# Patient Record
Sex: FEMALE | Race: WHITE | NOT HISPANIC OR LATINO | Employment: FULL TIME | URBAN - METROPOLITAN AREA
[De-identification: names, ages, dates, MRNs, and addresses within clinical notes are randomized per-mention and may not be internally consistent; named-entity substitution may affect disease eponyms.]

---

## 2017-04-28 ENCOUNTER — OFFICE VISIT (OUTPATIENT)
Dept: INTERNAL MEDICINE | Facility: CLINIC | Age: 42
End: 2017-04-28
Payer: COMMERCIAL

## 2017-04-28 VITALS
SYSTOLIC BLOOD PRESSURE: 99 MMHG | TEMPERATURE: 98.1 F | OXYGEN SATURATION: 98 % | HEART RATE: 75 BPM | DIASTOLIC BLOOD PRESSURE: 68 MMHG | WEIGHT: 124 LBS | BODY MASS INDEX: 22.68 KG/M2

## 2017-04-28 DIAGNOSIS — Z12.31 ENCOUNTER FOR SCREENING MAMMOGRAM FOR BREAST CANCER: ICD-10-CM

## 2017-04-28 DIAGNOSIS — Z12.4 CERVICAL CANCER SCREENING: ICD-10-CM

## 2017-04-28 DIAGNOSIS — M70.61 TROCHANTERIC BURSITIS OF RIGHT HIP: ICD-10-CM

## 2017-04-28 DIAGNOSIS — M25.551 HIP PAIN, RIGHT: Primary | ICD-10-CM

## 2017-04-28 PROCEDURE — 87624 HPV HI-RISK TYP POOLED RSLT: CPT | Performed by: NURSE PRACTITIONER

## 2017-04-28 PROCEDURE — G0145 SCR C/V CYTO,THINLAYER,RESCR: HCPCS | Performed by: NURSE PRACTITIONER

## 2017-04-28 PROCEDURE — 99213 OFFICE O/P EST LOW 20 MIN: CPT | Performed by: NURSE PRACTITIONER

## 2017-04-28 ASSESSMENT — PAIN SCALES - GENERAL: PAINLEVEL: MILD PAIN (3)

## 2017-04-28 NOTE — PATIENT INSTRUCTIONS
Take ibuprofen three times daily for 3 days then as needed for pain.     Rest, gentle stretching    Can schedule physical therapy in 1-2 weeks

## 2017-04-28 NOTE — PROGRESS NOTES
SUBJECTIVE:                                                    Lisset MITCHELL Chi is a 41 year old female who presents to clinic today for the following health issues:      Joint Pain     Onset: 2 weeks    Description:   Location: left hip and right hip  Character: Sharp and Dull ache    Intensity: mild to moderate    Progression of Symptoms: same, intermittent    Accompanying Signs & Symptoms:  Other symptoms: weakness of hip   History:   Previous similar pain: no       Precipitating factors:   Trauma or overuse: YES- overuse    Alleviating factors:  Improved by: rest/inactivity       Therapies Tried and outcome:     Right hip pain  Denies injury or trauma  Possible from overuse, been doing fitbit a lot lately  Right hip occasionally radiates to right buttocks  Denies paresthesias  Has not taken anything for the pain  Has not had similar pain before  Pain worse after period of immobility  Not worse during certain times of the day  Pain relieved with standing        Problem list and histories reviewed & adjusted, as indicated.  Additional history: none    Patient Active Problem List   Diagnosis     Irritable bowel syndrome     Premature ovarian failure     CARDIOVASCULAR SCREENING; LDL GOAL LESS THAN 160     Mild persistent asthma     Past Surgical History:   Procedure Laterality Date      SECTION           HC DILATION/CURETTAGE DIAG/THER NON OB  2003    D & C for retained placenta one week after the delivery     I&D BARTHOLIN GLAND ABSCESS   and     [facility] cath      KNEE SURGERY      Rt knee Fx, repair-dislocation problem     KNEE SURGERY      Lt knee reconstructive surgery-dislocation problem     LEEP TX, CERVICAL      LEEP for abnormal pap     MARSUP BARTHOLIN GLAND CYST  08       Social History   Substance Use Topics     Smoking status: Never Smoker     Smokeless tobacco: Never Used     Alcohol use Yes      Comment: social     Family History   Problem Relation Age  of Onset     Thyroid Disease Mother      DIABETES Mother      type2     Eye Disorder Mother      cataracts     GASTROINTESTINAL DISEASE Mother      hep a/has to have her throat stretched     Respiratory Mother      sleep apnea     HEART DISEASE Father 60     C.A.D. Paternal Grandfather 30     age 30, then again in his 80's     DIABETES Maternal Grandfather      CANCER Maternal Grandfather      Thyroid Disease Maternal Grandmother      Gynecology Maternal Grandmother      on bcp to keep period regular, a small uterus     HEART DISEASE Maternal Grandmother      tachycardia     Allergies Daughter      milk     Asthma Daughter            Reviewed and updated as needed this visit by clinical staff  Tobacco  Allergies  Meds  Med Hx  Surg Hx  Fam Hx  Soc Hx      Reviewed and updated as needed this visit by Provider         ROS:  Noncontributory except as above    OBJECTIVE:                                                    BP 99/68 (BP Location: Right arm, Patient Position: Chair, Cuff Size: Adult Regular)  Pulse 75  Temp 98.1  F (36.7  C) (Oral)  Wt 124 lb (56.2 kg)  LMP 12/25/2013  SpO2 98%  Breastfeeding? No  BMI 22.68 kg/m2  Body mass index is 22.68 kg/(m^2).  GENERAL: healthy, alert and no distress  MS: Gait steady without assistive device. Pain to palpation right greater trochanter. No swelling. No pain to palpation lumbar spine or SI joint. Pain with flexion and abduction right hip but no limited ROM.   SKIN: no suspicious lesions or rashes  NEURO: Normal strength and tone, mentation intact and speech normal    Diagnostic Test Results:  none      ASSESSMENT/PLAN:                                                        ICD-10-CM    1. Hip pain, right M25.551    2. Trochanteric bursitis of right hip M70.61 [facility] REFERRAL   3. Cervical cancer screening Z12.4 Pap imaged thin layer screen with HPV - recommended age 30 - 65 years (select HPV order below)     HPV High Risk Types DNA  Cervical   4. Encounter for screening mammogram for breast cancer Z12.31 MA Screening Digital Bilateral       Discussed conservative treatment options for bursitis. Recommend rest, ice, over the counter NSAID. Could benefit from steroid injection but she would prefer more conservative options first. If pain continues, recommend physical therapy. If no improvement despite the above measures, consider imaging    JOSE Ferrell CNP  [facility]

## 2017-04-28 NOTE — PROGRESS NOTES
"Chief Complaint   Patient presents with     Musculoskeletal Problem     Hiop pain       Initial BP 99/68 (BP Location: Right arm, Patient Position: Chair, Cuff Size: Adult Regular)  Pulse 75  Temp 98.1  F (36.7  C) (Oral)  Wt 124 lb (56.2 kg)  LMP 12/25/2013  SpO2 98%  Breastfeeding? No  BMI 22.68 kg/m2 Estimated body mass index is 22.68 kg/(m^2) as calculated from the following:    Height as of 10/12/16: 5' 2\" (1.575 m).    Weight as of this encounter: 124 lb (56.2 kg).  Medication Reconciliation: complete.  JAVI Hinton MA      "

## 2017-04-28 NOTE — MR AVS SNAPSHOT
After Visit Summary   4/28/2017    Lisset MITCHELL Chi    MRN: [id number]           Patient Information     Date Of Birth          1975        Visit Information        Provider Department      4/28/2017 9:20 AM Shannon Powell APRN [facility]        Today's Diagnoses     Hip pain, right    -  1    Trochanteric bursitis of right hip        Cervical cancer screening        Encounter for screening mammogram for breast cancer          Care Instructions    Take ibuprofen three times daily for 3 days then as needed for pain.     Rest, gentle stretching    Can schedule physical therapy in 1-2 weeks          Follow-ups after your visit        Additional Services     [facility] REFERRAL       **This order will print in the [facility] Scheduling Office**    Physical Therapy, Hand Therapy and Chiropractic Care are available through:    *[facility]  *[facility]  *[facility]    Call one number to schedule at any of the above locations: [phone].    Your provider has referred you to: Physical Therapy at [facility] or [facility]    Indication/Reason for Referral: Hip Pain  Onset of Illness: 2 weeks  Therapy Orders: Evaluate and Treat  Special Programs: None  Special Request: None    Avril Mackenzie      Additional Comments for the Therapist or Chiropractor:     Please be aware that coverage of these services is subject to the terms and limitations of your health insurance plan.  Call member services at your health plan with any benefit or coverage questions.      Please bring the following to your appointment:    *Your personal calendar for scheduling future appointments  *Comfortable clothing                  Future tests that were ordered for you today     Open Future Orders        Priority Expected Expires Ordered    MA Screening Digital Bilateral Routine  4/28/2018 4/28/2017            Who to contact     If you have  questions or need follow up information about today's clinic visit or your schedule please contact [facility] directly at [phone].  Normal or non-critical lab and imaging results will be communicated to you by MyChart, letter or phone within 4 business days after the clinic has received the results. If you do not hear from us within 7 days, please contact the clinic through [employer] or phone. If you have a critical or abnormal lab result, we will notify you by phone as soon as possible.  Submit refill requests through [employer] or call your pharmacy and they will forward the refill request to us. Please allow 3 business days for your refill to be completed.          Additional Information About Your Visit        [employer] Information     [employer] gives you secure access to your electronic health record. If you see a primary care provider, you can also send messages to your care team and make appointments. If you have questions, please call your primary care clinic.  If you do not have a primary care provider, please call [phone] and they will assist you.        Care EveryWhere ID     This is your Care EveryWhere ID. This could be used by other organizations to access your [place] medical records  [id number]        Your Vitals Were     Pulse Temperature Last Period Pulse Oximetry Breastfeeding? BMI (Body Mass Index)    75 98.1  F (36.7  C) (Oral) 12/25/2013 98% No 22.68 kg/m2       Blood Pressure from Last 3 Encounters:   04/28/17 99/68   10/12/16 108/64   02/16/16 100/58    Weight from Last 3 Encounters:   04/28/17 124 lb (56.2 kg)   10/12/16 126 lb (57.2 kg)   02/16/16 126 lb (57.2 kg)              We Performed the Following     HPV High Risk Types DNA Cervical     [facility] REFERRAL     Pap imaged thin layer screen with HPV - recommended age 30 - 65 years (select HPV order below)        Primary Care Provider Office Phone # Fax #    Lashanda Briones PA-C  [phone]       [facility] [address] [facility] [address] [address]        Thank you!     Thank you for choosing [facility]  for your care. Our goal is always to provide you with excellent care. Hearing back from our patients is one way we can continue to improve our services. Please take a few minutes to complete the written survey that you may receive in the mail after your visit with us. Thank you!             Your Updated Medication List - Protect others around you: Learn how to safely use, store and throw away your medicines at www.disposemymeds.org.          This list is accurate as of: 4/28/17 10:12 AM.  Always use your most recent med list.                   Brand Name Dispense Instructions for use    albuterol 108 (90 BASE) MCG/ACT Inhaler    PROAIR HFA/PROVENTIL HFA/VENTOLIN HFA    3 Inhaler    Inhale 2 puffs into the lungs 2 times daily as needed for shortness of breath / dyspnea       CALCIUM + D PO      Take 600 mg by mouth.       fexofenadine 180 MG tablet    ALLEGRA     1 TABLET DAILY       fluticasone 50 MCG/ACT spray    FLONASE     Spray 2 sprays into both nostrils daily       LAMISIL AF DEFENSE 1 % Aerp   Generic drug:  tolnaftate      Reported on 4/28/2017       Turmeric Curcumin 500 MG Caps      Take 500 mg by mouth daily       VITAMIN D (CHOLECALCIFEROL) PO      Take by mouth daily

## 2017-04-29 ASSESSMENT — ASTHMA QUESTIONNAIRES: ACT_TOTALSCORE: 22

## 2017-05-02 LAB
COPATH REPORT: NORMAL
PAP: NORMAL

## 2017-05-04 LAB
FINAL DIAGNOSIS: NORMAL
HPV HR 12 DNA CVX QL NAA+PROBE: NEGATIVE
HPV16 DNA SPEC QL NAA+PROBE: NEGATIVE
HPV18 DNA SPEC QL NAA+PROBE: NEGATIVE
SPECIMEN DESCRIPTION: NORMAL

## 2017-05-09 ENCOUNTER — RADIANT APPOINTMENT (OUTPATIENT)
Dept: MAMMOGRAPHY | Facility: CLINIC | Age: 42
End: 2017-05-09
Attending: NURSE PRACTITIONER
Payer: COMMERCIAL

## 2017-05-09 DIAGNOSIS — Z12.31 VISIT FOR SCREENING MAMMOGRAM: ICD-10-CM

## 2017-05-09 PROCEDURE — G0202 SCR MAMMO BI INCL CAD: HCPCS | Mod: TC

## 2017-10-13 ENCOUNTER — OFFICE VISIT (OUTPATIENT)
Dept: FAMILY MEDICINE | Facility: CLINIC | Age: 42
End: 2017-10-13
Payer: COMMERCIAL

## 2017-10-13 VITALS
HEIGHT: 62 IN | BODY MASS INDEX: 21.9 KG/M2 | SYSTOLIC BLOOD PRESSURE: 118 MMHG | TEMPERATURE: 98.2 F | HEART RATE: 67 BPM | OXYGEN SATURATION: 100 % | WEIGHT: 119 LBS | DIASTOLIC BLOOD PRESSURE: 74 MMHG

## 2017-10-13 DIAGNOSIS — S93.401A SPRAIN OF RIGHT ANKLE, UNSPECIFIED LIGAMENT, INITIAL ENCOUNTER: ICD-10-CM

## 2017-10-13 DIAGNOSIS — M70.62 TROCHANTERIC BURSITIS OF BOTH HIPS: Primary | ICD-10-CM

## 2017-10-13 DIAGNOSIS — M25.571 ACUTE RIGHT ANKLE PAIN: ICD-10-CM

## 2017-10-13 DIAGNOSIS — M70.61 TROCHANTERIC BURSITIS OF BOTH HIPS: Primary | ICD-10-CM

## 2017-10-13 DIAGNOSIS — J45.31 MILD PERSISTENT ASTHMA WITH ACUTE EXACERBATION: ICD-10-CM

## 2017-10-13 PROCEDURE — 99214 OFFICE O/P EST MOD 30 MIN: CPT | Performed by: NURSE PRACTITIONER

## 2017-10-13 RX ORDER — FLUTICASONE PROPIONATE 110 UG/1
2 AEROSOL, METERED RESPIRATORY (INHALATION) 2 TIMES DAILY
Qty: 1 INHALER | Refills: 1 | Status: SHIPPED | OUTPATIENT
Start: 2017-10-13 | End: 2018-01-14

## 2017-10-13 RX ORDER — ALBUTEROL SULFATE 90 UG/1
2 AEROSOL, METERED RESPIRATORY (INHALATION) 2 TIMES DAILY PRN
Qty: 3 INHALER | Refills: 3 | Status: SHIPPED | OUTPATIENT
Start: 2017-10-13 | End: 2019-06-19

## 2017-10-13 ASSESSMENT — PAIN SCALES - GENERAL: PAINLEVEL: EXTREME PAIN (9)

## 2017-10-13 NOTE — LETTER
My Asthma Action Plan  Name: Lisset MITCHELL Chi   YOB: 1975  Date: 10/13/2017   My doctor: JOSE Ferrell CNP   My clinic: [facility]        My Control Medicine: Fluticasone propionate (Flovent) -   mcg 2 puffs twice daily  My Rescue Medicine: Albuterol (Proair/Ventolin/Proventil) inhaler 2 puffs BID as needed   My Asthma Severity: intermittent  Avoid your asthma triggers: animal dander               GREEN ZONE   Good Control    I feel good    No cough or wheeze    Can work, sleep and play without asthma symptoms       Take your asthma control medicine every day.     1. If exercise triggers your asthma, take your rescue medication    15 minutes before exercise or sports, and    During exercise if you have asthma symptoms  2. Spacer to use with inhaler: If you have a spacer, make sure to use it with your inhaler             YELLOW ZONE Getting Worse  I have ANY of these:    I do not feel good    Cough or wheeze    Chest feels tight    Wake up at night   1. Keep taking your Green Zone medications  2. Start taking your rescue medicine:    every 20 minutes for up to 1 hour. Then every 4 hours for 24-48 hours.  3. If you stay in the Yellow Zone for more than 12-24 hours, contact your doctor.  4. If you do not return to the Green Zone in 12-24 hours or you get worse, start taking your oral steroid medicine if prescribed by your provider.           RED ZONE Medical Alert - Get Help  I have ANY of these:    I feel awful    Medicine is not helping    Breathing getting harder    Trouble walking or talking    Nose opens wide to breathe       1. Take your rescue medicine NOW  2. If your provider has prescribed an oral steroid medicine, start taking it NOW  3. Call your doctor NOW  4. If you are still in the Red Zone after 20 minutes and you have not reached your doctor:    Take your rescue medicine again and    Call 911 or go to the emergency room right away    See your regular  doctor within 2 weeks of an Emergency Room or Urgent Care visit for follow-up treatment.        Electronically signed by: Shannon Powell, October 13, 2017    Annual Reminders:  Meet with Asthma Educator,  Flu Shot in the Fall, consider Pneumonia Vaccination for patients with asthma (aged 19 and older).    Pharmacy:    [facility] [address]  [facility] [address] [place] [address] [facility]                    Asthma Triggers  How To Control Things That Make Your Asthma Worse    Triggers are things that make your asthma worse.  Look at the list below to help you find your triggers and what you can do about them.  You can help prevent asthma flare-ups by staying away from your triggers.      Trigger                                                          What you can do   Cigarette Smoke  Tobacco smoke can make asthma worse. Do not allow smoking in your home, car or around you.  Be sure no one smokes at a child s day care or school.  If you smoke, ask your health care provider for ways to help you quit.  Ask family members to quit too.  Ask your health care provider for a referral to Quit Plan to help you quit smoking, or call 7-997-172-PLAN.     Colds, Flu, Bronchitis  These are common triggers of asthma. Wash your hands often.  Don t touch your eyes, nose or mouth.  Get a flu shot every year.     Dust Mites  These are tiny bugs that live in cloth or carpet. They are too small to see. Wash sheets and blankets in hot water every week.   Encase pillows and mattress in dust mite proof covers.  Avoid having carpet if you can. If you have carpet, vacuum weekly.   Use a dust mask and HEPA vacuum.   Pollen and Outdoor Mold  Some people are allergic to trees, grass, or weed pollen, or molds. Try to keep your windows closed.  Limit time out doors when pollen count is high.   Ask you health care provider about taking medicine during allergy  season.     Animal Dander  Some people are allergic to skin flakes, urine or saliva from pets with fur or feathers. Keep pets with fur or feathers out of your home.    If you can t keep the pet outdoors, then keep the pet out of your bedroom.  Keep the bedroom door closed.  Keep pets off cloth furniture and away from stuffed toys.     Mice, Rats, and Cockroaches  Some people are allergic to the waste from these pests.   Cover food and garbage.  Clean up spills and food crumbs.  Store grease in the refrigerator.   Keep food out of the bedroom.   Indoor Mold  This can be a trigger if your home has high moisture. Fix leaking faucets, pipes, or other sources of water.   Clean moldy surfaces.  Dehumidify basement if it is damp and smelly.   Smoke, Strong Odors, and Sprays  These can reduce air quality. Stay away from strong odors and sprays, such as perfume, powder, hair spray, paints, smoke incense, paint, cleaning products, candles and new carpet.   Exercise or Sports  Some people with asthma have this trigger. Be active!  Ask your doctor about taking medicine before sports or exercise to prevent symptoms.    Warm up for 5-10 minutes before and after sports or exercise.     Other Triggers of Asthma  Cold air:  Cover your nose and mouth with a scarf.  Sometimes laughing or crying can be a trigger.  Some medicines and food can trigger asthma.

## 2017-10-13 NOTE — PATIENT INSTRUCTIONS
Understanding Ankle Sprain    The ankle is the joint where the leg and foot meet. Bones are held in place by connective tissue called ligaments. When ankle ligaments are stretched to the point of pain and injury, it is called an ankle sprain. A sprain can tear the ligaments. These tears can be very small but still cause pain. Ankle sprains can be mild or severe.  What causes an ankle sprain?  A sprain may occur when you twist your ankle or bend it too far. This can happen when you stumble or fall. Things that can make an ankle sprain more likely include:    Having had an ankle sprain before    Playing sports that involve running and jumping. Or playing contact sports such as football or hockey.    Wearing shoes that don t support your feet and ankles well    Having ankles with poor strength and flexibility  Symptoms of an ankle sprain  Symptoms may include:    Pain or soreness in the ankle    Swelling    Redness or bruising    Not being able to walk or put weight on the affected foot    Reduced range of motion in the ankle    A popping or tearing feeling at the time the sprain occurs    An abnormal or dislocated look to the ankle    Instability or too much range of motion in the ankle  Treatment for an ankle sprain  Treatment focuses on reducing pain and swelling, and avoiding further injury. Treatments may include:    Resting the ankle. Avoid putting weight on it. This may mean using crutches until the sprain heals.    Prescription or over-the-counter pain medicines. These help reduce swelling and pain.    Cold packs. These help reduce pain and swelling.    Raising your ankle above your heart. This helps reduce swelling.    Wrapping the ankle with an elastic bandage or ankle brace. This helps reduce swelling and gives some support to the ankle. In rare cases, you may need a cast or boot.    Stretching and other exercises. These improve flexibility and strength.    Heat packs. These may be recommended before doing  ankle exercises.  Possible complications of an ankle sprain  An ankle that has been weakened by a sprain can be more likely to have repeated sprains afterward. Doing exercises to strengthen your ankle and improve balance can reduce your risk for repeated sprains. Other possible complications are long-term (chronic) pain or an ankle that remains unstable.  When to call your healthcare provider  Call your healthcare provider right away if you have any of these:    Fever of 100.4 F (38 C) or higher, or as directed    Pain, numbness, discoloration, or coldness in the foot or toes    Pain that gets worse    Symptoms that don t get better, or get worse    New symptoms   Date Last Reviewed: 3/10/2016    3895-7027 [employer]. [address] [place] [address], [place] [id number]. All rights reserved. This information is not intended as a substitute for professional medical care. Always follow your healthcare professional's instructions.        Treating Ankle Sprains  Treatment will depend on how bad your sprain is. For a severe sprain, healing may take 3 months or more.  Right after your injury: Use R.I.C.E.    Rest: At first, keep weight off the ankle as much as you can. You may be given crutches to help you walk without putting weight on the ankle.    Ice: Put an ice pack on the ankle for 15 minutes. Remove the pack and wait at least 30 minutes. Repeat for up to 3 days. This helps reduce swelling.    Compression: To reduce swelling and keep the joint stable, you may need to wrap the ankle with an elastic bandage. For more severe sprains, you may need an ankle brace or a cast.    Elevation: To reduce swelling, keep your ankle raised above your heart when you sit or lie down.  Medicine  Your healthcare provider may suggest oral non-steroidal anti-inflammatory medicine (NSAIDs), such as ibuprofen. This relieves the pain and helps reduce any swelling. Be sure to take your medicine as directed.  Contrast baths  After 3  days, soak your ankle in warm water for 30 seconds, then in cool water for 30 seconds. Go back and forth for 5 minutes. Doing this every 2 hours will help keep the swelling down.  Exercises    After about 2 to 3 weeks, you may be given exercises to strengthen the ligaments and muscles in the ankle. Doing these exercises will help prevent another ankle sprain. Exercises may include standing on your toes and then on your heels and doing ankle curls.  Ankle curls    Sit on the edge of a sturdy table or lie on your back.    Pull your toes toward you. Then point them away from you. Repeat for 2 to 3 minutes.   Date Last Reviewed: 9/28/2015 2000-2017 [employer]. [address] [place] [address], [place] [address]. All rights reserved. This information is not intended as a substitute for professional medical care. Always follow your healthcare professional's instructions.

## 2017-10-13 NOTE — MR AVS SNAPSHOT
After Visit Summary   10/13/2017    Lisset MITCHELL Chi    MRN: [id number]           Patient Information     Date Of Birth          1975        Visit Information        Provider Department      10/13/2017 9:40 AM Shannon Powell APRN [facility]        Today's Diagnoses     Trochanteric bursitis of both hips    -  1    Acute right ankle pain        Sprain of right ankle, unspecified ligament, initial encounter        Mild persistent asthma with acute exacerbation          Care Instructions      Understanding Ankle Sprain    The ankle is the joint where the leg and foot meet. Bones are held in place by connective tissue called ligaments. When ankle ligaments are stretched to the point of pain and injury, it is called an ankle sprain. A sprain can tear the ligaments. These tears can be very small but still cause pain. Ankle sprains can be mild or severe.  What causes an ankle sprain?  A sprain may occur when you twist your ankle or bend it too far. This can happen when you stumble or fall. Things that can make an ankle sprain more likely include:    Having had an ankle sprain before    Playing sports that involve running and jumping. Or playing contact sports such as football or hockey.    Wearing shoes that don t support your feet and ankles well    Having ankles with poor strength and flexibility  Symptoms of an ankle sprain  Symptoms may include:    Pain or soreness in the ankle    Swelling    Redness or bruising    Not being able to walk or put weight on the affected foot    Reduced range of motion in the ankle    A popping or tearing feeling at the time the sprain occurs    An abnormal or dislocated look to the ankle    Instability or too much range of motion in the ankle  Treatment for an ankle sprain  Treatment focuses on reducing pain and swelling, and avoiding further injury. Treatments may include:    Resting the ankle. Avoid putting weight on it. This may mean  using crutches until the sprain heals.    Prescription or over-the-counter pain medicines. These help reduce swelling and pain.    Cold packs. These help reduce pain and swelling.    Raising your ankle above your heart. This helps reduce swelling.    Wrapping the ankle with an elastic bandage or ankle brace. This helps reduce swelling and gives some support to the ankle. In rare cases, you may need a cast or boot.    Stretching and other exercises. These improve flexibility and strength.    Heat packs. These may be recommended before doing ankle exercises.  Possible complications of an ankle sprain  An ankle that has been weakened by a sprain can be more likely to have repeated sprains afterward. Doing exercises to strengthen your ankle and improve balance can reduce your risk for repeated sprains. Other possible complications are long-term (chronic) pain or an ankle that remains unstable.  When to call your healthcare provider  Call your healthcare provider right away if you have any of these:    Fever of 100.4 F (38 C) or higher, or as directed    Pain, numbness, discoloration, or coldness in the foot or toes    Pain that gets worse    Symptoms that don t get better, or get worse    New symptoms   Date Last Reviewed: 3/10/2016    3206-4278 [employer]. [address] [place] [address]. All rights reserved. This information is not intended as a substitute for professional medical care. Always follow your healthcare professional's instructions.        Treating Ankle Sprains  Treatment will depend on how bad your sprain is. For a severe sprain, healing may take 3 months or more.  Right after your injury: Use R.I.C.E.    Rest: At first, keep weight off the ankle as much as you can. You may be given crutches to help you walk without putting weight on the ankle.    Ice: Put an ice pack on the ankle for 15 minutes. Remove the pack and wait at least 30 minutes. Repeat for up to 3 days. This helps  reduce swelling.    Compression: To reduce swelling and keep the joint stable, you may need to wrap the ankle with an elastic bandage. For more severe sprains, you may need an ankle brace or a cast.    Elevation: To reduce swelling, keep your ankle raised above your heart when you sit or lie down.  Medicine  Your healthcare provider may suggest oral non-steroidal anti-inflammatory medicine (NSAIDs), such as ibuprofen. This relieves the pain and helps reduce any swelling. Be sure to take your medicine as directed.  Contrast baths  After 3 days, soak your ankle in warm water for 30 seconds, then in cool water for 30 seconds. Go back and forth for 5 minutes. Doing this every 2 hours will help keep the swelling down.  Exercises    After about 2 to 3 weeks, you may be given exercises to strengthen the ligaments and muscles in the ankle. Doing these exercises will help prevent another ankle sprain. Exercises may include standing on your toes and then on your heels and doing ankle curls.  Ankle curls    Sit on the edge of a sturdy table or lie on your back.    Pull your toes toward you. Then point them away from you. Repeat for 2 to 3 minutes.   Date Last Reviewed: 9/28/2015 2000-2017 [employer]. [address] [place] [address], [place] [address]. All rights reserved. This information is not intended as a substitute for professional medical care. Always follow your healthcare professional's instructions.                Follow-ups after your visit        Additional Services     ORTHO  REFERRAL       [facility] is referring you to the Orthopedic  Services at [facility].       The  Representative will assist you in the coordination of your Orthopedic and Musculoskeletal Care as prescribed by your physician.    The  Representative will call you within 1 business day to help schedule your appointment, or you may contact the   Representative at:    All areas ~ [phone]     Type of Referral : Surgical / Specialist       Timeframe requested: Routine    Coverage of these services is subject to the terms and limitations of your health insurance plan.  Please call member services at your health plan with any benefit or coverage questions.      If X-rays, CT or MRI's have been performed, please contact the facility where they were done to arrange for , prior to your scheduled appointment.  Please bring this referral request to your appointment and present it to your specialist.                  Who to contact     If you have questions or need follow up information about today's clinic visit or your schedule please contact [facility] directly at [phone].  Normal or non-critical lab and imaging results will be communicated to you by MyChart, letter or phone within 4 business days after the clinic has received the results. If you do not hear from us within 7 days, please contact the clinic through [employer] or phone. If you have a critical or abnormal lab result, we will notify you by phone as soon as possible.  Submit refill requests through [facility] or call your pharmacy and they will forward the refill request to us. Please allow 3 business days for your refill to be completed.          Additional Information About Your Visit        [facility] Information     [facility] gives you secure access to your electronic health record. If you see a primary care provider, you can also send messages to your care team and make appointments. If you have questions, please call your primary care clinic.  If you do not have a primary care provider, please call [phone] and they will assist you.        Care EveryWhere ID     This is your Care EveryWhere ID. This could be used by other organizations to access your [facility] medical records  [id number]        Your Vitals Were     Pulse Temperature Height Last Period Pulse  "Oximetry BMI (Body Mass Index)    67 98.2  F (36.8  C) (Oral) 5' 1.81\" (1.57 m) 12/25/2013 100% 21.9 kg/m2       Blood Pressure from Last 3 Encounters:   10/13/17 118/74   04/28/17 99/68   10/12/16 108/64    Weight from Last 3 Encounters:   10/13/17 119 lb (54 kg)   04/28/17 124 lb (56.2 kg)   10/12/16 126 lb (57.2 kg)              We Performed the Following     ORTHO  REFERRAL          Today's Medication Changes          These changes are accurate as of: 10/13/17 11:01 AM.  If you have any questions, ask your nurse or doctor.               Start taking these medicines.        Dose/Directions    fluticasone 110 MCG/ACT Inhaler   Commonly known as:  FLOVENT HFA   Used for:  Mild persistent asthma with acute exacerbation   Started by:  Shannon Powell APRN CNP        Dose:  2 puff   Inhale 2 puffs into the lungs 2 times daily   Quantity:  1 Inhaler   Refills:  1            Where to get your medicines      These medications were sent to [place] [address] [place] [id number] [facility]  [address] [facility] [address]     Phone:  [phone]     albuterol 108 (90 BASE) MCG/ACT Inhaler    fluticasone 110 MCG/ACT Inhaler                Primary Care Provider Office Phone # Fax #    Lashanda Briones PA-C [phone]       [facility] [facility] [address]        Equal Access to Services     YUMIKO CAO AH: Hadyenifer poeo Soomaali, waaxda luqadaha, qaybta kaalmada adeegyada, francisco taylor. So [facility] [phone].    ATENCIÓN: Si habla español, tiene a lopes disposición servicios gratuitos de asistencia lingüística. Ozzy al [phone].    We comply with applicable federal civil rights laws and [place] laws. We do not discriminate on the basis of race, color, national origin, age, disability, sex, sexual orientation, or gender identity.            Thank you!     Thank you for choosing [facility]  for your " care. Our goal is always to provide you with excellent care. Hearing back from our patients is one way we can continue to improve our services. Please take a few minutes to complete the written survey that you may receive in the mail after your visit with us. Thank you!             Your Updated Medication List - Protect others around you: Learn how to safely use, store and throw away your medicines at www.disposemymeds.org.          This list is accurate as of: 10/13/17 11:01 AM.  Always use your most recent med list.                   Brand Name Dispense Instructions for use Diagnosis    albuterol 108 (90 BASE) MCG/ACT Inhaler    PROAIR HFA/PROVENTIL HFA/VENTOLIN HFA    3 Inhaler    Inhale 2 puffs into the lungs 2 times daily as needed for shortness of breath / dyspnea    Mild persistent asthma with acute exacerbation       CALCIUM + D PO      Take 600 mg by mouth.        fexofenadine 180 MG tablet    ALLEGRA     1 TABLET DAILY        fluticasone 110 MCG/ACT Inhaler    FLOVENT HFA    1 Inhaler    Inhale 2 puffs into the lungs 2 times daily    Mild persistent asthma with acute exacerbation       fluticasone 50 MCG/ACT spray    FLONASE     Spray 2 sprays into both nostrils daily        Turmeric Curcumin 500 MG Caps      Take 500 mg by mouth daily        VITAMIN B12 PO           VITAMIN D (CHOLECALCIFEROL) PO      Take by mouth daily

## 2017-10-13 NOTE — NURSING NOTE
"Chief Complaint   Patient presents with     Ankle/Foot right     Twisted ankle yesterday, fall       Initial /74 (BP Location: Left arm, Patient Position: Chair, Cuff Size: Adult Small)  Pulse 67  Temp 98.2  F (36.8  C) (Oral)  Ht 5' 1.81\" (1.57 m)  Wt 119 lb (54 kg)  LMP 12/25/2013  SpO2 100%  BMI 21.9 kg/m2 Estimated body mass index is 21.9 kg/(m^2) as calculated from the following:    Height as of this encounter: 5' 1.81\" (1.57 m).    Weight as of this encounter: 119 lb (54 kg).  Medication Reconciliation: complete   ACT was given to the patient to be completed.    Pamela Snow CMA     "

## 2017-10-13 NOTE — PROGRESS NOTES
SUBJECTIVE:   Lisset MITCHELL Chi is a 41 year old female who presents to clinic today for the following health issues:      Joint Pain    Onset: Yesterday    Description:   Location: right ankle  Character: Sharp when turning it a certain direction    Intensity: 9/10    Progression of Symptoms: same    Accompanying Signs & Symptoms:  Other symptoms: tingling    History:   Previous similar pain: YES      Precipitating factors:     Trauma or overuse: YES fall    Alleviating factors:  Improved by: rest/inactivity, ice and support wrap    Therapies Tried and outcome: Support wrap, Ibuprofen    She missed a step yesterday and twisted right ankle  Inversion injury  She is taking ibuprofen for pain which helps  She immobilized with ACE warp  No bruising    Complains of geneneralized body aches since the fall  Seen in past for bilateral hip bursitis  Did not do physical therapy  Questions whether she may need surgery in the future  Not interested in steroid injection    Requests albuterol refill and QVAR  ACT score: 20  She is not currently using ICS but notices that her symptoms worsen in the winter when they bring the animals in from outside  Was last on Fluticasone    Problem list and histories reviewed & adjusted, as indicated.  Additional history: none    Patient Active Problem List   Diagnosis     Irritable bowel syndrome     Premature ovarian failure     CARDIOVASCULAR SCREENING; LDL GOAL LESS THAN 160     Mild persistent asthma     Past Surgical History:   Procedure Laterality Date      SECTION           HC DILATION/CURETTAGE DIAG/THER NON OB  2003    D & C for retained placenta one week after the delivery     I&D BARTHOLIN GLAND ABSCESS   and     mcgrath cath      KNEE SURGERY      Rt knee Fx, repair-dislocation problem     KNEE SURGERY      Lt knee reconstructive surgery-dislocation problem     LEEP TX, CERVICAL      LEEP for abnormal pap     MARSUP BARTHOLIN GLAND CYST   "4/25/08       Social History   Substance Use Topics     Smoking status: Never Smoker     Smokeless tobacco: Never Used     Alcohol use Yes      Comment: social     Family History   Problem Relation Age of Onset     Thyroid Disease Mother      DIABETES Mother      type2     Eye Disorder Mother      cataracts     GASTROINTESTINAL DISEASE Mother      hep a/has to have her throat stretched     Respiratory Mother      sleep apnea     HEART DISEASE Father 60     C.A.D. Paternal Grandfather 30     age 30, then again in his 80's     DIABETES Maternal Grandfather      CANCER Maternal Grandfather      Thyroid Disease Maternal Grandmother      Gynecology Maternal Grandmother      on bcp to keep period regular, a small uterus     HEART DISEASE Maternal Grandmother      tachycardia     Allergies Daughter      milk     Asthma Daughter              Reviewed and updated as needed this visit by clinical staffTobacco  Allergies  Med Hx  Surg Hx  Fam Hx  Soc Hx      Reviewed and updated as needed this visit by Provider         ROS:  Constitutional, HEENT, cardiovascular, pulmonary, gi and gu systems are negative, except as otherwise noted.      OBJECTIVE:   /74 (BP Location: Left arm, Patient Position: Chair, Cuff Size: Adult Small)  Pulse 67  Temp 98.2  F (36.8  C) (Oral)  Ht 5' 1.81\" (1.57 m)  Wt 119 lb (54 kg)  LMP 12/25/2013  SpO2 100%  BMI 21.9 kg/m2  Body mass index is 21.9 kg/(m^2).  GENERAL: healthy, alert and no distress  RESP: lungs clear to auscultation - no rales, rhonchi or wheezes  MS: Gait steady. Able to bear weight. No pain to lateral or medial malleolus. No swelling. Pain to right lateral ankle. Plantar/dorsiflexion 5/5 and symmetric. ROM intact. Pain with ankle inversion.   SKIN: no suspicious lesions or rashes  NEURO: Normal strength and tone, mentation intact and speech normal, sensation grossly intact    Diagnostic Test Results:  none     ASSESSMENT/PLAN:   1. Acute right ankle pain    2. Sprain " of right ankle, unspecified ligament, initial encounter  - Reviewed RICE and stretching exercises and handout given  - May take ibuprofen 600 mg every 6 hours as needed for pain    3. Mild persistent asthma with acute exacerbation  - Well controlled. Will restart ICS for patient reported worsening of symptoms d/t allergies  - albuterol (PROAIR HFA/PROVENTIL HFA/VENTOLIN HFA) 108 (90 BASE) MCG/ACT Inhaler; Inhale 2 puffs into the lungs 2 times daily as needed for shortness of breath / dyspnea  Dispense: 3 Inhaler; Refill: 3  - fluticasone (FLOVENT HFA) 110 MCG/ACT Inhaler; Inhale 2 puffs into the lungs 2 times daily  Dispense: 1 Inhaler; Refill: 1   - Asthma Action Plan    4. Trochanteric bursitis of both hips  - Had previously recommended physical therapy which patient did not schedule. Also discussed injection. She would like ortho consult  - ORTHO  REFERRAL  - diclofenac (VOLTAREN) 1 % GEL topical gel; Apply 4 grams to hips four times daily as needed using enclosed dosing card.  Dispense: 100 g; Refill: 1    See Patient Instructions    JOSE Ferrell [facility]

## 2017-11-14 ENCOUNTER — OFFICE VISIT (OUTPATIENT)
Dept: ORTHOPEDICS | Facility: CLINIC | Age: 42
End: 2017-11-14
Payer: COMMERCIAL

## 2017-11-14 VITALS — HEIGHT: 62 IN | BODY MASS INDEX: 21.9 KG/M2 | TEMPERATURE: 98 F | RESPIRATION RATE: 18 BRPM | WEIGHT: 119 LBS

## 2017-11-14 DIAGNOSIS — M70.61 TROCHANTERIC BURSITIS OF BOTH HIPS: Primary | ICD-10-CM

## 2017-11-14 DIAGNOSIS — M70.62 TROCHANTERIC BURSITIS OF BOTH HIPS: Primary | ICD-10-CM

## 2017-11-14 PROCEDURE — 99203 OFFICE O/P NEW LOW 30 MIN: CPT | Performed by: ORTHOPAEDIC SURGERY

## 2017-11-14 NOTE — NURSING NOTE
"Chief Complaint   Patient presents with     Consult     Bilateral hip pain R>L for the last 2 months. Pain level 4/10 dull, achy and episodic. Ibuprofen makes the pain better and going up the stairs, sitting for long periods and lying on that side makes the pain worse.       Initial Temp 98  F (36.7  C)  Resp 18  Ht 1.575 m (5' 2\")  Wt 54 kg (119 lb)  LMP 12/25/2013  BMI 21.77 kg/m2 Estimated body mass index is 21.77 kg/(m^2) as calculated from the following:    Height as of this encounter: 1.575 m (5' 2\").    Weight as of this encounter: 54 kg (119 lb).  Medication Reconciliation: nilam Lorenzo MA      "

## 2017-11-14 NOTE — LETTER
2017         RE: Lisset MITCHELL Chi  [address] [facility] [address]        Dear Colleague,    Thank you for referring your patient, Lisset MITCHELL Chi, to the [facility]. Please see a copy of my visit note below.    Lisset MITCHELL Chi is a 42 year old female who is seen in consultation at the request of Shannon Powell CNP  for bilateral hip pain.    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     Premature ovarian failure      Raynaud's disease 3/08       Past Surgical History:   Procedure Laterality Date      SECTION           HC DILATION/CURETTAGE DIAG/THER NON OB  2003    D & C for retained placenta one week after the delivery     I&D BARTHOLIN GLAND ABSCESS   and     mcgrath cath      KNEE SURGERY      Rt knee Fx, repair-dislocation problem     KNEE SURGERY      Lt knee reconstructive surgery-dislocation problem     LEEP TX, CERVICAL      LEEP for abnormal pap     MARSUP BARTHOLIN GLAND CYST  08       Family History   Problem Relation Age of Onset     Thyroid Disease Mother      DIABETES Mother      type2     Eye Disorder Mother      cataracts     GASTROINTESTINAL DISEASE Mother      hep a/has to have her throat stretched     Respiratory Mother      sleep apnea     HEART DISEASE Father 60     C.A.D. Paternal Grandfather 30     age 30, then again in his 80's     DIABETES Maternal Grandfather      CANCER Maternal Grandfather      Thyroid Disease Maternal Grandmother      Gynecology Maternal Grandmother      on bcp to keep period regular, a small uterus     HEART DISEASE Maternal Grandmother      tachycardia     Allergies Daughter      milk     Asthma Daughter        Social History     Social History     Marital status:      Spouse name: N/A     Number of children: 2      Years of education: N/A     Occupational History      [employer]     Social History Main Topics     Smoking status: Never Smoker     Smokeless tobacco: Never Used     Alcohol use Yes      Comment: social     Drug use: No     Sexual activity: Yes     Partners: Male     Birth control/ protection: Surgical      Comment:  had vasectomy     Other Topics Concern      Service No     Caffeine Concern No     Occupational Exposure No     Sleep Concern No     Stress Concern Yes     Weight Concern Yes     gains easily     Special Diet No     Back Care Yes     Exercise Yes     irregularly     Bike Helmet Not Asked     doesn't bike     Seat Belt No     Self-Exams No     Parent/Sibling W/ Cabg, Mi Or Angioplasty Before 65f 55m? No     Social History Narrative    Caffeine intake/servings daily - 0-1    Calcium intake/servings daily - 2-3    Exercise 2-3  times weekly - describe aerobics    Sunscreen used - Yes    Seatbelts used - Yes    Guns stored in the home - No    Self Breast Exam - Yes    Pap test up to date -  Yes    Eye exam up to date -  Yes    Dental exam up to date -  Yes    DEXA scan up to date -  Not Applicable    Flex Sig/Colonoscopy up to date -  Not Applicable    Mammography up to date -  Not Applicable    Immunizations reviewed and up to date - Yes    Abuse: Current or Past (Physical, Sexual or Emotional) - None current, past    Do you feel safe in your environment - Yes    Do you cope well with stress - Yes    Do you suffer from insomnia - No    Last updated by: Estefani Moraes MA 5/5/2010               Current Outpatient Prescriptions   Medication Sig Dispense Refill     Cyanocobalamin (VITAMIN B12 PO)        albuterol (PROAIR HFA/PROVENTIL HFA/VENTOLIN HFA) 108 (90 BASE) MCG/ACT Inhaler Inhale 2 puffs into the lungs 2 times daily as needed for shortness of breath / dyspnea 3 Inhaler 3     fluticasone (FLOVENT HFA) 110 MCG/ACT Inhaler Inhale 2 puffs into the lungs 2 times daily 1 Inhaler 1  "    VITAMIN D, CHOLECALCIFEROL, PO Take by mouth daily       Turmeric Curcumin 500 MG CAPS Take 500 mg by mouth daily       fluticasone (FLONASE) 50 MCG/ACT nasal spray Spray 2 sprays into both nostrils daily       Calcium Carbonate-Vitamin D (CALCIUM + D PO) Take 600 mg by mouth.       FEXOFENADINE  MG OR TABS 1 TABLET DAILY          Allergies   Allergen Reactions     No Known Allergies        REVIEW OF SYSTEMS:  CONSTITUTIONAL:  NEGATIVE for fever, chills, change in weight, not feeling tired  SKIN:  NEGATIVE for worrisome rashes, no skin lumps, no skin ulcers and no non-healing wounds  EYES:  NEGATIVE for vision changes or irritation.  ENT/MOUTH:  NEGATIVE.  No hearing loss, no hoarseness, no difficulty swallowing.  RESP:  NEGATIVE. No cough or shortness of breath.  CV:  NEGATIVE for chest pain, palpitations or peripheral edema  GI:  NEGATIVE for nausea, abdominal pain, heartburn, or change in bowel habits  :  Negative. No dysuria, no hematuria  MUSCULOSKELETAL:  See HPI above  NEURO:  NEGATIVE . No headaches, no dizziness,  no numbness  ENDOCRINE:  NEGATIVE for temperature intolerance, skin/hair changes  HEME/ALLERGY/IMMUNE:  NEGATIVE for bleeding problems  PSYCHIATRIC:  NEGATIVE. no anxiety, no depression.     Exam:  Vitals: Temp 98  F (36.7  C)  Resp 18  Ht 1.575 m (5' 2\")  Wt 54 kg (119 lb)  LMP 12/25/2013  BMI 21.77 kg/m2  BMI= Body mass index is 21.77 kg/(m^2).  Constitutional:  healthy, alert and no distress  Neuro: Alert and Oriented x 3, Gait normal. Sensation grossly WNL.  Psych: Affect normal   Respiratory: Breathing not labored.  Cardiovascular: normal peripheral pulses  Lymph: no adenopathy  Skin: No rashes,worrisome lesions or skin problems      Again, thank you for allowing me to participate in the care of your patient.        Sincerely,        Floyd Duckworth MD    "

## 2017-11-14 NOTE — MR AVS SNAPSHOT
After Visit Summary   11/14/2017    Lisset MITCHELL Chi    MRN: [id number]           Patient Information     Date Of Birth          1975        Visit Information        Provider Department      11/14/2017 1:30 PM Floyd Duckworth MD [facility]        Care Instructions                     Trochanteric Bursitis           What is trochanteric bursitis?   Trochanteric bursitis is irritation or inflammation of the trochanteric bursa. A bursa is a fluid-filled sac that acts as a cushion between tendons, bones, and skin. The trochanteric bursa is located on the upper, outer area of the thigh. There is a bump on the outer side of the upper part of the thigh bone (femur) called the greater trochanter. The trochanteric bursa is located over the greater trochanter.   How does it occur?   The trochanteric bursa may be inflamed by a group of muscles or tendons rubbing over the bursa and causing friction against the thigh bone. This injury can occur with running, walking, or bicycling, especially when the bicycle seat is too high.   What are the symptoms?   You have pain on the upper outer area of your thigh or in your hip. The pain is worse when you walk, bicycle, or go up or down stairs. You have pain when you move your thigh bone and feel tenderness in the area over the greater trochanter.   How is it diagnosed?   Your healthcare provider will ask about your symptoms and examine your hip and thigh.   How is it treated?   To treat this condition:   Put an ice pack, gel pack, or package of frozen vegetables, wrapped in a cloth on the area every 3 to 4 hours, for up to 20 minutes at a time.   Sleep with a pillow between your legs.  Take an anti-inflammatory medicine such as ibuprofen, or other medicine as directed by your provider. Nonsteroidal anti-inflammatory medicines (NSAIDs) may cause stomach bleeding and other problems. These risks increase with age. Read the label and take as  directed. Unless recommended by your healthcare provider, do not take for more than 10 days.   Your provider may give you an injection of a corticosteroid medicine.   While you are recovering from your injury you will need to change your sport or activity to one that does not make your condition worse. For example, you may need to swim instead of running or bicycling. If you are bicycling, you may need to lower your bicycle seat.   How long do the effects last?   The length of recovery depends on many factors such as your age, health, and if you have had a previous injury. Recovery time also depends on the severity of the injury. A bursa that is only mildly inflamed and has just started to hurt may improve within a few weeks. A bursa that is significantly inflamed and has been painful for a long time may take up to a few months to improve. You need to stop doing the activities that cause pain until your bursa has healed. If you continue doing activities that cause pain, your symptoms will return and it will take longer to recover.   When can I return to my normal activities?   Everyone recovers from an injury at a different rate. Return to your activities depends on how soon your leg recovers, not by how many days or weeks it has been since your injury has occurred. In general, the longer you have symptoms before you start treatment, the longer it will take to get better. The goal of rehabilitation is to return to your normal activities as soon as is safely possible. If you return too soon you may worsen your injury.   You may safely return to your normal activities when, starting from the top of the list and progressing to the end, each of the following is true:   You have full range of motion in the injured leg compared to the uninjured leg.   You have full strength of the injured leg compared to the uninjured leg.   You can walk straight ahead without pain or limping.   How can I prevent trochanteric bursitis?    Trochanteric bursitis is best prevented by warming up properly and stretching the muscles on the outer side of your upper thigh.     Published by [employer] (formerly known [employer]).  This content is reviewed periodically and is subject to change as new health information becomes available. The information is intended to inform and educate and is not a replacement for medical evaluation, advice, diagnosis or treatment by a healthcare professional.   Written by Karlos West MD, for [employer] ([employer]).   ? 2010 [facility] and/or its affiliates. All Rights Reserved.   Copyright   Clinical Reference Systems 2011                     Stretches lower back, side of hip, and neck  1. Sit on floor with left leg straight out in front   2. Bend right leg, cross right foot over, place outside left knee   3. Bend left elbow and rest it outside right knee   4. Place right hand behind hips on floor   5. Turn head over right shoulder, rotate upper body right   6. Hold 10 to 15 seconds   7. Repeat on other side   8. Breathe in slowly               Trochanteric Bursitis Rehabilitation Exercises   You can begin stretching the muscles that run along the outside of your hip using the first exercise. You can do the strengthening exercises when the sharp pain lessens.                    Strengthening exercises:  Start abductor strengthening and begin the exercises demonstrated today.  Leg strengthening:  Hold onto the sink with painful leg toward the sink.  Lift painful leg out to touch the wall with the heel.  Lift 10-15 times, twice a day.                                  Step-up: Stand with the foot of your injured leg on a support 3 to 5 inches high (like a small step or block of wood). Keep your other foot flat on the floor. Shift your weight onto the injured leg on the support. Straighten your injured leg as the other leg comes off the floor. Return to the starting position by bending your injured leg and slowly lowering your uninjured leg back to the floor.  Do 3 sets of 10.   Clam exercise: Lie on your uninjured side with your hips and knees bent and feet together. Slowly raise your top leg toward the ceiling while keeping your heels touching each other. Hold for 2 seconds and lower slowly. Do 3 sets of 10 repetitions.   Iliotibial band stretch: Side-bending: Cross one leg in front of the other leg and lean in the opposite direction from the front leg. Reach the arm on the side of the back leg over your head while you do this. Hold this position for 15 to 30 seconds. Return to the starting position. Repeat 3 times and then switch legs and repeat the exercise.   Published by [employer].  This content is reviewed periodically and is subject to change as new health information becomes available. The information is intended to inform and educate and is not a replacement for medical evaluation, advice, diagnosis or treatment by a healthcare professional.   Written by Marichuy Erazo, MS, PT, and Akilah Turner PT, [facility], [facility], for [facility].   ? 2010 [facility] and/or its affiliates. All Rights Reserved.   Copyright   Clinical Reference Systems 2011  Adult Health Advisor                                     Follow-ups after your visit        Who to contact     If you have questions or need follow up information about today's clinic visit or your schedule please contact [facility] directly at [phone].  Normal or non-critical lab and imaging results will be communicated to you by MyChart, letter or phone within 4 business days after the clinic has received the results. If you do not hear from us within 7 days, please contact the clinic through MyChart or phone. If you have a critical or abnormal lab result, we will notify you by phone as soon as possible.  Submit refill requests through [employer] or call your pharmacy and they will forward the refill request to us. Please allow 3 business days for your refill to be completed.          Additional  "Information About Your Visit        MyChart Information     [employer] gives you secure access to your electronic health record. If you see a primary care provider, you can also send messages to your care team and make appointments. If you have questions, please call your primary care clinic.  If you do not have a primary care provider, please call [phone] and they will assist you.        Care EveryWhere ID     This is your Care EveryWhere ID. This could be used by other organizations to access your [facility] medical records  [id number]        Your Vitals Were     Temperature Respirations Height Last Period BMI (Body Mass Index)       98  F (36.7  C) 18 1.575 m (5' 2\") 12/25/2013 21.77 kg/m2        Blood Pressure from Last 3 Encounters:   10/13/17 118/74   04/28/17 99/68   10/12/16 108/64    Weight from Last 3 Encounters:   11/14/17 54 kg (119 lb)   10/13/17 54 kg (119 lb)   04/28/17 56.2 kg (124 lb)              Today, you had the following     No orders found for display       Primary Care Provider Office Phone # Fax #    Shannon ReyesJOSE [facility] [phone]       [address] [place], [place]. \Hospital [facility] and Outlook.\"" [id number]        Equal Access to Services     YUMIKO CAO : Hadii aad ku hadasho Soomaali, waaxda luqadaha, qaybta kaalmada adeegyada, waxay idiin hayrajeshn homero taylor. So [facility] [phone].    ATENCIÓN: Si habla español, tiene a lopes disposición servicios gratuitos de asistencia lingüística. Llashvin al [phone].    We comply with applicable federal civil rights laws and [place] laws. We do not discriminate on the basis of race, color, national origin, age, disability, sex, sexual orientation, or gender identity.            Thank you!     Thank you for choosing [facility]  for your care. Our goal is always to provide you with excellent care. Hearing back from our patients is one way we can continue to improve our services. Please take a few minutes to complete " the written survey that you may receive in the mail after your visit with us. Thank you!             Your Updated Medication List - Protect others around you: Learn how to safely use, store and throw away your medicines at www.disposemymeds.org.          This list is accurate as of: 11/14/17  2:10 PM.  Always use your most recent med list.                   Brand Name Dispense Instructions for use Diagnosis    albuterol 108 (90 BASE) MCG/ACT Inhaler    PROAIR HFA/PROVENTIL HFA/VENTOLIN HFA    3 Inhaler    Inhale 2 puffs into the lungs 2 times daily as needed for shortness of breath / dyspnea    Mild persistent asthma with acute exacerbation       CALCIUM + D PO      Take 600 mg by mouth.        fexofenadine 180 MG tablet    ALLEGRA     1 TABLET DAILY        fluticasone 110 MCG/ACT Inhaler    FLOVENT HFA    1 Inhaler    Inhale 2 puffs into the lungs 2 times daily    Mild persistent asthma with acute exacerbation       fluticasone 50 MCG/ACT spray    FLONASE     Spray 2 sprays into both nostrils daily        Turmeric Curcumin 500 MG Caps      Take 500 mg by mouth daily        VITAMIN B12 PO           VITAMIN D (CHOLECALCIFEROL) PO      Take by mouth daily

## 2017-11-14 NOTE — PATIENT INSTRUCTIONS
Trochanteric Bursitis           What is trochanteric bursitis?   Trochanteric bursitis is irritation or inflammation of the trochanteric bursa. A bursa is a fluid-filled sac that acts as a cushion between tendons, bones, and skin. The trochanteric bursa is located on the upper, outer area of the thigh. There is a bump on the outer side of the upper part of the thigh bone (femur) called the greater trochanter. The trochanteric bursa is located over the greater trochanter.   How does it occur?   The trochanteric bursa may be inflamed by a group of muscles or tendons rubbing over the bursa and causing friction against the thigh bone. This injury can occur with running, walking, or bicycling, especially when the bicycle seat is too high.   What are the symptoms?   You have pain on the upper outer area of your thigh or in your hip. The pain is worse when you walk, bicycle, or go up or down stairs. You have pain when you move your thigh bone and feel tenderness in the area over the greater trochanter.   How is it diagnosed?   Your healthcare provider will ask about your symptoms and examine your hip and thigh.   How is it treated?   To treat this condition:   Put an ice pack, gel pack, or package of frozen vegetables, wrapped in a cloth on the area every 3 to 4 hours, for up to 20 minutes at a time.   Sleep with a pillow between your legs.  Take an anti-inflammatory medicine such as ibuprofen, or other medicine as directed by your provider. Nonsteroidal anti-inflammatory medicines (NSAIDs) may cause stomach bleeding and other problems. These risks increase with age. Read the label and take as directed. Unless recommended by your healthcare provider, do not take for more than 10 days.   Your provider may give you an injection of a corticosteroid medicine.   While you are recovering from your injury you will need to change your sport or activity to one that does not make your condition worse. For example,  you may need to swim instead of running or bicycling. If you are bicycling, you may need to lower your bicycle seat.   How long do the effects last?   The length of recovery depends on many factors such as your age, health, and if you have had a previous injury. Recovery time also depends on the severity of the injury. A bursa that is only mildly inflamed and has just started to hurt may improve within a few weeks. A bursa that is significantly inflamed and has been painful for a long time may take up to a few months to improve. You need to stop doing the activities that cause pain until your bursa has healed. If you continue doing activities that cause pain, your symptoms will return and it will take longer to recover.   When can I return to my normal activities?   Everyone recovers from an injury at a different rate. Return to your activities depends on how soon your leg recovers, not by how many days or weeks it has been since your injury has occurred. In general, the longer you have symptoms before you start treatment, the longer it will take to get better. The goal of rehabilitation is to return to your normal activities as soon as is safely possible. If you return too soon you may worsen your injury.   You may safely return to your normal activities when, starting from the top of the list and progressing to the end, each of the following is true:   You have full range of motion in the injured leg compared to the uninjured leg.   You have full strength of the injured leg compared to the uninjured leg.   You can walk straight ahead without pain or limping.   How can I prevent trochanteric bursitis?   Trochanteric bursitis is best prevented by warming up properly and stretching the muscles on the outer side of your upper thigh.     Published by [employer].  This content is reviewed periodically and is subject to change as new health information becomes available. The information is intended to inform and educate  and is not a replacement for medical evaluation, advice, diagnosis or treatment by a healthcare professional.   Written by Karlos West MD, for [employer].   ? 2010 [employer] and/or its affiliates. All Rights Reserved.   Copyright   Clinical Reference Systems 2011                     Stretches lower back, side of hip, and neck  1. Sit on floor with left leg straight out in front   2. Bend right leg, cross right foot over, place outside left knee   3. Bend left elbow and rest it outside right knee   4. Place right hand behind hips on floor   5. Turn head over right shoulder, rotate upper body right   6. Hold 10 to 15 seconds   7. Repeat on other side   8. Breathe in slowly               Trochanteric Bursitis Rehabilitation Exercises   You can begin stretching the muscles that run along the outside of your hip using the first exercise. You can do the strengthening exercises when the sharp pain lessens.                    Strengthening exercises:  Start abductor strengthening and begin the exercises demonstrated today.  Leg strengthening:  Hold onto the sink with painful leg toward the sink.  Lift painful leg out to touch the wall with the heel.  Lift 10-15 times, twice a day.                                  Step-up: Stand with the foot of your injured leg on a support 3 to 5 inches high (like a small step or block of wood). Keep your other foot flat on the floor. Shift your weight onto the injured leg on the support. Straighten your injured leg as the other leg comes off the floor. Return to the starting position by bending your injured leg and slowly lowering your uninjured leg back to the floor. Do 3 sets of 10.   Clam exercise: Lie on your uninjured side with your hips and knees bent and feet together. Slowly raise your top leg toward the ceiling while keeping your heels touching each other. Hold for 2 seconds and lower slowly. Do 3 sets of 10 repetitions.   Iliotibial band stretch: Side-bending: Cross one  leg in front of the other leg and lean in the opposite direction from the front leg. Reach the arm on the side of the back leg over your head while you do this. Hold this position for 15 to 30 seconds. Return to the starting position. Repeat 3 times and then switch legs and repeat the exercise.   Published by [employer]  This content is reviewed periodically and is subject to change as new health information becomes available. The information is intended to inform and educate and is not a replacement for medical evaluation, advice, diagnosis or treatment by a healthcare professional.   Written by Marichuy Erazo, MS, PT, and Akilah Turner PT, [facility], [facility], for [employer]   ? 2010 [facility] and/or its affiliates. All Rights Reserved.   Copyright   Clinical Reference Systems 2011  Adult Health Advisor

## 2017-11-15 PROBLEM — M70.61 TROCHANTERIC BURSITIS OF BOTH HIPS: Status: ACTIVE | Noted: 2017-11-15

## 2017-11-15 PROBLEM — M70.62 TROCHANTERIC BURSITIS OF BOTH HIPS: Status: ACTIVE | Noted: 2017-11-15

## 2017-11-16 NOTE — PROGRESS NOTES
Lisset MITCHELL Chi is a 42 year old female who is seen in consultation at the request of Shannon Powell CNP  for bilateral hip pain.    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     Premature ovarian failure      Raynaud's disease 3/08       Past Surgical History:   Procedure Laterality Date      SECTION           HC DILATION/CURETTAGE DIAG/THER NON OB  2003    D & C for retained placenta one week after the delivery     I&D BARTHOLIN GLAND ABSCESS   and     [facility] cath      KNEE SURGERY      Rt knee Fx, repair-dislocation problem     KNEE SURGERY      Lt knee reconstructive surgery-dislocation problem     LEEP TX, CERVICAL      LEEP for abnormal pap     MARSUP BARTHOLIN GLAND CYST  08       Family History   Problem Relation Age of Onset     Thyroid Disease Mother      DIABETES Mother      type2     Eye Disorder Mother      cataracts     GASTROINTESTINAL DISEASE Mother      hep a/has to have her throat stretched     Respiratory Mother      sleep apnea     HEART DISEASE Father 60     C.A.D. Paternal Grandfather 30     age 30, then again in his 80's     DIABETES Maternal Grandfather      CANCER Maternal Grandfather      Thyroid Disease Maternal Grandmother      Gynecology Maternal Grandmother      on bcp to keep period regular, a small uterus     HEART DISEASE Maternal Grandmother      tachycardia     Allergies Daughter      milk     Asthma Daughter        Social History     Social History     Marital status:      Spouse name: N/A     Number of children: 2     Years of education: N/A     Occupational History      [employer]     Social History Main Topics     Smoking status: Never Smoker     Smokeless tobacco: Never Used     Alcohol use Yes      Comment: social     Drug use: No     Sexual  activity: Yes     Partners: Male     Birth control/ protection: Surgical      Comment:  had vasectomy     Other Topics Concern      Service No     Caffeine Concern No     Occupational Exposure No     Sleep Concern No     Stress Concern Yes     Weight Concern Yes     gains easily     Special Diet No     Back Care Yes     Exercise Yes     irregularly     Bike Helmet Not Asked     doesn't bike     Seat Belt No     Self-Exams No     Parent/Sibling W/ Cabg, Mi Or Angioplasty Before 65f 55m? No     Social History Narrative    Caffeine intake/servings daily - 0-1    Calcium intake/servings daily - 2-3    Exercise 2-3  times weekly - describe aerobics    Sunscreen used - Yes    Seatbelts used - Yes    Guns stored in the home - No    Self Breast Exam - Yes    Pap test up to date -  Yes    Eye exam up to date -  Yes    Dental exam up to date -  Yes    DEXA scan up to date -  Not Applicable    Flex Sig/Colonoscopy up to date -  Not Applicable    Mammography up to date -  Not Applicable    Immunizations reviewed and up to date - Yes    Abuse: Current or Past (Physical, Sexual or Emotional) - None current, past    Do you feel safe in your environment - Yes    Do you cope well with stress - Yes    Do you suffer from insomnia - No    Last updated by: Estefani Moraes MA 5/5/2010               Current Outpatient Prescriptions   Medication Sig Dispense Refill     Cyanocobalamin (VITAMIN B12 PO)        albuterol (PROAIR HFA/PROVENTIL HFA/VENTOLIN HFA) 108 (90 BASE) MCG/ACT Inhaler Inhale 2 puffs into the lungs 2 times daily as needed for shortness of breath / dyspnea 3 Inhaler 3     fluticasone (FLOVENT HFA) 110 MCG/ACT Inhaler Inhale 2 puffs into the lungs 2 times daily 1 Inhaler 1     VITAMIN D, CHOLECALCIFEROL, PO Take by mouth daily       Turmeric Curcumin 500 MG CAPS Take 500 mg by mouth daily       fluticasone (FLONASE) 50 MCG/ACT nasal spray Spray 2 sprays into both nostrils daily       Calcium Carbonate-Vitamin D  "(CALCIUM + D PO) Take 600 mg by mouth.       FEXOFENADINE  MG OR TABS 1 TABLET DAILY          Allergies   Allergen Reactions     No Known Allergies        REVIEW OF SYSTEMS:  CONSTITUTIONAL:  NEGATIVE for fever, chills, change in weight, not feeling tired  SKIN:  NEGATIVE for worrisome rashes, no skin lumps, no skin ulcers and no non-healing wounds  EYES:  NEGATIVE for vision changes or irritation.  ENT/MOUTH:  NEGATIVE.  No hearing loss, no hoarseness, no difficulty swallowing.  RESP:  NEGATIVE. No cough or shortness of breath.  CV:  NEGATIVE for chest pain, palpitations or peripheral edema  GI:  NEGATIVE for nausea, abdominal pain, heartburn, or change in bowel habits  :  Negative. No dysuria, no hematuria  MUSCULOSKELETAL:  See HPI above  NEURO:  NEGATIVE . No headaches, no dizziness,  no numbness  ENDOCRINE:  NEGATIVE for temperature intolerance, skin/hair changes  HEME/ALLERGY/IMMUNE:  NEGATIVE for bleeding problems  PSYCHIATRIC:  NEGATIVE. no anxiety, no depression.     Exam:  Vitals: Temp 98  F (36.7  C)  Resp 18  Ht 1.575 m (5' 2\")  Wt 54 kg (119 lb)  LMP 12/25/2013  BMI 21.77 kg/m2  BMI= Body mass index is 21.77 kg/(m^2).  Constitutional:  healthy, alert and no distress  Neuro: Alert and Oriented x 3, Gait normal. Sensation grossly WNL.  Psych: Affect normal   Respiratory: Breathing not labored.  Cardiovascular: normal peripheral pulses  Lymph: no adenopathy  Skin: No rashes,worrisome lesions or skin problems    "

## 2017-11-16 NOTE — PROGRESS NOTES
DATE OF VISIT:  2017      HISTORY OF PRESENT ILLNESS:  Ms. Basim Martinez is a 42-year-old female referred from JOSE Hidalgo CNP for evaluation of bilateral hip pain, right greater than left.  She has had pain over the outer aspect of the hips primarily she thinks from overuse.  She has dull aching pain rated 4/10.  She has had trouble with stairs, sitting too long and lying on her side.  She usually sleeps on her left side.  She does not use a pillow between the knees when she sleeps.  Icing has helped.  Ibuprofen has helped.      PHYSICAL EXAMINATION:  Shows tenderness over the greater trochanter on both sides and a bit just superior to the greater trochanter.  She has no tenderness of the SI joints or sciatic notch.  She had no significant pain with hip rotation, certainly not in the groin.  Had some lateral hip pain on the right with full internal rotation of the right hip.  She had no pain with flexion, extension.  She does have increased pain with adduction across her body.  She has good range of motion of the knees.  Sensation and circulation are intact.      IMPRESSION:  Bilateral trochanteric bursitis.  We discussed options and I have gone over stretching of the IT band, strengthening of the hip abductors, use of a pillow between the knees at night, a foam roller if she wishes to try this.  We discussed steroid injection and anti-inflammatory use.  RTC jair ERWIN MD             D: 11/15/2017 20:23   T: 11/15/2017 23:26   MT: LQ      Name:     BASIM MARTINEZ   MRN:      [id number]        Account:      [id number]   :      1975           Visit Date:   2017      Document: [id number]

## 2018-01-11 ENCOUNTER — OFFICE VISIT (OUTPATIENT)
Dept: ORTHOPEDICS | Facility: CLINIC | Age: 43
End: 2018-01-11
Payer: COMMERCIAL

## 2018-01-11 ENCOUNTER — RADIANT APPOINTMENT (OUTPATIENT)
Dept: GENERAL RADIOLOGY | Facility: CLINIC | Age: 43
End: 2018-01-11
Attending: ORTHOPAEDIC SURGERY
Payer: COMMERCIAL

## 2018-01-11 VITALS — HEIGHT: 62 IN | RESPIRATION RATE: 18 BRPM | TEMPERATURE: 98.4 F | WEIGHT: 120 LBS | BODY MASS INDEX: 22.08 KG/M2

## 2018-01-11 DIAGNOSIS — M25.562 PAIN IN BOTH KNEES, UNSPECIFIED CHRONICITY: Primary | ICD-10-CM

## 2018-01-11 DIAGNOSIS — M25.561 PAIN IN BOTH KNEES, UNSPECIFIED CHRONICITY: ICD-10-CM

## 2018-01-11 DIAGNOSIS — S83.002A SUBLUXATION OF LEFT PATELLA, INITIAL ENCOUNTER: ICD-10-CM

## 2018-01-11 DIAGNOSIS — M25.562 PAIN IN BOTH KNEES, UNSPECIFIED CHRONICITY: ICD-10-CM

## 2018-01-11 DIAGNOSIS — M25.561 PAIN IN BOTH KNEES, UNSPECIFIED CHRONICITY: Primary | ICD-10-CM

## 2018-01-11 DIAGNOSIS — M25.561 RIGHT KNEE PAIN, UNSPECIFIED CHRONICITY: ICD-10-CM

## 2018-01-11 DIAGNOSIS — M23.41 LOOSE BODY IN KNEE, RIGHT: ICD-10-CM

## 2018-01-11 PROCEDURE — 73562 X-RAY EXAM OF KNEE 3: CPT | Mod: RT

## 2018-01-11 PROCEDURE — 99213 OFFICE O/P EST LOW 20 MIN: CPT | Performed by: ORTHOPAEDIC SURGERY

## 2018-01-11 NOTE — LETTER
2018         RE: Lisset MITCHELL Chi  [address] [address]        Dear Colleague,    Thank you for referring your patient, Lisset MITCHELL Chi, to the [facility]. Please see a copy of my visit note below.    Lisset MITCHELL Chi is a 42 year old female who is seen as self referral for bilateral knee pain, right > left.      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     Premature ovarian failure      Raynaud's disease 3/08       Past Surgical History:   Procedure Laterality Date      SECTION           HC DILATION/CURETTAGE DIAG/THER NON OB  2003    D & C for retained placenta one week after the delivery     I&D BARTHOLIN GLAND ABSCESS   and     mcgrath cath      KNEE SURGERY      Rt knee Fx, repair-dislocation problem     KNEE SURGERY      Lt knee reconstructive surgery-dislocation problem     LEEP TX, CERVICAL      LEEP for abnormal pap     MARSUP BARTHOLIN GLAND CYST  08       Family History   Problem Relation Age of Onset     Thyroid Disease Mother      DIABETES Mother      type2     Eye Disorder Mother      cataracts     GASTROINTESTINAL DISEASE Mother      hep a/has to have her throat stretched     Respiratory Mother      sleep apnea     HEART DISEASE Father 60     C.A.D. Paternal Grandfather 30     age 30, then again in his 80's     DIABETES Maternal Grandfather      CANCER Maternal Grandfather      Thyroid Disease Maternal Grandmother      Gynecology Maternal Grandmother      on bcp to keep period regular, a small uterus     HEART DISEASE Maternal Grandmother      tachycardia     Allergies Daughter      milk     Asthma Daughter        Social History     Social History     Marital status:      Spouse name: N/A     Number of children: 2     Years of education: N/A      Occupational History      [employer]     Social History Main Topics     Smoking status: Never Smoker     Smokeless tobacco: Never Used     Alcohol use Yes      Comment: social     Drug use: No     Sexual activity: Yes     Partners: Male     Birth control/ protection: Surgical      Comment:  had vasectomy     Other Topics Concern      Service No     Caffeine Concern No     Occupational Exposure No     Sleep Concern No     Stress Concern Yes     Weight Concern Yes     gains easily     Special Diet No     Back Care Yes     Exercise Yes     irregularly     Bike Helmet Not Asked     doesn't bike     Seat Belt No     Self-Exams No     Parent/Sibling W/ Cabg, Mi Or Angioplasty Before 65f 55m? No     Social History Narrative    Caffeine intake/servings daily - 0-1    Calcium intake/servings daily - 2-3    Exercise 2-3  times weekly - describe aerobics    Sunscreen used - Yes    Seatbelts used - Yes    Guns stored in the home - No    Self Breast Exam - Yes    Pap test up to date -  Yes    Eye exam up to date -  Yes    Dental exam up to date -  Yes    DEXA scan up to date -  Not Applicable    Flex Sig/Colonoscopy up to date -  Not Applicable    Mammography up to date -  Not Applicable    Immunizations reviewed and up to date - Yes    Abuse: Current or Past (Physical, Sexual or Emotional) - None current, past    Do you feel safe in your environment - Yes    Do you cope well with stress - Yes    Do you suffer from insomnia - No    Last updated by: Estefani Moraes MA 5/5/2010               Current Outpatient Prescriptions   Medication Sig Dispense Refill     Cyanocobalamin (VITAMIN B12 PO)        albuterol (PROAIR HFA/PROVENTIL HFA/VENTOLIN HFA) 108 (90 BASE) MCG/ACT Inhaler Inhale 2 puffs into the lungs 2 times daily as needed for shortness of breath / dyspnea 3 Inhaler 3     fluticasone (FLOVENT HFA) 110 MCG/ACT Inhaler Inhale 2 puffs into the lungs 2 times daily 1 Inhaler 1     VITAMIN D,  "CHOLECALCIFEROL, PO Take by mouth daily       Turmeric Curcumin 500 MG CAPS Take 500 mg by mouth daily       fluticasone (FLONASE) 50 MCG/ACT nasal spray Spray 2 sprays into both nostrils daily       Calcium Carbonate-Vitamin D (CALCIUM + D PO) Take 600 mg by mouth.       FEXOFENADINE  MG OR TABS 1 TABLET DAILY          Allergies   Allergen Reactions     No Known Allergies        REVIEW OF SYSTEMS:  CONSTITUTIONAL:  NEGATIVE for fever, chills, change in weight, not feeling tired  SKIN:  NEGATIVE for worrisome rashes, no skin lumps, no skin ulcers and no non-healing wounds  EYES:  NEGATIVE for vision changes or irritation.  ENT/MOUTH:  NEGATIVE.  No hearing loss, no hoarseness, no difficulty swallowing.  RESP:  NEGATIVE. No cough or shortness of breath.  CV:  NEGATIVE for chest pain, palpitations or peripheral edema  GI:  NEGATIVE for nausea, abdominal pain, heartburn, or change in bowel habits  :  Negative. No dysuria, no hematuria  MUSCULOSKELETAL:  See HPI above  NEURO:  NEGATIVE . No headaches, no dizziness,  no numbness  ENDOCRINE:  NEGATIVE for temperature intolerance, skin/hair changes  HEME/ALLERGY/IMMUNE:  NEGATIVE for bleeding problems  PSYCHIATRIC:  NEGATIVE. no anxiety, no depression.      Exam:  Vitals: Temp 98.4  F (36.9  C)  Resp 18  Ht 1.575 m (5' 2\")  Wt 54.4 kg (120 lb)  LMP 12/25/2013  BMI 21.95 kg/m2  BMI= Body mass index is 21.95 kg/(m^2).  Constitutional:  healthy, alert and no distress  Neuro: Alert and Oriented x 3, Gait normal. Sensation grossly WNL.  HEENT:  Atraumatic, EOMI  Neck:  Neck supple with no tenderness.  Psych: Affect normal   Respiratory: Breathing not labored.  Cardiovascular: normal peripheral pulses  Lymph: no adenopathy  Skin: No rashes,worrisome lesions or skin problems  Spine: straight, no straight leg raising pain.  Hips show full range of motion.  There is no tenderness over the sacro-iliac joints, sciatic notch, or greater trochanters.       Again, thank " you for allowing me to participate in the care of your patient.        Sincerely,        Floyd Duckworth MD

## 2018-01-11 NOTE — PATIENT INSTRUCTIONS
- Please call [facility] at [phone] to schedule your MRI.   -  Once your MRI is scheduled, make an appointment to discuss the results with Dr. Floyd Duckworth.  You can call [phone] to make this appointment, anytime 2-3 days after your MRI scan is performed. Dr. Duckworth prefers patients to come in for a follow-up appointment to discuss next steps after the MRI.

## 2018-01-11 NOTE — MR AVS SNAPSHOT
After Visit Summary   1/11/2018    Lisset MITCHELL Chi    MRN: [id number]           Patient Information     Date Of Birth          1975        Visit Information        Provider Department      1/11/2018 1:45 PM Floyd Duckworth MD [facility]        Today's Diagnoses     Pain in both knees, unspecified chronicity    -  1    Right knee pain, unspecified chronicity        Loose body in knee, right          Care Instructions    - Please call [facility] at [phone] to schedule your MRI.   -  Once your MRI is scheduled, make an appointment to discuss the results with Dr. Floyd Duckworth.  You can call [phone] to make this appointment, anytime 2-3 days after your MRI scan is performed. Dr. Duckworth prefers patients to come in for a follow-up appointment to discuss next steps after the MRI.            Follow-ups after your visit        Future tests that were ordered for you today     Open Future Orders        Priority Expected Expires Ordered    MR Knee Right w/o Contrast Routine  1/11/2019 1/11/2018            Who to contact     If you have questions or need follow up information about today's clinic visit or your schedule please contact [facility] directly at [phone].  Normal or non-critical lab and imaging results will be communicated to you by MyChart, letter or phone within 4 business days after the clinic has received the results. If you do not hear from us within 7 days, please contact the clinic through [employer] or phone. If you have a critical or abnormal lab result, we will notify you by phone as soon as possible.  Submit refill requests through [employer] or call your pharmacy and they will forward the refill request to us. Please allow 3 business days for your refill to be completed.          Additional Information About Your Visit        [employer] Information     [employer] gives you secure access to your electronic health  "record. If you see a primary care provider, you can also send messages to your care team and make appointments. If you have questions, please call your primary care clinic.  If you do not have a primary care provider, please call [phone] and they will assist you.        Care EveryWhere ID     This is your Care EveryWhere ID. This could be used by other organizations to access your [place] medical records  [id number]        Your Vitals Were     Temperature Respirations Height Last Period BMI (Body Mass Index)       98.4  F (36.9  C) 18 1.575 m (5' 2\") 12/25/2013 21.95 kg/m2        Blood Pressure from Last 3 Encounters:   10/13/17 118/74   04/28/17 99/68   10/12/16 108/64    Weight from Last 3 Encounters:   01/11/18 54.4 kg (120 lb)   11/14/17 54 kg (119 lb)   10/13/17 54 kg (119 lb)               Primary Care Provider Office Phone # Fax #    Shannon MarshallJOSE [facility] [phone]       [address] [address]        Equal Access to Services     [facility] : Hadii aad ku hadasho Soomaali, waaxda luqadaha, qaybta kaalmada adeegyada, francisco gutierrez hayrajeshn homero cameron . [facility] [phone].    ATENCIÓN: Si habla español, tiene a lopes disposición servicios gratuitos de asistencia lingüística. Llame al [phone].    We comply with applicable federal civil rights laws and [place] laws. We do not discriminate on the basis of race, color, national origin, age, disability, sex, sexual orientation, or gender identity.            Thank you!     Thank you for choosing [facility]  for your care. Our goal is always to provide you with excellent care. Hearing back from our patients is one way we can continue to improve our services. Please take a few minutes to complete the written survey that you may receive in the mail after your visit with us. Thank you!             Your Updated Medication List - Protect others around you: Learn how to safely use, store " and throw away your medicines at www.disposemymeds.org.          This list is accurate as of: 1/11/18  2:39 PM.  Always use your most recent med list.                   Brand Name Dispense Instructions for use Diagnosis    albuterol 108 (90 BASE) MCG/ACT Inhaler    PROAIR HFA/PROVENTIL HFA/VENTOLIN HFA    3 Inhaler    Inhale 2 puffs into the lungs 2 times daily as needed for shortness of breath / dyspnea    Mild persistent asthma with acute exacerbation       CALCIUM + D PO      Take 600 mg by mouth.        fexofenadine 180 MG tablet    ALLEGRA     1 TABLET DAILY        fluticasone 110 MCG/ACT Inhaler    FLOVENT HFA    1 Inhaler    Inhale 2 puffs into the lungs 2 times daily    Mild persistent asthma with acute exacerbation       fluticasone 50 MCG/ACT spray    FLONASE     Spray 2 sprays into both nostrils daily        Turmeric Curcumin 500 MG Caps      Take 500 mg by mouth daily        VITAMIN B12 PO           VITAMIN D (CHOLECALCIFEROL) PO      Take by mouth daily

## 2018-01-11 NOTE — NURSING NOTE
"Chief Complaint   Patient presents with     RECHECK     New issue. Bilateral knee pain R>L pain has become worse since she sprained her right ankle and landed on her right knee in Oct. 2017 at a conference. Pain level 3-4-9/10 sharp, dull, shooting, achy and constant. Ibuprofen, ice and compression helps the pain and stairs, walking and standing makes the pain worse.       Initial Temp 98.4  F (36.9  C)  Resp 18  Ht 1.575 m (5' 2\")  Wt 54.4 kg (120 lb)  LMP 12/25/2013  BMI 21.95 kg/m2 Estimated body mass index is 21.95 kg/(m^2) as calculated from the following:    Height as of this encounter: 1.575 m (5' 2\").    Weight as of this encounter: 54.4 kg (120 lb).  Medication Reconciliation: complete   Veronica Lorenzo MA      "

## 2018-01-13 NOTE — PROGRESS NOTES
Lisset MITCHELL Chi is a 42 year old female who is seen as self referral for bilateral knee pain, right > left.      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     Premature ovarian failure      Raynaud's disease 3/08       Past Surgical History:   Procedure Laterality Date      SECTION           HC DILATION/CURETTAGE DIAG/THER NON OB  2003    D & C for retained placenta one week after the delivery     I&D BARTHOLIN GLAND ABSCESS   and     mcgrath cath      KNEE SURGERY      Rt knee Fx, repair-dislocation problem     KNEE SURGERY      Lt knee reconstructive surgery-dislocation problem     LEEP TX, CERVICAL      LEEP for abnormal pap     MARSUP BARTHOLIN GLAND CYST  08       Family History   Problem Relation Age of Onset     Thyroid Disease Mother      DIABETES Mother      type2     Eye Disorder Mother      cataracts     GASTROINTESTINAL DISEASE Mother      hep a/has to have her throat stretched     Respiratory Mother      sleep apnea     HEART DISEASE Father 60     C.A.D. Paternal Grandfather 30     age 30, then again in his 80's     DIABETES Maternal Grandfather      CANCER Maternal Grandfather      Thyroid Disease Maternal Grandmother      Gynecology Maternal Grandmother      on bcp to keep period regular, a small uterus     HEART DISEASE Maternal Grandmother      tachycardia     Allergies Daughter      milk     Asthma Daughter        Social History     Social History     Marital status:      Spouse name: N/A     Number of children: 2     Years of education: N/A     Occupational History      [employer]     Social History Main Topics     Smoking status: Never Smoker     Smokeless tobacco: Never Used     Alcohol use Yes      Comment: social     Drug use: No     Sexual activity: Yes      Partners: Male     Birth control/ protection: Surgical      Comment:  had vasectomy     Other Topics Concern      Service No     Caffeine Concern No     Occupational Exposure No     Sleep Concern No     Stress Concern Yes     Weight Concern Yes     gains easily     Special Diet No     Back Care Yes     Exercise Yes     irregularly     Bike Helmet Not Asked     doesn't bike     Seat Belt No     Self-Exams No     Parent/Sibling W/ Cabg, Mi Or Angioplasty Before 65f 55m? No     Social History Narrative    Caffeine intake/servings daily - 0-1    Calcium intake/servings daily - 2-3    Exercise 2-3  times weekly - describe aerobics    Sunscreen used - Yes    Seatbelts used - Yes    Guns stored in the home - No    Self Breast Exam - Yes    Pap test up to date -  Yes    Eye exam up to date -  Yes    Dental exam up to date -  Yes    DEXA scan up to date -  Not Applicable    Flex Sig/Colonoscopy up to date -  Not Applicable    Mammography up to date -  Not Applicable    Immunizations reviewed and up to date - Yes    Abuse: Current or Past (Physical, Sexual or Emotional) - None current, past    Do you feel safe in your environment - Yes    Do you cope well with stress - Yes    Do you suffer from insomnia - No    Last updated by: Estefani Moraes MA 5/5/2010               Current Outpatient Prescriptions   Medication Sig Dispense Refill     Cyanocobalamin (VITAMIN B12 PO)        albuterol (PROAIR HFA/PROVENTIL HFA/VENTOLIN HFA) 108 (90 BASE) MCG/ACT Inhaler Inhale 2 puffs into the lungs 2 times daily as needed for shortness of breath / dyspnea 3 Inhaler 3     fluticasone (FLOVENT HFA) 110 MCG/ACT Inhaler Inhale 2 puffs into the lungs 2 times daily 1 Inhaler 1     VITAMIN D, CHOLECALCIFEROL, PO Take by mouth daily       Turmeric Curcumin 500 MG CAPS Take 500 mg by mouth daily       fluticasone (FLONASE) 50 MCG/ACT nasal spray Spray 2 sprays into both nostrils daily       Calcium Carbonate-Vitamin D (CALCIUM + D PO) Take  "600 mg by mouth.       FEXOFENADINE  MG OR TABS 1 TABLET DAILY          Allergies   Allergen Reactions     No Known Allergies        REVIEW OF SYSTEMS:  CONSTITUTIONAL:  NEGATIVE for fever, chills, change in weight, not feeling tired  SKIN:  NEGATIVE for worrisome rashes, no skin lumps, no skin ulcers and no non-healing wounds  EYES:  NEGATIVE for vision changes or irritation.  ENT/MOUTH:  NEGATIVE.  No hearing loss, no hoarseness, no difficulty swallowing.  RESP:  NEGATIVE. No cough or shortness of breath.  CV:  NEGATIVE for chest pain, palpitations or peripheral edema  GI:  NEGATIVE for nausea, abdominal pain, heartburn, or change in bowel habits  :  Negative. No dysuria, no hematuria  MUSCULOSKELETAL:  See HPI above  NEURO:  NEGATIVE . No headaches, no dizziness,  no numbness  ENDOCRINE:  NEGATIVE for temperature intolerance, skin/hair changes  HEME/ALLERGY/IMMUNE:  NEGATIVE for bleeding problems  PSYCHIATRIC:  NEGATIVE. no anxiety, no depression.      Exam:  Vitals: Temp 98.4  F (36.9  C)  Resp 18  Ht 1.575 m (5' 2\")  Wt 54.4 kg (120 lb)  LMP 12/25/2013  BMI 21.95 kg/m2  BMI= Body mass index is 21.95 kg/(m^2).  Constitutional:  healthy, alert and no distress  Neuro: Alert and Oriented x 3, Gait normal. Sensation grossly WNL.  HEENT:  Atraumatic, EOMI  Neck:  Neck supple with no tenderness.  Psych: Affect normal   Respiratory: Breathing not labored.  Cardiovascular: normal peripheral pulses  Lymph: no adenopathy  Skin: No rashes,worrisome lesions or skin problems  Spine: straight, no straight leg raising pain.  Hips show full range of motion.  There is no tenderness over the sacro-iliac joints, sciatic notch, or greater trochanters.     "

## 2018-01-14 DIAGNOSIS — J45.31 MILD PERSISTENT ASTHMA WITH ACUTE EXACERBATION: ICD-10-CM

## 2018-01-14 NOTE — PROGRESS NOTES
DATE OF SERVICE:  01/11/2018.      HISTORY OF PRESENT ILLNESS:  Ms. Lisset Gates is a 42-year-old female seen with bilateral knee pain, right greater than left.  This started in 10/2017 after she had sprained her right ankle and fell on her right knee at a conference.  She feels like something is occasionally sticking out on the outer side of her right knee above the patella and she can move something there.  She has not had anything moving in the left knee, but has pain under the kneecap on both knees.  She has sharp, dull, aching pain rated 3-4 up to 9/10.  Always has 3-4 pain but worse with stairs and prolonged standing.  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  She has staples in her tibia on the left knee.      IMAGING:  X-rays of the knees show no significant arthritis of either knee.  She does have a very shallow medial facet on the trochlea and medial patella on the left knee.  Right is better formed.  Both patellas appear to be situated well on the sunrise view, but on the AP view the left patella is laterally subluxed.      PHYSICAL EXAMINATION:  Good range of motion of the knees from 0-130 degrees easily.  She does have a definite subluxation of the left patella with a clunk from lateral to centralized as she flexes the knee at about 30 degrees.  It clunks laterally again with extension.  The right does not do this.  She has no loose bodies that I can detect on the knees now and certainly no mass on the upper lateral aspect of the right knee where she often feels it.  She had pain under the patella with medial and lateral Rosio on both knees but has no medial or lateral joint line tenderness with this.  She has no ligamentous laxity.  She does have a positive patellar apprehension on the left.  She does have some tenderness about the patella on the right but  negative apprehension.  There is no increased warmth or erythema.  Sensation and circulation are intact.      IMPRESSION:  Bilateral knee pain, the left is definitely patellofemoral; the right is confusing as she seems to have some patellofemoral pain but her feeling of a mass moving about the knee and a feeling of instability do not match this.  We discussed various possibilities for evaluation since it is problematic enough for her and we will proceed with MRI scan of the right knee to look for a cause of loose body or mass within the knee and see her back following this.  If this is positive, we will consider arthroscopy to relieve it.         FRANSICO ERWIN MD             D: 2018 18:20   T: 2018 09:11   MT: DONNIE      Name:     BASIM MARTINEZ   MRN:      [id number]        Account:      [id number]   :      1975           Visit Date:   2018      Document: [id number]

## 2018-01-15 ENCOUNTER — RADIANT APPOINTMENT (OUTPATIENT)
Dept: MRI IMAGING | Facility: CLINIC | Age: 43
End: 2018-01-15
Attending: PHYSICIAN ASSISTANT
Payer: COMMERCIAL

## 2018-01-15 DIAGNOSIS — M25.561 RIGHT KNEE PAIN, UNSPECIFIED CHRONICITY: ICD-10-CM

## 2018-01-15 DIAGNOSIS — M23.41 LOOSE BODY IN KNEE, RIGHT: ICD-10-CM

## 2018-01-15 PROCEDURE — 73721 MRI JNT OF LWR EXTRE W/O DYE: CPT | Mod: TC

## 2018-01-15 RX ORDER — DEXAMETHASONE 4 MG/1
TABLET ORAL
Qty: 12 G | Refills: 3 | Status: SHIPPED | OUTPATIENT
Start: 2018-01-15 | End: 2019-03-01

## 2018-01-15 NOTE — TELEPHONE ENCOUNTER
Requested Prescriptions   Pending Prescriptions Disp Refills     FLOVENT  MCG/ACT Inhaler [Pharmacy Med Name: FLOVENT  MCG INHALER] 12 Inhaler 1     Sig: INHALE 2 PUFFS INTO THE LUNGS 2 TIMES DAILY    There is no refill protocol information for this order   Last Written Prescription Date:  10-13-17  Last Fill Quantity: 1,  # refills: 1   Last Office Visit with [facility], [facility] or [facility] prescribing provider:  10-13-17   Future Office Visit:    Next 5 appointments (look out 90 days)     Jan 18, 2018  3:45 PM CST   [facility] Duck[facility]   [facility]    [address]  [place] [address] [phone]

## 2018-01-15 NOTE — TELEPHONE ENCOUNTER
Prescription approved per [facility] Refill Protocol.    Cori Shannon RN  [facility]

## 2018-01-18 ENCOUNTER — OFFICE VISIT (OUTPATIENT)
Dept: ORTHOPEDICS | Facility: CLINIC | Age: 43
End: 2018-01-18
Payer: COMMERCIAL

## 2018-01-18 VITALS — TEMPERATURE: 98.2 F | BODY MASS INDEX: 22.08 KG/M2 | HEIGHT: 62 IN | RESPIRATION RATE: 18 BRPM | WEIGHT: 120 LBS

## 2018-01-18 DIAGNOSIS — M22.8X1 PATELLAR TRACKING DISORDER OF RIGHT KNEE: Primary | ICD-10-CM

## 2018-01-18 PROCEDURE — 99213 OFFICE O/P EST LOW 20 MIN: CPT | Performed by: ORTHOPAEDIC SURGERY

## 2018-01-18 NOTE — MR AVS SNAPSHOT
After Visit Summary   1/18/2018    Lisset MITCHELL Chi    MRN: [id number]           Patient Information     Date Of Birth          1975        Visit Information        Provider Department      1/18/2018 3:45 PM Floyd Duckworth MD [facility]        Today's Diagnoses     Patellar tracking disorder of right knee    -  1       Follow-ups after your visit        Additional Services     ORTHO  REFERRAL       [facility] is referring you to the Orthopedic  Services at [facility].       The  Representative will assist you in the coordination of your Orthopedic and Musculoskeletal Care as prescribed by your physician.    The  Representative will call you within 1 business day to help schedule your appointment, or you may contact the  Representative at:    All areas ~ [phone]     Type of Referral : Surgical / Specialist Rima Sarmiento patellar tracking abnormality of the right knee (MRI 1/15/18)      Timeframe requested: 1 - 2 days    Coverage of these services is subject to the terms and limitations of your health insurance plan.  Please call member services at your health plan with any benefit or coverage questions.      If X-rays, CT or MRI's have been performed, please contact the facility where they were done to arrange for , prior to your scheduled appointment.  Please bring this referral request to your appointment and present it to your specialist.                  Who to contact     If you have questions or need follow up information about today's clinic visit or your schedule please contact [facility] directly at [phone].  Normal or non-critical lab and imaging results will be communicated to you by MyChart, letter or phone within 4 business days after the clinic has received the results. If you do not hear from us within 7 days, please contact the clinic through  "MyChart or phone. If you have a critical or abnormal lab result, we will notify you by phone as soon as possible.  Submit refill requests through [email or web address] or call your pharmacy and they will forward the refill request to us. Please allow 3 business days for your refill to be completed.          Additional Information About Your Visit        Tengagedhart Information     [email or web address] gives you secure access to your electronic health record. If you see a primary care provider, you can also send messages to your care team and make appointments. If you have questions, please call your primary care clinic.  If you do not have a primary care provider, please call [phone] and they will assist you.        Care EveryWhere ID     This is your Care EveryWhere ID. This could be used by other organizations to access your [facility] medical records  [id number]        Your Vitals Were     Temperature Respirations Height Last Period BMI (Body Mass Index)       98.2  F (36.8  C) 18 1.575 m (5' 2\") 12/25/2013 21.95 kg/m2        Blood Pressure from Last 3 Encounters:   10/13/17 118/74   04/28/17 99/68   10/12/16 108/64    Weight from Last 3 Encounters:   01/18/18 54.4 kg (120 lb)   01/11/18 54.4 kg (120 lb)   11/14/17 54 kg (119 lb)              We Performed the Following     ORTHO  REFERRAL        Primary Care Provider Office Phone # Fax #    Shannon Lehmankinza JOSE [facility] [phone]       [address] [address]        Equal Access to Services     AKANKSHA CAO : Hadii aad ku hadasho Soomaali, waaxda luqadaha, qaybta kaalmada adeegyada, francisco cameron . So [facility] [phone].    ATENCIÓN: Si habla español, tiene a lopes disposición servicios gratuitos de asistencia lingüística. Llame al [phone].    We comply with applicable federal civil rights laws and [place] laws. We do not discriminate on the basis of race, color, national origin, age, disability, sex, sexual " orientation, or gender identity.            Thank you!     Thank you for choosing [facility]  for your care. Our goal is always to provide you with excellent care. Hearing back from our patients is one way we can continue to improve our services. Please take a few minutes to complete the written survey that you may receive in the mail after your visit with us. Thank you!             Your Updated Medication List - Protect others around you: Learn how to safely use, store and throw away your medicines at www.disposemymeds.org.          This list is accurate as of: 1/18/18  4:24 PM.  Always use your most recent med list.                   Brand Name Dispense Instructions for use Diagnosis    albuterol 108 (90 BASE) MCG/ACT Inhaler    PROAIR HFA/PROVENTIL HFA/VENTOLIN HFA    3 Inhaler    Inhale 2 puffs into the lungs 2 times daily as needed for shortness of breath / dyspnea    Mild persistent asthma with acute exacerbation       CALCIUM + D PO      Take 600 mg by mouth.        fexofenadine 180 MG tablet    ALLEGRA     1 TABLET DAILY        FLOVENT  MCG/ACT Inhaler   Generic drug:  fluticasone     12 g    INHALE 2 PUFFS INTO THE LUNGS 2 TIMES DAILY    Mild persistent asthma with acute exacerbation       fluticasone 50 MCG/ACT spray    FLONASE     Spray 2 sprays into both nostrils daily        Turmeric Curcumin 500 MG Caps      Take 500 mg by mouth daily        VITAMIN B12 PO           VITAMIN D (CHOLECALCIFEROL) PO      Take by mouth daily

## 2018-01-18 NOTE — LETTER
1/18/2018         RE: Lisset MITCHELL Chi  [address] [address]        Dear Colleague,    Thank you for referring your patient, Lisset MITCHELL Chi, to the [facility]. Please see a copy of my visit note below.         HISTORY OF PRESENT ILLNESS:  Ms. Lisset Gates is a 42-year-old female seen with bilateral knee pain, right greater than left.  This started in 10/2017 after she had sprained her right ankle and fell on her right knee at a conference.  She feels like something is occasionally sticking out on the outer side of her right knee above the patella and she can move something there.  She has not had anything moving in the left knee, but has pain under the kneecap on both knees.  She has sharp, dull, aching pain rated 3-4 up to 9/10.  Always has 3-4 pain but worse with stairs and prolonged standing.  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  She has staples in her tibia on the left knee.      IMAGING:  X-rays of the knees show no significant arthritis of either knee.  She does have a very shallow medial facet on the trochlea and medial patella on the left knee.  Right is better formed.  Both patellas appear to be situated well on the sunrise view, but on the AP view the left patella is laterally subluxed.      PHYSICAL EXAMINATION:  Good range of motion of the knees from 0-130 degrees easily.  She does have a definite subluxation of the left patella with a clunk from lateral to centralized as she flexes the knee at about 30 degrees.  It clunks laterally again with extension.  The right does not do this.  She has no loose bodies that I can detect on the knees now and certainly no mass on the upper lateral aspect of the right knee where she often feels it.  She had pain under the patella with medial and lateral Rosio on both knees but has no medial  or lateral joint line tenderness with this.  She has no ligamentous laxity.  She does have a positive patellar apprehension on the left.  She does have some tenderness about the patella on the right but negative apprehension.  There is no increased warmth or erythema.  Sensation and circulation are intact.     MRI 1/15/18 shows right patella enrico with lateral patellar subluxation and very shallow trochlear groove.  She has grade 2 chondromalacia of lateral femur and degenerative fraying of lateral meniscus. No mass was seen.     IMPRESSION:  Bilateral knee pain, with patello-femoral malalignment.  We discussed various options of strengthening, steroid injection, NSAID, referral for evaluation by Dr Sarmiento at [facility].  Patient prefers to see Dr Kaitlin Sarmiento for evaluation of knees.         FLOYD ERWIN MD                Again, thank you for allowing me to participate in the care of your patient.        Sincerely,        Floyd Erwin MD

## 2018-01-18 NOTE — NURSING NOTE
"Chief Complaint   Patient presents with     RECHECK     right knee/MRI results.       Initial Temp 98.2  F (36.8  C)  Resp 18  Ht 1.575 m (5' 2\")  Wt 54.4 kg (120 lb)  LMP 12/25/2013  BMI 21.95 kg/m2 Estimated body mass index is 21.95 kg/(m^2) as calculated from the following:    Height as of this encounter: 1.575 m (5' 2\").    Weight as of this encounter: 54.4 kg (120 lb).  Medication Reconciliation: complete   Veronica Lorenzo MA      "

## 2018-01-19 NOTE — PROGRESS NOTES
HISTORY OF PRESENT ILLNESS:  Ms. Lisset Gates is a 42-year-old female seen with bilateral knee pain, right greater than left.  This started in 10/2017 after she had sprained her right ankle and fell on her right knee at a conference.  She feels like something is occasionally sticking out on the outer side of her right knee above the patella and she can move something there.  She has not had anything moving in the left knee, but has pain under the kneecap on both knees.  She has sharp, dull, aching pain rated 3-4 up to 9/10.  Always has 3-4 pain but worse with stairs and prolonged standing.  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  She has staples in her tibia on the left knee.      IMAGING:  X-rays of the knees show no significant arthritis of either knee.  She does have a very shallow medial facet on the trochlea and medial patella on the left knee.  Right is better formed.  Both patellas appear to be situated well on the sunrise view, but on the AP view the left patella is laterally subluxed.      PHYSICAL EXAMINATION:  Good range of motion of the knees from 0-130 degrees easily.  She does have a definite subluxation of the left patella with a clunk from lateral to centralized as she flexes the knee at about 30 degrees.  It clunks laterally again with extension.  The right does not do this.  She has no loose bodies that I can detect on the knees now and certainly no mass on the upper lateral aspect of the right knee where she often feels it.  She had pain under the patella with medial and lateral Rosio on both knees but has no medial or lateral joint line tenderness with this.  She has no ligamentous laxity.  She does have a positive patellar apprehension on the left.  She does have some tenderness about the patella on the right but negative apprehension.  There  is no increased warmth or erythema.  Sensation and circulation are intact.     MRI 1/15/18 shows right patella enrico with lateral patellar subluxation and very shallow trochlear groove.  She has grade 2 chondromalacia of lateral femur and degenerative fraying of lateral meniscus. No mass was seen.     IMPRESSION:  Bilateral knee pain, with patello-femoral malalignment.  We discussed various options of strengthening, steroid injection, NSAID, referral for evaluation by Dr Sarmiento at [facility].  Patient prefers to see Dr Kaitlin Sarmiento for evaluation of knees.         FRANSICO ERWIN MD

## 2018-01-19 NOTE — TELEPHONE ENCOUNTER
APPT INFO    Date /Time: 1/23/18 12:45PM   Reason for Appt: Rt Knee Tracking Abnormality   Ref Provider/Clinic: Dr. Duckworth   Are there internal records? Yes/No?  IF YES, list clinic names: Yes - [facility]    Imaging in pacs:  MRI R Knee 1/15/18  XR R Knee 1/11/18   Are there outside records? Yes/No? no   Patient Contact (Y/N) & Call Details: No - Patient is referred. All records are in Epic/PACS.      Action: Chart reviewed

## 2018-01-22 ENCOUNTER — TELEPHONE (OUTPATIENT)
Dept: ORTHOPEDICS | Facility: CLINIC | Age: 43
End: 2018-01-22

## 2018-01-22 NOTE — TELEPHONE ENCOUNTER
Patient was called to discuss her upcoming appointment with Dr. Sarmiento.  Her appintment was originally on 1/23/18.  She was called to discuss either having a PT appointment at 10:00am on the 23rd or to schedule a different time on a different day.  The patient choose to be scheduled on Feb 6th 2018 at 11:00am with Dr. Sarmiento and Physical therapy at 10:00am.  Patient was agreeable with this plan.

## 2018-01-23 ENCOUNTER — PRE VISIT (OUTPATIENT)
Dept: ORTHOPEDICS | Facility: CLINIC | Age: 43
End: 2018-01-23

## 2018-02-01 ENCOUNTER — PRE VISIT (OUTPATIENT)
Dept: ORTHOPEDICS | Facility: CLINIC | Age: 43
End: 2018-02-01

## 2018-02-01 DIAGNOSIS — M22.8X1 MALTRACKING OF RIGHT PATELLA: Primary | ICD-10-CM

## 2018-02-01 NOTE — TELEPHONE ENCOUNTER
Patient is being referred by Dr. Duckworth for Right knee tracking abnormality.     Patient is new to this provider.  Patient' records are in EPIC and imaging is in PACS.    Patient is coming to clinic for Right knee tracking abnormality.      X-ray imaging has been ordered and scheduled.  Bilateral knee 20 degree and Long leg alignment  Patient also has PT at 10:00am with Jennifer before clinic visit    Patient visit type and questionnaires have been reviewed and are correct for this appointment? Yes    Clair Ch ATC

## 2018-02-06 ENCOUNTER — RADIANT APPOINTMENT (OUTPATIENT)
Dept: GENERAL RADIOLOGY | Facility: CLINIC | Age: 43
End: 2018-02-06
Attending: ORTHOPAEDIC SURGERY
Payer: COMMERCIAL

## 2018-02-06 ENCOUNTER — THERAPY VISIT (OUTPATIENT)
Dept: PHYSICAL THERAPY | Facility: CLINIC | Age: 43
End: 2018-02-06
Payer: COMMERCIAL

## 2018-02-06 ENCOUNTER — OFFICE VISIT (OUTPATIENT)
Dept: ORTHOPEDICS | Facility: CLINIC | Age: 43
End: 2018-02-06
Payer: COMMERCIAL

## 2018-02-06 VITALS — WEIGHT: 121 LBS | BODY MASS INDEX: 22.26 KG/M2 | HEIGHT: 62 IN

## 2018-02-06 DIAGNOSIS — M22.8X1 MALTRACKING OF RIGHT PATELLA: ICD-10-CM

## 2018-02-06 DIAGNOSIS — M25.561 ACUTE PAIN OF RIGHT KNEE: ICD-10-CM

## 2018-02-06 DIAGNOSIS — M25.561 BILATERAL KNEE PAIN: Primary | ICD-10-CM

## 2018-02-06 DIAGNOSIS — M25.361 PATELLAR INSTABILITY OF RIGHT KNEE: ICD-10-CM

## 2018-02-06 DIAGNOSIS — M25.561 BILATERAL KNEE PAIN: ICD-10-CM

## 2018-02-06 DIAGNOSIS — M25.562 BILATERAL KNEE PAIN: ICD-10-CM

## 2018-02-06 DIAGNOSIS — M25.561 RIGHT KNEE PAIN: Primary | ICD-10-CM

## 2018-02-06 DIAGNOSIS — M25.562 BILATERAL KNEE PAIN: Primary | ICD-10-CM

## 2018-02-06 PROCEDURE — 97161 PT EVAL LOW COMPLEX 20 MIN: CPT | Mod: GP | Performed by: PHYSICAL THERAPIST

## 2018-02-06 PROCEDURE — 97112 NEUROMUSCULAR REEDUCATION: CPT | Mod: GP | Performed by: PHYSICAL THERAPIST

## 2018-02-06 PROCEDURE — 97110 THERAPEUTIC EXERCISES: CPT | Mod: GP | Performed by: PHYSICAL THERAPIST

## 2018-02-06 ASSESSMENT — ENCOUNTER SYMPTOMS
DYSPNEA ON EXERTION: 0
SHORTNESS OF BREATH: 0
DIARRHEA: 0
MYALGIAS: 0
ABDOMINAL PAIN: 0
MUSCLE CRAMPS: 0
BLOATING: 1
NECK MASS: 0
HOT FLASHES: 1
NECK PAIN: 0
EYE IRRITATION: 0
SKIN CHANGES: 0
COUGH: 1
SINUS CONGESTION: 1
HEARTBURN: 0
ALTERED TEMPERATURE REGULATION: 0
NAIL CHANGES: 0
WHEEZING: 1
TROUBLE SWALLOWING: 0
DECREASED CONCENTRATION: 1
MUSCLE WEAKNESS: 1
DECREASED LIBIDO: 1
FATIGUE: 1
PANIC: 0
CONSTIPATION: 1
INCREASED ENERGY: 1
DOUBLE VISION: 0
DECREASED APPETITE: 0
HALLUCINATIONS: 0
SNORES LOUDLY: 0
HEMOPTYSIS: 0
EYE PAIN: 1
BLOOD IN STOOL: 0
HOARSE VOICE: 0
EYE WATERING: 0
TASTE DISTURBANCE: 0
SPUTUM PRODUCTION: 1
EYE REDNESS: 0
JAUNDICE: 0
STIFFNESS: 1
POLYDIPSIA: 1
POOR WOUND HEALING: 1
DEPRESSION: 1
VOMITING: 0
NIGHT SWEATS: 0
NAUSEA: 0
BOWEL INCONTINENCE: 0
ARTHRALGIAS: 1
RECTAL PAIN: 0
SMELL DISTURBANCE: 0
NERVOUS/ANXIOUS: 0
WEIGHT LOSS: 0
JOINT SWELLING: 1
CHILLS: 0
WEIGHT GAIN: 0
INSOMNIA: 0
POSTURAL DYSPNEA: 0
POLYPHAGIA: 1
SORE THROAT: 1
BACK PAIN: 0
FEVER: 1
COUGH DISTURBING SLEEP: 0
SINUS PAIN: 0

## 2018-02-06 ASSESSMENT — KOOS JR
HOW SEVERE IS YOUR KNEE STIFFNESS AFTER FIRST WAKING IN MORNING: MILD
HOW SEVERE IS YOUR KNEE STIFFNESS AFTER FIRST WAKING IN MORNING: 1

## 2018-02-06 ASSESSMENT — ACTIVITIES OF DAILY LIVING (ADL): FUNCTION,_DAILY_LIVING_SCORE: 60.29

## 2018-02-06 NOTE — LETTER
2/6/2018       RE: Lisset MITCHELL Chi  [address] [address]     Dear Colleague,    Thank you for referring your patient, Lisset MITCHELL Chi, to the [facility] at [facility]. Please see a copy of my visit note below.    [facility], Orthopaedic Surgery, Consult    Lisset MITCHELL Chi MRN# [id number]   Age: 42 year old YOB: 1975     Requesting physician: Floyd Duckworth           History of Present Illness:   Lisset MITCHELL Chi is a 42 year old year old female who presents today for evaluation and management of right knee pain. Lisset Gates has a complex past surgery with her knees including patellar instability on the left status post patellar realignment procedure in 1988. She also had fixation of a Right lateral femoral condyle osteochondritis dissecans lesion in 1987.     She feels that her left knee benefited from the surgery initially and she did not have any subluxations or dislocations however recently she is had ongoing subluxations and rarely dislocations of the left patella. She feels this is due to the fact that she is 'very careful' and has a very small envelope of activities in which she partakes.  This is her choice, as many family members went on to have DJD and she wanted to preserve her knees if possible.  When she does have a dustin dislocation where the kneecap goes laterally this is associated with swelling. This recovers and swelling decreases over the next couple of days.     The pain in her right knee however has been present most noticeably since October 2017. She relates this to falling off of a 4 inch step due to her LEFT kneecap dislocation and fell onto her RIGHT knee at the same time spraining her ankle.  Since that time she's been having anterior and lateral knee pain as well as a sensation of feeling that there is something in the RIGHT knee. She states she gets a fullness/mass in the superior lateral aspect of the  patellofemoral joint and she has to push it to move it out of the way. She denies catching or locking her knee buckling but has significant pain with stair climbing and descending stairs. She has ongoing swelling in the right knee (when asked directly she says she feels it more than sees swelling).     She has not participated in any physical therapy and is not taking any medications for these symptoms.This is in part due to the fact that she used a lot of NSAIDS to treat a 'hip' issue and she wanted to give her body a break.    Background history:  DX:  1. Left knee patellar instability  2. 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 Left knee patellar realignment procedure, 1988.  2. Right knee fixation of osteochondritis dissecans lesion, 1987.         Past Medical History:     - Asthma  - Raynaud's    Prior history of blood clot: none  Prior history of bleeding problems: none  Prior history of anesthetic complications: none  Currently on opioids:  none  History of Diabetes: no           Past Surgical History:   1987: Right lateral femoral condyle OCD lesion fixation  1988: Left knee patellar realignment procedure         Social History:     Smoking: Nonsmoker.   Alcohol: Drinks alcohol rarely  Living situation: Lives in the [place] area with her . 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 Enjoys participating in yoga and aerobics but has been avoiding this due to knee and hip pain         Family History:       Pertinent family history includes a long line of ligamentous laxity    Family History   Problem Relation Age of Onset     Thyroid Disease Mother      DIABETES Mother      type2     Eye Disorder Mother      cataracts     GASTROINTESTINAL DISEASE Mother      hep a/has to have her throat stretched     Respiratory Mother      sleep apnea     HEART DISEASE Father 60     C.A.D. Paternal  "Grandfather 30     age 30, then again in his 80's     DIABETES Maternal Grandfather      CANCER Maternal Grandfather      Thyroid Disease Maternal Grandmother      Gynecology Maternal Grandmother      on bcp to keep period regular, a small uterus     HEART DISEASE Maternal Grandmother      tachycardia     Allergies Daughter      milk     Asthma Daughter       Family history of blood clot: none  Family history of bleeding problems: none  Family history of anesthetic complications: none  Family history of hyperlaxity and knee DJD         Medications:   Fexofenadine for asthma         Review of Systems:   A comprehensive 10 point review of systems (constitutional, ENT, cardiac, peripheral vascular, lymphatic, respiratory, GI, , Musculoskeletal, skin, Neurological) was performed and found to be negative except as described in this note.     Also see intake form completed by patient.           Physical Exam:     EXAMINATION pertinent findings:   VITAL SIGNS: Height 1.575 m (5' 2\"), weight 54.9 kg (121 lb), last menstrual period 12/25/2013, not currently breastfeeding.  Body mass index is 22.13 kg/(m^2).  GEN: AOx3, cooperative, no distress  RESP: non labored breathing   ABD: benign   SKIN: grossly normal   LYMPHATIC: grossly normal   NEURO: grossly normal   VASCULAR: satisfactory perfusion of all extremities  MUSCULOSKELETAL:   Gait: normal with toes pointing straight ahead.  PE suggestive of limb version.    Hip RO:  IR >> ER without pain.    Right Knee: 1+ Effusion without a fluid wave. ROM is 0 to 130.  Stable to varus and valgus stress, anterior drawer. Soft J sign. Negative Patellar apprehension. Negative Patellar grind. Patella tracks well thru on AROM with a subtle J sign.  Patella stable to lateral translation, (-) apprehension.    Strength is 5/5 quadriceps, 5/5 hamstrings, 5/5 TA, 5/5 GSC. Feet warm and well perfused with brisk capillary refill. Palpable DP and PT pulses bilaterally. Hip internal rotation is " 65 degrees and much greater than external rotation. With pointing the feet directly forward the patellas nearly kiss.    Left Knee: No Effusion. ROM is 0 to 130.  Stable to varus and valgus stress but valgus pseudolaxity, anterior drawer. Frankly positive J sign. Positive Patellar apprehension. Negative Patellar grind.     Strength is 4/5 quadriceps, 5/5 hamstrings, 5/5 TA, 5/5 GSC. Feet warm and well perfused with brisk capillary refill. Palpable DP and PT pulses bilaterally.     Beighton's Score: 5/9 (1- palms to floor, 2 - thumb to forearm, 2 small finger hyperextension).         Data:   Imaging:   X rays: Right knee CD index 1.03, left knee CD index 1. Flex knee   PA view reveals flattening of the right lateral femoral condyle concerning for osteochondritis dissecans lesion w/out Joint space narrowing.    MRI: Right knee TT-TG 17mm. there is evidence of osteonecrosis of the lateral femoral condyle which measures 11.3 mm wide x 7 mm deep at its greatest size. The cartilage overlying this lesion is intact but flattened with no signs of fluid under OCD region,. No loose bodies visualized.     The patellofemoral joint cartilage is well intact without any evidence of subchondral edema or fissuring.         Assessment and Plan:   Assessment: A 42-year-old female with known ligamentous laxity and right knee discomfort of uncertain etiology:  -Bilateral patellar instability left greater than right  -Likely abnormal version most likely due to increased femoral anteversion  -Right lateral femoral condyle osteochondritis dissecans lesion.     Plan:  -As it is unclear what is exactly causing her pain or the feeling of this fullness in the knee and signs and symptoms do not definitively point to a loose body it is clear that she has a right knee lateral femoral condyle OCD lesion. To characterize this lesion and determine if she would be amenable to some kind of cartilage allograft or autograft procedure we recommend right  knee diagnostic arthroscopy for evaluation of cartilage and for possible second stage surgery.  -She'll meet with our surgery schedulers today and we'll plan to perform her right knee diagnostic arthroscopy. She was counseled that this may be performed in a staged fashion depending on what we find we may be able to recommend a another procedure in the future..  -Although she has a chronically subluxing left patella this is not bothersome to her and we do not recommend any treatment at this time.  -In the future we may consider a CT for evaluation for version.   -We will keep in mind that her knee problems may be stemming from her hips, particularly because she states she has posterolateral hip/buttock pain. THis may be a by product of her version.    RT: 45 min, CT: 30 min    Melissa Salazar PGY-4  Orthopedic Surgery    I have personally examined this patient and have reviewed the clinical presentation and progress note with the resident.  I agree with the treatment plan as outlined.  The plan was formulated with the resident on the day of the resident dictation.    Rima Sarmiento               Review of Systems:

## 2018-02-06 NOTE — NURSING NOTE
"Reason For Visit:   Chief Complaint   Patient presents with     RECHECK     Right knee tracking and Left knee dislocating       Primary MD: Shannon Powell  Referring MD: Floyd Duckworth    ?  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  Work status?  Full time.    Date of injury: Oct 2017  Type of injury: Fall.    Date of surgery: 1988 Right Knee Surgery and 1989 Left Knee Surgery    Smoker: No    Ht 1.575 m (5' 2\")  Wt 54.9 kg (121 lb)  [facility] 12/25/2013  BMI 22.13 kg/m2    Pain Assessment  Patient Currently in Pain: Yes  0-10 Pain Scale: 2  Primary Pain Location: Knee  Pain Orientation: Right, Left  Pain Descriptors: Aching  Alleviating Factors: Rest  Aggravating Factors: Movement    Estefani Saha CMA  2/6/2018        "

## 2018-02-06 NOTE — MR AVS SNAPSHOT
After Visit Summary   2/6/2018    Lisset MITCHELL Chi    MRN: [id number]           Patient Information     Date Of Birth          1975        Visit Information        Provider Department      2/6/2018 11:00 AM Rima Sarmiento MD [facility]        Today's Diagnoses     Bilateral knee pain    -  1    Patellar instability of right knee        Acute pain of right knee           Follow-ups after your visit        Your next 10 appointments already scheduled     Mar 02, 2018   Procedure with Rima Sarmiento MD   [facility] ([facility])    [address] [place] [address]  5th [facility] [address]   [phone]           Located in the Clinics and Surgery Center at [address] [place] [address].   parking is very convenient and highly recommended.  is a $6 flat rate fee.  Both  and self parkers should enter the main arrival plaza from [facility]; parking attendants will direct you based on your parking preference.            Mar 14, 2018 10:30 AM CDT   (Arrive by 10:15 AM)   Post-Op with  U Ortho Nurse   [facility] ([facility])    [address] [place] [address]  4th [facility] [id number] [phone]            Apr 03, 2018  1:30 PM CDT   (Arrive by 1:15 PM)   RETURN KNEE with Rima Sarmiento MD   [facility] ([facility])    [address] [place] [address]  4th [facility] [id number] [phone]              Who to contact     Please call your clinic at [phone] to:    Ask questions about your health    Make or cancel appointments    Discuss your medicines    Learn about your test results    Speak to your doctor            Additional Information About Your Visit        MyChart Information     GTxcelt gives you secure access to your electronic health record. If you see a primary care provider, you can also send  "messages to your care team and make appointments. If you have questions, please call your primary care clinic.  If you do not have a primary care provider, please call [phone] and they will assist you.      LOYAL3 is an electronic gateway that provides easy, online access to your medical records. With LOYAL3, you can request a clinic appointment, read your test results, renew a prescription or communicate with your care team.     To access your existing account, please contact your [facility] or call [phone] for assistance.        Care EveryWhere ID     This is your Care EveryWhere ID. This could be used by other organizations to access your [place] medical records  [id number]        Your Vitals Were     Height Last Period BMI (Body Mass Index)             1.575 m (5' 2\") 12/25/2013 22.13 kg/m2          Blood Pressure from Last 3 Encounters:   No data found for BP    Weight from Last 3 Encounters:   No data found for Wt              Today, you had the following     No orders found for display       Primary Care Provider Office Phone # Fax #    Shannon JOSE Lezama [facility] [phone]       [address] [facility] [address]        Equal Access to Services     AKANKSHA CAO : Hadii aad ku hadasho Sojesusali, waaxda luqadaha, qaybta kaalmada adeegyada, francisco taylor. So [facility] [phone].    ATENCIÓN: Si habla español, tiene a lopes disposición servicios gratuitos de asistencia lingüística. Llame al [phone].    We comply with applicable federal civil rights laws and [place] laws. We do not discriminate on the basis of race, color, national origin, age, disability, sex, sexual orientation, or gender identity.            Thank you!     Thank you for choosing [facility]  for your care. Our goal is always to provide you with excellent care. Hearing back from our patients is one way we can continue to " improve our services. Please take a few minutes to complete the written survey that you may receive in the mail after your visit with us. Thank you!             Your Updated Medication List - Protect others around you: Learn how to safely use, store and throw away your medicines at www.disposemymeds.org.          This list is accurate as of 2/6/18 11:59 PM.  Always use your most recent med list.                   Brand Name Dispense Instructions for use Diagnosis    albuterol 108 (90 BASE) MCG/ACT Inhaler    PROAIR HFA/PROVENTIL HFA/VENTOLIN HFA    3 Inhaler    Inhale 2 puffs into the lungs 2 times daily as needed for shortness of breath / dyspnea    Mild persistent asthma with acute exacerbation       CALCIUM + D PO      Take 600 mg by mouth.        fexofenadine 180 MG tablet    ALLEGRA     1 TABLET DAILY        FLOVENT  MCG/ACT Inhaler   Generic drug:  fluticasone     12 g    INHALE 2 PUFFS INTO THE LUNGS 2 TIMES DAILY    Mild persistent asthma with acute exacerbation       fluticasone 50 MCG/ACT spray    FLONASE     Spray 2 sprays into both nostrils daily        Turmeric Curcumin 500 MG Caps      Take 500 mg by mouth daily        VITAMIN B12 PO           VITAMIN D (CHOLECALCIFEROL) PO      Take by mouth daily

## 2018-02-06 NOTE — NURSING NOTE
Teaching Flowsheet   Relevant Diagnosis: Knee pain tracking abnormality  Teaching Topic: Right knee arthroscopy     Person(s) involved in teaching:   Patient and      Motivation Level:  Asks Questions: Yes  Eager to Learn: Yes  Cooperative: Yes  Receptive (willing/able to accept information): Yes  Any cultural factors/Scientology beliefs that may influence understanding or compliance? No     Patient demonstrates understanding of the following:  Reason for the appointment, diagnosis and treatment plan: Yes  Knowledge of proper use of medications and conditions for which they are ordered (with special attention to potential side effects or drug interactions): Yes  Which situations necessitate calling provider and whom to contact: Yes     Teaching Concerns Addressed: Patient understands she will need a pre-op H&P completed within 30 days of surgery.     Nutritional needs and diet plan: Yes  Pain management techniques: Yes  Wound Care: Yes  How and/when to access community resources: Yes     Instructional Materials Used/Given: Pre-op packet given and reviewed with patient. Antiseptic soap given. Surgery date of 3/2/18 was given to her today in clinic.  She understands they will call her the week of surgery with the time of procedure and check in time.  Questions answered.  Patient has our contact information for further questions/concerns.       Time spent with patient: 15 minutes.

## 2018-02-06 NOTE — PROGRESS NOTES
[facility], Orthopaedic Surgery, Consult    Lisset MITCHELL Chi MRN# [id number]   Age: 42 year old YOB: 1975     Requesting physician: Floyd Duckworth           History of Present Illness:   Lisset MITCHELL Chi is a 42 year old year old female who presents today for evaluation and management of right knee pain. Lisset aGtes has a complex past surgery with her knees including patellar instability on the left status post patellar realignment procedure in 1988. She also had fixation of a Right lateral femoral condyle osteochondritis dissecans lesion in 1987.     She feels that her left knee benefited from the surgery initially and she did not have any subluxations or dislocations however recently she is had ongoing subluxations and rarely dislocations of the left patella. She feels this is due to the fact that she is 'very careful' and has a very small envelope of activities in which she partakes.  This is her choice, as many family members went on to have DJD and she wanted to preserve her knees if possible.  When she does have a dustin dislocation where the kneecap goes laterally this is associated with swelling. This recovers and swelling decreases over the next couple of days.     The pain in her right knee however has been present most noticeably since October 2017. She relates this to falling off of a 4 inch step due to her LEFT kneecap dislocation and fell onto her RIGHT knee at the same time spraining her ankle.  Since that time she's been having anterior and lateral knee pain as well as a sensation of feeling that there is something in the RIGHT knee. She states she gets a fullness/mass in the superior lateral aspect of the patellofemoral joint and she has to push it to move it out of the way. She denies catching or locking her knee buckling but has significant pain with stair climbing and descending stairs. She has ongoing swelling in the right knee (when asked directly she says she feels it more than  sees swelling).     She has not participated in any physical therapy and is not taking any medications for these symptoms.This is in part due to the fact that she used a lot of NSAIDS to treat a 'hip' issue and she wanted to give her body a break.    Background history:  DX:  1. Left knee patellar instability  2. 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 Left knee patellar realignment procedure, 1988.  2. Right knee fixation of osteochondritis dissecans lesion, 1987.         Past Medical History:     - Asthma  - Raynaud's    Prior history of blood clot: none  Prior history of bleeding problems: none  Prior history of anesthetic complications: none  Currently on opioids:  none  History of Diabetes: no           Past Surgical History:   1987: Right lateral femoral condyle OCD lesion fixation  1988: Left knee patellar realignment procedure         Social History:     Smoking: Nonsmoker.   Alcohol: Drinks alcohol rarely  Living situation: [place] in the [place] area with her . 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 Enjoys participating in yoga and aerobics but has been avoiding this due to knee and hip pain         Family History:       Pertinent family history includes a long line of ligamentous laxity    Family History   Problem Relation Age of Onset     Thyroid Disease Mother      DIABETES Mother      type2     Eye Disorder Mother      cataracts     GASTROINTESTINAL DISEASE Mother      hep a/has to have her throat stretched     Respiratory Mother      sleep apnea     HEART DISEASE Father 60     C.A.D. Paternal Grandfather 30     age 30, then again in his 80's     DIABETES Maternal Grandfather      CANCER Maternal Grandfather      Thyroid Disease Maternal Grandmother      Gynecology Maternal Grandmother      on bcp to keep period regular, a small uterus     HEART DISEASE Maternal Grandmother       "tachycardia     Allergies Daughter      milk     Asthma Daughter       Family history of blood clot: none  Family history of bleeding problems: none  Family history of anesthetic complications: none  Family history of hyperlaxity and knee DJD         Medications:   Fexofenadine for asthma         Review of Systems:   A comprehensive 10 point review of systems (constitutional, ENT, cardiac, peripheral vascular, lymphatic, respiratory, GI, , Musculoskeletal, skin, Neurological) was performed and found to be negative except as described in this note.     Also see intake form completed by patient.           Physical Exam:     EXAMINATION pertinent findings:   VITAL SIGNS: Height 1.575 m (5' 2\"), weight 54.9 kg (121 lb), last menstrual period 12/25/2013, not currently breastfeeding.  Body mass index is 22.13 kg/(m^2).  GEN: AOx3, cooperative, no distress  RESP: non labored breathing   ABD: benign   SKIN: grossly normal   LYMPHATIC: grossly normal   NEURO: grossly normal   VASCULAR: satisfactory perfusion of all extremities  MUSCULOSKELETAL:   Gait: normal with toes pointing straight ahead.  PE suggestive of limb version.    Hip RO:  IR >> ER without pain.    Right Knee: 1+ Effusion without a fluid wave. ROM is 0 to 130.  Stable to varus and valgus stress, anterior drawer. Soft J sign. Negative Patellar apprehension. Negative Patellar grind. Patella tracks well thru on AROM with a subtle J sign.  Patella stable to lateral translation, (-) apprehension.    Strength is 5/5 quadriceps, 5/5 hamstrings, 5/5 TA, 5/5 GSC. Feet warm and well perfused with brisk capillary refill. Palpable DP and PT pulses bilaterally. Hip internal rotation is 65 degrees and much greater than external rotation. With pointing the feet directly forward the patellas nearly kiss.    Left Knee: No Effusion. ROM is 0 to 130.  Stable to varus and valgus stress but valgus pseudolaxity, anterior drawer. Frankly positive J sign. Positive Patellar " apprehension. Negative Patellar grind.     Strength is 4/5 quadriceps, 5/5 hamstrings, 5/5 TA, 5/5 GSC. Feet warm and well perfused with brisk capillary refill. Palpable DP and PT pulses bilaterally.     Beighton's Score: 5/9 (1- palms to floor, 2 - thumb to forearm, 2 small finger hyperextension).         Data:   Imaging:   X rays: Right knee CD index 1.03, left knee CD index 1. Flex knee   PA view reveals flattening of the right lateral femoral condyle concerning for osteochondritis dissecans lesion w/out Joint space narrowing.    MRI: Right knee TT-TG 17mm. there is evidence of osteonecrosis of the lateral femoral condyle which measures 11.3 mm wide x 7 mm deep at its greatest size. The cartilage overlying this lesion is intact but flattened with no signs of fluid under OCD region,. No loose bodies visualized.     The patellofemoral joint cartilage is well intact without any evidence of subchondral edema or fissuring.         Assessment and Plan:   Assessment: A 42-year-old female with known ligamentous laxity and right knee discomfort of uncertain etiology:  -Bilateral patellar instability left greater than right  -Likely abnormal version most likely due to increased femoral anteversion  -Right lateral femoral condyle osteochondritis dissecans lesion.     Plan:  -As it is unclear what is exactly causing her pain or the feeling of this fullness in the knee and signs and symptoms do not definitively point to a loose body it is clear that she has a right knee lateral femoral condyle OCD lesion. To characterize this lesion and determine if she would be amenable to some kind of cartilage allograft or autograft procedure we recommend right knee diagnostic arthroscopy for evaluation of cartilage and for possible second stage surgery.  -She'll meet with our surgery schedulers today and we'll plan to perform her right knee diagnostic arthroscopy. She was counseled that this may be performed in a staged fashion depending  on what we find we may be able to recommend a another procedure in the future..  -Although she has a chronically subluxing left patella this is not bothersome to her and we do not recommend any treatment at this time.  -In the future we may consider a CT for evaluation for version.   -We will keep in mind that her knee problems may be stemming from her hips, particularly because she states she has posterolateral hip/buttock pain. THis may be a by product of her version.    RT: 45 min, CT: 30 min    Melissa Salazar PGY-4  Orthopedic Surgery    I have personally examined this patient and have reviewed the clinical presentation and progress note with the resident.  I agree with the treatment plan as outlined.  The plan was formulated with the resident on the day of the resident dictation.    Rima Sarmiento               Review of Systems:         Answers for HPI/ROS submitted by the patient on 2/6/2018   General Symptoms: Yes  Skin Symptoms: Yes  HENT Symptoms: Yes  EYE SYMPTOMS: Yes  HEART SYMPTOMS: No  LUNG SYMPTOMS: Yes  INTESTINAL SYMPTOMS: Yes  URINARY SYMPTOMS: No  GYNECOLOGIC SYMPTOMS: Yes  BREAST SYMPTOMS: No  SKELETAL SYMPTOMS: Yes  BLOOD SYMPTOMS: No  NERVOUS SYSTEM SYMPTOMS: No  MENTAL HEALTH SYMPTOMS: Yes  Fever: Yes  Loss of appetite: No  Weight loss: No  Weight gain: No  Fatigue: Yes  Night sweats: No  Chills: No  Increased stress: Yes  Excessive hunger: Yes  Excessive thirst: Yes  Feeling hot or cold when others believe the temperature is normal: No  Loss of height: Yes  Post-operative complications: No  Surgical site pain: No  Hallucinations: No  Change in or Loss of Energy: Yes  Hyperactivity: No  Confusion: No  Changes in hair: No  Changes in moles/birth marks: No  Itching: No  Rashes: No  Changes in nails: No  Acne: Yes  Hair in places you don't want it: No  Change in facial hair: No  Warts: No  Non-healing sores: Yes  Scarring: No  Flaking of skin: No  Color changes of hands/feet in cold :  Yes  Sun sensitivity: No  Skin thickening: No  Ear pain: No  Ear discharge: No  Hearing loss: No  Tinnitus: No  Nosebleeds: No  Congestion: Yes  Sinus pain: No  Trouble swallowing: No   Voice hoarseness: No  Mouth sores: No  Sore throat: Yes  Tooth pain: No  Gum tenderness: Yes  Bleeding gums: Yes  Change in taste: No  Change in sense of smell: No  Dry mouth: Yes  Hearing aid used: No  Neck lump: No  Eye pain: Yes  Vision loss: Yes  Dry eyes: Yes  Watery eyes: No  Eye bulging: No  Double vision: No  Flashing of lights: No  Spots: No  Floaters: No  Redness: No  Crossed eyes: No  Tunnel Vision: No  Yellowing of eyes: No  Eye irritation: No  Cough: Yes  Sputum or phlegm: Yes  Coughing up blood: No  Difficulty breating or shortness of breath: No  Snoring: No  Wheezing: Yes  Difficulty breathing on exertion: No  Nighttime Cough: No  Difficulty breathing when lying flat: No  Heart burn or indigestion: No  Nausea: No  Vomiting: No  Abdominal pain: No  Bloating: Yes  Constipation: Yes  Diarrhea: No  Blood in stool: No  Black stools: No  Rectal or Anal pain: No  Fecal incontinence: No  Yellowing of skin or eyes: No  Vomit with blood: No  Change in stools: No  Back pain: No  Muscle aches: No  Neck pain: No  Swollen joints: Yes  Joint pain: Yes  Bone pain: No  Muscle cramps: No  Muscle weakness: Yes  Joint stiffness: Yes  Bone fracture: No  Bleeding or spotting between periods: No  Heavy or painful periods: No  Irregular periods: No  Vaginal discharge: No  Hot flashes: Yes  Vaginal dryness: Yes  Genital ulcers: No  Reduced libido: Yes  Painful intercourse: Yes  Difficulty with sexual arousal: No  Post-menopausal bleeding: Yes  Nervous or Anxious: No  Depression: Yes  Trouble sleeping: No  Trouble thinking or concentrating: Yes  Mood changes: No  Panic attacks: No

## 2018-02-06 NOTE — MR AVS SNAPSHOT
After Visit Summary   2/6/2018    Lisset MITCHELL Chi    MRN: [id number]           Patient Information     Date Of Birth          1975        Visit Information        Provider Department      2/6/2018 10:00 AM Rima Chopra PT [facility]        Today's Diagnoses     Right knee pain    -  1       Follow-ups after your visit        Who to contact     If you have questions or need follow up information about today's clinic visit or your schedule please contact [facility] [place] directly at [phone].  Normal or non-critical lab and imaging results will be communicated to you by Capabluehart, letter or phone within 4 business days after the clinic has received the results. If you do not hear from us within 7 days, please contact the clinic through Serometrixt or phone. If you have a critical or abnormal lab result, we will notify you by phone as soon as possible.  Submit refill requests through Perfect Commerce or call your pharmacy and they will forward the refill request to us. Please allow 3 business days for your refill to be completed.          Additional Information About Your Visit        MyChart Information     [employer] gives you secure access to your electronic health record. If you see a primary care provider, you can also send messages to your care team and make appointments. If you have questions, please call your primary care clinic.  If you do not have a primary care provider, please call [phone] and they will assist you.        Care EveryWhere ID     This is your Care EveryWhere ID. This could be used by other organizations to access your [facility] medical records  [id number]        Your Vitals Were     Last Period                   12/25/2013            Blood Pressure from Last 3 Encounters:   10/13/17 118/74   04/28/17 99/68   10/12/16 108/64    Weight from Last 3 Encounters:   02/06/18 54.9 kg (121 lb)   01/18/18 54.4 kg (120 lb)   01/11/18 54.4 kg (120 lb)               We Performed the Following     HC PT EVAL, LOW COMPLEXITY     [place] INITIAL EVAL REPORT     NEUROMUSCULAR RE-EDUCATION     THERAPEUTIC EXERCISES        Primary Care Provider Office Phone # Fax #    JOSE Sarkar [facility] [phone]       [address] [facility] [address]        Equal Access to Services     YUMIKO CAO : Hadii aad ku hadasho Soomaali, waaxda luqadaha, qaybta kaalmada adeegyada, waxay idiin hayaan adeeg khyamilsh lafatoumata . So [facility] [phone].    ATENCIÓN: Si habla español, tiene a lopes disposición servicios gratuitos de asistencia lingüística. Llame al [phone].    We comply with applicable federal civil rights laws and [place] laws. We do not discriminate on the basis of race, color, national origin, age, disability, sex, sexual orientation, or gender identity.            Thank you!     Thank you for choosing [facility] [place]  for your care. Our goal is always to provide you with excellent care. Hearing back from our patients is one way we can continue to improve our services. Please take a few minutes to complete the written survey that you may receive in the mail after your visit with us. Thank you!             Your Updated Medication List - Protect others around you: Learn how to safely use, store and throw away your medicines at www.disposemymeds.org.          This list is accurate as of 2/6/18 11:19 AM.  Always use your most recent med list.                   Brand Name Dispense Instructions for use Diagnosis    albuterol 108 (90 BASE) MCG/ACT Inhaler    PROAIR HFA/PROVENTIL HFA/VENTOLIN HFA    3 Inhaler    Inhale 2 puffs into the lungs 2 times daily as needed for shortness of breath / dyspnea    Mild persistent asthma with acute exacerbation       CALCIUM + D PO      Take 600 mg by mouth.        fexofenadine 180 MG tablet    ALLEGRA     1 TABLET DAILY        FLOVENT  MCG/ACT Inhaler   Generic drug:  fluticasone     12 g     INHALE 2 PUFFS INTO THE LUNGS 2 TIMES DAILY    Mild persistent asthma with acute exacerbation       fluticasone 50 MCG/ACT spray    FLONASE     Spray 2 sprays into both nostrils daily        Turmeric Curcumin 500 MG Caps      Take 500 mg by mouth daily        VITAMIN B12 PO           VITAMIN D (CHOLECALCIFEROL) PO      Take by mouth daily

## 2018-02-06 NOTE — PROGRESS NOTES
"[facility] Initial Evaluation  Subjective:  HPI                  PT KEY IMPRESSIONS:  1. Response to taping trial: Neutral response to Dominguez taping  2. Quadriceps, proximal core and hip weakness noted  3. Concern for meniscus tear vs loose body based on palpation    PT Responsive Factors    Proximal LE/Trunk muscle weakness +   Quadriceps muscle dysfunction/weakness +   Poor postural stability -   Poor dynamic movement patterns -   Restricted ankle DF -   Inconsistent/non-specific PT intervention +   TOTAL 3/6     Non-PT Responsive Factors    Abnormal static patellar orientation -   Abnormal dynamic patellar tracking +   Abnormal LE bony alignment -   TOTAL 1/3         Subjective History:      Question Response   Primary Complaint primarily instability symptoms   Onset date of current symptoms October 2017   History of similar/related symptoms Patient reports onset of patellar dislocation for the first time at age 6 in her left knee.  She ended up undergoing surgery on that knee at age 12 or 13. She had an event while in dance with her R knee at age 13, and underwent subsequent surgery for what sounds like an OCD lesion.  She notes onset of R patellar dislocation in 2005 when she was chasing her daughter.  She notes only one occasion of patellar dislocation on the R side.  She now is feeling a sensation like she has to \"push\" her knee to make it move.  She did sprain her ankle and fall onto that knee last fall as well.   Previous treatment for condition Brace and medication   Symptoms with current condition Appropriate   Worst pain < > Best pain Worst: 9/10 < > Best: 1/10; aching   Symptom exacerbation &   Functional limitations 3 worst activities: stairs - up is worse (Prior: no restrictions) , walking (Prior: no restrictions), sitting with leg extended or bent (Prior: no restrictions)   Symptom relief resting   Symptom behavior activity/position dependent.    Symptom trend Staying the same "   Time of day dependent? Not related   Prior Diagnostic Testing x-ray and MRI   Prior Interventions Brace and medication.        Lifestyle & General Medical History:  Employment: Administrative job - sitting and standing.    General Physical Activity Level (within past year): yoga, dancing.    General health status (as reported by patient): good.     Other orthopaedic history: s/p AMZ (?) on L knee.     Lower Extremity/Patellofemoral Exam    Dynamic Movement Screen:  2 leg stance: WNL  2 leg squat: Reduced squat depth d/t reported pain at knee    1 leg stance:   Right: proprioceptive challenge  Left: normal    1 leg squat:   Right: reduced squat depth d/t reported pain at knee  Left: normal    Gait: Normal - increased internal rotation of L LE during stance    Functional Strength Tracking   Single Leg Squat Tape Tech. Applied Post-Tape Squat/Pain   Right 82 degrees                                   (Pain: 4/10, Instability 4/10) Medial glide 82 degrees                                   (Pain: 4/10, Instability 4/10)   Left 82 degrees                                       (Pain: 0/10, Instability 0/10) Medial glide 82 degrees                                   (Pain: 0/10, Instability 0/10)   Limb Symmetry Index % 100 %  100 %     Patellofemoral Joint Special Testing:    Static Patellar Positioning in 90 degrees KF (Seated)  Right: neutral  Left: neutral    Patellar tracking with OKC knee extension (Seated)  Right: Increased lateralization into TKE (very mild)       Left: Increased lateralization into TKE    Static Patellar Positioning in full extension (Supine)  Right: neutral      Left: neutral    Patellar Quadrant Mobility Test (Med:Lat)  Right: 1:1       Left 1:2        Knee Joint AROM   Right Left Difference   Hyperextension 0 deg 0 deg 0 deg   Extension 0 deg 0 deg 0 deg   Flexion 120 deg 135 deg 15 deg     Basic Muscle Activation:  Quadriceps: Right: Fair, Left: Good    Knee Joint Effusion (Stroke Test  Assessment):  Right: 0; Left: 0    Palpation:   Tender to palpation at the following structures: lateral joint line, superior patella    Hip Joint PROM Screen   Right Left   ER 50 deg 50 deg   IR 40 deg 40 deg   Flex 120 deg 120 deg     Lower Extremity Muscle Strength (x/5)   Right Left   Hip ER 5-/5 5-/5   Hip IR 5-/5 5-/5   Hip ABD 4+/5 5-/5   Hip Ext 5/5 5/5   Knee Flex 5-/5 5/5     System    Physical Exam    General     ROS    Assessment/Plan:    Patient is a 42 year old female with right side knee complaints.    Patient has the following significant findings with corresponding treatment plan.                Diagnosis 1:  Knee pain    Pain -  hot/cold therapy, manual therapy, splint/taping/bracing/orthotics, self management, education and home program  Decreased ROM/flexibility - manual therapy and therapeutic exercise  Decreased strength - therapeutic exercise and therapeutic activities  Decreased proprioception - neuro re-education and therapeutic activities  Impaired muscle performance - neuro re-education  Decreased function - therapeutic activities    Therapy Evaluation Codes:   1) History comprised of:   Personal factors that impact the plan of care:      Time since onset of symptoms.    Comorbidity factors that impact the plan of care are:      None.     Medications impacting care: None.  2) Examination of Body Systems comprised of:   Body structures and functions that impact the plan of care:      Knee.   Activity limitations that impact the plan of care are:      Sitting, Squatting/kneeling, Stairs, Standing and Walking.  3) Clinical presentation characteristics are:   Stable/Uncomplicated.  4) Decision-Making    Low complexity using standardized patient assessment instrument and/or measureable assessment of functional outcome.  Cumulative Therapy Evaluation is: Low complexity.    Previous and current functional limitations:  (See Goal Flow Sheet for this information)    Short term and Long term goals:  (See Goal Flow Sheet for this information)     Communication ability:  Patient appears to be able to clearly communicate and understand verbal and written communication and follow directions correctly.  Treatment Explanation - The following has been discussed with the patient:   RX ordered/plan of care  Anticipated outcomes  Possible risks and side effects  This patient would benefit from PT intervention to resume normal activities.   Rehab potential is good.    Frequency:  1 X week, once daily  Duration:  for 1 week  Discharge Plan:  Achieve all LTG.  Independent in home treatment program.  Reach maximal therapeutic benefit.    Please refer to the daily flowsheet for treatment today, total treatment time and time spent performing 1:1 timed codes.

## 2018-02-07 ENCOUNTER — TELEPHONE (OUTPATIENT)
Dept: ORTHOPEDICS | Facility: CLINIC | Age: 43
End: 2018-02-07

## 2018-02-19 ENCOUNTER — OFFICE VISIT (OUTPATIENT)
Dept: FAMILY MEDICINE | Facility: CLINIC | Age: 43
End: 2018-02-19
Payer: COMMERCIAL

## 2018-02-19 VITALS
HEART RATE: 74 BPM | TEMPERATURE: 97.9 F | DIASTOLIC BLOOD PRESSURE: 74 MMHG | WEIGHT: 121 LBS | SYSTOLIC BLOOD PRESSURE: 114 MMHG | BODY MASS INDEX: 22.13 KG/M2

## 2018-02-19 DIAGNOSIS — Z01.818 PREOP GENERAL PHYSICAL EXAM: Primary | ICD-10-CM

## 2018-02-19 DIAGNOSIS — M25.561 ACUTE PAIN OF RIGHT KNEE: ICD-10-CM

## 2018-02-19 PROCEDURE — 99214 OFFICE O/P EST MOD 30 MIN: CPT | Performed by: PHYSICIAN ASSISTANT

## 2018-02-19 NOTE — PROGRESS NOTES
[address] [address]  [phone]  Dept: [phone]    PRE-OP EVALUATION:  Today's date: 2018    Lisset MITCHELL Chi (: 1975) presents for pre-operative evaluation assessment as requested by Dr. Sarmiento .  She requires evaluation and anesthesia risk assessment prior to undergoing surgery/procedure for treatment of R knee arthroscopy  .  Proposed procedure: R knee arthroscopy     Date of Surgery/ Procedure: 3-2-2018  Time of Surgery/ Procedure: 8:00 AM   Hospital/Surgical [facility]     Primary Physician: Shannon Powell  Type of Anesthesia Anticipated: general     Patient has a Health Care Directive or Living Will:  NO    Preop Questions 2018   1.  Do you have a history of heart attack, stroke, stent, bypass or surgery on an artery in the head, neck, heart or legs? No   2.  Do you ever have any pain or discomfort in your chest? No   3.  Do you have a history of  Heart Failure? No   4.   Are you troubled by shortness of breath when:  walking on a level surface, or up a slight hill, or at night? No   5.  Do you currently have a cold, bronchitis or other respiratory infection? No   6.  Do you have a cough, shortness of breath, or wheezing? No   7.  Do you sometimes get pains in the calves of your legs when you walk? No   8. Do you or anyone in your family have previous history of blood clots? No   9.  Do you or does anyone in your family have a serious bleeding problem such as prolonged bleeding following surgeries or cuts? No   10. Have you ever had problems with anemia or been told to take iron pills? No   11. Have you had any abnormal blood loss such as black, tarry or bloody stools, or abnormal vaginal bleeding? No   12. Have you ever had a blood transfusion? No   13. Have you or any of your relatives ever had problems with anesthesia? No   14. Do you have sleep apnea, excessive snoring or daytime drowsiness?  No   15. Do you have any prosthetic heart valves? No   16. Do you have prosthetic joints? No   17. Is there any chance that you may be pregnant? No         HPI:                                                      Brief HPI related to upcoming procedure: right knee pain that started after a fall and continues to be bothersome.         See problem list for active medical problems.  Problems all longstanding and stable, except as noted/documented.  See ROS for pertinent symptoms related to these conditions.                                                                                                  .  ASTHMA - Patient has a longstanding history of mild Asthma . Patient has been doing well overall noting no symptoms  and continues on medication regimen consisting of Flovent and albuterol  without adverse reactions or side effects.                                                                                                                                              .    MEDICAL HISTORY:                                                      Patient Active Problem List    Diagnosis Date Noted     Right knee pain 2018     Priority: Medium     Trochanteric bursitis of both hips 11/15/2017     Priority: Medium     Mild persistent asthma 2012     Priority: Medium     Managed by asthma/allergy specialist.       CARDIOVASCULAR SCREENING; LDL GOAL LESS THAN 160 2010     Priority: Medium     Premature ovarian failure 2010     Priority: Medium     Irritable bowel syndrome 2007     Priority: Medium      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     Premature ovarian failure      Raynaud's disease 3/08     Past Surgical History:   Procedure Laterality Date      SECTION           HC DILATION/CURETTAGE  DIAG/THER NON OB  5/2003    D & C for retained placenta one week after the delivery     I&D BARTHOLIN GLAND ABSCESS  9/07 and 12/07    mcgrath cath 12/07     KNEE SURGERY  1988    Rt knee Fx, repair-dislocation problem     KNEE SURGERY  1990    Lt knee reconstructive surgery-dislocation problem     LEEP TX, CERVICAL  1995    LEEP for abnormal pap     MARSUP BARTHOLIN GLAND CYST  4/25/08     Current Outpatient Prescriptions   Medication Sig Dispense Refill     FLOVENT  MCG/ACT Inhaler INHALE 2 PUFFS INTO THE LUNGS 2 TIMES DAILY 12 g 3     Cyanocobalamin (VITAMIN B12 PO)        albuterol (PROAIR HFA/PROVENTIL HFA/VENTOLIN HFA) 108 (90 BASE) MCG/ACT Inhaler Inhale 2 puffs into the lungs 2 times daily as needed for shortness of breath / dyspnea 3 Inhaler 3     VITAMIN D, CHOLECALCIFEROL, PO Take by mouth daily       Turmeric Curcumin 500 MG CAPS Take 500 mg by mouth daily       fluticasone (FLONASE) 50 MCG/ACT nasal spray Spray 2 sprays into both nostrils daily       Calcium Carbonate-Vitamin D (CALCIUM + D PO) Take 600 mg by mouth.       FEXOFENADINE  MG OR TABS 1 TABLET DAILY        OTC products: None, except as noted above    Allergies   Allergen Reactions     No Known Allergies       Latex Allergy: NO    Social History   Substance Use Topics     Smoking status: Never Smoker     Smokeless tobacco: Never Used     Alcohol use Yes      Comment: social     History   Drug Use No       REVIEW OF SYSTEMS:                                                    CONSTITUTIONAL: NEGATIVE for fever, chills, change in weight  INTEGUMENTARY/SKIN: NEGATIVE for worrisome rashes, moles or lesions  ENT/MOUTH: NEGATIVE for ear, mouth and throat problems  RESP:mild chest tightness today  CV: occasional palpitations   GI: NEGATIVE for nausea, abdominal pain, heartburn, or change in bowel habits  : NEGATIVE for frequency, dysuria, or hematuria  MUSCULOSKELETAL:as above  NEURO: no regular headaches  HEME: NEGATIVE for bleeding  problems  PSYCHIATRIC: NEGATIVE for changes in mood or affect    EXAM:                                                    /74 (BP Location: Left arm, Patient Position: Sitting, Cuff Size: Adult Regular)  Pulse 74  Temp 97.9  F (36.6  C) (Oral)  Wt 121 lb (54.9 kg)  LMP 12/25/2013  BMI 22.13 kg/m2    GENERAL APPEARANCE: healthy, alert and no distress     EYES: EOMI, PERRL     HENT: ear canals and TM's normal and nose and mouth without ulcers or lesions     NECK: no adenopathy, no asymmetry, masses, or scars and thyroid normal to palpation     RESP: lungs clear to auscultation - no rales, rhonchi or wheezes     CV: regular rates and rhythm, normal S1 S2, no S3 or S4 and no murmur, click or rub     ABDOMEN: bowel sounds normal and mild generalized tenderness     MS: tenderness over right knee lateral joint line     NEURO: Normal strength and tone, sensory exam grossly normal, mentation intact and speech normal     PSYCH: mentation appears normal. and affect normal/bright     LYMPHATICS: no cervical lymph nodes      DIAGNOSTICS:                                                    No labs or EKG required for low risk surgery (cataract, skin procedure, breast biopsy, etc)    Recent Labs   Lab Test  01/14/14   1114   HGB  13.5        IMPRESSION:                                                    Reason for surgery/procedure: right knee pain  Diagnosis/reason for consult: preop clearance     The proposed surgical procedure is considered INTERMEDIATE risk.    REVISED CARDIAC RISK INDEX  The patient has the following serious cardiovascular risks for perioperative complications such as (MI, PE, VFib and 3  AV Block):  No serious cardiac risks  INTERPRETATION: 0 risks: Class I (very low risk - 0.4% complication rate)    The patient has the following additional risks for perioperative complications:  No identified additional risks      ICD-10-CM    1. Preop general physical exam Z01.818    2. Acute pain of right knee  M25.561        RECOMMENDATIONS:                                                      --Consult hospital rounder / IM to assist post-op medical management    --Patient is to take all scheduled medications on the day of surgery EXCEPT for modifications listed below.    APPROVAL GIVEN to proceed with proposed procedure, without further diagnostic evaluation       Signed Electronically by: Debbie Aponte PA-C    Copy of this evaluation report is provided to requesting physician.    [facility] Preop Guidelines

## 2018-02-19 NOTE — MR AVS SNAPSHOT
After Visit Summary   2/19/2018    Lisset MITCHELL Chi    MRN: [id number]           Patient Information     Date Of Birth          1975        Visit Information        Provider Department      2/19/2018 9:40 AM Debbie Aponte PA-C [facility]        Today's Diagnoses     Preop general physical exam    -  1    Acute pain of right knee          Care Instructions      Before Your Surgery      Call your surgeon if there is any change in your health. This includes signs of a cold or flu (such as a sore throat, runny nose, cough, rash or fever).    Do not smoke, drink alcohol or take over the counter medicine (unless your surgeon or primary care doctor tells you to) for the 24 hours before and after surgery.    If you take prescribed drugs: Follow your doctor s orders about which medicines to take and which to stop until after surgery.    Eating and drinking prior to surgery: follow the instructions from your surgeon    Take a shower or bath the night before surgery. Use the soap your surgeon gave you to gently clean your skin. If you do not have soap from your surgeon, use your regular soap. Do not shave or scrub the surgery site.  Wear clean pajamas and have clean sheets on your bed.           Follow-ups after your visit        Your next 10 appointments already scheduled     Mar 02, 2018   Procedure with Rima Sarmiento MD   [facility] ([facility])    [address] [place] [address]   [phone]           Located in the Clinics and Surgery Center at [address] [place] [address].   parking is very convenient and highly recommended.  is a $6 flat rate fee.  Both  and self parkers should enter the main arrival plaza from [facility] [address]; parking attendants will direct you based on your parking preference.            Mar 14, 2018 10:30 AM CDT   (Arrive by 10:15 AM)    Post-Op with  U Ortho Nurse   [facility] ([facility])    [address] [facility]  [address] [facility] [id number] [phone]            Apr 03, 2018  1:30 PM CDT   (Arrive by 1:15 PM)   RETURN KNEE with Rima Sarmiento MD   [facility] ([facility] [place])    [address] [id number] [phone]              Who to contact     If you have questions or need follow up information about today's clinic visit or your schedule please contact [facility] directly at [phone].  Normal or non-critical lab and imaging results will be communicated to you by MyChart, letter or phone within 4 business days after the clinic has received the results. If you do not hear from us within 7 days, please contact the clinic through [facility] or phone. If you have a critical or abnormal lab result, we will notify you by phone as soon as possible.  Submit refill requests through [facility] or call your pharmacy and they will forward the refill request to us. Please allow 3 business days for your refill to be completed.          Additional Information About Your Visit        ChibwehareEvent Information     [facility] gives you secure access to your electronic health record. If you see a primary care provider, you can also send messages to your care team and make appointments. If you have questions, please call your primary care clinic.  If you do not have a primary care provider, please call [phone] and they will assist you.        Care EveryWhere ID     This is your Care EveryWhere ID. This could be used by other organizations to access your [facility] medical records  [id number]        Your Vitals Were     Pulse Temperature Last Period BMI (Body Mass Index)          74 97.9  F (36.6  C) (Oral) 12/25/2013 22.13 kg/m2         Blood Pressure from Last 3 Encounters:   02/19/18 114/74   10/13/17 118/74    04/28/17 99/68    Weight from Last 3 Encounters:   02/19/18 121 lb (54.9 kg)   02/06/18 121 lb (54.9 kg)   01/18/18 120 lb (54.4 kg)              Today, you had the following     No orders found for display       Primary Care Provider Office Phone # Fax #    JOSE Sarkar [facility] [phone]       [address] [facility] [address]        Equal Access to Services     YUMIKO CAO : Hadii aad ku hadasho Soomaali, waaxda luqadaha, qaybta kaalmada adeegyada, waxay idiin hayaan adeeg kharash rakesh . So [facility] [phone].    ATENCIÓN: Si habla español, tiene a lopes disposición servicios gratuitos de asistencia lingüística. Llame al [phone].    We comply with applicable federal civil rights laws and [place] laws. We do not discriminate on the basis of race, color, national origin, age, disability, sex, sexual orientation, or gender identity.            Thank you!     Thank you for choosing [facility]  for your care. Our goal is always to provide you with excellent care. Hearing back from our patients is one way we can continue to improve our services. Please take a few minutes to complete the written survey that you may receive in the mail after your visit with us. Thank you!             Your Updated Medication List - Protect others around you: Learn how to safely use, store and throw away your medicines at www.disposemymeds.org.          This list is accurate as of 2/19/18 10:47 AM.  Always use your most recent med list.                   Brand Name Dispense Instructions for use Diagnosis    albuterol 108 (90 BASE) MCG/ACT Inhaler    PROAIR HFA/PROVENTIL HFA/VENTOLIN HFA    3 Inhaler    Inhale 2 puffs into the lungs 2 times daily as needed for shortness of breath / dyspnea    Mild persistent asthma with acute exacerbation       CALCIUM + D PO      Take 600 mg by mouth.        fexofenadine 180 MG tablet    ALLEGRA     1 TABLET DAILY        FLOVENT   MCG/ACT Inhaler   Generic drug:  fluticasone     12 g    INHALE 2 PUFFS INTO THE LUNGS 2 TIMES DAILY    Mild persistent asthma with acute exacerbation       fluticasone 50 MCG/ACT spray    FLONASE     Spray 2 sprays into both nostrils daily        Turmeric Curcumin 500 MG Caps      Take 500 mg by mouth daily        VITAMIN B12 PO           VITAMIN D (CHOLECALCIFEROL) PO      Take by mouth daily

## 2018-02-19 NOTE — NURSING NOTE
"Chief Complaint   Patient presents with     Pre-Op Exam       Initial /74 (BP Location: Left arm, Patient Position: Sitting, Cuff Size: Adult Regular)  Pulse 74  Temp 97.9  F (36.6  C) (Oral)  Wt 121 lb (54.9 kg)  LMP 12/25/2013  BMI 22.13 kg/m2 Estimated body mass index is 22.13 kg/(m^2) as calculated from the following:    Height as of 2/6/18: 5' 2\" (1.575 m).    Weight as of this encounter: 121 lb (54.9 kg).  Medication Reconciliation: complete  Zachary Beltran MA    "

## 2018-02-20 ASSESSMENT — ASTHMA QUESTIONNAIRES: ACT_TOTALSCORE: 23

## 2018-02-22 ENCOUNTER — TELEPHONE (OUTPATIENT)
Dept: ORTHOPEDICS | Facility: CLINIC | Age: 43
End: 2018-02-22

## 2018-02-22 ENCOUNTER — MYC MEDICAL ADVICE (OUTPATIENT)
Dept: FAMILY MEDICINE | Facility: CLINIC | Age: 43
End: 2018-02-22

## 2018-02-22 NOTE — TELEPHONE ENCOUNTER
Lisset calling today with many questions regarding her knee scope surgery planned on 3/2 with Dr. Sarmiento.  Patient explained she has had some family life changes recently and is quite nervous about the anesthesia aspect of surgery.  We discussed this and she was also given the information regarding our PAC clinic if she felt that she needed to be seen there prior to surgery to get more specific questions answered pre-op.  She is going to think on this today and let us know.  She may just post pone her surgery if she feels that it's just not a good time for her family.  She will let josh know of this as soon as she decides.   She has our phone number for further questions or concerns.

## 2018-03-01 ENCOUNTER — ANESTHESIA EVENT (OUTPATIENT)
Dept: SURGERY | Facility: AMBULATORY SURGERY CENTER | Age: 43
End: 2018-03-01

## 2018-03-02 ENCOUNTER — HOSPITAL ENCOUNTER (OUTPATIENT)
Facility: AMBULATORY SURGERY CENTER | Age: 43
End: 2018-03-02
Attending: ORTHOPAEDIC SURGERY
Payer: COMMERCIAL

## 2018-03-02 ENCOUNTER — SURGERY (OUTPATIENT)
Age: 43
End: 2018-03-02

## 2018-03-02 ENCOUNTER — ANESTHESIA (OUTPATIENT)
Dept: SURGERY | Facility: AMBULATORY SURGERY CENTER | Age: 43
End: 2018-03-02

## 2018-03-02 VITALS
HEART RATE: 55 BPM | TEMPERATURE: 97.5 F | OXYGEN SATURATION: 98 % | RESPIRATION RATE: 16 BRPM | SYSTOLIC BLOOD PRESSURE: 103 MMHG | BODY MASS INDEX: 22.08 KG/M2 | DIASTOLIC BLOOD PRESSURE: 65 MMHG | WEIGHT: 120 LBS | HEIGHT: 62 IN

## 2018-03-02 DIAGNOSIS — Z98.890 STATUS POST SURGERY: Primary | ICD-10-CM

## 2018-03-02 RX ORDER — HYDROXYZINE HYDROCHLORIDE 25 MG/1
25 TABLET, FILM COATED ORAL EVERY 6 HOURS PRN
Qty: 20 TABLET | Refills: 0 | Status: SHIPPED | OUTPATIENT
Start: 2018-03-02 | End: 2018-06-12

## 2018-03-02 RX ORDER — ONDANSETRON 4 MG/1
4 TABLET, ORALLY DISINTEGRATING ORAL EVERY 30 MIN PRN
Status: DISCONTINUED | OUTPATIENT
Start: 2018-03-02 | End: 2018-03-03 | Stop reason: HOSPADM

## 2018-03-02 RX ORDER — SODIUM CHLORIDE, SODIUM LACTATE, POTASSIUM CHLORIDE, CALCIUM CHLORIDE 600; 310; 30; 20 MG/100ML; MG/100ML; MG/100ML; MG/100ML
INJECTION, SOLUTION INTRAVENOUS CONTINUOUS
Status: DISCONTINUED | OUTPATIENT
Start: 2018-03-02 | End: 2018-03-02 | Stop reason: HOSPADM

## 2018-03-02 RX ORDER — OXYCODONE HYDROCHLORIDE 5 MG/1
2.5-5 TABLET ORAL EVERY 4 HOURS PRN
Qty: 15 TABLET | Refills: 0 | Status: SHIPPED | OUTPATIENT
Start: 2018-03-02 | End: 2018-06-12

## 2018-03-02 RX ORDER — NALOXONE HYDROCHLORIDE 0.4 MG/ML
.1-.4 INJECTION, SOLUTION INTRAMUSCULAR; INTRAVENOUS; SUBCUTANEOUS
Status: DISCONTINUED | OUTPATIENT
Start: 2018-03-02 | End: 2018-03-03 | Stop reason: HOSPADM

## 2018-03-02 RX ORDER — ACETAMINOPHEN 325 MG/1
975 TABLET ORAL ONCE
Status: COMPLETED | OUTPATIENT
Start: 2018-03-02 | End: 2018-03-02

## 2018-03-02 RX ORDER — ACETAMINOPHEN 325 MG/1
650 TABLET ORAL EVERY 4 HOURS PRN
Qty: 100 TABLET | Refills: 0 | Status: SHIPPED | OUTPATIENT
Start: 2018-03-02 | End: 2019-03-01

## 2018-03-02 RX ORDER — AMOXICILLIN 250 MG
1-2 CAPSULE ORAL 2 TIMES DAILY
Qty: 20 TABLET | Refills: 0 | Status: SHIPPED | OUTPATIENT
Start: 2018-03-02 | End: 2018-06-12

## 2018-03-02 RX ORDER — SODIUM CHLORIDE, SODIUM LACTATE, POTASSIUM CHLORIDE, CALCIUM CHLORIDE 600; 310; 30; 20 MG/100ML; MG/100ML; MG/100ML; MG/100ML
INJECTION, SOLUTION INTRAVENOUS CONTINUOUS
Status: DISCONTINUED | OUTPATIENT
Start: 2018-03-02 | End: 2018-03-03 | Stop reason: HOSPADM

## 2018-03-02 RX ORDER — OXYCODONE HYDROCHLORIDE 5 MG/1
5-10 TABLET ORAL EVERY 4 HOURS PRN
Qty: 30 TABLET | Refills: 0 | Status: SHIPPED | OUTPATIENT
Start: 2018-03-02 | End: 2018-03-02

## 2018-03-02 RX ORDER — FENTANYL CITRATE 50 UG/ML
25-50 INJECTION, SOLUTION INTRAMUSCULAR; INTRAVENOUS
Status: DISCONTINUED | OUTPATIENT
Start: 2018-03-02 | End: 2018-03-02 | Stop reason: HOSPADM

## 2018-03-02 RX ORDER — PROPOFOL 10 MG/ML
INJECTION, EMULSION INTRAVENOUS CONTINUOUS PRN
Status: DISCONTINUED | OUTPATIENT
Start: 2018-03-02 | End: 2018-03-02

## 2018-03-02 RX ORDER — LIDOCAINE 40 MG/G
CREAM TOPICAL
Status: DISCONTINUED | OUTPATIENT
Start: 2018-03-02 | End: 2018-03-02 | Stop reason: HOSPADM

## 2018-03-02 RX ORDER — BUPIVACAINE HYDROCHLORIDE 2.5 MG/ML
INJECTION, SOLUTION INFILTRATION; PERINEURAL PRN
Status: DISCONTINUED | OUTPATIENT
Start: 2018-03-02 | End: 2018-03-02 | Stop reason: HOSPADM

## 2018-03-02 RX ORDER — ONDANSETRON 2 MG/ML
4 INJECTION INTRAMUSCULAR; INTRAVENOUS EVERY 30 MIN PRN
Status: DISCONTINUED | OUTPATIENT
Start: 2018-03-02 | End: 2018-03-03 | Stop reason: HOSPADM

## 2018-03-02 RX ORDER — GABAPENTIN 300 MG/1
300 CAPSULE ORAL ONCE
Status: COMPLETED | OUTPATIENT
Start: 2018-03-02 | End: 2018-03-02

## 2018-03-02 RX ORDER — PROPOFOL 10 MG/ML
INJECTION, EMULSION INTRAVENOUS PRN
Status: DISCONTINUED | OUTPATIENT
Start: 2018-03-02 | End: 2018-03-02

## 2018-03-02 RX ORDER — KETOROLAC TROMETHAMINE 30 MG/ML
INJECTION, SOLUTION INTRAMUSCULAR; INTRAVENOUS PRN
Status: DISCONTINUED | OUTPATIENT
Start: 2018-03-02 | End: 2018-03-02

## 2018-03-02 RX ORDER — FENTANYL CITRATE 50 UG/ML
INJECTION, SOLUTION INTRAMUSCULAR; INTRAVENOUS PRN
Status: DISCONTINUED | OUTPATIENT
Start: 2018-03-02 | End: 2018-03-02

## 2018-03-02 RX ORDER — MEPERIDINE HYDROCHLORIDE 25 MG/ML
12.5 INJECTION INTRAMUSCULAR; INTRAVENOUS; SUBCUTANEOUS
Status: DISCONTINUED | OUTPATIENT
Start: 2018-03-02 | End: 2018-03-03 | Stop reason: HOSPADM

## 2018-03-02 RX ORDER — ONDANSETRON 2 MG/ML
INJECTION INTRAMUSCULAR; INTRAVENOUS PRN
Status: DISCONTINUED | OUTPATIENT
Start: 2018-03-02 | End: 2018-03-02

## 2018-03-02 RX ADMIN — PROPOFOL 150 MCG/KG/MIN: 10 INJECTION, EMULSION INTRAVENOUS at 08:32

## 2018-03-02 RX ADMIN — SODIUM CHLORIDE, SODIUM LACTATE, POTASSIUM CHLORIDE, CALCIUM CHLORIDE: 600; 310; 30; 20 INJECTION, SOLUTION INTRAVENOUS at 08:27

## 2018-03-02 RX ADMIN — FENTANYL CITRATE 12.5 MCG: 50 INJECTION, SOLUTION INTRAMUSCULAR; INTRAVENOUS at 09:15

## 2018-03-02 RX ADMIN — PROPOFOL 50 MG: 10 INJECTION, EMULSION INTRAVENOUS at 09:27

## 2018-03-02 RX ADMIN — BUPIVACAINE HYDROCHLORIDE 16 ML: 2.5 INJECTION, SOLUTION INFILTRATION; PERINEURAL at 09:26

## 2018-03-02 RX ADMIN — FENTANYL CITRATE 25 MCG: 50 INJECTION, SOLUTION INTRAMUSCULAR; INTRAVENOUS at 08:41

## 2018-03-02 RX ADMIN — GABAPENTIN 300 MG: 300 CAPSULE ORAL at 07:58

## 2018-03-02 RX ADMIN — BUPIVACAINE HYDROCHLORIDE 10 ML: 2.5 INJECTION, SOLUTION INFILTRATION; PERINEURAL at 09:08

## 2018-03-02 RX ADMIN — ACETAMINOPHEN 975 MG: 325 TABLET ORAL at 07:58

## 2018-03-02 RX ADMIN — PROPOFOL 25 MG: 10 INJECTION, EMULSION INTRAVENOUS at 09:12

## 2018-03-02 RX ADMIN — PROPOFOL 25 MG: 10 INJECTION, EMULSION INTRAVENOUS at 09:18

## 2018-03-02 RX ADMIN — ONDANSETRON 4 MG: 2 INJECTION INTRAMUSCULAR; INTRAVENOUS at 08:57

## 2018-03-02 RX ADMIN — KETOROLAC TROMETHAMINE 30 MG: 30 INJECTION, SOLUTION INTRAMUSCULAR; INTRAVENOUS at 09:41

## 2018-03-02 NOTE — IP AVS SNAPSHOT
MRN:[id number]                      After Visit Summary   3/2/2018    Lisset MITCHELL Chi    MRN: [id number]           Thank you!     Thank you for choosing [facility] for your care. Our goal is always to provide you with excellent care. Hearing back from our patients is one way we can continue to improve our services. Please take a few minutes to complete the written survey that you may receive in the mail after you visit with us. Thank you!        Patient Information     Date Of Birth          1975        About your hospital stay     You were admitted on:  March 2, 2018 [facility]    You were discharged on:  March 2, 2018       Who to Call     For medical emergencies, please call 911.  For non-urgent questions about your medical care, please call your primary care provider or clinic, [phone]  For questions related to your surgery, please call your surgery clinic        Attending Provider     Provider Specialty    iRma Sarmiento MD Orthopedics       Primary Care Provider Office Phone # Fax #    Shannon JOSE Lezama [facility] [phone]      After Care Instructions     Discharge Instructions       KNEE ARTHROSCOPY POST OPERATIVE INSTRUCTIONS    A.  COMFORT:  -Elevation: Elevate your knee and ankle above the level of your heart. The best position is lying down with two pillows lengthwise under your entire leg. This should be done for the first several days after surgery.  -Swelling: An ice pack will be provided to control swelling and discomfort.  Once the initial dressing is removed, place a thin towel between your skin and the ice pack.  -Pain Medication: Take medication as prescribed, but only as often as necessary. Avoid alcohol and driving if you are taking pain medication.  Remember that Tylenol can be used for pain relief as you wean off the narcotics. The maximum Tylenol dose for a single day is 3000 mg.           B.  ACTIVITIES:  -Exercises: Point and flex your feet and wiggle your toes, move your ankles clockwise and counterclockwise in circular motions, to help prevent complications such as blood clotting in your legs. Thigh muscle tightening exercises should begin the day of surgery - Tighten quadriceps muscles on top of thigh so the kneecap is pulled up and leg is completely straight. Hold to a count of five. Relax 1-2 seconds between each exercise. Do ten times each hour.  -Weight bearing status: Use crutches as needed for the first two to three days to avoid pain when walking. You may weight bear on your leg as tolerated.    C. INCISION CARE:    -Keep the initial post-op dressing on, clean and dry for 24 hours after surgery. You may then remove the dressing and shower. If the dressing feels too tight or you are experiencing numbness swelling, or tingling below the dressing, remove the outer dressing.  -The Dermabond (surgical adhesive that is directly on the incision areas) should be left on until they fall off or are removed at your first office visit. If a little drainage from the wound occurs after you have removed the original dressing, use a band-aid. If the drainage increases, CALL YOUR DOCTOR.  Redress the knee with tubi- or ace wrap as long as swelling persists.   -Avoid soaking the wound in a pool or bath for TEN days (or until the skin has healed over the surgical area). When taking a shower, let water run over the wound, pat dry. DO NOT rub or scrub the wound area. The wound itself can be cleaned with rubbing alcohol or peroxide. You may also use rubbing alcohol to remove betadine or other skin prep solution from the leg.    D. CALL YOUR PHYSICIAN IF:  -Pain in your knee persists or worsens in the first few days after surgery, or is not relieved by Tylenol or the narcotic prescribed to you.  -You have excessive redness or drainage of cloudy or bloody material from the wounds (Clear red tinted fluid  and some mild drainage should be expected). Drainage of any kind 5 days after surgery should be reported to the doctor.  -You have signs of infection such as a temperature elevation greater than 101.5 , increased tenderness, swelling or redness at surgical site.  -You have pain, swelling or redness in your calf that increases over time.  -You have numbness or weakness in your leg or foot.  -You injure your leg or knee.      You may contact your physician at [phone] during business hours.    For after hours or weekend calls, you may contact the hospital at [phone] and ask for the on-call orthopedic resident            Ice to affected area       Ice pack to surgical site every 15 minutes per hour for 24 hours            No Alcohol       While on narcotic medication            No driving or operating machinery       Do not drive or operate machinery until narcotic pain medications are discontinued            Shower       You may remove the dressing and shower on POD #3            Weight bearing - As tolerated       Weight bearing as tolerated with crutches until normal gait is restored                  Your next 10 appointments already scheduled     Mar 14, 2018 10:30 AM CDT   (Arrive by 10:15 AM)   Post-Op with Dee NG Ortho Nurse   [facility] ([facility])    [address] [place] [address] [phone]            Apr 03, 2018  1:30 PM CDT   (Arrive by 1:15 PM)   RETURN KNEE with Rima Sarmiento MD   [facility] ([facility])    [address] [place] [address] [phone]              Additional Services     PHYSICAL THERAPY REFERRAL (External-Prints)       Physical Therapy Referral                  Further instructions from your care team           [facility]  Home Care Following Anesthesia  For 24 hours after surgery:  1. Get  plenty of rest.  A responsible adult must stay with you for at least 24 hours after you leave the surgery center.  2. Do not drive or use heavy equipment.  If you have weakness or tingling, don't drive or use heavy equipment until this feeling goes away.   3. Do not drink alcohol.   4. Avoid strenuous or risky activities.  Ask for help when climbing stairs.  5. You may feel lightheaded.  IF so, sit for a few minutes before standing.  Have someone help you get up.   6. If you have nausea (feel sick to your stomach): Drink only clear liquids such as apple juice, ginger ale, broth or 7-Up.  Rest may also help.  Be sure to drink enough fluids.  Move to a regular diet as you feel able.   7. You may have a slight fever.  Call the doctor if your fever is over 100 F (37.7 C) (taken under the tongue) or lasts longer than 24 hours.  8. You may have a dry mouth, a sore throat, muscle aches or trouble sleeping. These should go away after 24 hours.  9. Do not make important or legal decisions.     Tips for taking pain medications  To get the best pain relief possible, remember these points:    Take pain medications as directed, before pain becomes severe.    Pain medication can upset your stomach: taking it with food may help.    Constipation is a common side effect of pain medication. Drink plenty of  fluids.    Eat foods high in fiber. Take a stool softener if recommended by your doctor or pharmacist.    Do not drink alcohol, drive or operate machinery while taking pain medications.    Ask about other ways to control pain, such as with heat, ice or relaxation.    Tylenol/Acetaminophen Consumption  To help encourage the safe use of acetaminophen, the makers of TYLENOL  have lowered the maximum daily dose for single-ingredient Extra Strength TYLENOL  (acetaminophen) products sold in the [place] from 8 pills per day (4,000 mg) to 6 pills per day (3,000 mg). The dosing interval has also changed from 2 pills every 4-6 hours to 2 pills  every 6 hours.    If you feel your pain relief is insufficient, you may take Tylenol/Acetaminophen in addition to your narcotic pain medication.     Be careful not to exceed 3,000 mg of Tylenol/Acetaminophen in a 24 hour period from all sources.    If you are taking extra strength Tylenol/acetaminophen (500 mg), the maximum dose is 6 tablets in 24 hours.    If you are taking regular strength acetaminophen (325 mg), the maximum dose is 9 tablets in 24 hours.    Call a doctor for any of the followin. Signs of infection (fever, growing tenderness at the surgery site, a large amount of drainage or bleeding, severe pain, foul-smelling drainage, redness, swelling).  2. It has been over 8 to 10 hours since surgery and you are still not able to urinate (pass water).  3. Headache for over 24 hours.  Your doctor is:  Dr. Rima Sarmiento, Orthopaedics: [phone]                    Or dial [phone] and ask for the resident on call for:  Orthopaedics  For emergency care, call the:  [facility] Emergency Department: [phone] (TTY for hearing impaired: [phone])  Safety Tips for Using Crutches    Crutch Fit:    Assume good standing posture with shoulders relaxed and crutch tips 6-8 inches out from the side of the foot.    The underarm pad should fall 2-3 fingers width below the armpit.    The handgrip is positioned level with the wrist to allow 30  flexion at the elbow.    Safety Tips:    Bear weight on your hands, not on your armpits.    Do not add extra padding to the underarm pad. This will, in effect, lengthen the crutches and increase risk of nerve injury.    Wear flat, properly fitting shoes. Do not walk in stocking feet, high heels or slippers.    Household hazards:  --Throw rugs should be removed from floors.  --Stairs should be cleared of obstacles.  --Use extra caution on slippery, highly polished, littered or uneven floor surfaces.  --Check for electric cords.    Check crutch tips for excessive wear  "and keep wing nuts tight.    While walking, look forward with  head up  and  eyes open.  Take equal length steps.    Use BOTH crutches.    Stairs Sequence:    UP: \"Good\" leg first, followed by  bad  leg, then crutches.    DOWN: Crutches, followed by  bad  leg, \"good\" leg.     Walking with Crutches:    Move both crutches forward at the same time.    Non-Weight Bearing (NWB):  Hold the involved leg up and swing through the crutches with the involved leg. The involved leg does not touch the floor.    Toe Touch Weight Bearing (TTWB): Move the involved leg forward. Rest it lightly on the floor for balance only. Step through the crutches with the uninvolved leg.    Partial Weight Bearing (PWB): Move the involved leg forward. Step down the weight of the leg only.  Step through the crutches with the uninvolved leg.    Weight Bearing As Tolerated (WBAT): Move the involved leg forward. Put as much pressure through the involved leg as you can tolerate comfortably. Then step through the crutches with the uninvolved leg.        Pending Results     No orders found from 2/28/2018 to 3/3/2018.            Admission Information     Date & Time Provider Department Dept. Phone    3/2/2018 Rima Sarmiento MD [facility] [phone]      Your Vitals Were     Blood Pressure Pulse Temperature Respirations Height Weight    104/44 56 97.6  F (36.4  C) (Oral) 16 1.575 m (5' 2\") 54.4 kg (120 lb)    Last Period Pulse Oximetry BMI (Body Mass Index)             12/25/2013 98% 21.95 kg/m2         [employer] Information     [employer] gives you secure access to your electronic health record. If you see a primary care provider, you can also send messages to your care team and make appointments. If you have questions, please call your primary care clinic.  If you do not have a primary care provider, please call [phone] and they will assist you.      [employer] is an electronic gateway that provides easy, online access to " your medical records. With [employer] you can request a clinic appointment, read your test results, renew a prescription or communicate with your care team.     To access your existing account, please contact your [facility] or call [phone] for assistance.        Care EveryWhere ID     This is your Care EveryWhere ID. This could be used by other organizations to access your [facility] medical records  [id number]        Equal Access to Services     YUMIKO CAO : Hadii aad ku hadasho Soomaali, waaxda luqadaha, qaybta kaalmada adeegyada, waxay antonioin diane reedyamilcal taylor. So [facility] [phone].    ATENCIÓN: Si mary gracela dru, tiene a lopes disposición servicios gratuitos de asistencia lingüística. Llame al [phone].    We comply with applicable federal civil rights laws and [place] laws. We do not discriminate on the basis of race, color, national origin, age, disability, sex, sexual orientation, or gender identity.               Review of your medicines      START taking        Dose / Directions    acetaminophen 325 MG tablet   Commonly known as:  TYLENOL   Used for:  Status post surgery        Dose:  650 mg   Take 2 tablets (650 mg) by mouth every 4 hours as needed for other (mild pain)   Quantity:  100 tablet   Refills:  0       aspirin  MG EC tablet   Used for:  Status post surgery        Dose:  325 mg   Take 1 tablet (325 mg) by mouth daily To be taken for DVT Prophylaxis daily   Quantity:  28 tablet   Refills:  0       hydrOXYzine 25 MG tablet   Commonly known as:  ATARAX   Used for:  Status post surgery        Dose:  25 mg   Take 1 tablet (25 mg) by mouth every 6 hours as needed for itching (and nausea)   Quantity:  20 tablet   Refills:  0       oxyCODONE IR 5 MG tablet   Commonly known as:  ROXICODONE   Used for:  Status post surgery        Dose:  2.5-5 mg   Take 0.5-1 tablets (2.5-5 mg) by mouth every 4 hours as needed for pain maximum 8 tablet(s) per day    Quantity:  15 tablet   Refills:  0       senna-docusate 8.6-50 MG per tablet   Commonly known as:  SENOKOT-S;PERICOLACE   Used for:  Status post surgery        Dose:  1-2 tablet   Take 1-2 tablets by mouth 2 times daily Take while on oral narcotics to prevent or treat constipation.   Quantity:  20 tablet   Refills:  0         CONTINUE these medicines which have NOT CHANGED        Dose / Directions    albuterol 108 (90 BASE) MCG/ACT Inhaler   Commonly known as:  PROAIR HFA/PROVENTIL HFA/VENTOLIN HFA   Used for:  Mild persistent asthma with acute exacerbation        Dose:  2 puff   Inhale 2 puffs into the lungs 2 times daily as needed for shortness of breath / dyspnea   Quantity:  3 Inhaler   Refills:  3       CALCIUM + D PO        Dose:  600 mg   Take 600 mg by mouth.   Refills:  0       fexofenadine 180 MG tablet   Commonly known as:  ALLEGRA        1 TABLET DAILY   Refills:  0       FLOVENT  MCG/ACT Inhaler   Used for:  Mild persistent asthma with acute exacerbation   Generic drug:  fluticasone        INHALE 2 PUFFS INTO THE LUNGS 2 TIMES DAILY   Quantity:  12 g   Refills:  3       fluticasone 50 MCG/ACT spray   Commonly known as:  FLONASE        Dose:  2 spray   Spray 2 sprays into both nostrils daily   Refills:  0       Turmeric Curcumin 500 MG Caps        Dose:  500 mg   Take 500 mg by mouth daily   Refills:  0       VITAMIN B12 PO        Refills:  0       VITAMIN D (CHOLECALCIFEROL) PO        Take by mouth daily   Refills:  0            Where to get your medicines      These medications were sent to [place], [place] - [address] [facility] [address] [place] [address] [address] [place] [id number]    Hours:  TRANSPLANT PHONE NUMBER [phone] Phone:  [phone]     acetaminophen 325 MG tablet    aspirin  MG EC tablet    hydrOXYzine 25 MG tablet    senna-docusate 8.6-50 MG per tablet         Some of these will need a paper prescription and others can be bought  over the counter. Ask your nurse if you have questions.     Bring a paper prescription for each of these medications     oxyCODONE IR 5 MG tablet                Protect others around you: Learn how to safely use, store and throw away your medicines at www.disposemymeds.org.        Information about OPIOIDS     PRESCRIPTION OPIOIDS: WHAT YOU NEED TO KNOW    Prescription opioids can be used to help relieve moderate to severe pain and are often prescribed following a surgery or injury, or for certain health conditions. These medications can be an important part of treatment but also come with serious risks. It is important to work with your health care provider to make sure you are getting the safest, most effective care.    WHAT ARE THE RISKS AND SIDE EFFECTS OF OPIOID USE?  Prescription opioids carry serious risks of addiction and overdose, especially with prolonged use. An opioid overdose, often marked by slowed breathing can cause sudden death. The use of prescription opioids can have a number of side effects as well, even when taken as directed:      Tolerance - meaning you might need to take more of a medication for the same pain relief    Physical dependence - meaning you have symptoms of withdrawal when a medication is stopped    Increased sensitivity to pain    Constipation    Nausea, vomiting, and dry mouth    Sleepiness and dizziness    Confusion    Depression    Low levels of testosterone that can result in lower sex drive, energy, and strength    Itching and sweating    RISKS ARE GREATER WITH:    History of drug misuse, substance use disorder, or overdose    Mental health conditions (such as depression or anxiety)    Sleep apnea    Older age (65 years or older)    Pregnancy    Avoid alcohol while taking prescription opioids.   Also, unless specifically advised by your health care provider, medications to avoid include:    Benzodiazepines (such as Xanax or Valium)    Muscle relaxants (such as Soma or  Flexeril)    Hypnotics (such as Ambien or Lunesta)    Other prescription opioids    KNOW YOUR OPTIONS:  Talk to your health care provider about ways to manage your pain that do not involve prescription opioids. Some of these options may actually work better and have fewer risks and side effects:    Pain relievers such as acetaminophen, ibuprofen, and naproxen    Some medications that are also used for depression or seizures    Physical therapy and exercise    Cognitive behavioral therapy, a psychological, goal-directed approach, in which patients learn how to modify physical, behavioral, and emotional triggers of pain and stress    IF YOU ARE PRESCRIBED OPIOIDS FOR PAIN:    Never take opioids in greater amounts or more often than prescribed    Follow up with your primary health care provider and work together to create a plan on how to manage your pain.    Talk about ways to help manage your pain that do not involve prescription opioids    Talk about all concerns and side effects    Help prevent misuse and abuse    Never sell or share prescription opioids    Never use another person's prescription opioids    Store prescription opioids in a secure place and out of reach of others (this may include visitors, children, friends, and family)    Visit www.cdc.gov/drugoverdose to learn about risks of opioid abuse and overdose    If you believe you may be struggling with addiction, tell your health care provider and ask for guidance or call [facility]'s National Helpline at 5-146-415-HELP    LEARN MORE / www.cdc.gov/drugoverdose/prescribing/guideline.html    Safely dispose of unused prescription opioids: Find your local drug take-back programs and more information about the importance of safe disposal at www.doseofreality.[place].gov             Medication List: This is a list of all your medications and when to take them. Check marks below indicate your daily home schedule. Keep this list as a reference.      Medications            Morning Afternoon Evening Bedtime As Needed    acetaminophen 325 MG tablet   Commonly known as:  TYLENOL   Take 2 tablets (650 mg) by mouth every 4 hours as needed for other (mild pain)   Last time this was given:  975 mg on 3/2/2018  7:58 AM                                albuterol 108 (90 BASE) MCG/ACT Inhaler   Commonly known as:  PROAIR HFA/PROVENTIL HFA/VENTOLIN HFA   Inhale 2 puffs into the lungs 2 times daily as needed for shortness of breath / dyspnea                                aspirin  MG EC tablet   Take 1 tablet (325 mg) by mouth daily To be taken for DVT Prophylaxis daily                                CALCIUM + D PO   Take 600 mg by mouth.                                fexofenadine 180 MG tablet   Commonly known as:  ALLEGRA   1 TABLET DAILY                                FLOVENT  MCG/ACT Inhaler   INHALE 2 PUFFS INTO THE LUNGS 2 TIMES DAILY   Generic drug:  fluticasone                                fluticasone 50 MCG/ACT spray   Commonly known as:  FLONASE   Spray 2 sprays into both nostrils daily                                hydrOXYzine 25 MG tablet   Commonly known as:  ATARAX   Take 1 tablet (25 mg) by mouth every 6 hours as needed for itching (and nausea)                                oxyCODONE IR 5 MG tablet   Commonly known as:  ROXICODONE   Take 0.5-1 tablets (2.5-5 mg) by mouth every 4 hours as needed for pain maximum 8 tablet(s) per day                                senna-docusate 8.6-50 MG per tablet   Commonly known as:  SENOKOT-S;PERICOLACE   Take 1-2 tablets by mouth 2 times daily Take while on oral narcotics to prevent or treat constipation.                                Turmeric Curcumin 500 MG Caps   Take 500 mg by mouth daily                                VITAMIN B12 PO                                VITAMIN D (CHOLECALCIFEROL) PO   Take by mouth daily

## 2018-03-02 NOTE — DISCHARGE INSTRUCTIONS
[facility]  Home Care Following Anesthesia  For 24 hours after surgery:  1. Get plenty of rest.  A responsible adult must stay with you for at least 24 hours after you leave the surgery center.  2. Do not drive or use heavy equipment.  If you have weakness or tingling, don't drive or use heavy equipment until this feeling goes away.   3. Do not drink alcohol.   4. Avoid strenuous or risky activities.  Ask for help when climbing stairs.  5. You may feel lightheaded.  IF so, sit for a few minutes before standing.  Have someone help you get up.   6. If you have nausea (feel sick to your stomach): Drink only clear liquids such as apple juice, ginger ale, broth or 7-Up.  Rest may also help.  Be sure to drink enough fluids.  Move to a regular diet as you feel able.   7. You may have a slight fever.  Call the doctor if your fever is over 100 F (37.7 C) (taken under the tongue) or lasts longer than 24 hours.  8. You may have a dry mouth, a sore throat, muscle aches or trouble sleeping. These should go away after 24 hours.  9. Do not make important or legal decisions.     Tips for taking pain medications  To get the best pain relief possible, remember these points:    Take pain medications as directed, before pain becomes severe.    Pain medication can upset your stomach: taking it with food may help.    Constipation is a common side effect of pain medication. Drink plenty of  fluids.    Eat foods high in fiber. Take a stool softener if recommended by your doctor or pharmacist.    Do not drink alcohol, drive or operate machinery while taking pain medications.    Ask about other ways to control pain, such as with heat, ice or relaxation.    Tylenol/Acetaminophen Consumption  To help encourage the safe use of acetaminophen, the makers of TYLENOL  have lowered the maximum daily dose for single-ingredient Extra Strength TYLENOL  (acetaminophen) products sold in the [place] from 8 pills per  day (4,000 mg) to 6 pills per day (3,000 mg). The dosing interval has also changed from 2 pills every 4-6 hours to 2 pills every 6 hours.    If you feel your pain relief is insufficient, you may take Tylenol/Acetaminophen in addition to your narcotic pain medication.     Be careful not to exceed 3,000 mg of Tylenol/Acetaminophen in a 24 hour period from all sources.    If you are taking extra strength Tylenol/acetaminophen (500 mg), the maximum dose is 6 tablets in 24 hours.    If you are taking regular strength acetaminophen (325 mg), the maximum dose is 9 tablets in 24 hours.    Call a doctor for any of the followin. Signs of infection (fever, growing tenderness at the surgery site, a large amount of drainage or bleeding, severe pain, foul-smelling drainage, redness, swelling).  2. It has been over 8 to 10 hours since surgery and you are still not able to urinate (pass water).  3. Headache for over 24 hours.  Your doctor is:  Dr. Rima Sarmiento, Orthopaedics: [phone]                    Or dial [phone] and ask for the resident on call for:  Orthopaedics  For emergency care, call the:  [facility] Emergency Department: [phone] (TTY for hearing impaired: [phone])  Safety Tips for Using Crutches    Crutch Fit:    Assume good standing posture with shoulders relaxed and crutch tips 6-8 inches out from the side of the foot.    The underarm pad should fall 2-3 fingers width below the armpit.    The handgrip is positioned level with the wrist to allow 30  flexion at the elbow.    Safety Tips:    Bear weight on your hands, not on your armpits.    Do not add extra padding to the underarm pad. This will, in effect, lengthen the crutches and increase risk of nerve injury.    Wear flat, properly fitting shoes. Do not walk in stocking feet, high heels or slippers.    Household hazards:  --Throw rugs should be removed from floors.  --Stairs should be cleared of obstacles.  --Use extra caution on slippery,  "highly polished, littered or uneven floor surfaces.  --Check for electric cords.    Check crutch tips for excessive wear and keep wing nuts tight.    While walking, look forward with  head up  and  eyes open.  Take equal length steps.    Use BOTH crutches.    Stairs Sequence:    UP: \"Good\" leg first, followed by  bad  leg, then crutches.    DOWN: Crutches, followed by  bad  leg, \"good\" leg.     Walking with Crutches:    Move both crutches forward at the same time.    Non-Weight Bearing (NWB):  Hold the involved leg up and swing through the crutches with the involved leg. The involved leg does not touch the floor.    Toe Touch Weight Bearing (TTWB): Move the involved leg forward. Rest it lightly on the floor for balance only. Step through the crutches with the uninvolved leg.    Partial Weight Bearing (PWB): Move the involved leg forward. Step down the weight of the leg only.  Step through the crutches with the uninvolved leg.    Weight Bearing As Tolerated (WBAT): Move the involved leg forward. Put as much pressure through the involved leg as you can tolerate comfortably. Then step through the crutches with the uninvolved leg.      "

## 2018-03-02 NOTE — OP NOTE
Procedure Date: 03/02/2018      DATE OF SURGERY:  03/02/2018      PREOPERATIVE DIAGNOSES:   1.  Right knee catching sensations in deep flexion with pain, rule out loose body.   2.  History of osteochondritis dissecans lesion, lateral femoral condyle, evidenced by imaging.   3.  BMI 22.      POSTOPERATIVE DIAGNOSES:   1.  No evidence of loose bodies.   2.  Significant scar tissue in the intercondylar notch and in the anterior aspect of the lateral femoral condyle.   3.  Wide area of cartilage softening with no dustin loose body formation in central area, lateral femoral condyle.   4.  Relatively preserved medial compartment.   5.  Relatively preserved patellofemoral compartment with normal tracking.      OPERATION:   1.  Right knee exam under anesthesia.   2.  Diagnostic arthroscopy.   3.  Lateral compartment debridement.   4.  Anterior chamber debridement.      :  Rima Sarmiento MD.        ANESTHESIA:  MAC.        EXAM UNDER ANESTHESIA:  The patient's right knee revealed range of motion 5 degrees to 155 degrees.  Passive patellar mobility revealed 2 quadrants lateral mobility with a firm end point.  On the left knee, patient had a 4+ quadrant lateral mobility with a soft endpoint.  Because the patient was under MAC, she did have a positive apprehension sign with this maneuver.      The patient also complained of pain, again, in a type of an apprehensive maneuver when we reached full flexion on the right knee only.      Arthroscopic findings revealed no fluid upon entry into the joint.      The patient's patellofemoral compartment showed satisfactory cartilage surfaces.  There was a suggestion of lateral tracking, but there was no cartilage wear.  She had a fairly short medial trochlea, but no cartilage damage.      Medial compartment showed a preserved meniscus and near normal cartilage surfaces.      It was very difficult to get into the lateral compartment.  This was in part because patient had a near  complete ligamentum mucosum with a fair amount of scar tissue formation around it.  There was a thought initially that she had a discoid meniscus but once we were inside the compartment this proved to be satisfactory.  However, the anterior horn attachment of the lateral meniscus appeared to be more central than I was accustom to.        Once inside the lateral compartment, the patient had a wide area of cartilage softening and much of that area was a ballottable.  This suggested there was a subchondral delamination.  However, there was no trapped door phenomenon.  This was not probed aggressively.      The entire knee was checked for loose bodies including medial and lateral gutters and across the suprapatellar pouch.      The patient was done under awake sedation, which somewhat limited our ability to push and pull and get into every nook and cranny, however, I felt that I did a thorough review of the knee for loose bodies.  In addition, there was no profound area of the knee that a loose body could have emanated from.      PROCEDURE:  Under general anesthesia, the patient was positioned supine on the operating table.  The patient's leg was prepped and draped in the usual sterile fashion.  A pause was performed identifying the correct leg and the administration of antibiotics.      A diagnostic arthroscopy was performed using a superior medial parapatellar portal for inflow and anteromedial and anterolateral portals for instrumentation and visualization, respectively.  Diagnostic arthroscopy findings as stated above.      Operative arthroscopy commenced with debridement of the anterior chamber, primarily getting rid of the ligamentum mucosum and the scar tissue that was between the fat pad and the ACL and that also limited entrance into the lateral compartment.  Once this was debrided, we entered the lateral compartment and did light debridement of any cartilage fragmentation on the lateral femoral condyle, which  was minimal.      At the end of the procedure, excess fluid was removed from the knee.  Betadine, Adaptic, a sterile dressing and a Tubigrip stockinette was put in place.      In addition to the awake anesthesia, we used 26 mL of 0.25% bupivacaine on the table.  20 mL of this was injected into the anteromedial and anterolateral portals prior to the start of the case.  This was done under sterile conditions.  We then added another 6 mL superior medially when it was felt, during the case, that a superior medial portal for flow was needed.      The bupivacaine concentrated in the skin itself and in the fat pad region.      The portals were closed with simple sutures of nylon.  Betadine, Adaptic, a sterile dressing and a Tubigrip stockinette was put in place.  The patient tolerated this procedure well and was taken to the recovery room in satisfactory condition.         PAMELLA SHEIKH MD             D: 2018   T: 2018   MT: RAYSHAWN      Name:     BASIM MARTINEZ   MRN:      [id number]        Account:        [id number]   :      1975           Procedure Date: 2018      Document: [id number]

## 2018-03-02 NOTE — ANESTHESIA POSTPROCEDURE EVALUATION
Patient: Lisset MITCHELL Chi    Procedure(s):  Right Knee Arthroscopy, Lateral Compartmnt Debridemen and  Anterior Chamber - Wound Class: I-Clean    Diagnosis:Old Osteochondritis Dessecans at Lateral Femoral Condyle  Diagnosis Additional Information: No value filed.    Anesthesia Type:  General, LMA    Note:  Anesthesia Post Evaluation    Patient location during evaluation: PACU  Patient participation: Able to fully participate in evaluation  Level of consciousness: awake and alert  Pain management: adequate  Airway patency: patent  Cardiovascular status: hemodynamically stable  Respiratory status: acceptable  Hydration status: stable  PONV: none     Anesthetic complications: None          Last vitals:  Vitals:    03/02/18 1000 03/02/18 1015 03/02/18 1030   BP: 100/66 103/62 103/65   Pulse: 60 54 55   Resp: 16 16 16   Temp:  36.4  C (97.5  F) 36.4  C (97.5  F)   SpO2: 98% 98% 98%         Electronically Signed By: Tremayne Hilliard MD  March 2, 2018  12:44 PM

## 2018-03-02 NOTE — IP AVS SNAPSHOT
[facility]    [address] [place] [id number]    Phone:  [phone]    Fax:  [phone]                                       After Visit Summary   3/2/2018    Lisset MITCHELL Chi    MRN: [id number]           After Visit Summary Signature Page     I have received my discharge instructions, and my questions have been answered. I have discussed any challenges I see with this plan with the nurse or doctor.    ..........................................................................................................................................  Patient/Patient Representative Signature      ..........................................................................................................................................  Patient Representative Print Name and Relationship to Patient    ..................................................               ................................................  Date                                            Time    ..........................................................................................................................................  Reviewed by Signature/Title    ...................................................              ..............................................  Date                                                            Time

## 2018-03-02 NOTE — BRIEF OP NOTE
Orthopaedic Surgery Brief Op-Note     Patient: Lisset MITCHELL Chi  : 1975  Date of Service: 3/2/2018 9:32 AM    Preoperative Diagnosis: Old Osteochondritis Dessecans at Lateral Femoral Condyle     Postoperative Diagnosis: same    Procedure: Procedure(s):  Right Knee Arthroscopy - Wound Class: I-Clean    Surgeon: Dr. Sarmiento    Anesthesia: General     Estimated Blood Loss: 5 cc    Tourniquet Time: none    Specimen: none       Complications: none    Condition: stable    Findings: please see dictated operative note    Plan:    WBAT with assistive devices as needed    ROM as tolerated by patient, no restrictions    PT as outpatient; an order and PT protocol will be provided to the patient at time of discharge    ADAT    Pain control with orals prn    Bowel prophylaxis with senna-docusate    DVT prophylaxis: mechanical      Future Appointments  Date Time Provider Department Center   3/14/2018 10:30 AM Nurse, [facility]   4/3/2018 1:30 PM Rima Sarmiento MD [facility]       Disposition: patient may be discharged with  once appropriate discharge criteria have been met      Best Rowland PA-C  Orthopaedic Surgery    Please page me at [phone] with any questions/concerns. If there is no response, if it is a weekend, or if it is during evening hours, please page the orthopaedic surgery resident on call.

## 2018-03-02 NOTE — ANESTHESIA PREPROCEDURE EVALUATION
Anesthesia Evaluation     .             ROS/MED HX    ENT/Pulmonary:     (+)Intermittent asthma Treatment: Inhaler prn,  , . .    Neurologic:  - neg neurologic ROS     Cardiovascular:  - neg cardiovascular ROS       METS/Exercise Tolerance:     Hematologic:  - neg hematologic  ROS       Musculoskeletal:  - neg musculoskeletal ROS       GI/Hepatic:  - neg GI/hepatic ROS       Renal/Genitourinary:  - ROS Renal section negative       Endo:  - neg endo ROS       Psychiatric:  - neg psychiatric ROS       Infectious Disease:  - neg infectious disease ROS       Malignancy:      - no malignancy   Other:    - neg other ROS                 Physical Exam  Normal systems: cardiovascular, pulmonary and dental    Airway   Mallampati: I  TM distance: >3 FB  Neck ROM: full    Dental     Cardiovascular   Rhythm and rate: regular and normal      Pulmonary    breath sounds clear to auscultation                    Anesthesia Plan      History & Physical Review  History and physical reviewed and following examination; no interval change.    ASA Status:  2 .    NPO Status:  > 8 hours    Plan for General and LMA with Intravenous and Propofol induction. Maintenance will be Balanced.    PONV prophylaxis:  Ondansetron (or other 5HT-3) and Dexamethasone or Solumedrol       Postoperative Care  Postoperative pain management:  Oral pain medications.      Consents  Anesthetic plan, risks, benefits and alternatives discussed with:  Patient..                          .

## 2018-03-02 NOTE — ANESTHESIA CARE TRANSFER NOTE
Patient: Lisset MITCHELL Chi    Procedure(s):  Right Knee Arthroscopy, Lateral Compartmnt Debridemen and  Anterior Chamber - Wound Class: I-Clean    Diagnosis: Old Osteochondritis Dessecans at Lateral Femoral Condyle  Diagnosis Additional Information: No value filed.    Anesthesia Type:   General, LMA     Note:  Airway :Room Air  Patient transferred to:Phase II  Comments: To P2 Recovery.  Fully oriented but sleepy.   Denies discomfort.   VSS WNL.    Report to Jana RNHandoff Report: Identifed the Patient, Identified the Reponsible Provider, Reviewed the pertinent medical history, Discussed the surgical course, Reviewed Intra-OP anesthesia mangement and issues during anesthesia, Set expectations for post-procedure period and Allowed opportunity for questions and acknowledgement of understanding      Vitals: (Last set prior to Anesthesia Care Transfer)    CRNA VITALS  3/2/2018 0917 - 3/2/2018 0957      3/2/2018             Resp Rate (set): 10                Electronically Signed By: JOSE Carmona CRNA  March 2, 2018  9:57 AM

## 2018-03-05 ENCOUNTER — TELEPHONE (OUTPATIENT)
Dept: ORTHOPEDICS | Facility: CLINIC | Age: 43
End: 2018-03-05

## 2018-03-05 NOTE — TELEPHONE ENCOUNTER
[facility]:  Nursing Note  SITUATION                                                      Lisset MITCHELL Chi is a 42 year old female who calls with concerns postoperatively.    BACKGROUND                                                      Patient is s/p right knee exam under anesthesia, diagnostic arthroscopy, lateral compartment debridement, anterior chamber debridement on 3/2/2018.    Does patient have a future appointment scheduled?  Yes -  next appointment is on: 3/14/2018    Operative note reviewed in EPIC    ASSESSMENT      pain/symptom assessment - Patient is concerned that she is continuing to experience swelling.  Patient was advised to elevate her extremity and continue with icing.  Patient advised to continue with exercises as listed on her AVS.      PLAN                                                      Nursing Interventions: Patient education: as noted in assessement  RECOMMENDED DISPOSITION: To be seen in clinic as scheduled  Will comply with recommendation: Yes    Patient/family can be reached at: N/A    If further questions/concerns or if symptoms do not improve, worsen or new symptoms develop, patient/family are advised to call [phone], option #3 to speak with a triage nurse, as soon as possible.    Marii Saucedo

## 2018-03-14 ENCOUNTER — OFFICE VISIT (OUTPATIENT)
Dept: ORTHOPEDICS | Facility: CLINIC | Age: 43
End: 2018-03-14
Payer: COMMERCIAL

## 2018-03-14 DIAGNOSIS — Z98.890 S/P RIGHT KNEE SURGERY: Primary | ICD-10-CM

## 2018-03-14 NOTE — MR AVS SNAPSHOT
After Visit Summary   3/14/2018    Lisset MITCHELL Chi    MRN: [id number]           Patient Information     Date Of Birth          1975        Visit Information        Provider Department      3/14/2018 1:30 PM Nurse, [facility]        Today's Diagnoses     S/P right knee surgery    -  1       Follow-ups after your visit        Your next 10 appointments already scheduled     Apr 03, 2018  1:30 PM CDT   (Arrive by 1:15 PM)   RETURN KNEE with Rima Sarmiento MD   [facility] ([facility])    [address] [place] [id number] [phone]              Who to contact     Please call your clinic at [phone] to:    Ask questions about your health    Make or cancel appointments    Discuss your medicines    Learn about your test results    Speak to your doctor            Additional Information About Your Visit        MyChart Information     [employer] gives you secure access to your electronic health record. If you see a primary care provider, you can also send messages to your care team and make appointments. If you have questions, please call your primary care clinic.  If you do not have a primary care provider, please call [phone] and they will assist you.      [employer] is an electronic gateway that provides easy, online access to your medical records. With [employer] you can request a clinic appointment, read your test results, renew a prescription or communicate with your care team.     To access your existing account, please contact your [facility] or call [phone] for assistance.        Care EveryWhere ID     This is your Care EveryWhere ID. This could be used by other organizations to access your [facility] medical records  [id number]        Your Vitals Were     Last Period                   12/25/2013            Blood Pressure from Last 3 Encounters:   03/02/18 103/65    02/19/18 114/74   10/13/17 118/74    Weight from Last 3 Encounters:   03/02/18 54.4 kg (120 lb)   02/19/18 54.9 kg (121 lb)   02/06/18 54.9 kg (121 lb)              Today, you had the following     No orders found for display       Primary Care Provider Office Phone # Fax #    JOSE Sarkar [facility] [phone]       [address] [facility] [address]        Equal Access to Services     YUMIKO CAO : Hadii aad ku hadasho Soomaali, waaxda luqadaha, qaybta kaalmada adeegyada, waxay brenda haymackenzie cameron . So [facility] [phone].    ATENCIÓN: Si habla español, tiene a lopes disposición servicios gratuitos de asistencia lingüística. Llame al [phone].    We comply with applicable federal civil rights laws and [place] laws. We do not discriminate on the basis of race, color, national origin, age, disability, sex, sexual orientation, or gender identity.            Thank you!     Thank you for choosing [facility]  for your care. Our goal is always to provide you with excellent care. Hearing back from our patients is one way we can continue to improve our services. Please take a few minutes to complete the written survey that you may receive in the mail after your visit with us. Thank you!             Your Updated Medication List - Protect others around you: Learn how to safely use, store and throw away your medicines at www.disposemymeds.org.          This list is accurate as of 3/14/18  2:17 PM.  Always use your most recent med list.                   Brand Name Dispense Instructions for use Diagnosis    acetaminophen 325 MG tablet    TYLENOL    100 tablet    Take 2 tablets (650 mg) by mouth every 4 hours as needed for other (mild pain)    Status post surgery       albuterol 108 (90 BASE) MCG/ACT Inhaler    PROAIR HFA/PROVENTIL HFA/VENTOLIN HFA    3 Inhaler    Inhale 2 puffs into the lungs 2 times daily as needed for shortness of breath / dyspnea    Mild  persistent asthma with acute exacerbation       aspirin  MG EC tablet     28 tablet    Take 1 tablet (325 mg) by mouth daily To be taken for DVT Prophylaxis daily    Status post surgery       CALCIUM + D PO      Take 600 mg by mouth.        fexofenadine 180 MG tablet    ALLEGRA     1 TABLET DAILY        FLOVENT  MCG/ACT Inhaler   Generic drug:  fluticasone     12 g    INHALE 2 PUFFS INTO THE LUNGS 2 TIMES DAILY    Mild persistent asthma with acute exacerbation       fluticasone 50 MCG/ACT spray    FLONASE     Spray 2 sprays into both nostrils daily        hydrOXYzine 25 MG tablet    ATARAX    20 tablet    Take 1 tablet (25 mg) by mouth every 6 hours as needed for itching (and nausea)    Status post surgery       oxyCODONE IR 5 MG tablet    ROXICODONE    15 tablet    Take 0.5-1 tablets (2.5-5 mg) by mouth every 4 hours as needed for pain maximum 8 tablet(s) per day    Status post surgery       senna-docusate 8.6-50 MG per tablet    SENOKOT-S;PERICOLACE    20 tablet    Take 1-2 tablets by mouth 2 times daily Take while on oral narcotics to prevent or treat constipation.    Status post surgery       Turmeric Curcumin 500 MG Caps      Take 500 mg by mouth daily        VITAMIN B12 PO           VITAMIN D (CHOLECALCIFEROL) PO      Take by mouth daily

## 2018-03-14 NOTE — PROGRESS NOTES
Reason for visit:    Lisset MITCHELL Chi came in to the clinic for a two week post op check.    Her surgery was done 3/2/2018 by Dr Sarmiento.  She had Right knee scope, lateral compartment debridement and anterior chamber debridement.     Assessment:    Lisset came into the clinic ambulating independently, full weight bearing.    The Surgical wounds were exposed and found to be well-healed; so the sutures were removed.  Steri strips applied over suture sites.    Swelling noted.  She returned to work and has notice a bit more swelling since.  New tubigrip given and encouraged patient to get leg elevated and ice.    She has not yet started PT.  She thought she was told that her PT would be determined today.  Order was placed by SALOME Jewell on day of surgery.  She will start PT at [facility] near her home.    Her ROM today was 9-102.    Plan:  She has a follow up with Dr. Sarmiento on 4/3/18.     She has our phone number and will call with questions or problems.

## 2018-03-19 ENCOUNTER — THERAPY VISIT (OUTPATIENT)
Dept: PHYSICAL THERAPY | Facility: CLINIC | Age: 43
End: 2018-03-19
Payer: COMMERCIAL

## 2018-03-19 DIAGNOSIS — Z98.890 S/P RIGHT KNEE SURGERY: Primary | ICD-10-CM

## 2018-03-19 DIAGNOSIS — M25.561 KNEE PAIN, RIGHT: Primary | ICD-10-CM

## 2018-03-19 PROCEDURE — 97161 PT EVAL LOW COMPLEX 20 MIN: CPT | Mod: GP | Performed by: PHYSICAL THERAPIST

## 2018-03-19 PROCEDURE — 97110 THERAPEUTIC EXERCISES: CPT | Mod: GP | Performed by: PHYSICAL THERAPIST

## 2018-03-19 ASSESSMENT — ACTIVITIES OF DAILY LIVING (ADL)
STIFFNESS: THE SYMPTOM AFFECTS MY ACTIVITY SEVERELY
AS_A_RESULT_OF_YOUR_KNEE_INJURY,_HOW_WOULD_YOU_RATE_YOUR_CURRENT_LEVEL_OF_DAILY_ACTIVITY?: SEVERELY ABNORMAL
HOW_WOULD_YOU_RATE_THE_CURRENT_FUNCTION_OF_YOUR_KNEE_DURING_YOUR_USUAL_DAILY_ACTIVITIES_ON_A_SCALE_FROM_0_TO_100_WITH_100_BEING_YOUR_LEVEL_OF_KNEE_FUNCTION_PRIOR_TO_YOUR_INJURY_AND_0_BEING_THE_INABILITY_TO_PERFORM_ANY_OF_YOUR_USUAL_DAILY_ACTIVITIES?: 30
SWELLING: THE SYMPTOM AFFECTS MY ACTIVITY SEVERELY
STAND: ACTIVITY IS SOMEWHAT DIFFICULT
GO UP STAIRS: ACTIVITY IS FAIRLY DIFFICULT
LIMPING: THE SYMPTOM AFFECTS MY ACTIVITY MODERATELY
RISE FROM A CHAIR: ACTIVITY IS SOMEWHAT DIFFICULT
PAIN: THE SYMPTOM AFFECTS MY ACTIVITY MODERATELY
WALK: ACTIVITY IS SOMEWHAT DIFFICULT
HOW_WOULD_YOU_RATE_THE_OVERALL_FUNCTION_OF_YOUR_KNEE_DURING_YOUR_USUAL_DAILY_ACTIVITIES?: SEVERELY ABNORMAL
SQUAT: NOT ANSWERED
KNEEL ON THE FRONT OF YOUR KNEE: NOT ANSWERED
SIT WITH YOUR KNEE BENT: ACTIVITY IS SOMEWHAT DIFFICULT
WEAKNESS: THE SYMPTOM AFFECTS MY ACTIVITY MODERATELY
GIVING WAY, BUCKLING OR SHIFTING OF KNEE: THE SYMPTOM AFFECTS MY ACTIVITY SEVERELY
GO DOWN STAIRS: ACTIVITY IS FAIRLY DIFFICULT
KNEE_ACTIVITY_OF_DAILY_LIVING_SUM: 25

## 2018-03-19 NOTE — PROGRESS NOTES
[facility] Initial Evaluation  Subjective:  Patient is a 42 year old female presenting with rehab right knee hpi.   Lisset MITCHELL Chi is a 42 year old female with a right knee condition.  Occurance: OCD lesion lateral femoral condyle.  Condition occurred: for unknown reasons.  This is a new condition  S/p R arthoscropic debridement of lateral femoral condyle OCD lesion on 3/2/18  .    Patient reports pain:  Lateral, posterior and anterior.  Radiates to:  Thigh.  Pain is described as aching and shooting and is constant and reported as 4/10.  Associated symptoms:  Edema, loss of motion/stiffness and loss of strength. Pain is worse during the day.  Symptoms are exacerbated by activity, kneeling, weight bearing, bending/squatting, standing, ascending stairs, descending stairs and walking and relieved by rest.  Since onset symptoms are gradually worsening.  Special tests:  X-ray.  Previous treatment includes surgery and physical therapy.  There was moderate improvement following previous treatment.  General health as reported by patient is good.  Past medical history: OA, history of fractures, seizures, asthma, menopausal.  Medical allergies: no.  Surgical history: R knee, L knee reconstruction for dislocating patella.  Current medications:  Pain medication.  Current occupation is administation.  Patient is working in normal job without restrictions (worked from home 1 week post surgery ).  Primary job tasks include:  Prolonged sitting and prolonged standing (computer work).    Barriers include:  None as reported by the patient.    Red flags:  None as reported by the patient.                        Objective:  System                                                Knee Evaluation:  ROM:  Strength:  Not assessed    PROM      Extension: Left:   Right:  8  Flexion: Left:   Right:  92  Pain: end range PROM flex and ext    Strength:           Quad Set Right:  Fair    Pain: -  Ligament Testing:  Not  Assessed                Special Tests: Not Assessed      Palpation:  Palpation of knee: palpable tenderness R hip adductor insertion on knee, posterior knee joint between hamstring tendons.      Edema:  Edema of the knee: wearing compression sleeve, not assessed.            General     ROS    Assessment/Plan:    Patient is a 42 year old female with cervical and right side knee complaints.    Patient has the following significant findings with corresponding treatment plan.                Diagnosis 1:  S/p R knee arthoscopy  Pain -  hot/cold therapy, US, electric stimulation, manual therapy, splint/taping/bracing/orthotics, self management, education and home program  Decreased ROM/flexibility - manual therapy and therapeutic exercise  Decreased joint mobility - manual therapy and therapeutic exercise  Decreased strength - therapeutic exercise and therapeutic activities  Impaired balance - neuro re-education and therapeutic activities  Decreased proprioception - neuro re-education and therapeutic activities  Edema - cold therapy and self management/home program  Impaired gait - gait training and home program    Therapy Evaluation Codes:   1) History comprised of:   Personal factors that impact the plan of care:      Overall behavior pattern, Past/current experiences and Time since onset of symptoms.    Comorbidity factors that impact the plan of care are:      Osteoarthritis.     Medications impacting care: Pain.  2) Examination of Body Systems comprised of:   Body structures and functions that impact the plan of care:      Knee.   Activity limitations that impact the plan of care are:      Jumping, Lifting, Sitting, Squatting/kneeling, Stairs, Standing, Walking, Working, Sleeping and Laying down.  3) Clinical presentation characteristics are:   Stable/Uncomplicated.  4) Decision-Making    Low complexity using standardized patient assessment instrument and/or measureable assessment of functional outcome.  Cumulative  Therapy Evaluation is: Low complexity.    Previous and current functional limitations:  (See Goal Flow Sheet for this information)    Short term and Long term goals: (See Goal Flow Sheet for this information)     Communication ability:  Patient appears to be able to clearly communicate and understand verbal and written communication and follow directions correctly.  Treatment Explanation - The following has been discussed with the patient:   RX ordered/plan of care  Anticipated outcomes  Possible risks and side effects  This patient would benefit from PT intervention to resume normal activities.   Rehab potential is good.    Frequency:  2 X week, once daily for 4 weeks, 1 X week, once daily for 6 more weeks  Duration:  for 10 weeks  Discharge Plan:  Achieve all LTG.  Independent in home treatment program.  Reach maximal therapeutic benefit.    Please refer to the daily flowsheet for treatment today, total treatment time and time spent performing 1:1 timed codes.

## 2018-03-19 NOTE — MR AVS SNAPSHOT
After Visit Summary   3/19/2018    Lisset MITCHELL Chi    MRN: [id number]           Patient Information     Date Of Birth          1975        Visit Information        Provider Department      3/19/2018 8:50 AM Juan Carlos García PT [facility] Physical Therapy        Today's Diagnoses     Knee pain, right    -  1       Follow-ups after your visit        Your next 10 appointments already scheduled     Mar 21, 2018 10:50 AM CDT   GLORIA Extremity with Brittany Bernal PTA   [facility] Physical Therapy ([facility]  )    [address] [place] [address]   [phone]            Mar 26, 2018  8:10 AM CDT   GLORIA Extremity with Brittany Bernal PTA   [facility] ([facility]  )    [address] [place] [address]   [phone]            Mar 28, 2018 12:40 PM CDT   GLORIA Extremity with Brittany Bernal PTA   [facility] ([facility]  )    [address] [place] [address]   [phone]            Apr 02, 2018 11:40 AM CDT   GLORIA Extremity with Gagandeep Kim PT   [facility] ([facility]  )    [address] [place] [address]   [phone]            Apr 03, 2018  1:30 PM CDT   (Arrive by 1:15 PM)   RETURN KNEE with Rima Sarmiento MD   [facility] ([facility])    [address] [place] [id number] [phone]            Apr 04, 2018 12:10 PM CDT   GLORIA Extremity with Juan Carlos García PT   [facility] ([facility]  )    [address] [place] [address]   [phone]              Who to contact     If you have questions or need follow up information about today's clinic visit or your schedule please contact [facility] PH[facility]ICAL [facility]ERAP[facility] directly at [phone].  Normal or non-critical lab and imaging results will be communicated to you by MyChart, letter or phone within 4 business days after the clinic has received the results. If you do not hear from us within 7 days, please contact the clinic through [employer] or phone. If you have a critical or abnormal lab result, we will notify you by phone as soon as possible.  Submit refill requests through [employer] or call your pharmacy and they will forward the refill request to us. Please allow 3 business days for your refill to be completed.          Additional Information About Your Visit        [employer] Information     [employer] gives you secure access to your electronic health record. If you see a primary care provider, you can also send messages to your care team and make appointments. If you have questions, please call your primary care clinic.  If you do not have a primary care provider, please call [phone] and they will assist you.        Care EveryWhere ID     This is your Care EveryWhere ID. This could be used by other organizations to access your [place] medical records  [id number]        Your Vitals Were     Last Period                   12/25/2013            Blood Pressure from Last 3 Encounters:   03/02/18 103/65   02/19/18 114/74   10/13/17 118/74    Weight from Last 3 Encounters:   03/02/18 54.4 kg (120 lb)   02/19/18 54.9 kg (121 lb)   02/06/18 54.9 kg (121 lb)              We Performed the Following     HC PT EVAL, LOW COMPLEXITY     GLORIA INITIAL EVAL REPORT     THERAPEUTIC EXERCISES        Primary Care Provider Office Phone # Fax #    JOSE Sarkar [facility] [phone]       [address] [facility] [address]        Equal Access to Services     YUMIKO CAO : Lucinda Sotelo, fe ybarra, tom hope, francisco taylor. So [facility]  [phone].    ATENCIÓN: Si amisha gonsales, tiene a lopes disposición servicios gratuitos de asistencia lingüística. Ozzy teixeira [phone].    We comply with applicable federal civil rights laws and [place] laws. We do not discriminate on the basis of race, color, national origin, age, disability, sex, sexual orientation, or gender identity.            Thank you!     Thank you for choosing [facility] PHYSICAL THERAPY  for your care. Our goal is always to provide you with excellent care. Hearing back from our patients is one way we can continue to improve our services. Please take a few minutes to complete the written survey that you may receive in the mail after your visit with us. Thank you!             Your Updated Medication List - Protect others around you: Learn how to safely use, store and throw away your medicines at www.disposemymeds.org.          This list is accurate as of 3/19/18 11:35 AM.  Always use your most recent med list.                   Brand Name Dispense Instructions for use Diagnosis    acetaminophen 325 MG tablet    TYLENOL    100 tablet    Take 2 tablets (650 mg) by mouth every 4 hours as needed for other (mild pain)    Status post surgery       albuterol 108 (90 BASE) MCG/ACT Inhaler    PROAIR HFA/PROVENTIL HFA/VENTOLIN HFA    3 Inhaler    Inhale 2 puffs into the lungs 2 times daily as needed for shortness of breath / dyspnea    Mild persistent asthma with acute exacerbation       aspirin  MG EC tablet     28 tablet    Take 1 tablet (325 mg) by mouth daily To be taken for DVT Prophylaxis daily    Status post surgery       CALCIUM + D PO      Take 600 mg by mouth.        fexofenadine 180 MG tablet    ALLEGRA     1 TABLET DAILY        FLOVENT  MCG/ACT Inhaler   Generic drug:  fluticasone     12 g    INHALE 2 PUFFS INTO THE LUNGS 2 TIMES DAILY    Mild persistent asthma with acute exacerbation       fluticasone 50 MCG/ACT spray    FLONASE     Spray  2 sprays into both nostrils daily        hydrOXYzine 25 MG tablet    ATARAX    20 tablet    Take 1 tablet (25 mg) by mouth every 6 hours as needed for itching (and nausea)    Status post surgery       oxyCODONE IR 5 MG tablet    ROXICODONE    15 tablet    Take 0.5-1 tablets (2.5-5 mg) by mouth every 4 hours as needed for pain maximum 8 tablet(s) per day    Status post surgery       senna-docusate 8.6-50 MG per tablet    SENOKOT-S;PERICOLACE    20 tablet    Take 1-2 tablets by mouth 2 times daily Take while on oral narcotics to prevent or treat constipation.    Status post surgery       Turmeric Curcumin 500 MG Caps      Take 500 mg by mouth daily        VITAMIN B12 PO           VITAMIN D (CHOLECALCIFEROL) PO      Take by mouth daily

## 2018-03-21 ENCOUNTER — THERAPY VISIT (OUTPATIENT)
Dept: PHYSICAL THERAPY | Facility: CLINIC | Age: 43
End: 2018-03-21
Payer: COMMERCIAL

## 2018-03-21 DIAGNOSIS — M25.561 ACUTE PAIN OF RIGHT KNEE: ICD-10-CM

## 2018-03-21 PROCEDURE — 97530 THERAPEUTIC ACTIVITIES: CPT | Mod: GP

## 2018-03-21 PROCEDURE — 97110 THERAPEUTIC EXERCISES: CPT | Mod: GP

## 2018-03-26 ENCOUNTER — THERAPY VISIT (OUTPATIENT)
Dept: PHYSICAL THERAPY | Facility: CLINIC | Age: 43
End: 2018-03-26
Payer: COMMERCIAL

## 2018-03-26 DIAGNOSIS — M25.561 ACUTE PAIN OF RIGHT KNEE: ICD-10-CM

## 2018-03-26 PROCEDURE — 97112 NEUROMUSCULAR REEDUCATION: CPT | Mod: GP

## 2018-03-26 PROCEDURE — 97110 THERAPEUTIC EXERCISES: CPT | Mod: GP

## 2018-03-26 NOTE — MR AVS SNAPSHOT
After Visit Summary   3/26/2018    Lisset MITCHELL Chi    MRN: [id number]           Patient Information     Date Of Birth          1975        Visit Information        Provider Department      3/26/2018 8:10 AM Brittany Bernal PTA [facility] Physical Therapy        Today's Diagnoses     Acute pain of right knee           Follow-ups after your visit        Your next 10 appointments already scheduled     Mar 28, 2018 12:40 PM CDT   GLORIA Extremity with Brittany Bernal PTA   [facility] ([facility]  )    [address] [place] [address]   [phone]            Apr 02, 2018 11:40 AM CDT   GLORIA Extremity with Gagandeep Kim PT   [facility] ([facility]  )    [address] [place] [address] [phone]            Apr 03, 2018  1:30 PM CDT   (Arrive by 1:15 PM)   RETURN KNEE with Rima Sarmiento MD   [facility] ([facility])    [address] [place] [id number] [phone]            Apr 04, 2018 12:10 PM CDT   GLORIA Extremity with Juan Carlos García PT   [facility] ([facility]  )    [address] [place] [address] [phone]              Who to contact     If you have questions or need follow up information about today's clinic visit or your schedule please contact [facility] PHYSICAL THERAPY directly at [phone].  Normal or non-critical lab and imaging results will be communicated to you by MyChart, letter or phone within 4 business days after the clinic has received the results. If you do not hear from us within 7 days, please contact the clinic through MyChart or phone. If you have a critical or abnormal lab result, we will notify you by phone as soon as possible.  Submit  refill requests through [employer] or call your pharmacy and they will forward the refill request to us. Please allow 3 business days for your refill to be completed.          Additional Information About Your Visit        [employer] Information     [employer] gives you secure access to your electronic health record. If you see a primary care provider, you can also send messages to your care team and make appointments. If you have questions, please call your primary care clinic.  If you do not have a primary care provider, please call [phone] and they will assist you.        Care EveryWhere ID     This is your Care EveryWhere ID. This could be used by other organizations to access your [place] medical records  [id number]        Your Vitals Were     Last Period                   12/25/2013            Blood Pressure from Last 3 Encounters:   03/02/18 103/65   02/19/18 114/74   10/13/17 118/74    Weight from Last 3 Encounters:   03/02/18 54.4 kg (120 lb)   02/19/18 54.9 kg (121 lb)   02/06/18 54.9 kg (121 lb)              We Performed the Following     Neuromuscular Re-Education     Therapeutic Exercises        Primary Care Provider Office Phone # Fax #    Shannon Reyesbeth Andre, JOSE [facility] [phone]       [address] [facility] [address]        Equal Access to Services     YUMIKO CAO : Hadii kathy ku hadasho Soomaali, waaxda luqadaha, qaybta kaalmada adeegyada, waxay brenda taylor. So [facility] [phone].    ATENCIÓN: Si habla español, tiene a lopes disposición servicios gratuitos de asistencia lingüística. ame al [phone].    We comply with applicable federal civil rights laws and [place] laws. We do not discriminate on the basis of race, color, national origin, age, disability, sex, sexual orientation, or gender identity.            Thank you!     Thank you for choosing [facility] PHYSICAL THERAP[facility]  for your care. Our goal is always  to provide you with excellent care. Hearing back from our patients is one way we can continue to improve our services. Please take a few minutes to complete the written survey that you may receive in the mail after your visit with us. Thank you!             Your Updated Medication List - Protect others around you: Learn how to safely use, store and throw away your medicines at www.disposemymeds.org.          This list is accurate as of 3/26/18  1:58 PM.  Always use your most recent med list.                   Brand Name Dispense Instructions for use Diagnosis    acetaminophen 325 MG tablet    TYLENOL    100 tablet    Take 2 tablets (650 mg) by mouth every 4 hours as needed for other (mild pain)    Status post surgery       albuterol 108 (90 BASE) MCG/ACT Inhaler    PROAIR HFA/PROVENTIL HFA/VENTOLIN HFA    3 Inhaler    Inhale 2 puffs into the lungs 2 times daily as needed for shortness of breath / dyspnea    Mild persistent asthma with acute exacerbation       aspirin  MG EC tablet     28 tablet    Take 1 tablet (325 mg) by mouth daily To be taken for DVT Prophylaxis daily    Status post surgery       CALCIUM + D PO      Take 600 mg by mouth.        fexofenadine 180 MG tablet    ALLEGRA     1 TABLET DAILY        FLOVENT  MCG/ACT Inhaler   Generic drug:  fluticasone     12 g    INHALE 2 PUFFS INTO THE LUNGS 2 TIMES DAILY    Mild persistent asthma with acute exacerbation       fluticasone 50 MCG/ACT spray    FLONASE     Spray 2 sprays into both nostrils daily        hydrOXYzine 25 MG tablet    ATARAX    20 tablet    Take 1 tablet (25 mg) by mouth every 6 hours as needed for itching (and nausea)    Status post surgery       oxyCODONE IR 5 MG tablet    ROXICODONE    15 tablet    Take 0.5-1 tablets (2.5-5 mg) by mouth every 4 hours as needed for pain maximum 8 tablet(s) per day    Status post surgery       senna-docusate 8.6-50 MG per tablet    SENOKOT-S;PERICOLACE    20 tablet    Take 1-2 tablets by mouth 2  times daily Take while on oral narcotics to prevent or treat constipation.    Status post surgery       Turmeric Curcumin 500 MG Caps      Take 500 mg by mouth daily        VITAMIN B12 PO           VITAMIN D (CHOLECALCIFEROL) PO      Take by mouth daily

## 2018-03-28 ENCOUNTER — THERAPY VISIT (OUTPATIENT)
Dept: PHYSICAL THERAPY | Facility: CLINIC | Age: 43
End: 2018-03-28
Payer: COMMERCIAL

## 2018-03-28 DIAGNOSIS — M25.561 ACUTE PAIN OF RIGHT KNEE: ICD-10-CM

## 2018-03-28 PROCEDURE — 97110 THERAPEUTIC EXERCISES: CPT | Mod: GP

## 2018-03-28 PROCEDURE — 97112 NEUROMUSCULAR REEDUCATION: CPT | Mod: GP

## 2018-03-30 ENCOUNTER — PRE VISIT (OUTPATIENT)
Dept: ORTHOPEDICS | Facility: CLINIC | Age: 43
End: 2018-03-30

## 2018-03-30 NOTE — TELEPHONE ENCOUNTER
Patient is s/p Right knee scope, lateral compartment debridement and anterior chamber dibridement on 3/2/2018.    Patient was last seen on 3/14/2018 for a nurse post op visit.    Patient is coming to clinic for 4 week post-op visit.      No additional orders are needed this visit.     Patient visit type and questionnaires have been reviewed and are correct for this appointment? Yes    Clair Ch, ATC

## 2018-04-03 ENCOUNTER — OFFICE VISIT (OUTPATIENT)
Dept: ORTHOPEDICS | Facility: CLINIC | Age: 43
End: 2018-04-03
Payer: COMMERCIAL

## 2018-04-03 DIAGNOSIS — Z98.890 S/P RIGHT KNEE SURGERY: Primary | ICD-10-CM

## 2018-04-03 DIAGNOSIS — Z98.890 S/P KNEE SURGERY: ICD-10-CM

## 2018-04-03 RX ORDER — IBUPROFEN 400 MG/1
400 TABLET, FILM COATED ORAL EVERY 6 HOURS PRN
COMMUNITY
End: 2019-03-01

## 2018-04-03 ASSESSMENT — ENCOUNTER SYMPTOMS
INSOMNIA: 0
SINUS PAIN: 0
DECREASED CONCENTRATION: 1
PANIC: 0
JAUNDICE: 0
ALTERED TEMPERATURE REGULATION: 1
MUSCLE CRAMPS: 1
BOWEL INCONTINENCE: 0
DEPRESSION: 0
POLYPHAGIA: 0
ARTHRALGIAS: 1
HALLUCINATIONS: 0
BLOATING: 1
MYALGIAS: 0
STIFFNESS: 1
SORE THROAT: 1
BLOOD IN STOOL: 0
ABDOMINAL PAIN: 1
HEARTBURN: 1
TROUBLE SWALLOWING: 0
HOARSE VOICE: 0
WEIGHT GAIN: 1
POOR WOUND HEALING: 1
NERVOUS/ANXIOUS: 1
TASTE DISTURBANCE: 0
FEVER: 0
SKIN CHANGES: 0
SINUS CONGESTION: 1
CHILLS: 0
SMELL DISTURBANCE: 0
FATIGUE: 0
BACK PAIN: 0
POLYDIPSIA: 0
DIARRHEA: 0
JOINT SWELLING: 1
DECREASED APPETITE: 0
RECTAL PAIN: 0
NECK PAIN: 0
NIGHT SWEATS: 0
VOMITING: 0
INCREASED ENERGY: 0
CONSTIPATION: 1
NAIL CHANGES: 0
MUSCLE WEAKNESS: 1
WEIGHT LOSS: 0
NAUSEA: 0
NECK MASS: 0

## 2018-04-03 NOTE — NURSING NOTE
Reason For Visit:   Chief Complaint   Patient presents with     Surgical Followup     s/p right knee surgery 3/2/18       Primary MD: Shannon Powell  Referring MD: Floyd Duckworth     ?  No     Occupation: Office Administator.  Currently working? Yes.  Work status?  Full time. Working from home.      Date of injury: Oct 2017  Type of injury: Fall.     Date of Surgery: 3/2/18  Type of Surgery:  1.  Right knee exam under anesthesia.   2.  Diagnostic arthroscopy.   3.  Lateral compartment debridement.   4.  Anterior chamber debridement.     Date of surgery: 1988 Right Knee Surgery and 1989 Left Knee Surgery     Smoker: No    LMP 12/25/2013    Pain Assessment  Patient Currently in Pain: Yes  0-10 Pain Scale: 1 (sitting still but varies with activity)  Primary Pain Location: Knee  Pain Orientation: Right  Pain Descriptors: Aching, Patient unable to describe  Aggravating Factors: Bending, Walking, Stairs    Estefani Saha CMA  4/3/2018

## 2018-04-03 NOTE — MR AVS SNAPSHOT
After Visit Summary   4/3/2018    Lisset MITCHELL Chi    MRN: [id number]           Patient Information     Date Of Birth          1975        Visit Information        Provider Department      4/3/2018 1:30 PM Rima Sarmiento MD [facility]        Today's Diagnoses     S/P right knee surgery    -  1    S/P knee surgery           Follow-ups after your visit        Additional Services     ORTHOTICS REFERRAL       **This referral order prints off in the [facility]  (Orthotics & Prosthetics) Central Scheduling Office**    The [facility] Scheduling Staff will contact the patient to schedule appointments.     Central Scheduling Contact Information: [phone] ([place])    Lateral  brace 1 month trail    Please be aware that coverage of these services is subject to the terms and limitations of your health insurance plan.  Call member services at your health plan with any benefit or coverage questions.      Please bring the following to your appointment:    >>   Any x-rays, CTs or MRIs which have been performed.  Contact the facility where they were done to arrange for  prior to your scheduled appointment.    >>   List of current medications   >>   This referral request   >>   Any documents/labs given to you for this referral                  Your next 10 appointments already scheduled     Apr 13, 2018  2:00 PM CDT   [place]OR[place] Extremity with Brittany Bernal [facility]   [place] [facility] ([facility]  )    [address] [place] [address] [phone]            Jun 12, 2018 11:45 AM CDT   (Arrive by 11:30 AM)   RETURN KNEE with Rima Sarmiento MD   [facility] ( [facility])    [address] [place] [address] [phone]              Who to contact     Please call your clinic at [phone] to:    Ask questions about your  health    Make or cancel appointments    Discuss your medicines    Learn about your test results    Speak to your doctor            Additional Information About Your Visit        [employer] Information     Violet gives you secure access to your electronic health record. If you see a primary care provider, you can also send messages to your care team and make appointments. If you have questions, please call your primary care clinic.  If you do not have a primary care provider, please call [phone] and they will assist you.      Violet is an electronic gateway that provides easy, online access to your medical records. With Violet, you can request a clinic appointment, read your test results, renew a prescription or communicate with your care team.     To access your existing account, please contact your [facility] or call [phone] for assistance.        Care EveryWhere ID     This is your Care EveryWhere ID. This could be used by other organizations to access your [place] medical records  [id number]        Your Vitals Were     Last Period                   12/25/2013            Blood Pressure from Last 3 Encounters:   03/02/18 103/65   02/19/18 114/74   10/13/17 118/74    Weight from Last 3 Encounters:   03/02/18 54.4 kg (120 lb)   02/19/18 54.9 kg (121 lb)   02/06/18 54.9 kg (121 lb)              We Performed the Following     ORTHOTICS REFERRAL        Primary Care Provider Office Phone # Fax #    Shannon Abarca Andre, [facility] [phone]       [address] [facility] [address]        Equal Access to Services     YUMIKO CAO : Hadii aad ku hadasho Soomaali, waaxda luqadaha, qaybta kaalmada adeegyada, francisco cameron . So [facility] [phone].    ATENCIÓN: Si habla español, tiene a lopes disposición servicios gratuitos de asistencia lingüística. Llame al [phone].    We comply with applicable federal civil rights laws and  [place] laws. We do not discriminate on the basis of race, color, national origin, age, disability, sex, sexual orientation, or gender identity.            Thank you!     Thank you for choosing [facility]  for your care. Our goal is always to provide you with excellent care. Hearing back from our patients is one way we can continue to improve our services. Please take a few minutes to complete the written survey that you may receive in the mail after your visit with us. Thank you!             Your Updated Medication List - Protect others around you: Learn how to safely use, store and throw away your medicines at www.disposemymeds.org.          This list is accurate as of 4/3/18 11:59 PM.  Always use your most recent med list.                   Brand Name Dispense Instructions for use Diagnosis    acetaminophen 325 MG tablet    TYLENOL    100 tablet    Take 2 tablets (650 mg) by mouth every 4 hours as needed for other (mild pain)    Status post surgery       albuterol 108 (90 BASE) MCG/ACT Inhaler    PROAIR HFA/PROVENTIL HFA/VENTOLIN HFA    3 Inhaler    Inhale 2 puffs into the lungs 2 times daily as needed for shortness of breath / dyspnea    Mild persistent asthma with acute exacerbation       aspirin  MG EC tablet     28 tablet    Take 1 tablet (325 mg) by mouth daily To be taken for DVT Prophylaxis daily    Status post surgery       CALCIUM + D PO      Take 600 mg by mouth.        fexofenadine 180 MG tablet    ALLEGRA     1 TABLET DAILY        FLOVENT  MCG/ACT Inhaler   Generic drug:  fluticasone     12 g    INHALE 2 PUFFS INTO THE LUNGS 2 TIMES DAILY    Mild persistent asthma with acute exacerbation       fluticasone 50 MCG/ACT spray    FLONASE     Spray 2 sprays into both nostrils daily        hydrOXYzine 25 MG tablet    ATARAX    20 tablet    Take 1 tablet (25 mg) by mouth every 6 hours as needed for itching (and nausea)    Status post surgery       ibuprofen 400 MG tablet     ADVIL/MOTRIN     Take 400 mg by mouth every 6 hours as needed for moderate pain        oxyCODONE IR 5 MG tablet    ROXICODONE    15 tablet    Take 0.5-1 tablets (2.5-5 mg) by mouth every 4 hours as needed for pain maximum 8 tablet(s) per day    Status post surgery       senna-docusate 8.6-50 MG per tablet    SENOKOT-S;PERICOLACE    20 tablet    Take 1-2 tablets by mouth 2 times daily Take while on oral narcotics to prevent or treat constipation.    Status post surgery       Turmeric Curcumin 500 MG Caps      Take 500 mg by mouth daily        VITAMIN B12 PO           VITAMIN D (CHOLECALCIFEROL) PO      Take by mouth daily

## 2018-04-03 NOTE — PROGRESS NOTES
Date of surgery: 3/2/2018    Procedure performed: Diagnostic arthroscopy with debridement of the lateral compartment and anterior chamber debridement    History: The patient returns now 4 weeks out from surgery.  She has struggled since surgery with swelling and pain and stiffness.  She feels that her recovery is taking somewhat longer than she had expected.  She has been able to return to work but it took her longer than she expected.  She is still quite limited with activities that she can do and has quite limited motion at this time.  She has not had any drainage or erythema around her knee but again she feels her postoperative course was not consistent with what she expected as far as ease of recovery.  She feels at this time that her knee is worse postoperatively than it was preoperatively.    On exam is well-appearing female in no acute distress.  She is pleasant throughout the interview today.    Evaluation of the right lower extremity patient has well-healed surgical incisions from her arthroscopy over the right knee.  She does have a moderate effusion in the knee.  Range of motion is from full extension to about 105  of flexion.  This compared to 135  on the left side.  She does have scar tissue formation directly under her arthroscopy portals which is firm and nontender.  Her patella tracks normally without apprehension or J sign.    Assessment: 4 weeks status post the above procedure    Imaging: We discussed extensively the patient's intraoperative arthroscopic imaging.  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    Plan:     We discussed with the patient that we agree that she is recovering somewhat slower than the average patient from this procedure.  We would typically expect swelling to have improved somewhat more dramatically but this point and her having recovered more motion.  However she is  certainly still within the subacute operative time and her symptoms and swelling are not completely unexpected.    We would expect continued improvement even if it is slower than typical.  Would like her to continue with her therapy and utilizing her compression stocking as necessary.  We discussed that some of her symptoms preoperatively and postoperatively could be emanating from the cartilage defect in her lateral femoral condyle.    We also discussed future management options for this if it became necessary should her symptoms not improve or should they worsen in the future.  These could include cartilage restoration procedures or potentially even a unicompartmental knee arthroplasty.   We feel these are unlikely in the short-term but something that we wanted her to be aware of given her abnormal loading pattern which is clear based on her arthroscopic findings.  Because we do feel she is loading abnormally through the lateral compartment we will provide her prescription for a  brace today.  We will see her back in 2 months for a reevaluation.    Charlie Barrett MD  PGY-4, Orthopaedic Surgery  [phone]    I have personally examined this patient and have reviewed the clinical presentation and progress note with the resident.  I agree with the treatment plan as outlined.  The plan was formulated with the resident on the day of the resident dictation.    Rima Sarmiento        Answers for HPI/ROS submitted by the patient on 4/3/2018   General Symptoms: Yes  Skin Symptoms: Yes  HENT Symptoms: Yes  EYE SYMPTOMS: No  HEART SYMPTOMS: No  LUNG SYMPTOMS: No  INTESTINAL SYMPTOMS: Yes  URINARY SYMPTOMS: No  GYNECOLOGIC SYMPTOMS: No  BREAST SYMPTOMS: No  SKELETAL SYMPTOMS: Yes  BLOOD SYMPTOMS: No  NERVOUS SYSTEM SYMPTOMS: No  MENTAL HEALTH SYMPTOMS: Yes  Fever: No  Loss of appetite: No  Weight loss: No  Weight gain: Yes  Fatigue: No  Night sweats: No  Chills: No  Increased stress: Yes  Excessive hunger:  No  Excessive thirst: No  Feeling hot or cold when others believe the temperature is normal: Yes  Loss of height: Yes  Post-operative complications: No  Surgical site pain: Yes  Hallucinations: No  Change in or Loss of Energy: No  Hyperactivity: No  Confusion: No  Changes in hair: No  Changes in moles/birth marks: No  Itching: Yes  Rashes: No  Changes in nails: No  Acne: Yes  Hair in places you don't want it: No  Change in facial hair: No  Warts: No  Non-healing sores: Yes  Scarring: Yes  Flaking of skin: No  Color changes of hands/feet in cold : Yes  Sun sensitivity: No  Skin thickening: No  Ear pain: No  Ear discharge: No  Hearing loss: No  Tinnitus: No  Nosebleeds: No  Congestion: Yes  Sinus pain: No  Trouble swallowing: No   Voice hoarseness: No  Mouth sores: No  Sore throat: Yes  Tooth pain: No  Gum tenderness: No  Bleeding gums: No  Change in taste: No  Change in sense of smell: No  Dry mouth: Yes  Hearing aid used: No  Neck lump: No  Heart burn or indigestion: Yes  Nausea: No  Vomiting: No  Abdominal pain: Yes  Bloating: Yes  Constipation: Yes  Diarrhea: No  Blood in stool: No  Black stools: No  Rectal or Anal pain: No  Fecal incontinence: No  Yellowing of skin or eyes: No  Vomit with blood: No  Change in stools: No  Back pain: No  Muscle aches: No  Neck pain: No  Swollen joints: Yes  Joint pain: Yes  Bone pain: No  Muscle cramps: Yes  Muscle weakness: Yes  Joint stiffness: Yes  Bone fracture: No  Nervous or Anxious: Yes  Depression: No  Trouble sleeping: No  Trouble thinking or concentrating: Yes  Mood changes: No  Panic attacks: No

## 2018-04-03 NOTE — LETTER
4/3/2018       RE: Lisset MITCHELL Chi  [address] [facility] [address]     Dear Colleague,    Thank you for referring your patient, Lisset MITCHELL Chi, to the [facility] at [facility]. Please see a copy of my visit note below.    Date of surgery: 3/2/2018    Procedure performed: Diagnostic arthroscopy with debridement of the lateral compartment and anterior chamber debridement    History: The patient returns now 4 weeks out from surgery.  She has struggled since surgery with swelling and pain and stiffness.  She feels that her recovery is taking somewhat longer than she had expected.  She has been able to return to work but it took her longer than she expected.  She is still quite limited with activities that she can do and has quite limited motion at this time.  She has not had any drainage or erythema around her knee but again she feels her postoperative course was not consistent with what she expected as far as ease of recovery.  She feels at this time that her knee is worse postoperatively than it was preoperatively.    On exam is well-appearing female in no acute distress.  She is pleasant throughout the interview today.    Evaluation of the right lower extremity patient has well-healed surgical incisions from her arthroscopy over the right knee.  She does have a moderate effusion in the knee.  Range of motion is from full extension to about 105  of flexion.  This compared to 135  on the left side.  She does have scar tissue formation directly under her arthroscopy portals which is firm and nontender.  Her patella tracks normally without apprehension or J sign.    Assessment: 4 weeks status post the above procedure    Imaging: We discussed extensively the patient's intraoperative arthroscopic imaging.  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    Plan:     We  discussed with the patient that we agree that she is recovering somewhat slower than the average patient from this procedure.  We would typically expect swelling to have improved somewhat more dramatically but this point and her having recovered more motion.  However she is certainly still within the subacute operative time and her symptoms and swelling are not completely unexpected.    We would expect continued improvement even if it is slower than typical.  Would like her to continue with her therapy and utilizing her compression stocking as necessary.  We discussed that some of her symptoms preoperatively and postoperatively could be emanating from the cartilage defect in her lateral femoral condyle.    We also discussed future management options for this if it became necessary should her symptoms not improve or should they worsen in the future.  These could include cartilage restoration procedures or potentially even a unicompartmental knee arthroplasty.   We feel these are unlikely in the short-term but something that we wanted her to be aware of given her abnormal loading pattern which is clear based on her arthroscopic findings.  Because we do feel she is loading abnormally through the lateral compartment we will provide her prescription for a  brace today.  We will see her back in 2 months for a reevaluation.    Charlie Barrett MD  PGY-4, Orthopaedic Surgery  [phone]    I have personally examined this patient and have reviewed the clinical presentation and progress note with the resident.  I agree with the treatment plan as outlined.  The plan was formulated with the resident on the day of the resident dictation.      Again, thank you for allowing me to participate in the care of your patient.      Sincerely,    Rima Sarmiento MD

## 2018-04-04 ENCOUNTER — THERAPY VISIT (OUTPATIENT)
Dept: PHYSICAL THERAPY | Facility: CLINIC | Age: 43
End: 2018-04-04
Payer: COMMERCIAL

## 2018-04-04 DIAGNOSIS — M25.561 ACUTE PAIN OF RIGHT KNEE: ICD-10-CM

## 2018-04-04 PROCEDURE — 97110 THERAPEUTIC EXERCISES: CPT | Mod: GP

## 2018-04-04 PROCEDURE — 97530 THERAPEUTIC ACTIVITIES: CPT | Mod: GP

## 2018-04-04 NOTE — MR AVS SNAPSHOT
After Visit Summary   4/4/2018    Lisset MITCHELL Chi    MRN: [id number]           Patient Information     Date Of Birth          1975        Visit Information        Provider Department      4/4/2018 12:00 PM Brittany Bernal PTA [facility]        Today's Diagnoses     Acute pain of right knee           Follow-ups after your visit        Your next 10 appointments already scheduled     Apr 11, 2018 12:40 PM CDT   [place]OR[place] Extremity with Brittany Bernal PTA   [facility] ([place]  )    [address] [place] [id number] [phone]            Jun 12, 2018 11:45 AM CDT   (Arrive by 11:30 AM)   RETURN KNEE with Rima Sarmiento MD   [facility] ([facility])    [address] [place] [address] [phone]              Who to contact     If you have questions or need follow up information about today's clinic visit or your schedule please contact [facility] PHYS[facility] THERAP[facility] directly at [phone].  Normal or non-critical lab and imaging results will be communicated to you by MyChart, letter or phone within 4 business days after the clinic has received the results. If you do not hear from us within 7 days, please contact the clinic through [employer] or phone. If you have a critical or abnormal lab result, we will notify you by phone as soon as possible.  Submit refill requests through [employer] or call your pharmacy and they will forward the refill request to us. Please allow 3 business days for your refill to be completed.          Additional Information About Your Visit        MyChart Information     [employer] gives you secure access to your electronic health record. If you see a primary care provider, you can also send messages to your care team and make appointments. If you have questions, please call  your primary care clinic.  If you do not have a primary care provider, please call [phone] and they will assist you.        Care EveryWhere ID     This is your Care EveryWhere ID. This could be used by other organizations to access your [place] medical records  [id number]        Your Vitals Were     Last Period                   12/25/2013            Blood Pressure from Last 3 Encounters:   03/02/18 103/65   02/19/18 114/74   10/13/17 118/74    Weight from Last 3 Encounters:   03/02/18 54.4 kg (120 lb)   02/19/18 54.9 kg (121 lb)   02/06/18 54.9 kg (121 lb)              We Performed the Following     Therapeutic Activities     Therapeutic Exercises        Primary Care Provider Office Phone # Fax #    Shannon Abarca [place] [facility] [phone]       [address] [facility] [address]        Equal Access to Services     [facility] : Hadii aad ku hadasho Soomaali, waaxda luqadaha, qaybta kaalmada adeegyada, waxay idiin hayaan homero reedaracal cameron . So [facility] [phone].    ATENCIÓN: Si habla español, tiene a lopes disposición servicios gratuitos de asistencia lingüística. Ozzy al [phone].    We comply with applicable federal civil rights laws and [place] laws. We do not discriminate on the basis of race, color, national origin, age, disability, sex, sexual orientation, or gender identity.            Thank you!     Thank you for choosing [facility]  for your care. Our goal is always to provide you with excellent care. Hearing back from our patients is one way we can continue to improve our services. Please take a few minutes to complete the written survey that you may receive in the mail after your visit with us. Thank you!             Your Updated Medication List - Protect others around you: Learn how to safely use, store and throw away your medicines at www.disposemymeds.org.          This list is accurate as of 4/4/18  1:46  PM.  Always use your most recent med list.                   Brand Name Dispense Instructions for use Diagnosis    acetaminophen 325 MG tablet    TYLENOL    100 tablet    Take 2 tablets (650 mg) by mouth every 4 hours as needed for other (mild pain)    Status post surgery       albuterol 108 (90 BASE) MCG/ACT Inhaler    PROAIR HFA/PROVENTIL HFA/VENTOLIN HFA    3 Inhaler    Inhale 2 puffs into the lungs 2 times daily as needed for shortness of breath / dyspnea    Mild persistent asthma with acute exacerbation       aspirin  MG EC tablet     28 tablet    Take 1 tablet (325 mg) by mouth daily To be taken for DVT Prophylaxis daily    Status post surgery       CALCIUM + D PO      Take 600 mg by mouth.        fexofenadine 180 MG tablet    ALLEGRA     1 TABLET DAILY        FLOVENT  MCG/ACT Inhaler   Generic drug:  fluticasone     12 g    INHALE 2 PUFFS INTO THE LUNGS 2 TIMES DAILY    Mild persistent asthma with acute exacerbation       fluticasone 50 MCG/ACT spray    FLONASE     Spray 2 sprays into both nostrils daily        hydrOXYzine 25 MG tablet    ATARAX    20 tablet    Take 1 tablet (25 mg) by mouth every 6 hours as needed for itching (and nausea)    Status post surgery       ibuprofen 400 MG tablet    ADVIL/MOTRIN     Take 400 mg by mouth every 6 hours as needed for moderate pain        oxyCODONE IR 5 MG tablet    ROXICODONE    15 tablet    Take 0.5-1 tablets (2.5-5 mg) by mouth every 4 hours as needed for pain maximum 8 tablet(s) per day    Status post surgery       senna-docusate 8.6-50 MG per tablet    SENOKOT-S;PERICOLACE    20 tablet    Take 1-2 tablets by mouth 2 times daily Take while on oral narcotics to prevent or treat constipation.    Status post surgery       Turmeric Curcumin 500 MG Caps      Take 500 mg by mouth daily        VITAMIN B12 PO           VITAMIN D (CHOLECALCIFEROL) PO      Take by mouth daily

## 2018-04-04 NOTE — PROGRESS NOTES
Subjective:  HPI                    Objective:  System    Physical Exam    General     ROS    Assessment/Plan:    PROGRESS  REPORT    Progress reporting period is from 3/19/18 to 4/4/18.       SUBJECTIVE  Subjective changes noted by patient:  Pt saw MD yesterday who states she is progressing slower than expected with strength, ROM and swelling. Still has chondral lesion at lateral femoral condyle and she has orderd an  brace. pt will be fit/measured next week. c/o various levels of pain at distal quad tendon or beneath patella.  Pt will ask MD if she can take anything other than Tylenol for pain.     Current pain level is 4/10 Current Pain level: 4/10.     Previous pain level was  4/10 Initial Pain level: 4/10.   Changes in function:  Yes (See Goal flowsheet attached for changes in current functional level)  Adverse reaction to treatment or activity: None    OBJECTIVE  Changes noted in objective findings:  Yes,   Objective: AROM 0-3-124, PROM 0-0-127. Mild swelling noted suprapatellar region but none measurable. Both suprapatellar circumference is 37.1 cm likely due to R quad atrophy. Quad strength 4-/5. Able to  casi squats over a chair today without inc knee pain.      ASSESSMENT/PLAN  Updated problem list and treatment plan: Diagnosis 1:  S/p arthroscopy, debridement  Pain -  hot/cold therapy and education  Decreased ROM/flexibility - manual therapy, therapeutic exercise and home program  Decreased strength - therapeutic exercise, therapeutic activities and home program  Inflammation - cold therapy, electric stimulation and self management/home program  STG/LTGs have been met or progress has been made towards goals:  Yes (See Goal flow sheet completed today.)  Assessment of Progress: The patient's condition is improving slowly.  Self Management Plans:  Patient has been instructed in a home treatment program.  I have re-evaluated this patient and find that the nature, scope, duration and intensity of the  therapy is appropriate for the medical condition of the patient.  Lisset continues to require the following intervention to meet STG and LTG's:  PT    Recommendations:  This patient would benefit from continued therapy.     Frequency:  1-2 X week, once daily  Duration:  for 6 weeks        Please refer to the daily flowsheet for treatment today, total treatment time and time spent performing 1:1 timed codes.

## 2018-04-12 ENCOUNTER — DOCUMENTATION ONLY (OUTPATIENT)
Dept: ORTHOPEDICS | Facility: CLINIC | Age: 43
End: 2018-04-12

## 2018-04-13 ENCOUNTER — THERAPY VISIT (OUTPATIENT)
Dept: PHYSICAL THERAPY | Facility: CLINIC | Age: 43
End: 2018-04-13
Payer: COMMERCIAL

## 2018-04-13 DIAGNOSIS — M25.561 ACUTE PAIN OF RIGHT KNEE: ICD-10-CM

## 2018-04-13 PROCEDURE — 97110 THERAPEUTIC EXERCISES: CPT | Mod: GP

## 2018-04-13 PROCEDURE — 97530 THERAPEUTIC ACTIVITIES: CPT | Mod: GP

## 2018-04-13 NOTE — PROGRESS NOTES
"Subjective:  HPI                    Objective:  System    Physical Exam    General     ROS    Assessment/Plan:    SUBJECTIVE  Subjective changes as noted by pt:   Pt reports that she has been on her feet more, trying to be active and continues ex. Since last week has been having episodes of knee locking, sharp pain across ant knee and popping sound in back. Was measured for lat  brace.    Current pain level: 3/10 Current Pain level: 3/10   Changes in function:  Yes (See Goal flowsheet attached for changes in current functional level)     Adverse reaction to treatment or activity:  None    OBJECTIVE  Changes in objective findings:  Yes,   Objective: AROM 0-0-124, PROM 0-0-130. Mod swelling in knee. Quad strength 4/5. ABle to perform lean on 6\" step for quad facil. Unable to tolerate mini squats due to ant knee pain, however this improves with McConnel tape guiding into medial glide.      ASSESSMENT  Lisset continues to require intervention to meet STG and LTG's: PT  Patient is progressing as expected.  Response to therapy has shown an improvement in  ROM , muscle control and function  Progress made towards STG/LTG?  Yes (See Goal flowsheet attached for updates on achievement of STG and LTG)    PLAN  Continue current treatment plan until patient demonstrates readiness to progress to higher level exercises.    PTA/ATC plan:  Will continue with present plan of care.    Please refer to the daily flowsheet for treatment today, total treatment time and time spent performing 1:1 timed codes.          "

## 2018-04-13 NOTE — MR AVS SNAPSHOT
After Visit Summary   4/13/2018    Lisset MITCHELL Chi    MRN: [id number]           Patient Information     Date Of Birth          1975        Visit Information        Provider Department      4/13/2018 2:00 PM Brittany Bernal PTA [facility] Physical Therapy        Today's Diagnoses     Acute pain of right knee           Follow-ups after your visit        Your next 10 appointments already scheduled     Apr 20, 2018  1:20 PM CDT   [place] Extremity with Brittany Bernal PTA   [facility] Physical Therapy ([place]  )    [address] [place] [id number] [phone]            Jun 12, 2018 11:45 AM CDT   (Arrive by 11:30 AM)   RETURN KNEE with Rima Sarmiento MD   [facility] ([facility] [place])    [address] [place] [id number] [phone]              Who to contact     If you have questions or need follow up information about today's clinic visit or your schedule please contact [facility] PHYSICAL THERAPY directly at [phone].  Normal or non-critical lab and imaging results will be communicated to you by MyChart, letter or phone within 4 business days after the clinic has received the results. If you do not hear from us within 7 days, please contact the clinic through [employer] or phone. If you have a critical or abnormal lab result, we will notify you by phone as soon as possible.  Submit refill requests through [employer]ia or call your pharmacy and they will forward the refill request to us. Please allow 3 business days for your refill to be completed.          Additional Information About Your Visit        MyChart Information     [employer] gives you secure access to your electronic health record. If you see a primary care provider, you can also send messages to your care team and make appointments. If you have questions, please  call your primary care clinic.  If you do not have a primary care provider, please call [phone] and they will assist you.        Care EveryWhere ID     This is your Care EveryWhere ID. This could be used by other organizations to access your [place] medical records  [id number]        Your Vitals Were     Last Period                   12/25/2013            Blood Pressure from Last 3 Encounters:   03/02/18 103/65   02/19/18 114/74   10/13/17 118/74    Weight from Last 3 Encounters:   03/02/18 54.4 kg (120 lb)   02/19/18 54.9 kg (121 lb)   02/06/18 54.9 kg (121 lb)              We Performed the Following     Therapeutic Activities     Therapeutic Exercises        Primary Care Provider Office Phone # Fax #    Shannon Abarca Andre JOSE [phone]       [address] [facility] [address]        Equal Access to Services     YUMIKO CAO : Hadii aad ku hadasho Soomaali, waaxda luqadaha, qaybta kaalmada adeegyada, waxay idiin hayaan homero cameron . So [facility] [phone].    ATENCIÓN: Si habla español, tiene a lopes disposición servicios gratuitos de asistencia lingüística. Ozzy al [phone].    We comply with applicable federal civil rights laws and [place] laws. We do not discriminate on the basis of race, color, national origin, age, disability, sex, sexual orientation, or gender identity.            Thank you!     Thank you for choosing [facility]ICAL THERAP[facility]  for your care. Our goal is always to provide you with excellent care. Hearing back from our patients is one way we can continue to improve our services. Please take a few minutes to complete the written survey that you may receive in the mail after your visit with us. Thank you!             Your Updated Medication List - Protect others around you: Learn how to safely use, store and throw away your medicines at www.disposemymeds.org.          This list is accurate as of 4/13/18   4:01 PM.  Always use your most recent med list.                   Brand Name Dispense Instructions for use Diagnosis    acetaminophen 325 MG tablet    TYLENOL    100 tablet    Take 2 tablets (650 mg) by mouth every 4 hours as needed for other (mild pain)    Status post surgery       albuterol 108 (90 Base) MCG/ACT Inhaler    PROAIR HFA/PROVENTIL HFA/VENTOLIN HFA    3 Inhaler    Inhale 2 puffs into the lungs 2 times daily as needed for shortness of breath / dyspnea    Mild persistent asthma with acute exacerbation       aspirin 325 MG EC tablet     28 tablet    Take 1 tablet (325 mg) by mouth daily To be taken for DVT Prophylaxis daily    Status post surgery       CALCIUM + D PO      Take 600 mg by mouth.        fexofenadine 180 MG tablet    ALLEGRA     1 TABLET DAILY        FLOVENT  MCG/ACT Inhaler   Generic drug:  fluticasone     12 g    INHALE 2 PUFFS INTO THE LUNGS 2 TIMES DAILY    Mild persistent asthma with acute exacerbation       fluticasone 50 MCG/ACT spray    FLONASE     Spray 2 sprays into both nostrils daily        hydrOXYzine 25 MG tablet    ATARAX    20 tablet    Take 1 tablet (25 mg) by mouth every 6 hours as needed for itching (and nausea)    Status post surgery       ibuprofen 400 MG tablet    ADVIL/MOTRIN     Take 400 mg by mouth every 6 hours as needed for moderate pain        oxyCODONE IR 5 MG tablet    ROXICODONE    15 tablet    Take 0.5-1 tablets (2.5-5 mg) by mouth every 4 hours as needed for pain maximum 8 tablet(s) per day    Status post surgery       senna-docusate 8.6-50 MG per tablet    SENOKOT-S;PERICOLACE    20 tablet    Take 1-2 tablets by mouth 2 times daily Take while on oral narcotics to prevent or treat constipation.    Status post surgery       Turmeric Curcumin 500 MG Caps      Take 500 mg by mouth daily        VITAMIN B12 PO           VITAMIN D (CHOLECALCIFEROL) PO      Take by mouth daily

## 2018-04-17 NOTE — PROGRESS NOTES
S: Pt was seen at the [place] [facility] for the evaluation and measurements for a custom double upright lateral unloading knee orthosis. Pt presents with a prescription for Dr. Sarmiento for laterally unloading KO with a one month trial. Pt is experiencing significant pain on the lateral aspect of her knee and a valgus moment at the knee is visible during gait.  Pt will trial the knee orthosis for one month to determine the improvement of her symptoms.  Surgical intervention is a possibility in the near future if bracing is not successful.    O/G: Support, stabilize, and unload lateral compartment of right knee.  A: Measurements were taken of the right leg sent to [facility] for fabrication of [place] III lateral Adjustable OA, CI strapping, and red.  Pt trialed several different styles while in office that did not fit well and I determined that a custom will need to be ordered.  P: Pt will return approx. one month prior to return appointment with Dr. Sarmiento on June 5th. Pt was asked to contact the [place] office with any questions or concerns.

## 2018-04-20 ENCOUNTER — THERAPY VISIT (OUTPATIENT)
Dept: PHYSICAL THERAPY | Facility: CLINIC | Age: 43
End: 2018-04-20
Payer: COMMERCIAL

## 2018-04-20 DIAGNOSIS — M25.561 ACUTE PAIN OF RIGHT KNEE: ICD-10-CM

## 2018-04-20 PROCEDURE — 97112 NEUROMUSCULAR REEDUCATION: CPT | Mod: GP

## 2018-04-20 PROCEDURE — 97110 THERAPEUTIC EXERCISES: CPT | Mod: GP

## 2018-05-04 ENCOUNTER — THERAPY VISIT (OUTPATIENT)
Dept: PHYSICAL THERAPY | Facility: CLINIC | Age: 43
End: 2018-05-04
Payer: COMMERCIAL

## 2018-05-04 DIAGNOSIS — M25.561 ACUTE PAIN OF RIGHT KNEE: ICD-10-CM

## 2018-05-04 PROCEDURE — 97530 THERAPEUTIC ACTIVITIES: CPT | Mod: GP

## 2018-05-04 PROCEDURE — 97110 THERAPEUTIC EXERCISES: CPT | Mod: GP

## 2018-05-04 NOTE — MR AVS SNAPSHOT
After Visit Summary   5/4/2018    Lisset MITCHELL Chi    MRN: [id number]           Patient Information     Date Of Birth          1975        Visit Information        Provider Department      5/4/2018 10:10 AM Brittany Bernal PTA [facility] Physical Therapy        Today's Diagnoses     Acute pain of right knee           Follow-ups after your visit        Your next 10 appointments already scheduled     May 18, 2018 10:10 AM CDT   [place]OR[place] Extremity with Brittany Bernal PTA   [facility] Physical Therapy ([place]  )    [address] [place] [id number] [phone]            Jun 12, 2018 11:45 AM CDT   (Arrive by 11:30 AM)   RETURN KNEE with Rima Sarmiento MD   [facility] ([facility] [place])    [address] [place] [id number] [phone]              Who to contact     If you have questions or need follow up information about today's clinic visit or your schedule please contact [facility] PHYSICAL THERAPY directly at [phone].  Normal or non-critical lab and imaging results will be communicated to you by "[employer]hart, letter or phone within 4 business days after the clinic has received the results. If you do not hear from us within 7 days, please contact the clinic through "[employer]hart or phone. If you have a critical or abnormal lab result, we will notify you by phone as soon as possible.  Submit refill requests through [employer] or call your pharmacy and they will forward the refill request to us. Please allow 3 business days for your refill to be completed.          Additional Information About Your Visit        MyChart Information     [employer] gives you secure access to your electronic health record. If you see a primary care provider, you can also send messages to your care team and make appointments. If you have questions, please call  your primary care clinic.  If you do not have a primary care provider, please call [phone] and they will assist you.        Care EveryWhere ID     This is your Care EveryWhere ID. This could be used by other organizations to access your [place] medical records  [id number]        Your Vitals Were     Last Period                   12/25/2013            Blood Pressure from Last 3 Encounters:   03/02/18 103/65   02/19/18 114/74   10/13/17 118/74    Weight from Last 3 Encounters:   03/02/18 54.4 kg (120 lb)   02/19/18 54.9 kg (121 lb)   02/06/18 54.9 kg (121 lb)              We Performed the Following     Therapeutic Activities     Therapeutic Exercises        Primary Care Provider Office Phone # Fax #    Shannon Abarca [place] JOSE [facility] [phone]       [address] [facility] [address]        Equal Access to Services     [facility] : Hadii aad ku hadasho Soomaali, waaxda luqadaha, qaybta kaalmada adeegyada, waxay idiin hayaan homero reedaracal cameron . So [facility] [phone].    ATENCIÓN: Si habla español, tiene a lopes disposición servicios gratuitos de asistencia lingüística. Ozzy al [phone].    We comply with applicable federal civil rights laws and [place] laws. We do not discriminate on the basis of race, color, national origin, age, disability, sex, sexual orientation, or gender identity.            Thank you!     Thank you for choosing [facility] PH[facility]ICAL THERAP[facility]  for your care. Our goal is always to provide you with excellent care. Hearing back from our patients is one way we can continue to improve our services. Please take a few minutes to complete the written survey that you may receive in the mail after your visit with us. Thank you!             Your Updated Medication List - Protect others around you: Learn how to safely use, store and throw away your medicines at www.disposemymeds.org.          This list is accurate as of 5/4/18  7:42  PM.  Always use your most recent med list.                   Brand Name Dispense Instructions for use Diagnosis    acetaminophen 325 MG tablet    TYLENOL    100 tablet    Take 2 tablets (650 mg) by mouth every 4 hours as needed for other (mild pain)    Status post surgery       albuterol 108 (90 Base) MCG/ACT Inhaler    PROAIR HFA/PROVENTIL HFA/VENTOLIN HFA    3 Inhaler    Inhale 2 puffs into the lungs 2 times daily as needed for shortness of breath / dyspnea    Mild persistent asthma with acute exacerbation       aspirin 325 MG EC tablet     28 tablet    Take 1 tablet (325 mg) by mouth daily To be taken for DVT Prophylaxis daily    Status post surgery       CALCIUM + D PO      Take 600 mg by mouth.        fexofenadine 180 MG tablet    ALLEGRA     1 TABLET DAILY        FLOVENT  MCG/ACT Inhaler   Generic drug:  fluticasone     12 g    INHALE 2 PUFFS INTO THE LUNGS 2 TIMES DAILY    Mild persistent asthma with acute exacerbation       fluticasone 50 MCG/ACT spray    FLONASE     Spray 2 sprays into both nostrils daily        hydrOXYzine 25 MG tablet    ATARAX    20 tablet    Take 1 tablet (25 mg) by mouth every 6 hours as needed for itching (and nausea)    Status post surgery       ibuprofen 400 MG tablet    ADVIL/MOTRIN     Take 400 mg by mouth every 6 hours as needed for moderate pain        oxyCODONE IR 5 MG tablet    ROXICODONE    15 tablet    Take 0.5-1 tablets (2.5-5 mg) by mouth every 4 hours as needed for pain maximum 8 tablet(s) per day    Status post surgery       senna-docusate 8.6-50 MG per tablet    SENOKOT-S;PERICOLACE    20 tablet    Take 1-2 tablets by mouth 2 times daily Take while on oral narcotics to prevent or treat constipation.    Status post surgery       Turmeric Curcumin 500 MG Caps      Take 500 mg by mouth daily        VITAMIN B12 PO           VITAMIN D (CHOLECALCIFEROL) PO      Take by mouth daily

## 2018-05-05 NOTE — PROGRESS NOTES
Subjective:  HPI                    Objective:  System    Physical Exam      General     ROS    Assessment/Plan:    SUBJECTIVE  Subjective changes as noted by pt:  Pt can now ascend stairs normally, R cannot yet control descent. Hasn't been doing HEP much. Standing, walking 2 hours yesterday caused inc knee swelling, controls it with compression sleeve.  Hopes to regain ability to fully kneel and sit on heels.     Current pain level: 2/10     Changes in function:  Yes (See Goal flowsheet attached for changes in current functional level)     Adverse reaction to treatment or activity:  None    OBJECTIVE  Changes in objective findings:  Yes,   Objective: AROM 0-0-139, PROM 0-0-143. Discussed realistic expectations for kneeling. Quad strength R 4/5 with c/o cont clicking/popping. Refuses McConnel tape for patellar support. Wants to progress strengthening.      ASSESSMENT  Lisset continues to require intervention to meet STG and LTG's: PT  Patient is progressing as expected.  Response to therapy has shown lack of progress in  Strength due to inconsistent HEP compliance  Progress made towards STG/LTG?  Yes (See Goal flowsheet attached for updates on achievement of STG and LTG)    PLAN  Continue current treatment plan until patient demonstrates readiness to progress to higher level exercises.    PTA/ATC plan:  Will continue with present plan of care. Re check in 2 weeks, emphasized need for regular compliance of HEP for success.     Please refer to the daily flowsheet for treatment today, total treatment time and time spent performing 1:1 timed codes.

## 2018-05-18 ENCOUNTER — THERAPY VISIT (OUTPATIENT)
Dept: PHYSICAL THERAPY | Facility: CLINIC | Age: 43
End: 2018-05-18
Payer: COMMERCIAL

## 2018-05-18 DIAGNOSIS — M25.561 ACUTE PAIN OF RIGHT KNEE: ICD-10-CM

## 2018-05-18 PROCEDURE — 97110 THERAPEUTIC EXERCISES: CPT | Mod: GP

## 2018-05-18 PROCEDURE — 97530 THERAPEUTIC ACTIVITIES: CPT | Mod: GP

## 2018-05-18 PROCEDURE — 97112 NEUROMUSCULAR REEDUCATION: CPT | Mod: GP

## 2018-05-18 NOTE — PROGRESS NOTES
Subjective:  HPI                    Objective:  System    Physical Exam    General     ROS    Assessment/Plan:    DISCHARGE REPORT    Progress reporting period is from 4/4/18 to 5/18/18.       SUBJECTIVE  Subjective changes noted by patient:   Pt received custom [employer]   brace which unloads lateral knee. Has tried it once. Reports difficulty descending steps yet due to R knee pain and weakness, but can do all other ADLs.  Trying to do more home ex or facilitate quad throughout the day.   Current pain level is 1/10  .     Previous pain level was  4/10 Initial Pain level: 4/10.   Changes in function:  Yes (See Goal flowsheet attached for changes in current functional level)  Adverse reaction to treatment or activity: None    OBJECTIVE  Changes noted in objective findings:  Yes,   Objective: While squatting pt leans to L in compensatory fashion. Able to correct with cues. R quad strength 4/5, glute med 4/5. Takes effort to control valgus blanka on R side causing R foot to cramp. Ex-dance, maintains lordosis in all positions, can correct with cues to facil abdominals. Active knee flexion 147 R     ASSESSMENT/PLAN  Updated problem list and treatment plan: Diagnosis 1:  S/p R knee arthroscopy, debridement  Decreased strength - therapeutic exercise, therapeutic activities and home program  STG/LTGs have been met or progress has been made towards goals:  Yes (See Goal flow sheet completed today.)  Assessment of Progress: The patient's condition is improving.  Self Management Plans:  Patient has been instructed in a home treatment program.  I have re-evaluated this patient and find that the nature, scope, duration and intensity of the therapy is appropriate for the medical condition of the patient.  Lisset continues to require the following intervention to meet STG and LTG's:  PT    Recommendations:  This patient is ready to be discharged from therapy and continue their home treatment program.  The progress  note/discharge summary was written in collaboration with and reviewed by the physical therapist, unless she has problems progressing. Then she may follow up in PT within 1 month for supervision of glute, quad strength and pelvic positioning.     Please refer to the daily flowsheet for treatment today, total treatment time and time spent performing 1:1 timed codes.

## 2018-05-18 NOTE — MR AVS SNAPSHOT
After Visit Summary   5/18/2018    Lisset MITCHELL Chi    MRN: [id number]           Patient Information     Date Of Birth          1975        Visit Information        Provider Department      5/18/2018 10:10 AM Brittany Bernal PTA [facility]        Today's Diagnoses     Acute pain of right knee           Follow-ups after your visit        Your next 10 appointments already scheduled     Jun 12, 2018 11:45 AM CDT   (Arrive by 11:30 AM)   RETURN KNEE with Rima Sarmiento MD   [facility] ([facility])    [address] [place] [address] [phone]              Who to contact     If you have questions or need follow up information about today's clinic visit or your schedule please contact [facility] directly at [phone].  Normal or non-critical lab and imaging results will be communicated to you by MyChart, letter or phone within 4 business days after the clinic has received the results. If you do not hear from us within 7 days, please contact the clinic through Drimmihart or phone. If you have a critical or abnormal lab result, we will notify you by phone as soon as possible.  Submit refill requests through [employer] or call your pharmacy and they will forward the refill request to us. Please allow 3 business days for your refill to be completed.          Additional Information About Your Visit        MyChart Information     [employer] gives you secure access to your electronic health record. If you see a primary care provider, you can also send messages to your care team and make appointments. If you have questions, please call your primary care clinic.  If you do not have a primary care provider, please call [phone] and they will assist you.        Care EveryWhere ID     This is your Care EveryWhere ID. This could be used by other  organizations to access your [place] medical records  [id number]        Your Vitals Were     Last Period                   12/25/2013            Blood Pressure from Last 3 Encounters:   03/02/18 103/65   02/19/18 114/74   10/13/17 118/74    Weight from Last 3 Encounters:   03/02/18 54.4 kg (120 lb)   02/19/18 54.9 kg (121 lb)   02/06/18 54.9 kg (121 lb)              We Performed the Following     Neuromuscular Re-Education     Therapeutic Activities     Therapeutic Exercises        Primary Care Provider Office Phone # Fax #    Shannon Powell, APRN [phone]       [address] [facility] [address]        Equal Access to Services     YUMIKO CAO : Hadii aad ku hadasho Soelliott, waaxda luqadaha, qaybta kaalmada adeegyada, francisco cameron . So [facility] [phone].    ATENCIÓN: Si habla español, tiene a lopes disposición servicios gratuitos de asistencia lingüística. Llame al [phone].    We comply with applicable federal civil rights laws and [place] laws. We do not discriminate on the basis of race, color, national origin, age, disability, sex, sexual orientation, or gender identity.            Thank you!     Thank you for choosing [facility]  for your care. Our goal is always to provide you with excellent care. Hearing back from our patients is one way we can continue to improve our services. Please take a few minutes to complete the written survey that you may receive in the mail after your visit with us. Thank you!             Your Updated Medication List - Protect others around you: Learn how to safely use, store and throw away your medicines at www.disposemymeds.org.          This list is accurate as of 5/18/18 11:17 AM.  Always use your most recent med list.                   Brand Name Dispense Instructions for use Diagnosis    acetaminophen 325 MG tablet    TYLENOL    100 tablet    Take 2  tablets (650 mg) by mouth every 4 hours as needed for other (mild pain)    Status post surgery       albuterol 108 (90 Base) MCG/ACT Inhaler    PROAIR HFA/PROVENTIL HFA/VENTOLIN HFA    3 Inhaler    Inhale 2 puffs into the lungs 2 times daily as needed for shortness of breath / dyspnea    Mild persistent asthma with acute exacerbation       aspirin 325 MG EC tablet     28 tablet    Take 1 tablet (325 mg) by mouth daily To be taken for DVT Prophylaxis daily    Status post surgery       CALCIUM + D PO      Take 600 mg by mouth.        fexofenadine 180 MG tablet    ALLEGRA     1 TABLET DAILY        FLOVENT  MCG/ACT Inhaler   Generic drug:  fluticasone     12 g    INHALE 2 PUFFS INTO THE LUNGS 2 TIMES DAILY    Mild persistent asthma with acute exacerbation       fluticasone 50 MCG/ACT spray    FLONASE     Spray 2 sprays into both nostrils daily        hydrOXYzine 25 MG tablet    ATARAX    20 tablet    Take 1 tablet (25 mg) by mouth every 6 hours as needed for itching (and nausea)    Status post surgery       ibuprofen 400 MG tablet    ADVIL/MOTRIN     Take 400 mg by mouth every 6 hours as needed for moderate pain        oxyCODONE IR 5 MG tablet    ROXICODONE    15 tablet    Take 0.5-1 tablets (2.5-5 mg) by mouth every 4 hours as needed for pain maximum 8 tablet(s) per day    Status post surgery       senna-docusate 8.6-50 MG per tablet    SENOKOT-S;PERICOLACE    20 tablet    Take 1-2 tablets by mouth 2 times daily Take while on oral narcotics to prevent or treat constipation.    Status post surgery       Turmeric Curcumin 500 MG Caps      Take 500 mg by mouth daily        VITAMIN B12 PO           VITAMIN D (CHOLECALCIFEROL) PO      Take by mouth daily

## 2018-06-12 ENCOUNTER — OFFICE VISIT (OUTPATIENT)
Dept: ORTHOPEDICS | Facility: CLINIC | Age: 43
End: 2018-06-12
Payer: COMMERCIAL

## 2018-06-12 DIAGNOSIS — M25.561 CHRONIC PAIN OF RIGHT KNEE: Primary | ICD-10-CM

## 2018-06-12 DIAGNOSIS — G89.29 CHRONIC PAIN OF RIGHT KNEE: Primary | ICD-10-CM

## 2018-06-12 DIAGNOSIS — Z98.890 S/P KNEE SURGERY: ICD-10-CM

## 2018-06-12 ASSESSMENT — ENCOUNTER SYMPTOMS
NECK MASS: 0
RECTAL PAIN: 0
ABDOMINAL PAIN: 0
FEVER: 0
HOARSE VOICE: 1
BOWEL INCONTINENCE: 0
HYPERTENSION: 0
FATIGUE: 1
EYE REDNESS: 0
SINUS CONGESTION: 1
SYNCOPE: 0
DIARRHEA: 0
NERVOUS/ANXIOUS: 1
CHILLS: 0
MYALGIAS: 1
PALPITATIONS: 0
EYE IRRITATION: 0
BLOATING: 1
WEIGHT GAIN: 1
VOMITING: 0
LEG PAIN: 0
ORTHOPNEA: 0
WEIGHT LOSS: 0
TASTE DISTURBANCE: 0
ARTHRALGIAS: 1
DECREASED APPETITE: 0
NAUSEA: 0
HYPOTENSION: 0
SORE THROAT: 0
TROUBLE SWALLOWING: 0
SLEEP DISTURBANCES DUE TO BREATHING: 0
SKIN CHANGES: 0
MUSCLE CRAMPS: 1
EYE WATERING: 0
JOINT SWELLING: 1
MUSCLE WEAKNESS: 1
POLYPHAGIA: 1
NAIL CHANGES: 0
JAUNDICE: 0
STIFFNESS: 1
NIGHT SWEATS: 1
SMELL DISTURBANCE: 0
INSOMNIA: 1
HALLUCINATIONS: 0
HEARTBURN: 0
EXERCISE INTOLERANCE: 0
POOR WOUND HEALING: 1
DEPRESSION: 1
NECK PAIN: 0
DECREASED CONCENTRATION: 1
LIGHT-HEADEDNESS: 1
CONSTIPATION: 1
DOUBLE VISION: 0
INCREASED ENERGY: 1
BLOOD IN STOOL: 0
EYE PAIN: 0
ALTERED TEMPERATURE REGULATION: 1
SINUS PAIN: 0
POLYDIPSIA: 0
BACK PAIN: 1

## 2018-06-12 NOTE — MR AVS SNAPSHOT
After Visit Summary   6/12/2018    Lisset MITCHELL Chi    MRN: [id number]           Patient Information     Date Of Birth          1975        Visit Information        Provider Department      6/12/2018 11:45 AM Rima Sarmiento MD [facility]        Today's Diagnoses     Chronic pain of right knee    -  1    S/P knee surgery           Follow-ups after your visit        Follow-up notes from your care team     Return if symptoms worsen or fail to improve.      Who to contact     Please call your clinic at [phone] to:    Ask questions about your health    Make or cancel appointments    Discuss your medicines    Learn about your test results    Speak to your doctor            Additional Information About Your Visit        [employer] Information     [employer] gives you secure access to your electronic health record. If you see a primary care provider, you can also send messages to your care team and make appointments. If you have questions, please call your primary care clinic.  If you do not have a primary care provider, please call [phone] and they will assist you.      [employer] is an electronic gateway that provides easy, online access to your medical records. With [employer], you can request a clinic appointment, read your test results, renew a prescription or communicate with your care team.     To access your existing account, please contact your [facility] or call [phone] for assistance.        Care EveryWhere ID     This is your Care EveryWhere ID. This could be used by other organizations to access your [place] medical records  [id number]        Your Vitals Were     Last Period                   12/25/2013            Blood Pressure from Last 3 Encounters:   03/02/18 103/65   02/19/18 114/74   10/13/17 118/74    Weight from Last 3 Encounters:   03/02/18 54.4 kg (120 lb)   02/19/18 54.9 kg (121 lb)   02/06/18 54.9 kg (121 lb)               Today, you had the following     No orders found for display         Today's Medication Changes          These changes are accurate as of 6/12/18 11:59 PM.  If you have any questions, ask your nurse or doctor.               Stop taking these medicines if you haven't already. Please contact your care team if you have questions.     hydrOXYzine 25 MG tablet   Commonly known as:  ATARAX           oxyCODONE IR 5 MG tablet   Commonly known as:  ROXICODONE           senna-docusate 8.6-50 MG per tablet   Commonly known as:  SENOKOT-S;PERICOLACE                    Primary Care Provider Office Phone # Fax #    Shannon Powell, JOSE [facility] [phone]       [address] [facility] [address]        Equal Access to Services     YUMIKO CAO : Hadii kathy poeo Soelliott, waaxda luqadaha, qaybta kaalmada adeegyada, francisco cameron . So [facility] [phone].    ATENCIÓN: Si habla español, tiene a lopes disposición servicios gratuitos de asistencia lingüística. Ll[facility] [phone].    We comply with applicable federal civil rights laws and [place] laws. We do not discriminate on the basis of race, color, national origin, age, disability, sex, sexual orientation, or gender identity.            Thank you!     Thank you for choosing [facility]  for your care. Our goal is always to provide you with excellent care. Hearing back from our patients is one way we can continue to improve our services. Please take a few minutes to complete the written survey that you may receive in the mail after your visit with us. Thank you!             Your Updated Medication List - Protect others around you: Learn how to safely use, store and throw away your medicines at www.disposemymeds.org.          This list is accurate as of 6/12/18 11:59 PM.  Always use your most recent med list.                   Brand Name Dispense Instructions for use Diagnosis    acetaminophen 325 MG tablet     TYLENOL    100 tablet    Take 2 tablets (650 mg) by mouth every 4 hours as needed for other (mild pain)    Status post surgery       albuterol 108 (90 Base) MCG/ACT Inhaler    PROAIR HFA/PROVENTIL HFA/VENTOLIN HFA    3 Inhaler    Inhale 2 puffs into the lungs 2 times daily as needed for shortness of breath / dyspnea    Mild persistent asthma with acute exacerbation       aspirin 325 MG EC tablet     28 tablet    Take 1 tablet (325 mg) by mouth daily To be taken for DVT Prophylaxis daily    Status post surgery       CALCIUM + D PO      Take 600 mg by mouth.        fexofenadine 180 MG tablet    ALLEGRA     1 TABLET DAILY        FLOVENT  MCG/ACT Inhaler   Generic drug:  fluticasone     12 g    INHALE 2 PUFFS INTO THE LUNGS 2 TIMES DAILY    Mild persistent asthma with acute exacerbation       fluticasone 50 MCG/ACT spray    FLONASE     Spray 2 sprays into both nostrils daily        ibuprofen 400 MG tablet    ADVIL/MOTRIN     Take 400 mg by mouth every 6 hours as needed for moderate pain        Turmeric Curcumin 500 MG Caps      Take 500 mg by mouth daily        VITAMIN B12 PO           VITAMIN D (CHOLECALCIFEROL) PO      Take by mouth daily

## 2018-06-12 NOTE — PROGRESS NOTES
Orthopedic Surgery Clinic    PROCEDURE: Right knee arthroscopy, lateral and anterior compartment debridement  DATE OF SURGERY: March 2, 2018    CHIEF COMPLAINT: follow up    HISTORY OF PRESENT ILLNESS: 42-year-old female returns to clinic for follow-up of the aforementioned.  She was last seen in our clinic on April 3, 2018 where he was noted to be quite swollen as well as in a fair amount of postoperative pain.  She notes that her swelling and pain have significantly improved.  She also recently tried a lateral  brace however not consistently.  Therefore, she is uncertain of the benefit thus far however does note she feels that it is pushing very forcefully on the medial part of her knee, and causing more pain there at a trade of less pain laterally.  She has had no issues with her incision.  She is otherwise feeling well without fevers or chills.  Outside of routine scheduled follow-up, she has no other questions or concerns.  She discharged from physical therapy on May 18, 2018.  denies new numbness/tingling/weakness, denies fevers, chills, sob, cp.      REVIEW OF SYSTEMS:  10 point review of systems negative unless noted in HPI    PHYSICAL EXAM:    right painful  Knee  Overall limb alignment is: Neutral  Effusion or swelling of the knee: mild  Tenderness to palpation: none  ROM: 3 to 147  Pain with knee ROM: none  Crepitance with knee ROM: none  Extensor lag: none  MCL stability: Stable  Lateral Stability: Stable  Lachman: 1+  Posterior stability: stable  Pain with passive full hip range of motion: none  Prior surgical incision: yes, well-healed arthroscopy portal incisions, no concern for infection, no surrounding erythema       IMAGING: None new      A/P: 42 year old female status post right knee arthroscopy, lateral and anterior compartment debridement on March 2, 2018, overall doing well, much improved from previous visit    Presently, she is significantly improved from the standpoint of pain and  swelling compared to her last visit.  Given her sentiment of uncertain benefit from the  brace, and the fact that she has upcoming 1 month from date of delivery, we do recommend she return it, and discontinue use so that she is not discharged.  Otherwise, she should continue exercises as taught to her by physical therapy, she omar uld monitor for recurrent swelling, she can be weightbearing and activity as tolerated, and follow-up in our clinic on an as-needed basis with any questions or concerns.  Should she have concerns regarding her right knee, we would go ahead and order functional testing prior to revisit with us.  Is explained to the patient and she is understanding.  Imaging is reviewed with patient, all questions are answered, she is happy with the plan.      This is a post op visit.    Dave Evans MD  Orthopedic Surgery, PGY4  [phone]    I have personally examined this patient and have reviewed the clinical presentation and progress note with the resident.  I agree with the treatment plan as outlined.  The plan was formulated with the resident on the day of the resident dictation.    Rima Sarmiento

## 2018-06-12 NOTE — LETTER
6/12/2018       RE: Lisset MITCHELL Chi  [address] [facility] [address]     Dear Colleague,    Thank you for referring your patient, Lisset MITCHELL Chi, to the [facility]. Please see a copy of my visit note below.    Orthopedic Surgery Clinic    PROCEDURE: Right knee arthroscopy, lateral and anterior compartment debridement  DATE OF SURGERY: March 2, 2018    CHIEF COMPLAINT: follow up    HISTORY OF PRESENT ILLNESS: 42-year-old female returns to clinic for follow-up of the aforementioned.  She was last seen in our clinic on April 3, 2018 where he was noted to be quite swollen as well as in a fair amount of postoperative pain.  She notes that her swelling and pain have significantly improved.  She also recently tried a lateral  brace however not consistently.  Therefore, she is uncertain of the benefit thus far however does note she feels that it is pushing very forcefully on the medial part of her knee, and causing more pain there at a trade of less pain laterally.  She has had no issues with her incision.  She is otherwise feeling well without fevers or chills.  Outside of routine scheduled follow-up, she has no other questions or concerns.  She discharged from physical therapy on May 18, 2018.  denies new numbness/tingling/weakness, denies fevers, chills, sob, cp.      REVIEW OF SYSTEMS:  10 point review of systems negative unless noted in HPI    PHYSICAL EXAM:    right painful  Knee  Overall limb alignment is: Neutral  Effusion or swelling of the knee: mild  Tenderness to palpation: none  ROM: 3 to 147  Pain with knee ROM: none  Crepitance with knee ROM: none  Extensor lag: none  MCL stability: Stable  Lateral Stability: Stable  Lachman: 1+  Posterior stability: stable  Pain with passive full hip range of motion: none  Prior surgical incision: yes, well-healed arthroscopy portal incisions, no concern for infection, no surrounding  erythema       IMAGING: None new      A/P: 42 year old female status post right knee arthroscopy, lateral and anterior compartment debridement on March 2, 2018, overall doing well, much improved from previous visit    Presently, she is significantly improved from the standpoint of pain and swelling compared to her last visit.  Given her sentiment of uncertain benefit from the  brace, and the fact that she has upcoming 1 month from date of delivery, we do recommend she return it, and discontinue use so that she is not discharged.  Otherwise, she should continue exercises as taught to her by physical therapy, she omar uld monitor for recurrent swelling, she can be weightbearing and activity as tolerated, and follow-up in our clinic on an as-needed basis with any questions or concerns.  Should she have concerns regarding her right knee, we would go ahead and order functional testing prior to revisit with us.  Is explained to the patient and she is understanding.  Imaging is reviewed with patient, all questions are answered, she is happy with the plan.      This is a post op visit.    Dave Evans MD  Orthopedic Surgery, PGY4  [phone]    I have personally examined this patient and have reviewed the clinical presentation and progress note with the resident.  I agree with the treatment plan as outlined.  The plan was formulated with the resident on the day of the resident dictation.    Rima Sarmiento

## 2018-06-12 NOTE — NURSING NOTE
Reason For Visit:   Chief Complaint   Patient presents with     Surgical Followup     3 month pop knee scope and lateral compartment and anterior chamber debridement.  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  Work status?  Full time.    Date of surgery: 3/2/18  Type of surgery: scope and lateral compartment, ant chamber debridement.  Smoker: No    Pain Assessment  Patient Currently in Pain: Yes  Patient's Stated Pain Goal: 2  Primary Pain Location: Knee  Pain Orientation: Right  Pain Descriptors: Aching  Alleviating Factors:  (Bracing)  Aggravating Factors:  (weather)

## 2018-07-06 PROBLEM — M25.561 KNEE PAIN, RIGHT: Status: RESOLVED | Noted: 2018-03-19 | Resolved: 2018-07-06

## 2019-03-01 ENCOUNTER — OFFICE VISIT (OUTPATIENT)
Dept: FAMILY MEDICINE | Facility: CLINIC | Age: 44
End: 2019-03-01
Payer: COMMERCIAL

## 2019-03-01 ENCOUNTER — ANCILLARY PROCEDURE (OUTPATIENT)
Dept: MAMMOGRAPHY | Facility: CLINIC | Age: 44
End: 2019-03-01
Attending: NURSE PRACTITIONER
Payer: COMMERCIAL

## 2019-03-01 VITALS
HEIGHT: 62 IN | SYSTOLIC BLOOD PRESSURE: 106 MMHG | OXYGEN SATURATION: 99 % | WEIGHT: 128 LBS | TEMPERATURE: 98 F | HEART RATE: 70 BPM | BODY MASS INDEX: 23.55 KG/M2 | DIASTOLIC BLOOD PRESSURE: 70 MMHG

## 2019-03-01 DIAGNOSIS — F43.21 ADJUSTMENT DISORDER WITH DEPRESSED MOOD: ICD-10-CM

## 2019-03-01 DIAGNOSIS — Z13.6 CARDIOVASCULAR SCREENING; LDL GOAL LESS THAN 160: ICD-10-CM

## 2019-03-01 DIAGNOSIS — J45.30 MILD PERSISTENT ASTHMA WITHOUT COMPLICATION: ICD-10-CM

## 2019-03-01 DIAGNOSIS — Z00.00 ROUTINE HISTORY AND PHYSICAL EXAMINATION OF ADULT: Primary | ICD-10-CM

## 2019-03-01 DIAGNOSIS — B35.1 ONYCHOMYCOSIS: ICD-10-CM

## 2019-03-01 DIAGNOSIS — Z12.31 VISIT FOR SCREENING MAMMOGRAM: ICD-10-CM

## 2019-03-01 DIAGNOSIS — R10.32 LLQ ABDOMINAL PAIN: ICD-10-CM

## 2019-03-01 LAB
ALBUMIN SERPL-MCNC: 4 G/DL (ref 3.4–5)
ALBUMIN UR-MCNC: NEGATIVE MG/DL
ALP SERPL-CCNC: 92 U/L (ref 40–150)
ALT SERPL W P-5'-P-CCNC: 21 U/L (ref 0–50)
ANION GAP SERPL CALCULATED.3IONS-SCNC: 7 MMOL/L (ref 3–14)
APPEARANCE UR: CLEAR
AST SERPL W P-5'-P-CCNC: 13 U/L (ref 0–45)
BASOPHILS # BLD AUTO: 0 10E9/L (ref 0–0.2)
BASOPHILS NFR BLD AUTO: 0.4 %
BILIRUB SERPL-MCNC: 0.5 MG/DL (ref 0.2–1.3)
BILIRUB UR QL STRIP: NEGATIVE
BUN SERPL-MCNC: 15 MG/DL (ref 7–30)
CALCIUM SERPL-MCNC: 9.2 MG/DL (ref 8.5–10.1)
CHLORIDE SERPL-SCNC: 105 MMOL/L (ref 94–109)
CO2 SERPL-SCNC: 28 MMOL/L (ref 20–32)
COLOR UR AUTO: YELLOW
CREAT SERPL-MCNC: 0.77 MG/DL (ref 0.52–1.04)
DIFFERENTIAL METHOD BLD: NORMAL
EOSINOPHIL # BLD AUTO: 0.3 10E9/L (ref 0–0.7)
EOSINOPHIL NFR BLD AUTO: 4.9 %
ERYTHROCYTE [DISTWIDTH] IN BLOOD BY AUTOMATED COUNT: 12.6 % (ref 10–15)
GFR SERPL CREATININE-BSD FRML MDRD: >90 ML/MIN/{1.73_M2}
GLUCOSE SERPL-MCNC: 85 MG/DL (ref 70–99)
GLUCOSE UR STRIP-MCNC: NEGATIVE MG/DL
HCT VFR BLD AUTO: 39.2 % (ref 35–47)
HGB BLD-MCNC: 12.6 G/DL (ref 11.7–15.7)
HGB UR QL STRIP: NEGATIVE
KETONES UR STRIP-MCNC: NEGATIVE MG/DL
LEUKOCYTE ESTERASE UR QL STRIP: NEGATIVE
LYMPHOCYTES # BLD AUTO: 1.2 10E9/L (ref 0.8–5.3)
LYMPHOCYTES NFR BLD AUTO: 21.6 %
MCH RBC QN AUTO: 29.5 PG (ref 26.5–33)
MCHC RBC AUTO-ENTMCNC: 32.1 G/DL (ref 31.5–36.5)
MCV RBC AUTO: 92 FL (ref 78–100)
MONOCYTES # BLD AUTO: 0.6 10E9/L (ref 0–1.3)
MONOCYTES NFR BLD AUTO: 11.2 %
NEUTROPHILS # BLD AUTO: 3.4 10E9/L (ref 1.6–8.3)
NEUTROPHILS NFR BLD AUTO: 61.9 %
NITRATE UR QL: NEGATIVE
PH UR STRIP: 7 PH (ref 5–7)
PLATELET # BLD AUTO: 188 10E9/L (ref 150–450)
POTASSIUM SERPL-SCNC: 4.2 MMOL/L (ref 3.4–5.3)
PROT SERPL-MCNC: 7.2 G/DL (ref 6.8–8.8)
RBC # BLD AUTO: 4.27 10E12/L (ref 3.8–5.2)
SODIUM SERPL-SCNC: 140 MMOL/L (ref 133–144)
SOURCE: NORMAL
SP GR UR STRIP: 1.01 (ref 1–1.03)
UROBILINOGEN UR STRIP-ACNC: 0.2 EU/DL (ref 0.2–1)
WBC # BLD AUTO: 5.6 10E9/L (ref 4–11)

## 2019-03-01 PROCEDURE — 85025 COMPLETE CBC W/AUTO DIFF WBC: CPT | Performed by: NURSE PRACTITIONER

## 2019-03-01 PROCEDURE — 36415 COLL VENOUS BLD VENIPUNCTURE: CPT | Performed by: NURSE PRACTITIONER

## 2019-03-01 PROCEDURE — 81003 URINALYSIS AUTO W/O SCOPE: CPT | Performed by: NURSE PRACTITIONER

## 2019-03-01 PROCEDURE — 99213 OFFICE O/P EST LOW 20 MIN: CPT | Mod: 25 | Performed by: NURSE PRACTITIONER

## 2019-03-01 PROCEDURE — 80053 COMPREHEN METABOLIC PANEL: CPT | Performed by: NURSE PRACTITIONER

## 2019-03-01 PROCEDURE — 77067 SCR MAMMO BI INCL CAD: CPT | Mod: TC

## 2019-03-01 PROCEDURE — 99396 PREV VISIT EST AGE 40-64: CPT | Performed by: NURSE PRACTITIONER

## 2019-03-01 RX ORDER — FLUTICASONE PROPIONATE 110 UG/1
2 AEROSOL, METERED RESPIRATORY (INHALATION) 2 TIMES DAILY
Qty: 12 G | Refills: 3 | Status: SHIPPED | OUTPATIENT
Start: 2019-03-01 | End: 2020-04-01

## 2019-03-01 ASSESSMENT — ENCOUNTER SYMPTOMS
SHORTNESS OF BREATH: 0
FREQUENCY: 0
NAUSEA: 0
HEARTBURN: 0
COUGH: 0
FEVER: 0
DIZZINESS: 0
BREAST MASS: 0
HEADACHES: 1
DYSURIA: 0
PARESTHESIAS: 0
CONSTIPATION: 1
WEAKNESS: 0
EYE PAIN: 0
ARTHRALGIAS: 1
NERVOUS/ANXIOUS: 0
HEMATURIA: 0
CHILLS: 0
DIARRHEA: 0
SORE THROAT: 0
ABDOMINAL PAIN: 1
MYALGIAS: 1
JOINT SWELLING: 1
HEMATOCHEZIA: 0
PALPITATIONS: 1

## 2019-03-01 ASSESSMENT — PAIN SCALES - GENERAL: PAINLEVEL: NO PAIN (0)

## 2019-03-01 ASSESSMENT — MIFFLIN-ST. JEOR: SCORE: 1183.85
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
## 2019-03-02 ASSESSMENT — ASTHMA QUESTIONNAIRES: ACT_TOTALSCORE: 23

## 2019-03-04 ENCOUNTER — MYC MEDICAL ADVICE (OUTPATIENT)
Dept: FAMILY MEDICINE | Facility: CLINIC | Age: 44
End: 2019-03-04

## 2019-03-04 ENCOUNTER — OFFICE VISIT (OUTPATIENT)
Dept: FAMILY MEDICINE | Facility: CLINIC | Age: 44
End: 2019-03-04
Payer: COMMERCIAL

## 2019-03-04 VITALS
HEIGHT: 62 IN | DIASTOLIC BLOOD PRESSURE: 73 MMHG | OXYGEN SATURATION: 99 % | WEIGHT: 126.2 LBS | HEART RATE: 84 BPM | SYSTOLIC BLOOD PRESSURE: 106 MMHG | BODY MASS INDEX: 23.22 KG/M2 | TEMPERATURE: 99 F

## 2019-03-04 DIAGNOSIS — R07.0 THROAT PAIN: ICD-10-CM

## 2019-03-04 DIAGNOSIS — R50.9 FEBRILE ILLNESS, ACUTE: Primary | ICD-10-CM

## 2019-03-04 LAB
DEPRECATED S PYO AG THROAT QL EIA: NORMAL
SPECIMEN SOURCE: NORMAL

## 2019-03-04 PROCEDURE — 87081 CULTURE SCREEN ONLY: CPT | Performed by: FAMILY MEDICINE

## 2019-03-04 PROCEDURE — 87880 STREP A ASSAY W/OPTIC: CPT | Performed by: FAMILY MEDICINE

## 2019-03-04 PROCEDURE — 99213 OFFICE O/P EST LOW 20 MIN: CPT | Performed by: FAMILY MEDICINE

## 2019-03-04 ASSESSMENT — MIFFLIN-ST. JEOR: SCORE: 1178.94

## 2019-03-04 NOTE — PROGRESS NOTES
SUBJECTIVE:   Lisset MITCHELL Chi is a 43 year old female who presents to clinic today for the following health issues:  SUBJECTIVE: 43 year old female with sore throat, myalgias, swollen glands, headache and fever for 2-3 days. No history of rheumatic fever. Other symptoms: decreased appetite, decreased activity, headache and dry cough.  She report muscle ache and joint pain, getting better, still with mild headache. Denies diarrhea or vomiting, no abd pain or history of travel.    OBJECTIVE:   Vitals as noted above.  Appears mild distress.  Ears: normal  Oropharynx: mild erythema  Neck: normal and supple  Lungs: chest clear to IPPA and clear to IPPA  Rapid Strep test is negative    ASSESSMENT: (R50.9) Febrile illness, acute  (primary encounter diagnosis)  Plan: Strep, Rapid Screen  (R07.0) Throat pain  Plan: Strep, Rapid Screen, Beta strep group A culture       PLAN: Per orders. Gargle, use acetaminophen or other OTC analgesic,. See prn.    Acute Illness   Acute illness concerns: Fever, chest heaviness, headache, and shooting pains   Onset: 3 days ago    Fever: YES, 101.7 highest    Chills/Sweats: YES    Headache (location?): YES    Sinus Pressure:YES    Conjunctivitis:  No, light sensitivity     Ear Pain: no    Rhinorrhea: YES    Congestion: no     Sore Throat: no      Cough: YES    Wheeze: YES, patient has asthma    Decreased Appetite: YES    Nausea: no     Vomiting: no     Diarrhea:  no     Dysuria/Freq.: no     Fatigue/Achiness: YES    Sick/Strep Exposure: YES- colleague        Qian nieto MD

## 2019-03-05 ENCOUNTER — ANCILLARY PROCEDURE (OUTPATIENT)
Dept: ULTRASOUND IMAGING | Facility: CLINIC | Age: 44
End: 2019-03-05
Attending: NURSE PRACTITIONER
Payer: COMMERCIAL

## 2019-03-05 DIAGNOSIS — R10.32 LLQ ABDOMINAL PAIN: ICD-10-CM

## 2019-03-05 LAB
BACTERIA SPEC CULT: NORMAL
SPECIMEN SOURCE: NORMAL

## 2019-03-05 PROCEDURE — 76856 US EXAM PELVIC COMPLETE: CPT

## 2019-03-05 PROCEDURE — 76830 TRANSVAGINAL US NON-OB: CPT

## 2019-06-10 ENCOUNTER — ANCILLARY PROCEDURE (OUTPATIENT)
Dept: GENERAL RADIOLOGY | Facility: CLINIC | Age: 44
End: 2019-06-10
Attending: PHYSICIAN ASSISTANT
Payer: COMMERCIAL

## 2019-06-10 ENCOUNTER — OFFICE VISIT (OUTPATIENT)
Dept: URGENT CARE | Facility: URGENT CARE | Age: 44
End: 2019-06-10
Payer: COMMERCIAL

## 2019-06-10 VITALS
OXYGEN SATURATION: 99 % | WEIGHT: 129 LBS | BODY MASS INDEX: 23.44 KG/M2 | TEMPERATURE: 98.2 F | SYSTOLIC BLOOD PRESSURE: 114 MMHG | DIASTOLIC BLOOD PRESSURE: 56 MMHG | HEART RATE: 63 BPM

## 2019-06-10 DIAGNOSIS — M79.672 LEFT FOOT PAIN: ICD-10-CM

## 2019-06-10 DIAGNOSIS — S99.929A TOE INJURY: ICD-10-CM

## 2019-06-10 DIAGNOSIS — S90.122A CONTUSION OF LESSER TOE OF LEFT FOOT WITHOUT DAMAGE TO NAIL, INITIAL ENCOUNTER: Primary | ICD-10-CM

## 2019-06-10 DIAGNOSIS — M72.2 PLANTAR FASCIITIS: ICD-10-CM

## 2019-06-10 DIAGNOSIS — S99.922A INJURY OF TOE ON LEFT FOOT, INITIAL ENCOUNTER: ICD-10-CM

## 2019-06-10 PROCEDURE — 73630 X-RAY EXAM OF FOOT: CPT | Mod: LT

## 2019-06-10 PROCEDURE — 99213 OFFICE O/P EST LOW 20 MIN: CPT | Performed by: PHYSICIAN ASSISTANT

## 2019-06-11 NOTE — PROGRESS NOTES
"SUBJECTIVE:   Lisset MITCHELL Chi is a 43 year old female presenting for evaluation of   Chief Complaint   Patient presents with     Urgent Care     Pt in clinic c/o left pinky toe pain due to injury.     Toe Injury   .    2 days ago she was dancing on a carpeted floor when her left pinky toe \"caught\" the carpet and she kept twisting. The toe has since been bruised and sore. She has pain in this toe but is able to bear weight.  She has also had pain on the bottom of the left foot for several weeks, worse in the morning. It feels \"tight.\" She typically wears good supportive shoes, but lately has been wearing sandals more since it is summer.  She has applied ice to the left little toe.    ROS  See HPI    PMH:  Past Medical History:   Diagnosis Date     Allergic rhinitis due to other allergen      Asthma      IBS (irritable bowel syndrome)      Other internal derangement of knee(717.89) 2005    patella dislocates easily     Premature ovarian failure      Raynaud's disease 3/08     Patient Active Problem List    Diagnosis Date Noted     Adjustment disorder with depressed mood 03/01/2019     Priority: Medium     Right knee pain 02/06/2018     Priority: Medium     Trochanteric bursitis of both hips 11/15/2017     Priority: Medium     Mild persistent asthma without complication 03/05/2012     Priority: Medium     Managed by asthma/allergy specialist.       CARDIOVASCULAR SCREENING; LDL GOAL LESS THAN 160 06/11/2010     Priority: Medium     Premature ovarian failure 03/04/2010     Priority: Medium     Irritable bowel syndrome 03/02/2007     Priority: Medium         Current medications:  Current Outpatient Medications   Medication Sig Dispense Refill     albuterol (PROAIR HFA/PROVENTIL HFA/VENTOLIN HFA) 108 (90 BASE) MCG/ACT Inhaler Inhale 2 puffs into the lungs 2 times daily as needed for shortness of breath / dyspnea 3 Inhaler 3     Calcium Carbonate-Vitamin D (CALCIUM + D PO) Take 600 mg by mouth.       Cyanocobalamin (VITAMIN " B12 PO)        FEXOFENADINE  MG OR TABS 1 TABLET DAILY        fluticasone (FLONASE) 50 MCG/ACT nasal spray Spray 2 sprays into both nostrils daily       fluticasone (FLOVENT HFA) 110 MCG/ACT inhaler Inhale 2 puffs into the lungs 2 times daily 12 g 3     MULTIPLE VITAMIN PO        Turmeric Curcumin 500 MG CAPS Take 500 mg by mouth daily       VITAMIN D, CHOLECALCIFEROL, PO Take by mouth daily         Surgical History:  Past Surgical History:   Procedure Laterality Date     ARTHROSCOPY KNEE Right 3/2/2018    Procedure: ARTHROSCOPY KNEE;  Right Knee Arthroscopy, Lateral Compartmnt Debridemen and  Anterior Chamber;  Surgeon: Rima Sarmiento MD;  Location: [facility] OR      SECTION           HC DILATION/CURETTAGE DIAG/THER NON OB  2003    D & C for retained placenta one week after the delivery     I&D BARTHOLIN GLAND ABSCESS   and     mcgrath cath      KNEE SURGERY      Rt knee Fx, repair-dislocation problem     KNEE SURGERY      Lt knee reconstructive surgery-dislocation problem     LEEP TX, CERVICAL      LEEP for abnormal pap     MARSUP BARTHOLIN GLAND CYST  08       Family history:  Family History   Problem Relation Age of Onset     Thyroid Disease Mother      Diabetes Mother         type2     Eye Disorder Mother         cataracts     Gastrointestinal Disease Mother         hep a/has to have her throat stretched     Respiratory Mother         sleep apnea     Heart Disease Father 60     C.A.D. Paternal Grandfather 30        age 30, then again in his 80's     Diabetes Maternal Grandfather      Cancer Maternal Grandfather      Thyroid Disease Maternal Grandmother      Gynecology Maternal Grandmother         on bcp to keep period regular, a small uterus     Heart Disease Maternal Grandmother         tachycardia     Allergies Daughter         milk     Asthma Daughter          Social History:  Social History     Tobacco Use     Smoking status: Never Smoker      Smokeless tobacco: Never Used   Substance Use Topics     Alcohol use: Yes     Comment: social           OBJECTIVE  /56   Pulse 63   Temp 98.2  F (36.8  C) (Oral)   Wt 58.5 kg (129 lb)   LMP 12/25/2013   SpO2 99%   BMI 23.44 kg/m      Physical Exam  General: well-appearing, no acute distress.   Muscloloskeletal: left lower extremity: DP pulse 2+. Edema and ecchymosis of left little toe with associated tenderness of phalanx. No other bony tenderness of foot.  Neuro: Alert. Sensation to light touch intact in distal left lower extremity.  Psych: Normal mood and affect.          Labs:  No results found for this or any previous visit (from the past 24 hour(s)).    X-Ray was done, my findings are: no fracture or dislocation        ASSESSMENT/PLAN:      ICD-10-CM    1. Contusion of lesser toe of left foot without damage to nail, initial encounter S90.122A    2. Plantar fasciitis M72.2    3. Injury of toe on left foot, initial encounter S99.922A XR Foot Left G/E 3 Views     CANCELED: XR Toe Left G/E 2 Views   4. Left foot pain M79.672 XR Foot Left G/E 3 Views        Medical Decision Making:      Serious Comorbid Conditions: none affecting MDM    Differential Diagnosis:   Toe contusion   Toe fracture    Foot xrays demonstrate no fracture. Suspect contusion. Recommend RICE, always wearing supportive, firm-soled shoes.  Pain on bottom of foot is ongoing - suspect plantar fascitis on history. Again recommend good footwear at all times.    Follow up with PCP if no improvement in 2 weeks.    At the end of the encounter, I discussed all available results with patient. Discussed diagnosis and treatment plan.   Return precautions provided to patient and printed below in patient instructions.  Patient understood and agreed to plan. Patient was appropriate for discharge.        Patient Instructions   1. Toe pain  No fracture on xray.  You have a toe contusion (bruise).  Recommend ice, ibuprofen, and always wear good,  supportive shoes.    2. Plantar fasciitis  Patient Education     Plantar Fasciitis  Plantar fasciitis is a painful swelling of the plantar fascia. The plantar fascia is a thick, fibrous layer of tissue that covers the bones on the bottom of your foot. It supports the foot bones in an arched position.  Plantar fasciitis can happen gradually or suddenly. It usually affects one foot at a time. Heel pain can be sharp, like a knife sticking into the bottom of your foot. You may feel pain after exercising, long-distance jogging, stair climbing, long periods of standing, or after standing up.  Risk factors include: non-active lifestyle, arthritis, diabetes, obesity or recent weight gain, flat foot, high arch. Wearing high heels, loose shoes, or shoes with poor arch support for long periods of time adds to the risk. This problem is commonly found in runners and dancers. It also found in people who stand on hard surfaces for long periods of time.  Foot pain from this condition is usually worse in the morning. But it often improves with walking. By the end of the day there may be a dull aching. Treatment requires short-term rest and controlling swelling. It may take up to 9 months before all symptoms go away. Rarely, a steroid injection into the foot, or surgery, may be needed.  Home care    If you are overweight, lose weight to help healing.    Choose supportive shoes with good arch support and shock absorbency. Replace athletic shoes when they become worn out. Don t walk or run barefoot.    Premade or custom-fitted shoe inserts may be helpful. Inserts made of silicone seem to be the most effective. Custom-made inserts can be provided by foot specialist, physical therapist, or orthopedist.    Premade or custom-made night splints keep the heel stretched out while you sleep. They may prevent morning pain.    Limit activities that stress the feet: jogging, prolonged standing or walking, contact sports, etc.    First thing in  the morning and before sports, stretch the bottom of your feet. Gently flex your ankle so the toes move toward your knee.    Icing may help control heel pain. Apply an ice pack to the heel for 10 to 20 minutes as a preventive. Or ice your heel after a severe flare-up of symptoms. You may repeat this every 1 to 2 hours as needed.    You may use over-the-counter pain medicine to control pain, unless another medicine was prescribed. Anti-inflammatory pain medicines, such as ibuprofen or naproxen, may work better than acetaminophen. If you have chronic liver or kidney disease or ever had a stomach ulcer or gastrointestinal bleeding, talk with your healthcare provider before using these medicines.  Follow-up care  Follow up with your healthcare provider, or as advised.  Call for an appointment if pain worsens or there is no relief after a few weeks of home treatment. Shoe inserts, a night splint, or a special boot may be required.  If X-rays were taken, you will be told of any new findings that may affect your care.  When to seek medical advice  Call your healthcare provider right away if any of these occur:    Foot swelling    Redness or warmth with increasing pain  Date Last Reviewed: 5/1/2018 2000-2018 [employer]. [address] [place] [address], [place] [address]. All rights reserved. This information is not intended as a substitute for professional medical care. Always follow your healthcare professional's instructions.                     Thania Powell PA-C  06/10/19 7:26 AM

## 2019-06-11 NOTE — PATIENT INSTRUCTIONS
1. Toe pain  No fracture on xray.  You have a toe contusion (bruise).  Recommend ice, ibuprofen, and always wear good, supportive shoes.    2. Plantar fasciitis  Patient Education     Plantar Fasciitis  Plantar fasciitis is a painful swelling of the plantar fascia. The plantar fascia is a thick, fibrous layer of tissue that covers the bones on the bottom of your foot. It supports the foot bones in an arched position.  Plantar fasciitis can happen gradually or suddenly. It usually affects one foot at a time. Heel pain can be sharp, like a knife sticking into the bottom of your foot. You may feel pain after exercising, long-distance jogging, stair climbing, long periods of standing, or after standing up.  Risk factors include: non-active lifestyle, arthritis, diabetes, obesity or recent weight gain, flat foot, high arch. Wearing high heels, loose shoes, or shoes with poor arch support for long periods of time adds to the risk. This problem is commonly found in runners and dancers. It also found in people who stand on hard surfaces for long periods of time.  Foot pain from this condition is usually worse in the morning. But it often improves with walking. By the end of the day there may be a dull aching. Treatment requires short-term rest and controlling swelling. It may take up to 9 months before all symptoms go away. Rarely, a steroid injection into the foot, or surgery, may be needed.  Home care    If you are overweight, lose weight to help healing.    Choose supportive shoes with good arch support and shock absorbency. Replace athletic shoes when they become worn out. Don t walk or run barefoot.    Premade or custom-fitted shoe inserts may be helpful. Inserts made of silicone seem to be the most effective. Custom-made inserts can be provided by foot specialist, physical therapist, or orthopedist.    Premade or custom-made night splints keep the heel stretched out while you sleep. They may prevent morning  pain.    Limit activities that stress the feet: jogging, prolonged standing or walking, contact sports, etc.    First thing in the morning and before sports, stretch the bottom of your feet. Gently flex your ankle so the toes move toward your knee.    Icing may help control heel pain. Apply an ice pack to the heel for 10 to 20 minutes as a preventive. Or ice your heel after a severe flare-up of symptoms. You may repeat this every 1 to 2 hours as needed.    You may use over-the-counter pain medicine to control pain, unless another medicine was prescribed. Anti-inflammatory pain medicines, such as ibuprofen or naproxen, may work better than acetaminophen. If you have chronic liver or kidney disease or ever had a stomach ulcer or gastrointestinal bleeding, talk with your healthcare provider before using these medicines.  Follow-up care  Follow up with your healthcare provider, or as advised.  Call for an appointment if pain worsens or there is no relief after a few weeks of home treatment. Shoe inserts, a night splint, or a special boot may be required.  If X-rays were taken, you will be told of any new findings that may affect your care.  When to seek medical advice  Call your healthcare provider right away if any of these occur:    Foot swelling    Redness or warmth with increasing pain  Date Last Reviewed: 5/1/2018 2000-2018 [employer]. [address] [place] [address], [place] [address]. All rights reserved. This information is not intended as a substitute for professional medical care. Always follow your healthcare professional's instructions.

## 2019-06-19 ENCOUNTER — MYC REFILL (OUTPATIENT)
Dept: FAMILY MEDICINE | Facility: CLINIC | Age: 44
End: 2019-06-19

## 2019-06-19 DIAGNOSIS — J45.31 MILD PERSISTENT ASTHMA WITH ACUTE EXACERBATION: ICD-10-CM

## 2019-06-21 RX ORDER — ALBUTEROL SULFATE 90 UG/1
2 AEROSOL, METERED RESPIRATORY (INHALATION) 2 TIMES DAILY PRN
Qty: 3 INHALER | Refills: 3 | Status: SHIPPED | OUTPATIENT
Start: 2019-06-21 | End: 2019-12-12 | Stop reason: DRUGHIGH

## 2019-06-21 NOTE — TELEPHONE ENCOUNTER
"Requested Prescriptions   Pending Prescriptions Disp Refills     albuterol (PROAIR HFA/PROVENTIL HFA/VENTOLIN HFA) 108 (90 Base) MCG/ACT inhaler 3 Inhaler 3     Sig: Inhale 2 puffs into the lungs 2 times daily as needed for shortness of breath / dyspnea       Asthma Maintenance Inhalers - Anticholinergics Passed - 6/19/2019  8:53 PM        Passed - Patient is age 12 years or older        Passed - Asthma control assessment score within normal limits in last 6 months     Please review ACT score.           Passed - Medication is active on med list        Passed - Recent (6 mo) or future (30 days) visit within the authorizing provider's specialty     Patient had office visit in the last 6 months or has a visit in the next 30 days with authorizing provider or within the authorizing provider's specialty.  See \"Patient Info\" tab in inbasket, or \"Choose Columns\" in Meds & Orders section of the refill encounter.              "

## 2019-07-11 ENCOUNTER — MYC MEDICAL ADVICE (OUTPATIENT)
Dept: FAMILY MEDICINE | Facility: CLINIC | Age: 44
End: 2019-07-11

## 2019-07-11 DIAGNOSIS — S90.122A CONTUSION OF LESSER TOE OF LEFT FOOT WITHOUT DAMAGE TO NAIL, INITIAL ENCOUNTER: Primary | ICD-10-CM

## 2019-07-11 NOTE — TELEPHONE ENCOUNTER
Routing to PCP to review and advise.    Please see My Chart message.      Mona Walter RN  [facility]

## 2019-07-17 ENCOUNTER — OFFICE VISIT (OUTPATIENT)
Dept: PODIATRY | Facility: CLINIC | Age: 44
End: 2019-07-17
Payer: COMMERCIAL

## 2019-07-17 ENCOUNTER — ANCILLARY PROCEDURE (OUTPATIENT)
Dept: GENERAL RADIOLOGY | Facility: CLINIC | Age: 44
End: 2019-07-17
Attending: PODIATRIST
Payer: COMMERCIAL

## 2019-07-17 VITALS
WEIGHT: 130.6 LBS | HEIGHT: 62 IN | SYSTOLIC BLOOD PRESSURE: 107 MMHG | DIASTOLIC BLOOD PRESSURE: 75 MMHG | BODY MASS INDEX: 24.03 KG/M2 | HEART RATE: 69 BPM

## 2019-07-17 DIAGNOSIS — S99.922A INJURY OF TOE ON LEFT FOOT, INITIAL ENCOUNTER: ICD-10-CM

## 2019-07-17 DIAGNOSIS — S92.515A CLOSED NONDISPLACED FRACTURE OF PROXIMAL PHALANX OF LESSER TOE OF LEFT FOOT, INITIAL ENCOUNTER: ICD-10-CM

## 2019-07-17 PROCEDURE — 99203 OFFICE O/P NEW LOW 30 MIN: CPT | Performed by: PODIATRIST

## 2019-07-17 PROCEDURE — 73630 X-RAY EXAM OF FOOT: CPT | Mod: LT

## 2019-07-17 ASSESSMENT — MIFFLIN-ST. JEOR: SCORE: 1198.98

## 2019-07-17 NOTE — PROGRESS NOTES
PATIENT HISTORY:  Lisset MITCHELL Chi is a 43 year old female who presents to clinic for L 5th toe injury/pain.  Injury 5 wks ago.  Seen at  and XRs were neg.  Pain has continued, with swelling.  Rest helps.  She went to [employer] and did a lot of walking.  She did not rate her pain today.    Review of Systems:  Patient denies fever, chills, rash, wound, stiffness, limping, numbness, weakness, heart burn, blood in stool, chest pain with activity, calf pain when walking, shortness of breath with activity, chronic cough, easy bleeding/bruising, excessive thirst, fatigue, anxiety.  Patient admits to depression, swelling of ankle.     PAST MEDICAL HISTORY:   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     Premature ovarian failure      Raynaud's disease 3/08        PAST SURGICAL HISTORY:   Past Surgical History:   Procedure Laterality Date     ARTHROSCOPY KNEE Right 3/2/2018    Procedure: ARTHROSCOPY KNEE;  Right Knee Arthroscopy, Lateral Compartmnt Debridemen and  Anterior Chamber;  Surgeon: Rima Sarmiento MD;  Location: [facility] OR      SECTION           HC DILATION/CURETTAGE DIAG/THER NON OB  2003    D & C for retained placenta one week after the delivery     I&D BARTHOLIN GLAND ABSCESS   and     mcgrath cath      KNEE SURGERY      Rt knee Fx, repair-dislocation problem     KNEE SURGERY      Lt knee reconstructive surgery-dislocation problem     LEEP TX, CERVICAL      LEEP for abnormal pap     MARSUP BARTHOLIN GLAND CYST  08        MEDICATIONS:   Current Outpatient Medications:      albuterol (PROAIR HFA/PROVENTIL HFA/VENTOLIN HFA) 108 (90 Base) MCG/ACT inhaler, Inhale 2 puffs into the lungs 2 times daily as needed for shortness of breath / dyspnea, Disp: 3 Inhaler, Rfl: 3     Calcium Carbonate-Vitamin D  (CALCIUM + D PO), Take 600 mg by mouth., Disp: , Rfl:      Cyanocobalamin (VITAMIN B12 PO), , Disp: , Rfl:      FEXOFENADINE  MG OR TABS, 1 TABLET DAILY , Disp: , Rfl:      fluticasone (FLONASE) 50 MCG/ACT nasal spray, Spray 2 sprays into both nostrils daily, Disp: , Rfl:      fluticasone (FLOVENT HFA) 110 MCG/ACT inhaler, Inhale 2 puffs into the lungs 2 times daily, Disp: 12 g, Rfl: 3     MULTIPLE VITAMIN PO, , Disp: , Rfl:      Turmeric Curcumin 500 MG CAPS, Take 500 mg by mouth daily, Disp: , Rfl:      VITAMIN D, CHOLECALCIFEROL, PO, Take by mouth daily, Disp: , Rfl:      ALLERGIES:    Allergies   Allergen Reactions     No Known Allergies         SOCIAL HISTORY:   Social History     Socioeconomic History     Marital status:      Spouse name: Not on file     Number of children: 2     Years of education: Not on file     Highest education level: Not on file   Occupational History     Employer: [employer]   Social Needs     Financial resource strain: Not on file     Food insecurity:     Worry: Not on file     Inability: Not on file     Transportation needs:     Medical: Not on file     Non-medical: Not on file   Tobacco Use     Smoking status: Never Smoker     Smokeless tobacco: Never Used   Substance and Sexual Activity     Alcohol use: Yes     Comment: social     Drug use: No     Sexual activity: Yes     Partners: Male     Birth control/protection: Surgical     Comment:  had vasectomy   Lifestyle     Physical activity:     Days per week: Not on file     Minutes per session: Not on file     Stress: Not on file   Relationships     Social connections:     Talks on phone: Not on file     Gets together: Not on file     Attends Mandaen service: Not on file     Active member of club or organization: Not on file     Attends meetings of clubs or organizations: Not on file     Relationship status: Not on file     Intimate partner violence:     Fear of current or ex partner: Not on file      Emotionally abused: Not on file     Physically abused: Not on file     Forced sexual activity: Not on file   Other Topics Concern      Service No     Blood Transfusions Not Asked     Caffeine Concern No     Occupational Exposure No     Hobby Hazards Not Asked     Sleep Concern No     Stress Concern Yes     Weight Concern Yes     Comment: gains easily     Special Diet No     Back Care Yes     Exercise Yes     Comment: irregularly     Bike Helmet Not Asked     Comment: doesn't bike     Seat Belt No     Self-Exams No     Parent/sibling w/ CABG, MI or angioplasty before 65F 55M? No   Social History Narrative    Caffeine intake/servings daily - 0-1    Calcium intake/servings daily - 2-3    Exercise 2-3  times weekly - describe aerobics    Sunscreen used - Yes    Seatbelts used - Yes    Guns stored in the home - No    Self Breast Exam - Yes    Pap test up to date -  Yes    Eye exam up to date -  Yes    Dental exam up to date -  Yes    DEXA scan up to date -  Not Applicable    Flex Sig/Colonoscopy up to date -  Not Applicable    Mammography up to date -  Not Applicable    Immunizations reviewed and up to date - Yes    Abuse: Current or Past (Physical, Sexual or Emotional) - None current, past    Do you feel safe in your environment - Yes    Do you cope well with stress - Yes    Do you suffer from insomnia - No    Last updated by: Estefani Moraes MA 5/5/2010                FAMILY HISTORY:   Family History   Problem Relation Age of Onset     Thyroid Disease Mother      Diabetes Mother         type2     Eye Disorder Mother         cataracts     Gastrointestinal Disease Mother         hep a/has to have her throat stretched     Respiratory Mother         sleep apnea     Heart Disease Father 60     C.A.D. Paternal Grandfather 30        age 30, then again in his 80's     Diabetes Maternal Grandfather      Cancer Maternal Grandfather      Thyroid Disease Maternal Grandmother      Gynecology Maternal Grandmother         on bcp to  "keep period regular, a small uterus     Heart Disease Maternal Grandmother         tachycardia     Allergies Daughter         milk     Asthma Daughter         EXAM:Vitals: /75   Pulse 69   Ht 1.58 m (5' 2.21\")   Wt 59.2 kg (130 lb 9.6 oz)   LMP 12/25/2013   BMI 23.73 kg/m    BMI= Body mass index is 23.73 kg/m .    General appearance: Patient is alert and fully cooperative with history & exam.  No sign of distress is noted during the visit.     Psychiatric: Affect is pleasant & appropriate.  Patient appears motivated to improve health.     Respiratory: Breathing is regular & unlabored while sitting.     HEENT: Hearing is intact to spoken word.  Speech is clear.  No gross evidence of visual impairment that would impact ambulation.     Dermatologic: Skin is intact to L foot without significant lesions, rash or abrasion.  No paronychia or evidence of soft tissue infection is noted.     Vascular: DP & PT pulses are intact & regular on the L.  5th toe edema on the L noted. CFT and skin temperature are normal.     Neurologic: Lower extremity sensation is intact to light touch.  No evidence of weakness or contracture in the lower extremities.  No evidence of neuropathy.     Musculoskeletal:  L 5th toe pain on exam.  No toe deformity. Patient is ambulatory without assistive device or brace.  No gross ankle deformity noted.  No foot or ankle joint effusion is noted.    Repeat XRs of L 5th toe reviewed with pt, compared to prior.  Subtle change to head of 5th toe proximal phalanx suggestive of bone callus, suspect fx here.     ASSESSMENT:   L 5th toe injury, suspected fx     PLAN:  Reviewed patient's chart in epic.  Discussed condition and treatment options including pros and cons.    Treatment options for the fracture were discussed.  Non-operative treatment would involve a period of immobilization, rest, bracing or casting and edema control.  There is potential for the fracture to heal without surgery yet there " may still be a need for delayed surgery.  There is potential for non-union with any fracture.    WBAT in post op shoe advised.  RICE.  F/u in 4 wks.      Darius Lerma DPM, FACFAS

## 2019-07-17 NOTE — PATIENT INSTRUCTIONS
Thank you for choosing [facility]!    Follow up in 4 weeks    DR. ZEPEDA'S CLINIC LOCATIONS     MONDAY  [place] TUESDAY & FRIDAY AM  [facility]   [address]   [address]   [place] [address] [place] [address]   [phone]  [phone]  [phone]       WEDNESDAY  [place] SCHEDULE SURGERY: [phone]   [address] APPOINTMENTS: [phone]   [place] [address] BILLING QUESTIONS: [phone]   [phone]         TOE & METATARSAL FRACTURES  The structure of the foot is complex, consisting of bones, muscles, tendons, and other soft tissues. Of the 26 bones in the foot, 19 are toe bones (phalanges) and metatarsal bones (the long bones in the midfoot). Fractures of the toe and metatarsal bones are common and require evaluation by a specialist. A foot and ankle surgeon should be seen for proper diagnosis and treatment, even if initial treatment has been received in an emergency room.  A fracture is a break in the bone. Fractures can be divided into two categories: traumatic fractures and stress fractures.  TRAUMATIC FRACTURES (also called acute fractures) are caused by a direct blow or impact, such as seriously stubbing your toe. Traumatic fractures can be displaced or non-displaced. If the fracture is displaced, the bone is broken in such a way that it has changed in position (dislocated).  Signs and symptoms of a traumatic fracture include:  You may hear a sound at the time of the break.    Pinpoint pain  (pain at the place of impact) at the time the fracture occurs and perhaps for a few hours later, but often the pain goes away after several hours.   Crooked or abnormal appearance of the toe.   Bruising and swelling the next day.   It is not true that  if you can walk on it, it s not broken.  Evaluation by a foot and ankle surgeon is always recommended.   STRESS FRACTURES are tiny, hairline breaks  that are usually caused by repetitive stress. Stress fractures often afflict athletes who, for example, too rapidly increase their running mileage. They can also be caused by an abnormal foot structure, deformities, or osteoporosis. Improper footwear may also lead to stress fractures. Stress fractures should not be ignored. They require proper medical attention to heal correctly.  Symptoms of stress fractures include:  Pain with or after normal activity   Pain that goes away when resting and then returns when standing or during activity    Pinpoint pain  (pain at the site of the fracture) when touched   Swelling, but no bruising   IMPROPER TREATMENT  Some people say that  the doctor can t do anything for a broken bone in the foot.  This is usually not true. In fact, if a fractured toe or metatarsal bone is not treated correctly, serious complications may develop. For example:  A deformity in the bony architecture which may limit the ability to move the foot or cause difficulty in fitting shoes   Arthritis, which may be caused by a fracture in a joint (the juncture where two bones meet), or may be a result of angular deformities that develop when a displaced fracture is severe or hasn t been properly corrected   Chronic pain and deformity   Non-union, or failure to heal, can lead to subsequent surgery or chronic pain.   PROPER TREATMENT FOR TOES  Fractures of the toe bones are almost always traumatic fractures. Treatment for traumatic fractures depends on the break itself and may include these options:  Rest. Sometimes rest is all that is needed to treat a traumatic fracture of the toe.   Splinting. The toe may be fitted with a splint to keep it in a fixed position.   Rigid or stiff-soled shoe. Wearing a stiff-soled shoe protects the toe and helps keep it properly positioned.    Daniel taping  the fractured toe to another toe is sometimes appropriate, but in other cases it may be harmful.   Surgery. If the break is  badly displaced or if the joint is affected, surgery may be necessary. Surgery often involves the use of fixation devices, such as pins.   PROPER TREATMENT OF METATARSALS  Breaks in the metatarsal bones may be either stress or traumatic fractures. Certain kinds of fractures of the metatarsal bones present unique challenges.  For example, sometimes a fracture of the first metatarsal bone (behind the big toe) can lead to arthritis. Since the big toe is used so frequently and bears more weight than other toes, arthritis in that area can make it painful to walk, bend, or even stand.  Another type of break, called a Franklin fracture, occurs at the base of the fifth metatarsal bone (behind the little toe). It is often misdiagnosed as an ankle sprain, and misdiagnosis can have serious consequences since sprains and fractures require different treatments. Your foot and ankle surgeon is an expert in correctly identifying these conditions as well as other problems of the foot.  Treatment of metatarsal fractures depends on the type and extent of the fracture, and may include:  Rest. Sometimes rest is the only treatment needed to promote healing of a stress or traumatic fracture of a metatarsal bone.   Avoid the offending activity. Because stress fractures result from repetitive stress, it is important to avoid the activity that led to the fracture. Crutches or a wheelchair are sometimes required to offload weight from the foot to give it time to heal.   Immobilization, casting, or rigid shoe. A stiff-soled shoe or other form of immobilization may be used to protect the fractured bone while it is healing.   Surgery. Some traumatic fractures of the metatarsal bones require surgery, especially if the break is badly displaced.   Follow-up care. Your foot and ankle surgeon will provide instructions for care following surgical or non-surgical treatment. Physical therapy, exercises and rehabilitation may be included in a schedule for  return to normal activities.   PRICE THERAPY  Many aches and pains throughout the foot and ankle can be helped with many simple treatments. This is usually described as PRICE Therapy.      P - Protection - often times, inflammation/pain in the lower extremity is not able to improve simply because the areas involved are never allowed to rest. Every step we take can bother the problematic area. Protecting those areas is an important step in the healing process. This may involve a walking cast boot, a special insert/orthotic device, an ankle brace, or simply avoiding barefoot walking.    R - Rest - in addition to protecting the foot/ankle, resting is an important, but often times difficult, treatment option. Getting off your feet when they bother you, and specifically avoiding activities that cause pain/discomfort, are very beneficial to prevent, and treat, foot/ankle pain.      I - Ice - icing regularly can help to decrease inflammation and swelling in the foot, thus decreasing pain. Using an ice pack or a bag of frozen veggies works very well. Ice for 20 minutes multiple times per day as needed.  Do not place the ice directly on the skin as this can cause tissue damage.    C - Compression - using a compression wrap or an ACE wrap can help to decrease swelling, which can help to decrease pain. Wearing the wraps is generally not needed at night, but they should be worn on a regular basis when you are going to be on your feet for prolonged periods as gravity tends to pull fluids down to your feet/ankles.    E - Elevation - elevating your lower extremities multiple times daily for 15-20 minutes can help to decrease swelling, which works well in decreasing pain levels.    NSAID/Tylenol - Anti-inflammatories like Aleve or ibuprofen, and/or a pain medication, such as Tylenol, can help to improve pain levels and get the issue resolved sooner rather than later. Anyone with liver issues should be careful with Tylenol, and  anyone with high blood pressure or heart, stomach or kidney issues should be careful with anti-inflammatories. Please ask if you have questions about these medications, including dosage.      BODY WEIGHT AND YOUR FEET  The following information is included in the after visit summary for all patients. Body weight can be a sensitive issue to discuss in clinic, but we think the following information is very important. Although we focus on the feet and ankles, we do support the overall health of our patients.     Many things can cause foot and ankle problems. Foot structure, activity level, foot mechanics and injuries are common causes of pain. One very important issue that often goes unmentioned, is body weight. Extra weight can cause increased stress on muscles, ligaments, bones and tendons. Sometimes just a few extra pounds is all it takes to put one over her/his threshold. Without reducing that stress, it can be difficult to alleviate pain. As Foot & Ankle specialists, our job is addressing the lower extremity problem and possible causes. Regarding extra body weight, we encourage patients to discuss diet and weight management plans with their primary care doctors. It is this team approach that gives you the best opportunity for pain relief and getting you back on your feet.      [facility] has a Comprehensive Weight Management Program. This program includes counseling, education, non-surgical and surgical approaches to weight loss. If you are interested in learning more either talk to you primary care provider or call [phone].

## 2019-07-17 NOTE — LETTER
2019         RE: Lisset MITCHELL Chi  [address] [facility] [address]        Dear Colleague,    Thank you for referring your patient, Lisset MITCHELL Chi, to the [facility]. Please see a copy of my visit note below.    PATIENT HISTORY:  Lisset MITCHELL Chi is a 43 year old female who presents to clinic for L 5th toe injury/pain.  Injury 5 wks ago.  Seen at  and XRs were neg.  Pain has continued, with swelling.  Rest helps.  She went to [place] and did a lot of walking.  She did not rate her pain today.    Review of Systems:  Patient denies fever, chills, rash, wound, stiffness, limping, numbness, weakness, heart burn, blood in stool, chest pain with activity, calf pain when walking, shortness of breath with activity, chronic cough, easy bleeding/bruising, excessive thirst, fatigue, anxiety.  Patient admits to depression, swelling of ankle.     PAST MEDICAL HISTORY:   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     Premature ovarian failure      Raynaud's disease 3/08        PAST SURGICAL HISTORY:   Past Surgical History:   Procedure Laterality Date     ARTHROSCOPY KNEE Right 3/2/2018    Procedure: ARTHROSCOPY KNEE;  Right Knee Arthroscopy, Lateral Compartmnt Debridemen and  Anterior Chamber;  Surgeon: Rima Sarmiento MD;  Location: [facility] OR      SECTION           HC DILATION/CURETTAGE DIAG/THER NON OB  2003    D & C for retained placenta one week after the delivery     I&D BARTHOLIN GLAND ABSCESS   and     mcgrath cath      KNEE SURGERY      Rt knee Fx, repair-dislocation problem     KNEE SURGERY      Lt knee reconstructive surgery-dislocation problem     LEEP TX, CERVICAL      LEEP for abnormal pap     MARSUP BARTHOLIN GLAND CYST  08        MEDICATIONS:   Current Outpatient  Medications:      albuterol (PROAIR HFA/PROVENTIL HFA/VENTOLIN HFA) 108 (90 Base) MCG/ACT inhaler, Inhale 2 puffs into the lungs 2 times daily as needed for shortness of breath / dyspnea, Disp: 3 Inhaler, Rfl: 3     Calcium Carbonate-Vitamin D (CALCIUM + D PO), Take 600 mg by mouth., Disp: , Rfl:      Cyanocobalamin (VITAMIN B12 PO), , Disp: , Rfl:      FEXOFENADINE  MG OR TABS, 1 TABLET DAILY , Disp: , Rfl:      fluticasone (FLONASE) 50 MCG/ACT nasal spray, Spray 2 sprays into both nostrils daily, Disp: , Rfl:      fluticasone (FLOVENT HFA) 110 MCG/ACT inhaler, Inhale 2 puffs into the lungs 2 times daily, Disp: 12 g, Rfl: 3     MULTIPLE VITAMIN PO, , Disp: , Rfl:      Turmeric Curcumin 500 MG CAPS, Take 500 mg by mouth daily, Disp: , Rfl:      VITAMIN D, CHOLECALCIFEROL, PO, Take by mouth daily, Disp: , Rfl:      ALLERGIES:    Allergies   Allergen Reactions     No Known Allergies         SOCIAL HISTORY:   Social History     Socioeconomic History     Marital status:      Spouse name: Not on file     Number of children: 2     Years of education: Not on file     Highest education level: Not on file   Occupational History     Employer: [employer]   Social Needs     Financial resource strain: Not on file     Food insecurity:     Worry: Not on file     Inability: Not on file     Transportation needs:     Medical: Not on file     Non-medical: Not on file   Tobacco Use     Smoking status: Never Smoker     Smokeless tobacco: Never Used   Substance and Sexual Activity     Alcohol use: Yes     Comment: social     Drug use: No     Sexual activity: Yes     Partners: Male     Birth control/protection: Surgical     Comment:  had vasectomy   Lifestyle     Physical activity:     Days per week: Not on file     Minutes per session: Not on file     Stress: Not on file   Relationships     Social connections:     Talks on phone: Not on file     Gets together: Not on file     Attends Quaker service:  Not on file     Active member of club or organization: Not on file     Attends meetings of clubs or organizations: Not on file     Relationship status: Not on file     Intimate partner violence:     Fear of current or ex partner: Not on file     Emotionally abused: Not on file     Physically abused: Not on file     Forced sexual activity: Not on file   Other Topics Concern      Service No     Blood Transfusions Not Asked     Caffeine Concern No     Occupational Exposure No     Hobby Hazards Not Asked     Sleep Concern No     Stress Concern Yes     Weight Concern Yes     Comment: gains easily     Special Diet No     Back Care Yes     Exercise Yes     Comment: irregularly     Bike Helmet Not Asked     Comment: doesn't bike     Seat Belt No     Self-Exams No     Parent/sibling w/ CABG, MI or angioplasty before 65F 55M? No   Social History Narrative    Caffeine intake/servings daily - 0-1    Calcium intake/servings daily - 2-3    Exercise 2-3  times weekly - describe aerobics    Sunscreen used - Yes    Seatbelts used - Yes    Guns stored in the home - No    Self Breast Exam - Yes    Pap test up to date -  Yes    Eye exam up to date -  Yes    Dental exam up to date -  Yes    DEXA scan up to date -  Not Applicable    Flex Sig/Colonoscopy up to date -  Not Applicable    Mammography up to date -  Not Applicable    Immunizations reviewed and up to date - Yes    Abuse: Current or Past (Physical, Sexual or Emotional) - None current, past    Do you feel safe in your environment - Yes    Do you cope well with stress - Yes    Do you suffer from insomnia - No    Last updated by: Estefani Moraes MA 5/5/2010                FAMILY HISTORY:   Family History   Problem Relation Age of Onset     Thyroid Disease Mother      Diabetes Mother         type2     Eye Disorder Mother         cataracts     Gastrointestinal Disease Mother         hep a/has to have her throat stretched     Respiratory Mother         sleep apnea     Heart Disease  "Father 60     C.A.D. Paternal Grandfather 30        age 30, then again in his 80's     Diabetes Maternal Grandfather      Cancer Maternal Grandfather      Thyroid Disease Maternal Grandmother      Gynecology Maternal Grandmother         on bcp to keep period regular, a small uterus     Heart Disease Maternal Grandmother         tachycardia     Allergies Daughter         milk     Asthma Daughter         EXAM:Vitals: /75   Pulse 69   Ht 1.58 m (5' 2.21\")   Wt 59.2 kg (130 lb 9.6 oz)   LMP 12/25/2013   BMI 23.73 kg/m     BMI= Body mass index is 23.73 kg/m .    General appearance: Patient is alert and fully cooperative with history & exam.  No sign of distress is noted during the visit.     Psychiatric: Affect is pleasant & appropriate.  Patient appears motivated to improve health.     Respiratory: Breathing is regular & unlabored while sitting.     HEENT: Hearing is intact to spoken word.  Speech is clear.  No gross evidence of visual impairment that would impact ambulation.     Dermatologic: Skin is intact to L foot without significant lesions, rash or abrasion.  No paronychia or evidence of soft tissue infection is noted.     Vascular: DP & PT pulses are intact & regular on the L.  5th toe edema on the L noted. CFT and skin temperature are normal.     Neurologic: Lower extremity sensation is intact to light touch.  No evidence of weakness or contracture in the lower extremities.  No evidence of neuropathy.     Musculoskeletal:  L 5th toe pain on exam.  No toe deformity. Patient is ambulatory without assistive device or brace.  No gross ankle deformity noted.  No foot or ankle joint effusion is noted.    Repeat XRs of L 5th toe reviewed with pt, compared to prior.  Subtle change to head of 5th toe proximal phalanx suggestive of bone callus, suspect fx here.     ASSESSMENT:   L 5th toe injury, suspected fx     PLAN:  Reviewed patient's chart in epic.  Discussed condition and treatment options including pros " and cons.    Treatment options for the fracture were discussed.  Non-operative treatment would involve a period of immobilization, rest, bracing or casting and edema control.  There is potential for the fracture to heal without surgery yet there may still be a need for delayed surgery.  There is potential for non-union with any fracture.    WBAT in post op shoe advised.  RICE.  F/u in 4 wks.      Darius Lerma DPM, FACFAS          Again, thank you for allowing me to participate in the care of your patient.        Sincerely,        Darius Lerma DPM

## 2019-08-14 ENCOUNTER — OFFICE VISIT (OUTPATIENT)
Dept: PODIATRY | Facility: CLINIC | Age: 44
End: 2019-08-14
Payer: COMMERCIAL

## 2019-08-14 ENCOUNTER — ANCILLARY PROCEDURE (OUTPATIENT)
Dept: GENERAL RADIOLOGY | Facility: CLINIC | Age: 44
End: 2019-08-14
Attending: PODIATRIST
Payer: COMMERCIAL

## 2019-08-14 VITALS
WEIGHT: 129.4 LBS | SYSTOLIC BLOOD PRESSURE: 112 MMHG | DIASTOLIC BLOOD PRESSURE: 79 MMHG | HEART RATE: 70 BPM | HEIGHT: 62 IN | BODY MASS INDEX: 23.81 KG/M2

## 2019-08-14 DIAGNOSIS — S92.515D CLOSED NONDISPLACED FRACTURE OF PROXIMAL PHALANX OF LESSER TOE OF LEFT FOOT WITH ROUTINE HEALING, SUBSEQUENT ENCOUNTER: ICD-10-CM

## 2019-08-14 DIAGNOSIS — S92.515D CLOSED NONDISPLACED FRACTURE OF PROXIMAL PHALANX OF LESSER TOE OF LEFT FOOT WITH ROUTINE HEALING, SUBSEQUENT ENCOUNTER: Primary | ICD-10-CM

## 2019-08-14 PROCEDURE — 73660 X-RAY EXAM OF TOE(S): CPT | Mod: LT

## 2019-08-14 PROCEDURE — 99213 OFFICE O/P EST LOW 20 MIN: CPT | Performed by: PODIATRIST

## 2019-08-14 ASSESSMENT — MIFFLIN-ST. JEOR: SCORE: 1193.53

## 2019-08-14 NOTE — PROGRESS NOTES
"PATIENT HISTORY:  Lisset MITCHELL Chi is a 43 year old female who presents to clinic for recheck of L 5th toe fx.  Injury about 9 wks ago.  In a post op shoe.  1/10 pain.  Better with rest.  Can be worse with pressure.  No fevers, chills, limping.  Nonsmoker.  Nondiabetic.  No related family hx.     EXAM:Vitals: /79   Pulse 70   Ht 1.58 m (5' 2.21\")   Wt 58.7 kg (129 lb 6.4 oz)   LMP 12/25/2013   BMI 23.51 kg/m    BMI= Body mass index is 23.51 kg/m .    General appearance: Patient is alert and fully cooperative with history & exam.  No sign of distress is noted during the visit.     Dermatologic: Skin is intact to L foot without significant lesions, rash or abrasion.  No paronychia or evidence of soft tissue infection is noted.     Vascular: DP & PT pulses are intact & regular on the L.  Mild L 5th toe edema.  CFT and skin temperature are normal to both lower extremities.     Neurologic: Lower extremity sensation is intact to light touch.  No evidence of weakness or contracture in the lower extremities.  No evidence of neuropathy.     Musculoskeletal: No significant pain to palpation of L 5th toe.  Patient is ambulatory without assistive device or brace.  No gross ankle deformity noted.  No foot or ankle joint effusion is noted.    XRs of L 5th toe reviewed with pt.    Healing noted at proximal phalanx head fx site.     ASSESSMENT: L 5th toe fx     PLAN:  Reviewed patient's chart in epic.  Discussed condition and treatment options including pros and cons.    Pt doing well.  RICE prn.  Ok to transition to stiff soled shoes as tolerated.  F/u prn.    Darius Lerma DPM, FACFAS          "

## 2019-08-14 NOTE — PATIENT INSTRUCTIONS
Transition to stiff soled regular shoes as tolerated        Thank you for choosing [facility]!    Follow up as needed    DR. ZEPEDA'S CLINIC LOCATIONS     MONDAY  [place] TUESDAY & FRIDAY AM  [facility]   [address] [facility]   [address]   [place] [address] [place] [address]   [phone]  [phone]  [phone]       WEDNESDAY  [place] SCHEDULE SURGERY: [phone]   [address] [facility] APPOINTMENTS: [phone]   [place] [address] BILLING QUESTIONS: [phone]   [phone]             BODY WEIGHT AND YOUR FEET  The following information is included in the after visit summary for all patients. Body weight can be a sensitive issue to discuss in clinic, but we think the following information is very important. Although we focus on the feet and ankles, we do support the overall health of our patients.     Many things can cause foot and ankle problems. Foot structure, activity level, foot mechanics and injuries are common causes of pain. One very important issue that often goes unmentioned, is body weight. Extra weight can cause increased stress on muscles, ligaments, bones and tendons. Sometimes just a few extra pounds is all it takes to put one over her/his threshold. Without reducing that stress, it can be difficult to alleviate pain. As Foot & Ankle specialists, our job is addressing the lower extremity problem and possible causes. Regarding extra body weight, we encourage patients to discuss diet and weight management plans with their primary care doctors. It is this team approach that gives you the best opportunity for pain relief and getting you back on your feet.      [facility] has a Comprehensive Weight Management Program. This program includes counseling, education, non-surgical and surgical approaches to weight loss. If you are interested in learning more either talk to you primary care provider or  call [phone].

## 2019-08-14 NOTE — LETTER
"    8/14/2019         RE: Lisset MITCHELL Chi  [address] [facility] [address]        Dear Colleague,    Thank you for referring your patient, Lisset MITCHELL Chi, to the [facility]. Please see a copy of my visit note below.    PATIENT HISTORY:  Lisset MITCHELL Chi is a 43 year old female who presents to clinic for recheck of L 5th toe fx.  Injury about 9 wks ago.  In a post op shoe.  1/10 pain.  Better with rest.  Can be worse with pressure.  No fevers, chills, limping.  Nonsmoker.  Nondiabetic.  No related family hx.     EXAM:Vitals: /79   Pulse 70   Ht 1.58 m (5' 2.21\")   Wt 58.7 kg (129 lb 6.4 oz)   LMP 12/25/2013   BMI 23.51 kg/m     BMI= Body mass index is 23.51 kg/m .    General appearance: Patient is alert and fully cooperative with history & exam.  No sign of distress is noted during the visit.     Dermatologic: Skin is intact to L foot without significant lesions, rash or abrasion.  No paronychia or evidence of soft tissue infection is noted.     Vascular: DP & PT pulses are intact & regular on the L.  Mild L 5th toe edema.  CFT and skin temperature are normal to both lower extremities.     Neurologic: Lower extremity sensation is intact to light touch.  No evidence of weakness or contracture in the lower extremities.  No evidence of neuropathy.     Musculoskeletal: No significant pain to palpation of L 5th toe.  Patient is ambulatory without assistive device or brace.  No gross ankle deformity noted.  No foot or ankle joint effusion is noted.    XRs of L 5th toe reviewed with pt.    Healing noted at proximal phalanx head fx site.     ASSESSMENT: L 5th toe fx     PLAN:  Reviewed patient's chart in epic.  Discussed condition and treatment options including pros and cons.    Pt doing well.  RICE prn.  Ok to transition to stiff soled shoes as tolerated.  F/u prn.    Darius Lerma DPM, FACFAS            Again, thank you for allowing me to participate in the care of your patient.  "       Sincerely,        Darius Lerma DPM

## 2019-10-02 ENCOUNTER — HEALTH MAINTENANCE LETTER (OUTPATIENT)
Age: 44
End: 2019-10-02

## 2019-12-08 ENCOUNTER — OFFICE VISIT (OUTPATIENT)
Dept: URGENT CARE | Facility: URGENT CARE | Age: 44
End: 2019-12-08
Payer: COMMERCIAL

## 2019-12-08 ENCOUNTER — ANCILLARY PROCEDURE (OUTPATIENT)
Dept: GENERAL RADIOLOGY | Facility: CLINIC | Age: 44
End: 2019-12-08
Attending: FAMILY MEDICINE
Payer: COMMERCIAL

## 2019-12-08 VITALS
HEIGHT: 62 IN | WEIGHT: 129 LBS | HEART RATE: 68 BPM | OXYGEN SATURATION: 100 % | DIASTOLIC BLOOD PRESSURE: 68 MMHG | BODY MASS INDEX: 23.74 KG/M2 | TEMPERATURE: 98.5 F | SYSTOLIC BLOOD PRESSURE: 107 MMHG

## 2019-12-08 DIAGNOSIS — J45.30 MILD PERSISTENT ASTHMA WITHOUT COMPLICATION: ICD-10-CM

## 2019-12-08 DIAGNOSIS — S89.91XA INJURY OF RIGHT LOWER LEG, INITIAL ENCOUNTER: Primary | ICD-10-CM

## 2019-12-08 PROCEDURE — 73590 X-RAY EXAM OF LOWER LEG: CPT | Mod: RT

## 2019-12-08 PROCEDURE — 99214 OFFICE O/P EST MOD 30 MIN: CPT | Performed by: FAMILY MEDICINE

## 2019-12-08 RX ORDER — ALBUTEROL SULFATE 90 UG/1
2 AEROSOL, METERED RESPIRATORY (INHALATION) EVERY 6 HOURS
Qty: 18 G | Refills: 0 | Status: SHIPPED | OUTPATIENT
Start: 2019-12-08 | End: 2020-04-01

## 2019-12-08 ASSESSMENT — MIFFLIN-ST. JEOR: SCORE: 1186.72

## 2019-12-08 NOTE — PROGRESS NOTES
SUBJECTIVE:  Chief Complaint   Patient presents with     Urgent Care     Leg Pain     c/o leg pain for 2 weeks      Lisset MITCHELL Chi is a 44 year old female presents with a chief complaint of right lower leg pain.    Patient was at conference and was running to get into a picture, ended up hitting her right lower leg/shin onto hard plastic on a table.  Developed swelling which she did ice and this did improve.  Injury was on Nov 22nd.  Patient has been walking since injury but is getting more worried that may have caused some damage.  Still has a lump in area and very sensitive to touch, bruising noted after injury and now just noticed bruise down at ankle.      Patient endorsed that has low vit D, does not take supplement consistently.  Patient had broken left 5th toe this summer and did not know, ended up having persistent pain and when seen by Podiatry, confirmed that had a broken toe.  Patient is frustrated, does not want to have joint problems as she gets older.     Patient has had a cold for the week, is using albuterol inhaler more frequently and needs new one, usually very mild infrequent symptoms.  Just started flovent.  Denies any wheezing.    Past Medical History:   Diagnosis Date     Allergic rhinitis due to other allergen      Asthma      IBS (irritable bowel syndrome)      Other internal derangement of knee(037.89) 2005    patella dislocates easily     Premature ovarian failure      Raynaud's disease 3/08     Current Outpatient Medications   Medication Sig Dispense Refill     albuterol (PROAIR HFA/PROVENTIL HFA/VENTOLIN HFA) 108 (90 Base) MCG/ACT inhaler Inhale 2 puffs into the lungs 2 times daily as needed for shortness of breath / dyspnea 3 Inhaler 3     Calcium Carbonate-Vitamin D (CALCIUM + D PO) Take 600 mg by mouth.       Cyanocobalamin (VITAMIN B12 PO)        FEXOFENADINE  MG OR TABS 1 TABLET DAILY        fluticasone (FLONASE) 50 MCG/ACT nasal spray Spray 2 sprays into both nostrils daily    "    fluticasone (FLOVENT HFA) 110 MCG/ACT inhaler Inhale 2 puffs into the lungs 2 times daily 12 g 3     MULTIPLE VITAMIN PO        order for DME Equipment being ordered: post op shoe womens M 1 Device 0     Turmeric Curcumin 500 MG CAPS Take 500 mg by mouth daily       VITAMIN D, CHOLECALCIFEROL, PO Take by mouth daily       Social History     Tobacco Use     Smoking status: Never Smoker     Smokeless tobacco: Never Used   Substance Use Topics     Alcohol use: Yes     Comment: social       ROS:  Review of systems negative except as stated above.    EXAM:   /68   Pulse 68   Temp 98.5  F (36.9  C) (Oral)   Ht 1.58 m (5' 2.21\")   Wt 58.5 kg (129 lb)   LMP 12/25/2013   SpO2 100%   BMI 23.44 kg/m    Gen: healthy,alert,no distress  Extremity: right lower leg has residual hematoma on lower anterior leg, mild discomfort, no edema.  Medial aspect of heel with dependent bruising   There is not compromise to the distal circulation.  Pulses are +2 and CRT is brisk  EXTREMITIES: peripheral pulses normal  PSYCH: alert, affect bright    X-RAY was done - right tib/fib - no acute fracture personally viewed by me    ASSESSMENT/PLAN:   (S89.91XA) Injury of right lower leg, initial encounter  (primary encounter diagnosis)  Comment: bone bruise  Plan: XR Tibia & Fibula Right 2 Views            (J45.30) Mild persistent asthma without complication  Plan: albuterol (PROAIR HFA/PROVENTIL HFA/VENTOLIN         HFA) 108 (90 Base) MCG/ACT inhaler            Reassurance given, reviewed symptomatic treatment with rest, ice, elevation, tylenol and ibuprofen.  Reviewed hematoma and bone bruising.  Will follow up on formal Xray report and notify if any abnormalities.  Encourage to take vit D supplementation to help bone strength.    RX albuterol inhaler given for asthma, encourage to use flovent and monitor.      Follow up with primary provider if no improvement of symptoms in 1 week    Surya Murillo MD, MD  December 8, 2019 4:48 " PM

## 2019-12-12 ENCOUNTER — OFFICE VISIT (OUTPATIENT)
Dept: FAMILY MEDICINE | Facility: CLINIC | Age: 44
End: 2019-12-12
Payer: COMMERCIAL

## 2019-12-12 VITALS
WEIGHT: 129.4 LBS | BODY MASS INDEX: 23.81 KG/M2 | DIASTOLIC BLOOD PRESSURE: 74 MMHG | OXYGEN SATURATION: 97 % | SYSTOLIC BLOOD PRESSURE: 116 MMHG | RESPIRATION RATE: 15 BRPM | HEIGHT: 62 IN | HEART RATE: 55 BPM | TEMPERATURE: 98.3 F

## 2019-12-12 DIAGNOSIS — S29.011A PECTORALIS MUSCLE STRAIN, INITIAL ENCOUNTER: Primary | ICD-10-CM

## 2019-12-12 DIAGNOSIS — M54.50 ACUTE RIGHT-SIDED LOW BACK PAIN WITHOUT SCIATICA: ICD-10-CM

## 2019-12-12 PROCEDURE — 99214 OFFICE O/P EST MOD 30 MIN: CPT | Performed by: FAMILY MEDICINE

## 2019-12-12 ASSESSMENT — MIFFLIN-ST. JEOR: SCORE: 1188.38

## 2019-12-12 ASSESSMENT — PAIN SCALES - GENERAL: PAINLEVEL: EXTREME PAIN (8)

## 2019-12-12 NOTE — PROGRESS NOTES
Subjective     Lisset MITCHELL Chi is a 44 year old female who presents to clinic today for the following health issues:    HPI   Back Pain       Duration: 12/01/2019         Specific cause: none    Description:   Location of pain: low back right  Character of pain: dull ache, burning and intermittent  Pain radiation:none, up back and around to abdomen   New numbness or weakness in legs, not attributed to pain:  no     Intensity: Currently 6/10, moderate    History:   Pain interferes with job: YES  History of back problems: 1 yr ago had simialr pain   Any previous MRI or X-rays: None  Sees a specialist for back pain:  No  Therapies tried without relief: none, just rest     Alleviating factors:   Improved by: rest - did not relieve pain      Precipitating factors:  Worsened by: Lifting, Bending and twisting     Accompanying Signs & Symptoms:  Risk of Fracture:  None  Risk of Cauda Equina:  None  Risk of Infection:  None  Risk of Cancer:  None  Risk of Ankylosing Spondylitis:  Onset at age <35, male, AND morning back stiffness. no     Musculoskeletal problem/pain- right shoulder       Duration: 12/07/2019    Description  Location: right shoulder into back and arm and right side of chest     Intensity:  moderate    Accompanying signs and symptoms: radiation of pain to back, chest and neck and arm - notice at clavicle arera     History  Previous similar problem: no   Previous evaluation:  none    Precipitating or alleviating factors:  Trauma or overuse: YES- with shoveling snow  Aggravating factors include: lifting and overuse (had pain specifically when she was pushing the shovel and suddenly hit a ridge in the driveway that pushed into her shoulder)    Therapies tried and outcome: heat, stretching and Ibuprofen, 2 tablets       Patient Active Problem List   Diagnosis     Irritable bowel syndrome     Premature ovarian failure     CARDIOVASCULAR SCREENING; LDL GOAL LESS THAN 160     Mild persistent asthma without complication      Trochanteric bursitis of both hips     Right knee pain     Adjustment disorder with depressed mood     Past Surgical History:   Procedure Laterality Date     ARTHROSCOPY KNEE Right 3/2/2018    Procedure: ARTHROSCOPY KNEE;  Right Knee Arthroscopy, Lateral Compartmnt Debridemen and  Anterior Chamber;  Surgeon: Rima Sarmiento MD;  Location: [facility] OR      SECTION           HC DILATION/CURETTAGE DIAG/THER NON OB  2003    D & C for retained placenta one week after the delivery     I&D BARTHOLIN GLAND ABSCESS   and     mcgrath cath      KNEE SURGERY      Rt knee Fx, repair-dislocation problem     KNEE SURGERY      Lt knee reconstructive surgery-dislocation problem     LEEP TX, CERVICAL      LEEP for abnormal pap     MARSUP BARTHOLIN GLAND CYST  08       Social History     Tobacco Use     Smoking status: Never Smoker     Smokeless tobacco: Never Used   Substance Use Topics     Alcohol use: Yes     Comment: social     Family History   Problem Relation Age of Onset     Thyroid Disease Mother      Diabetes Mother         type2     Eye Disorder Mother         cataracts     Gastrointestinal Disease Mother         hep a/has to have her throat stretched     Respiratory Mother         sleep apnea     Heart Disease Father 60     C.A.D. Paternal Grandfather 30        age 30, then again in his 80's     Diabetes Maternal Grandfather      Cancer Maternal Grandfather      Thyroid Disease Maternal Grandmother      Gynecology Maternal Grandmother         on bcp to keep period regular, a small uterus     Heart Disease Maternal Grandmother         tachycardia     Allergies Daughter         milk     Asthma Daughter            Review of Systems   ROS COMP: Constitutional, HEENT, cardiovascular, pulmonary, gi and gu systems are negative, except as otherwise noted.      Objective    /74 (BP Location: Right arm, Patient Position: Chair, Cuff Size: Adult Regular)   Pulse 55   Temp  "98.3  F (36.8  C) (Oral)   Resp 15   Ht 1.58 m (5' 2.2\")   Wt 58.7 kg (129 lb 6.4 oz)   LMP 12/25/2013   SpO2 97%   BMI 23.52 kg/m    Body mass index is 23.52 kg/m .  Physical Exam   GENERAL: healthy, alert and no distress  MS: Bilateral shoulders with normal ROM.  Mild pain in the anterior right shoulder with full abduction, flexion, and external rotation.  Mild TTP along anterior shoulder.  No bony tenderness.  Negative empty can testing bilaterally.  Positive cross arm and Hawkin testing on the right.    Comprehensive back pain exam:  No TTP along lumbar spine, Lower extremity strength functional and equal on both sides and Lower extremity reflexes within normal limits bilaterally Right paralumbar musculature is tight and ropy           Assessment & Plan     1. Pectoralis muscle strain, initial encounter  - Most likely a mild strain from shoveling snow  - Advised doing some rotator cuff exercises at home (given a handout) and stretch pecs in door frame  - RICE therapy, NSAIDs PRN     2. Acute right-sided low back pain without sciatica  - Now almost completely resolved  - Most likely a mild muscle strain     Return in about 4 weeks (around 1/9/2020). If not improving.  Consider physical therapy if not getting better     Adriana Oconnor, DO  [facility]      "

## 2019-12-12 NOTE — PATIENT INSTRUCTIONS
Patient Education     Understanding Rotator Cuff Tendonitis    Tendons are tough tissues that connect muscles to bone. A group of 4 muscles and their tendons form a  cuff  around the head of the upper arm bone. This is called the rotator cuff. It connects the upper arm to the shoulder blade. It gives the shoulder joint stability and strength.  If tendons are injured or strained, they may become irritated and swollen (inflamed). This is called tendonitis. Rotator cuff tendonitis may cause shoulder pain and loss of function.  What causes rotator cuff tendonitis?  Tendonitis results when the rotator cuff tendons are injured or overworked. The most common cause of injury is repetitive overhead activities. These can be work-related activities such as reaching, pushing, or lifting. Or they can be sports-related activities such as throwing, swimming, or lifting weights.  Symptoms of rotator cuff tendonitis  Pain on the side of the upper arm is the most common symptom. Pain may get worse with overhead movements or when you raise the arm above shoulder level. It may also hurt to lie on the shoulder at night.  Treatment for rotator cuff tendonitis  Treatment may include the following:    Active rest. This lets the rotator cuff heal. Active rest means using your arm and shoulder, but avoiding activities that cause pain, such as reaching overhead or sleeping on the shoulder.    Cold packs. Putting ice packs on the shoulder helps reduce swelling and relieve pain.    Pain medicines. Prescription or over-the-counter pain medicines can help relieve pain and swelling.    Arm and shoulder exercises. These help keep the shoulder joint mobile as it heals. They also help improve the strength of muscles around the joint.  Possible complications  It might be tempting to stop using your shoulder completely to avoid pain. But doing so may lead to a condition called  frozen shoulder.  To help prevent this, following instructions you are  given for active rest and for doing exercises to help your shoulder heal.  When to call your healthcare provider  Call your healthcare provider right away if you have any of these:    Fever of 100.4 F (38 C) or higher, or as directed    Symptoms that don t get better, or get worse    New symptoms   Date Last Reviewed: 3/10/2016    6824-4595 [employer] - innov[employer] | mentor | invest. [address] [place] [address]. All rights reserved. This information is not intended as a substitute for professional medical care. Always follow your healthcare professional's instructions.

## 2020-01-14 ENCOUNTER — ANCILLARY PROCEDURE (OUTPATIENT)
Dept: GENERAL RADIOLOGY | Facility: CLINIC | Age: 45
End: 2020-01-14
Attending: FAMILY MEDICINE
Payer: COMMERCIAL

## 2020-01-14 ENCOUNTER — OFFICE VISIT (OUTPATIENT)
Dept: FAMILY MEDICINE | Facility: CLINIC | Age: 45
End: 2020-01-14
Payer: COMMERCIAL

## 2020-01-14 VITALS
WEIGHT: 131 LBS | SYSTOLIC BLOOD PRESSURE: 104 MMHG | BODY MASS INDEX: 24.11 KG/M2 | DIASTOLIC BLOOD PRESSURE: 62 MMHG | HEIGHT: 62 IN

## 2020-01-14 DIAGNOSIS — M25.511 ACUTE PAIN OF RIGHT SHOULDER: Primary | ICD-10-CM

## 2020-01-14 DIAGNOSIS — M25.511 ACUTE PAIN OF RIGHT SHOULDER: ICD-10-CM

## 2020-01-14 PROCEDURE — 73030 X-RAY EXAM OF SHOULDER: CPT | Mod: RT

## 2020-01-14 PROCEDURE — 99213 OFFICE O/P EST LOW 20 MIN: CPT | Mod: 25 | Performed by: FAMILY MEDICINE

## 2020-01-14 PROCEDURE — 90714 TD VACC NO PRESV 7 YRS+ IM: CPT | Performed by: FAMILY MEDICINE

## 2020-01-14 PROCEDURE — 90471 IMMUNIZATION ADMIN: CPT | Performed by: FAMILY MEDICINE

## 2020-01-14 ASSESSMENT — MIFFLIN-ST. JEOR: SCORE: 1192.46

## 2020-01-14 NOTE — NURSING NOTE
Prior to immunization administration, verified patients identity using patient s name and date of birth. Please see Immunization Activity for additional information.     Screening Questionnaire for Adult Immunization    Are you sick today?   No   Do you have allergies to medications, food, a vaccine component or latex?   No   Have you ever had a serious reaction after receiving a vaccination?   No   Do you have a long-term health problem with heart, lung, kidney, or metabolic disease (e.g., diabetes), asthma, a blood disorder, no spleen, complement component deficiency, a cochlear implant, or a spinal fluid leak?  Are you on long-term aspirin therapy?   Yes   Do you have cancer, leukemia, HIV/AIDS, or any other immune system problem?   No   Do you have a parent, brother, or sister with an immune system problem?   No   In the past 3 months, have you taken medications that affect  your immune system, such as prednisone, other steroids, or anticancer drugs; drugs for the treatment of rheumatoid arthritis, Crohn s disease, or psoriasis; or have you had radiation treatments?   No   Have you had a seizure, or a brain or other nervous system problem?   No   During the past year, have you received a transfusion of blood or blood    products, or been given immune (gamma) globulin or antiviral drug?   No   For women: Are you pregnant or is there a chance you could become       pregnant during the next month?   No   Have you received any vaccinations in the past 4 weeks?   No     Immunization questionnaire was positive for at least one answer.  Notified Anastasia.        Per orders of Dr. Oconnor, injection of TD given by Mamadou Cordova CMA. Patient instructed to remain in clinic for 15 minutes afterwards, and to report any adverse reaction to me immediately.       Screening performed by Mamadou Cordova CMA on 1/14/2020 at 1:58 PM.

## 2020-01-14 NOTE — PROGRESS NOTES
Subjective     Lisset MITCHELL Chi is a 44 year old female who presents to clinic today for the following health issues:    HPI     Follow up  Right hand     Description:   Location: right shoulder collar bone  Character: improved a little bit, in general still having pain. Did not do much ice/ exercises. Collar bone is sticking out still, tender to the touch. strong pain/burning sensation of the shoulder.      Intensity: 5/10    Progression of Symptoms:     Accompanying Signs & Symptoms:  Other symptoms: none    History:   Previous similar pain: no       Precipitating factors:   Trauma or overuse: had a fall 2019, she did catch her self with her hands    Alleviating factors:  Improved by:     Patient was seen back on  with these issues and diagnosed with a pectoralis muscle strain from shoveling snow. She was advised to do some home exercises for her rotator cuff.  She admits that she hasn't done the exercises or ice very often.  Hurts worse when driving or laying on her back or right side.      Patient Active Problem List   Diagnosis     Irritable bowel syndrome     Premature ovarian failure     CARDIOVASCULAR SCREENING; LDL GOAL LESS THAN 160     Mild persistent asthma without complication     Trochanteric bursitis of both hips     Right knee pain     Adjustment disorder with depressed mood     Past Surgical History:   Procedure Laterality Date     ARTHROSCOPY KNEE Right 3/2/2018    Procedure: ARTHROSCOPY KNEE;  Right Knee Arthroscopy, Lateral Compartmnt Debridemen and  Anterior Chamber;  Surgeon: Rima Sarmiento MD;  Location: [facility] OR      SECTION           HC DILATION/CURETTAGE DIAG/THER NON OB  2003    D & C for retained placenta one week after the delivery     I&D BARTHOLIN GLAND ABSCESS   and     mcgrath cath      KNEE SURGERY      Rt knee Fx, repair-dislocation problem     KNEE SURGERY      Lt knee reconstructive surgery-dislocation problem     LEEP TX, CERVICAL   "1995    LEEP for abnormal pap     MARSUP BARTHOLIN GLAND CYST  4/25/08       Social History     Tobacco Use     Smoking status: Never Smoker     Smokeless tobacco: Never Used   Substance Use Topics     Alcohol use: Yes     Comment: social     Family History   Problem Relation Age of Onset     Thyroid Disease Mother      Diabetes Mother         type2     Eye Disorder Mother         cataracts     Gastrointestinal Disease Mother         hep a/has to have her throat stretched     Respiratory Mother         sleep apnea     Heart Disease Father 60     C.A.D. Paternal Grandfather 30        age 30, then again in his 80's     Diabetes Maternal Grandfather      Cancer Maternal Grandfather      Thyroid Disease Maternal Grandmother      Gynecology Maternal Grandmother         on bcp to keep period regular, a small uterus     Heart Disease Maternal Grandmother         tachycardia     Allergies Daughter         milk     Asthma Daughter          Review of Systems   ROS COMP: Constitutional, HEENT, cardiovascular, pulmonary, gi and gu systems are negative, except as otherwise noted.      Objective    /62 (Cuff Size: Adult Regular)   Ht 1.575 m (5' 2\")   Wt 59.4 kg (131 lb)   LMP 12/25/2013   BMI 23.96 kg/m    Body mass index is 23.96 kg/m .  Physical Exam   GENERAL: healthy, alert and no distress  MS: Right shoulder with decreased ROM in abduction, flexion, and external rotation.  Mild TTP along clavicular head and AC joint.      Diagnostic Test Results:  RIGHT SHOULDER THREE OR MORE VIEWS   1/14/2020 1:24 PM      HISTORY: Two-three weeks right shoulder/chest pain.  Rule out AC  separation.                                                                      IMPRESSION: Unremarkable exam. If there is persistent clinical concern  for AC separation, bilateral views with and without weightbearing  could be performed.     NORM VILLAGOMEZ MD        Assessment & Plan     1. Acute pain of right shoulder  - XR WNL  - Advised using a " shoulder splint and continue to ice PRN for the next 2 weeks  - If symptoms aren't improving she should be seen by sports med  - XR Shoulder Right G/E 3 Views; Future  - order for DME; Equipment being ordered: Shoulder sling  Dispense: 1 each; Refill: 0  - order for DME; Equipment being ordered: Shoulder ice strap  Dispense: 1 each; Refill: 0  - ORTHO  REFERRAL       Return in about 2 weeks (around 1/28/2020).    Adriana Oconnor DO  [facility]

## 2020-01-29 ENCOUNTER — OFFICE VISIT (OUTPATIENT)
Dept: ORTHOPEDICS | Facility: CLINIC | Age: 45
End: 2020-01-29
Payer: COMMERCIAL

## 2020-01-29 VITALS
WEIGHT: 131 LBS | SYSTOLIC BLOOD PRESSURE: 112 MMHG | HEIGHT: 62 IN | DIASTOLIC BLOOD PRESSURE: 68 MMHG | BODY MASS INDEX: 24.11 KG/M2

## 2020-01-29 DIAGNOSIS — M12.811 ROTATOR CUFF ARTHROPATHY OF RIGHT SHOULDER: Primary | ICD-10-CM

## 2020-01-29 PROCEDURE — 20611 DRAIN/INJ JOINT/BURSA W/US: CPT | Mod: RT | Performed by: PREVENTIVE MEDICINE

## 2020-01-29 PROCEDURE — 99204 OFFICE O/P NEW MOD 45 MIN: CPT | Mod: 25 | Performed by: PREVENTIVE MEDICINE

## 2020-01-29 RX ORDER — DICLOFENAC SODIUM 75 MG/1
75 TABLET, DELAYED RELEASE ORAL 2 TIMES DAILY
Qty: 40 TABLET | Refills: 1 | Status: SHIPPED | OUTPATIENT
Start: 2020-01-29 | End: 2021-06-09

## 2020-01-29 RX ORDER — TRIAMCINOLONE ACETONIDE 40 MG/ML
40 INJECTION, SUSPENSION INTRA-ARTICULAR; INTRAMUSCULAR
Status: DISCONTINUED | OUTPATIENT
Start: 2020-01-29 | End: 2020-04-01

## 2020-01-29 RX ADMIN — TRIAMCINOLONE ACETONIDE 40 MG: 40 INJECTION, SUSPENSION INTRA-ARTICULAR; INTRAMUSCULAR at 15:58

## 2020-01-29 ASSESSMENT — MIFFLIN-ST. JEOR: SCORE: 1192.46

## 2020-01-29 NOTE — LETTER
1/29/2020         RE: Lisset MITCHELL Chi  [address] [facility] [facility] [id number]        Dear Colleague,    Thank you for referring your patient, Lisset MITCHELL Chi, to the [facility]. Please see a copy of my visit note below.          SPORTS & ORTHOPEDIC WALK-IN VISIT 1/29/2020    Primary Care Physician: Dr. Larsen    Reason for visit:     What part of your body is injured / painful?  right shoulder    What caused the injury /pain? Fall    How long ago did your injury occur or pain begin? 3month(s)    What are your most bothersome symptoms? Pain, Swelling and Weakness    How would you characterize your symptom?  aching, sharp and burning    What makes your symptoms better? Rest, Ice and Ibuprofen    What makes your symptoms worse? Movement, Overhead motion and Other: driving    Have you been previously seen for this problem? Yes, 01/14/2020    Medical History:    Any recent changes to your medical history? No    Any new medication prescribed since last visit? No    Have you had surgery on this body part before? No    Social History:    Occupation: Desk work    Handedness: Right    Exercise: None       Large Joint Injection/Arthocentesis: R subacromial bursa  Date/Time: 1/29/2020 3:58 PM  Performed by: Floyd Larsen MD  Authorized by: Floyd Larsen MD     Indications:  Pain  Needle Size:  22 G  Guidance: ultrasound    Approach:  Posterolateral  Location:  Shoulder      Site:  R subacromial bursa  Medications:  40 mg triamcinolone 40 MG/ML  Medications comment:  3mL of 1.0% Lidocaine was injected into right shoulder.  NDC: 6929-0865-98  Lot: [id number]  Exp: 03/31/2021  Outcome:  Tolerated well, no immediate complications  Procedure discussed: discussed risks, benefits, and alternatives    Consent Given by:  Patient  Timeout: timeout called immediately prior to procedure    Prep: patient was prepped and draped in usual sterile fashion            HISTORY OF PRESENT ILLNESS  Ms. Gates is a  johnnie 44 year old year old female who presents to clinic today with right shoulder pain  Lisset explains that she has had shoulder pain consistent for several months  Location: right shoulder  Quality:  achy pain    Severity: 6/10 at worst    Duration: since November  Timing: occurs intermittently  Context: occurs while using her arm  Modifying factors:  resting and non-use makes it better, movement and use makes it worse  Associated signs & symptoms:  Some radiation into upper arm, and history of neck pain  Additional history: as documented    MEDICAL HISTORY  Patient Active Problem List   Diagnosis     Irritable bowel syndrome     Premature ovarian failure     CARDIOVASCULAR SCREENING; LDL GOAL LESS THAN 160     Mild persistent asthma without complication     Trochanteric bursitis of both hips     Right knee pain     Adjustment disorder with depressed mood       Current Outpatient Medications   Medication Sig Dispense Refill     albuterol (PROAIR HFA/PROVENTIL HFA/VENTOLIN HFA) 108 (90 Base) MCG/ACT inhaler Inhale 2 puffs into the lungs every 6 hours 18 g 0     Calcium Carbonate-Vitamin D (CALCIUM + D PO) Take 600 mg by mouth.       FEXOFENADINE  MG OR TABS 1 TABLET DAILY        fluticasone (FLONASE) 50 MCG/ACT nasal spray Spray 2 sprays into both nostrils daily       fluticasone (FLOVENT HFA) 110 MCG/ACT inhaler Inhale 2 puffs into the lungs 2 times daily (Patient not taking: Reported on 1/14/2020) 12 g 3     MULTIPLE VITAMIN PO        order for DME Equipment being ordered: Shoulder sling 1 each 0     order for DME Equipment being ordered: Shoulder ice strap 1 each 0     Turmeric Curcumin 500 MG CAPS Take 500 mg by mouth daily       VITAMIN D, CHOLECALCIFEROL, PO Take by mouth daily         Allergies   Allergen Reactions     No Known Allergies        Family History   Problem Relation Age of Onset     Thyroid Disease Mother      Diabetes Mother         type2     Eye Disorder Mother         cataracts      "Gastrointestinal Disease Mother         hep a/has to have her throat stretched     Respiratory Mother         sleep apnea     Heart Disease Father 60     C.A.D. Paternal Grandfather 30        age 30, then again in his 80's     Diabetes Maternal Grandfather      Cancer Maternal Grandfather      Thyroid Disease Maternal Grandmother      Gynecology Maternal Grandmother         on bcp to keep period regular, a small uterus     Heart Disease Maternal Grandmother         tachycardia     Allergies Daughter         milk     Asthma Daughter        Additional medical/Social/Surgical histories reviewed in [facility] and updated as appropriate.     REVIEW OF SYSTEMS (1/29/2020)  10 point ROS of systems including Constitutional, Eyes, Respiratory, Cardiovascular, Gastroenterology, Genitourinary, Integumentary, Musculoskeletal, Psychiatric were all negative except for pertinent positives noted in my HPI.     PHYSICAL EXAM  Vitals:    01/29/20 1435   BP: 112/68   Weight: 59.4 kg (131 lb)   Height: 1.575 m (5' 2\")     Vital Signs: /68   Ht 1.575 m (5' 2\")   Wt 59.4 kg (131 lb)   LMP 12/25/2013   BMI 23.96 kg/m    Patient declined being weighed. Body mass index is 23.96 kg/m .    General  - normal appearance, in no obvious distress  CV  - normal radial pulse  Pulm  - normal respiratory pattern, non-labored  Musculoskeletal - right shoulder  - inspection: normal bone and joint alignment, no obvious deformity, no scapular winging, no AC step-off  - palpation: mildly tender RC insertion, normal clavicle, non-tender AC  - ROM:  painful and limited flexion and ER at end range, limited IR  - strength: 5/5  strength, 5/5 in all shoulder planes  - special tests:  (-) Speed's  (+) Neer  (+) Hawkin's  (+) Sergey's  (-) [place]'s  (-) apprehension  (-) subscap lift-off  Neuro  - no sensory or motor deficit, grossly normal coordination, normal muscle tone  Skin  - no ecchymosis, erythema, warmth, or induration, no obvious rash  Psych  - " interactive, appropriate, normal mood and affect  ASSESSMENT & PLAN  43 yo female with 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  The patient s right shoulder was prepped with chlorhexidine in sterile fashion.   40 mg of triamcinolone suspension was drawn up into a 5 mL syringe with 3 mL of 1% lidocaine.  Injection was performed using sterile technique.  Under ultrasound guidance a 1.5-inch 25-gauge needle was used to enter the right subacromial bursa.  Anterolateral approach was used with arm held in Crass position.  Needle placement was visualized and documented with ultrasound.  Ultrasound visualization was necessary to ensure placement in to the bursa and not the rotator cuff tendon which could potentially cause further tendon damage.  Injection performed long axis to the probe.  Injection solution visualized within the joint space.  Images were permanently stored for the patient's record.  There were no complications. The patient tolerated the procedure well. There was negligible bleeding.   The patient was instructed to ice the shoulder upon leaving clinic and refrain from overuse over the next 3 days.   The patient was instructed to call or go to the emergency room with any unusual pain, swelling, redness, or if otherwise concerned.          Again, thank you for allowing me to participate in the care of your patient.        Sincerely,        Floyd Larsen MD

## 2020-01-29 NOTE — PROGRESS NOTES
SPORTS & ORTHOPEDIC WALK-IN VISIT 1/29/2020    Primary Care Physician: Dr. Larsen    Reason for visit:     What part of your body is injured / painful?  right shoulder    What caused the injury /pain? Fall    How long ago did your injury occur or pain begin? 3month(s)    What are your most bothersome symptoms? Pain, Swelling and Weakness    How would you characterize your symptom?  aching, sharp and burning    What makes your symptoms better? Rest, Ice and Ibuprofen    What makes your symptoms worse? Movement, Overhead motion and Other: driving    Have you been previously seen for this problem? Yes, 01/14/2020    Medical History:    Any recent changes to your medical history? No    Any new medication prescribed since last visit? No    Have you had surgery on this body part before? No    Social History:    Occupation: Desk work    Handedness: Right    Exercise: None       Large Joint Injection/Arthocentesis: R subacromial bursa  Date/Time: 1/29/2020 3:58 PM  Performed by: Floyd Larsen MD  Authorized by: Floyd Larsen MD     Indications:  Pain  Needle Size:  22 G  Guidance: ultrasound    Approach:  Posterolateral  Location:  Shoulder      Site:  R subacromial bursa  Medications:  40 mg triamcinolone 40 MG/ML  Medications comment:  3mL of 1.0% Lidocaine was injected into right shoulder.  NDC: 1064-9495-24  Lot: [id number]  Exp: 03/31/2021  Outcome:  Tolerated well, no immediate complications  Procedure discussed: discussed risks, benefits, and alternatives    Consent Given by:  Patient  Timeout: timeout called immediately prior to procedure    Prep: patient was prepped and draped in usual sterile fashion

## 2020-01-29 NOTE — PROGRESS NOTES
HISTORY OF PRESENT ILLNESS  Ms. Gates is a pleasant 44 year old year old female who presents to clinic today with right shoulder pain  Lisset explains that she has had shoulder pain consistent for several months  Location: right shoulder  Quality:  achy pain    Severity: 6/10 at worst    Duration: since November  Timing: occurs intermittently  Context: occurs while using her arm  Modifying factors:  resting and non-use makes it better, movement and use makes it worse  Associated signs & symptoms:  Some radiation into upper arm, and history of neck pain  Additional history: as documented    MEDICAL HISTORY  Patient Active Problem List   Diagnosis     Irritable bowel syndrome     Premature ovarian failure     CARDIOVASCULAR SCREENING; LDL GOAL LESS THAN 160     Mild persistent asthma without complication     Trochanteric bursitis of both hips     Right knee pain     Adjustment disorder with depressed mood       Current Outpatient Medications   Medication Sig Dispense Refill     albuterol (PROAIR HFA/PROVENTIL HFA/VENTOLIN HFA) 108 (90 Base) MCG/ACT inhaler Inhale 2 puffs into the lungs every 6 hours 18 g 0     Calcium Carbonate-Vitamin D (CALCIUM + D PO) Take 600 mg by mouth.       FEXOFENADINE  MG OR TABS 1 TABLET DAILY        fluticasone (FLONASE) 50 MCG/ACT nasal spray Spray 2 sprays into both nostrils daily       fluticasone (FLOVENT HFA) 110 MCG/ACT inhaler Inhale 2 puffs into the lungs 2 times daily (Patient not taking: Reported on 1/14/2020) 12 g 3     MULTIPLE VITAMIN PO        order for DME Equipment being ordered: Shoulder sling 1 each 0     order for DME Equipment being ordered: Shoulder ice strap 1 each 0     Turmeric Curcumin 500 MG CAPS Take 500 mg by mouth daily       VITAMIN D, CHOLECALCIFEROL, PO Take by mouth daily         Allergies   Allergen Reactions     No Known Allergies        Family History   Problem Relation Age of Onset     Thyroid Disease Mother      Diabetes Mother         type2      "Eye Disorder Mother         cataracts     Gastrointestinal Disease Mother         hep a/has to have her throat stretched     Respiratory Mother         sleep apnea     Heart Disease Father 60     C.A.D. Paternal Grandfather 30        age 30, then again in his 80's     Diabetes Maternal Grandfather      Cancer Maternal Grandfather      Thyroid Disease Maternal Grandmother      Gynecology Maternal Grandmother         on bcp to keep period regular, a small uterus     Heart Disease Maternal Grandmother         tachycardia     Allergies Daughter         milk     Asthma Daughter        Additional medical/Social/Surgical [facility] and updated as appropriate.     REVIEW OF SYSTEMS (1/29/2020)  10 point ROS of systems including Constitutional, Eyes, Respiratory, Cardiovascular, Gastroenterology, Genitourinary, Integumentary, Musculoskeletal, Psychiatric were all negative except for pertinent positives noted in my HPI.     PHYSICAL EXAM  Vitals:    01/29/20 1435   BP: 112/68   Weight: 59.4 kg (131 lb)   Height: 1.575 m (5' 2\")     Vital Signs: /68   Ht 1.575 m (5' 2\")   Wt 59.4 kg (131 lb)   LMP 12/25/2013   BMI 23.96 kg/m   Patient declined being weighed. Body mass index is 23.96 kg/m .    General  - normal appearance, in no obvious distress  CV  - normal radial pulse  Pulm  - normal respiratory pattern, non-labored  Musculoskeletal - right shoulder  - inspection: normal bone and joint alignment, no obvious deformity, no scapular winging, no AC step-off  - palpation: mildly tender RC insertion, normal clavicle, non-tender AC  - ROM:  painful and limited flexion and ER at end range, limited IR  - strength: 5/5  strength, 5/5 in all shoulder planes  - special tests:  (-) Speed's  (+) Neer  (+) Hawkin's  (+) Sergey's  (-) [place]'s  (-) apprehension  (-) subscap lift-off  Neuro  - no sensory or motor deficit, grossly normal coordination, normal muscle tone  Skin  - no ecchymosis, erythema, warmth, " or induration, no obvious rash  Psych  - interactive, appropriate, normal mood and affect  ASSESSMENT & PLAN  45 yo female with 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  The patient s right shoulder was prepped with chlorhexidine in sterile fashion.   40 mg of triamcinolone suspension was drawn up into a 5 mL syringe with 3 mL of 1% lidocaine.  Injection was performed using sterile technique.  Under ultrasound guidance a 1.5-inch 25-gauge needle was used to enter the right subacromial bursa.  Anterolateral approach was used with arm held in Crass position.  Needle placement was visualized and documented with ultrasound.  Ultrasound visualization was necessary to ensure placement in to the bursa and not the rotator cuff tendon which could potentially cause further tendon damage.  Injection performed long axis to the probe.  Injection solution visualized within the joint space.  Images were permanently stored for the patient's record.  There were no complications. The patient tolerated the procedure well. There was negligible bleeding.   The patient was instructed to ice the shoulder upon leaving clinic and refrain from overuse over the next 3 days.   The patient was instructed to call or go to the emergency room with any unusual pain, swelling, redness, or if otherwise concerned.

## 2020-02-09 ENCOUNTER — MYC MEDICAL ADVICE (OUTPATIENT)
Dept: PEDIATRICS | Facility: CLINIC | Age: 45
End: 2020-02-09

## 2020-03-27 DIAGNOSIS — J45.30 MILD PERSISTENT ASTHMA WITHOUT COMPLICATION: ICD-10-CM

## 2020-03-30 NOTE — TELEPHONE ENCOUNTER
"Routing refill request to provider for review/approval because:  ACT not current  Last OV over 1 year ago    Requested Prescriptions   Pending Prescriptions Disp Refills     FLOVENT  MCG/ACT inhaler [Pharmacy Med Name: FLOVENT  MCG INHALER] 12 Inhaler 3     Sig: INHALE 2 PUFFS BY MOUTH TWICE DAILY       Inhaled Steroids Protocol Failed - 3/27/2020  5:13 PM        Failed - Asthma control assessment score within normal limits in last 6 months     Please review ACT score.           Passed - Patient is age 12 or older        Passed - Medication is active on med list        Passed - Recent (6 mo) or future (30 days) visit within the authorizing provider's specialty     Patient had office visit in the last 6 months or has a visit in the next 30 days with authorizing provider or within the authorizing provider's specialty.  See \"Patient Info\" tab in inbasket, or \"Choose Columns\" in Meds & Orders section of the refill encounter.               Hailey Maharaj RN  "

## 2020-04-01 ENCOUNTER — VIRTUAL VISIT (OUTPATIENT)
Dept: FAMILY MEDICINE | Facility: CLINIC | Age: 45
End: 2020-04-01
Payer: COMMERCIAL

## 2020-04-01 DIAGNOSIS — J45.30 MILD PERSISTENT ASTHMA WITHOUT COMPLICATION: ICD-10-CM

## 2020-04-01 PROCEDURE — 99213 OFFICE O/P EST LOW 20 MIN: CPT | Mod: TEL | Performed by: NURSE PRACTITIONER

## 2020-04-01 RX ORDER — ALBUTEROL SULFATE 90 UG/1
2 AEROSOL, METERED RESPIRATORY (INHALATION) EVERY 6 HOURS
Qty: 18 G | Refills: 3 | Status: SHIPPED | OUTPATIENT
Start: 2020-04-01 | End: 2021-06-09

## 2020-04-01 RX ORDER — DEXAMETHASONE 4 MG/1
2 TABLET ORAL 2 TIMES DAILY
Qty: 12 G | Refills: 3 | Status: SHIPPED | OUTPATIENT
Start: 2020-04-01 | End: 2020-09-10

## 2020-04-01 RX ORDER — DEXAMETHASONE 4 MG/1
TABLET ORAL
Qty: 12 INHALER | Refills: 3 | OUTPATIENT
Start: 2020-04-01

## 2020-04-01 ASSESSMENT — ASTHMA QUESTIONNAIRES
QUESTION_3 LAST FOUR WEEKS HOW OFTEN DID YOUR ASTHMA SYMPTOMS (WHEEZING, COUGHING, SHORTNESS OF BREATH, CHEST TIGHTNESS OR PAIN) WAKE YOU UP AT NIGHT OR EARLIER THAN USUAL IN THE MORNING: NOT AT ALL
QUESTION_1 LAST FOUR WEEKS HOW MUCH OF THE TIME DID YOUR ASTHMA KEEP YOU FROM GETTING AS MUCH DONE AT WORK, SCHOOL OR AT HOME: NONE OF THE TIME
QUESTION_2 LAST FOUR WEEKS HOW OFTEN HAVE YOU HAD SHORTNESS OF BREATH: ONCE OR TWICE A WEEK
ACT_TOTALSCORE: 21
QUESTION_5 LAST FOUR WEEKS HOW WOULD YOU RATE YOUR ASTHMA CONTROL: WELL CONTROLLED
QUESTION_4 LAST FOUR WEEKS HOW OFTEN HAVE YOU USED YOUR RESCUE INHALER OR NEBULIZER MEDICATION (SUCH AS ALBUTEROL): TWO OR THREE TIMES PER WEEK

## 2020-04-01 NOTE — LETTER
My Asthma Action Plan    Name: Lisset MITCHELL Chi   YOB: 1975  Date: 4/1/2020   My doctor: JOSE Pan CNP   My clinic: [facility]        My Control Medicine: Fluticasone propionate (Flovent HFA) - 110 mcg 2 puffs twice per day  My Rescue Medicine: Albuterol (Proair/Ventolin/Proventil HFA) 2-4 puffs EVERY 4 HOURS as needed. Use a spacer if recommended by your provider.   My Asthma Severity:   Mild Persistent  Know your asthma triggers: upper respiratory infections, pollens and cold air  Seasonal Allegies            GREEN ZONE   Good Control    I feel good    No cough or wheeze    Can work, sleep and play without asthma symptoms       Take your asthma control medicine every day.     1. If exercise triggers your asthma, take your rescue medication    15 minutes before exercise or sports, and    During exercise if you have asthma symptoms  2. Spacer to use with inhaler: If you have a spacer, make sure to use it with your inhaler             YELLOW ZONE Getting Worse  I have ANY of these:    I do not feel good    Cough or wheeze    Chest feels tight    Wake up at night   1. Keep taking your Green Zone medications  2. Start taking your rescue medicine:    every 20 minutes for up to 1 hour. Then every 4 hours for 24-48 hours.  3. If you stay in the Yellow Zone for more than 12-24 hours, contact your doctor.  4. If you do not return to the Green Zone in 12-24 hours or you get worse, start taking your oral steroid medicine if prescribed by your provider.           RED ZONE Medical Alert - Get Help  I have ANY of these:    I feel awful    Medicine is not helping    Breathing getting harder    Trouble walking or talking    Nose opens wide to breathe       1. Take your rescue medicine NOW  2. If your provider has prescribed an oral steroid medicine, start taking it NOW  3. Call your doctor NOW  4. If you are still in the Red Zone after 20 minutes and you have not reached your  doctor:    Take your rescue medicine again and    Call 911 or go to the emergency room right away    See your regular doctor within 2 weeks of an Emergency Room or Urgent Care visit for follow-up treatment.          Annual Reminders:  Meet with Asthma Educator,  Flu Shot in the Fall, consider Pneumonia Vaccination for patients with asthma (aged 19 and older).    Pharmacy:    [facility] [address]  [facility] [address] [place] [address] [facility] [address] [facility]    Electronically signed by JOSE Pan CNP   Date: 04/01/20                      Asthma Triggers  How To Control Things That Make Your Asthma Worse    Triggers are things that make your asthma worse.  Look at the list below to help you find your triggers and what you can do about them.  You can help prevent asthma flare-ups by staying away from your triggers.      Trigger                                                          What you can do   Cigarette Smoke  Tobacco smoke can make asthma worse. Do not allow smoking in your home, car or around you.  Be sure no one smokes at a child s day care or school.  If you smoke, ask your health care provider for ways to help you quit.  Ask family members to quit too.  Ask your health care provider for a referral to Quit Plan to help you quit smoking, or call 6-701-316-PLAN.     Colds, Flu, Bronchitis  These are common triggers of asthma. Wash your hands often.  Don t touch your eyes, nose or mouth.  Get a flu shot every year.     Dust Mites  These are tiny bugs that live in cloth or carpet. They are too small to see. Wash sheets and blankets in hot water every week.   Encase pillows and mattress in dust mite proof covers.  Avoid having carpet if you can. If you have carpet, vacuum weekly.   Use a dust mask and HEPA vacuum.   Pollen and Outdoor Mold  Some people are allergic to trees, grass, or weed pollen, or molds. Try to keep  your windows closed.  Limit time out doors when pollen count is high.   Ask you health care provider about taking medicine during allergy season.     Animal Dander  Some people are allergic to skin flakes, urine or saliva from pets with fur or feathers. Keep pets with fur or feathers out of your home.    If you can t keep the pet outdoors, then keep the pet out of your bedroom.  Keep the bedroom door closed.  Keep pets off cloth furniture and away from stuffed toys.     Mice, Rats, and Cockroaches   Some people are allergic to the waste from these pests.   Cover food and garbage.  Clean up spills and food crumbs.  Store grease in the refrigerator.   Keep food out of the bedroom.   Indoor Mold  This can be a trigger if your home has high moisture. Fix leaking faucets, pipes, or other sources of water.   Clean moldy surfaces.  Dehumidify basement if it is damp and smelly.   Smoke, Strong Odors, and Sprays  These can reduce air quality. Stay away from strong odors and sprays, such as perfume, powder, hair spray, paints, smoke incense, paint, cleaning products, candles and new carpet.   Exercise or Sports  Some people with asthma have this trigger. Be active!  Ask your doctor about taking medicine before sports or exercise to prevent symptoms.    Warm up for 5-10 minutes before and after sports or exercise.     Other Triggers of Asthma  Cold air:  Cover your nose and mouth with a scarf.  Sometimes laughing or crying can be a trigger.  Some medicines and food can trigger asthma.

## 2020-04-01 NOTE — PROGRESS NOTES
"Subjective     Lisset MITCHELL Chi is a 44 year old female who is being evaluated via a billable telephone visit.      The patient has been notified of following:     \"This telephone visit will be conducted via a call between you and your physician/provider. We have found that certain health care needs can be provided without the need for a physical exam.  This service lets us provide the care you need with a short phone conversation.  If a prescription is necessary we can send it directly to your pharmacy.  If lab work is needed we can place an order for that and you can then stop by our lab to have the test done at a later time.    If during the course of the call the physician/provider feels a telephone visit is not appropriate, you will not be charged for this service.\"     Patient has given verbal consent for Telephone visit?  Yes    Lisset MITCHELL Chi complains of   Chief Complaint   Patient presents with     Telephone     Spoke with patient, unable to talk.  Would like us to call her back around 12:30pm     Asthma       ALLERGIES  No known allergies    Asthma Follow-Up    Was ACT completed today?    Yes    ACT Total Scores 4/1/2020   ACT TOTAL SCORE -   ASTHMA ER VISITS -   ASTHMA HOSPITALIZATIONS -   ACT TOTAL SCORE (Goal Greater than or Equal to 20) 21   In the past 12 months, how many times did you visit the emergency room for your asthma without being admitted to the hospital? 0   In the past 12 months, how many times were you hospitalized overnight because of your asthma? 0       How many days per week do you miss taking your asthma controller medication?  Just uses prn    Please describe any recent triggers for your asthma: Seasonal Allegies    Have you had any Emergency Room Visits, Urgent Care Visits, or Hospital Admissions since your last office visit?  No    Only using Flovent for 1-2 weeks at a time around flares.  Tends to flare up in spring and end of summer or with URIs.          Reviewed and updated as needed " this visit by Provider         Review of Systems   ROS COMP: Constitutional, HEENT, cardiovascular, pulmonary, gi and gu systems are negative, except as otherwise noted.       Objective   Reported vitals:  [facility] 12/25/2013      Psych: Alert and oriented times 3; coherent speech, normal   rate and volume, able to articulate logical thoughts, able   to abstract reason, no tangential thoughts, no hallucinations   or delusions     Diagnostic Test Results:  Labs reviewed in Epic        Assessment/Plan:  1. Mild persistent asthma without complication  Fair control. Discussed risks/benefits of medication and encouraged patient to stay on Flovent throughout the year instead of intermittent use as she has been doing. Patient agreeable.  - fluticasone (FLOVENT HFA) 110 MCG/ACT inhaler; Inhale 2 puffs into the lungs 2 times daily  Dispense: 12 g; Refill: 3  - albuterol (PROAIR HFA/PROVENTIL HFA/VENTOLIN HFA) 108 (90 Base) MCG/ACT inhaler; Inhale 2 puffs into the lungs every 6 hours  Dispense: 18 g; Refill: 3    No follow-ups on file.      Phone call duration:  9 minutes    JOSE Pan CNP

## 2020-04-02 ASSESSMENT — ASTHMA QUESTIONNAIRES: ACT_TOTALSCORE: 21

## 2020-07-08 ENCOUNTER — MYC MEDICAL ADVICE (OUTPATIENT)
Dept: FAMILY MEDICINE | Facility: CLINIC | Age: 45
End: 2020-07-08

## 2020-07-09 NOTE — TELEPHONE ENCOUNTER
Please see [employer] message. OK for virtual visit or prefer in person assessment?     Routed to PCP to review and advise.     Tamiko Colbert, RN, BSN, PHN  [facility]: [place]

## 2020-07-13 ENCOUNTER — OFFICE VISIT (OUTPATIENT)
Dept: FAMILY MEDICINE | Facility: CLINIC | Age: 45
End: 2020-07-13
Payer: COMMERCIAL

## 2020-07-13 ENCOUNTER — ANCILLARY PROCEDURE (OUTPATIENT)
Dept: GENERAL RADIOLOGY | Facility: CLINIC | Age: 45
End: 2020-07-13
Attending: NURSE PRACTITIONER
Payer: COMMERCIAL

## 2020-07-13 VITALS
SYSTOLIC BLOOD PRESSURE: 112 MMHG | HEIGHT: 62 IN | WEIGHT: 131.8 LBS | RESPIRATION RATE: 16 BRPM | BODY MASS INDEX: 24.25 KG/M2 | DIASTOLIC BLOOD PRESSURE: 76 MMHG | HEART RATE: 73 BPM

## 2020-07-13 DIAGNOSIS — M53.3 PAIN IN THE COCCYX: ICD-10-CM

## 2020-07-13 DIAGNOSIS — M25.511 CHRONIC RIGHT SHOULDER PAIN: ICD-10-CM

## 2020-07-13 DIAGNOSIS — G89.29 CHRONIC RIGHT SHOULDER PAIN: ICD-10-CM

## 2020-07-13 DIAGNOSIS — R10.31 RLQ ABDOMINAL PAIN: Primary | ICD-10-CM

## 2020-07-13 PROCEDURE — 99213 OFFICE O/P EST LOW 20 MIN: CPT | Performed by: NURSE PRACTITIONER

## 2020-07-13 PROCEDURE — 72220 X-RAY EXAM SACRUM TAILBONE: CPT

## 2020-07-13 ASSESSMENT — MIFFLIN-ST. JEOR: SCORE: 1196.09

## 2020-07-13 ASSESSMENT — PAIN SCALES - GENERAL: PAINLEVEL: MODERATE PAIN (4)

## 2020-07-13 NOTE — PROGRESS NOTES
"Subjective     Lisset MITCHELL Chi is a 44 year old female who presents to clinic today for the following health issues:    HPI     New patient to this provider today with multiple concerns as per below:      Possible Blood in stool, with softer and looser BM's (7/08 and 7/09/2020) after eating beet chips.  Abdominal pain and nausea, after BM's had pink color to tissue with wipping.  Burning around the anus with stooling.  Has Fx Hx - kidney stones (both parents).     Lump right side collar bone.  In November 2019 she fell.  Had right shoulder pain.  It aches.  Had a cortisone shot in right shoulder with Orthopedic.  Weaker right shoulder.  Back of neck pain.    Tail bone pain, that is waking her up at night.  Says she injured her tailbone as a child.  She will always need to crack her tailbone, but lately cannot crack her tailbone.  Aches, can't get comfortable, and will toss and turn.  Says she has history of hypermobility in her joints.    [employer] message from 7/8/20:  \"Today I found some redness that looked like blood in my stool. Once in the mid day and once in the evening. Full disclosure: yesterday I ate a bunch of beet chips, which I have never eaten before. I wonder about hemmoroids. I have also been having some bloating and tail bone pain off and on for a few weeks. I have a little very light cramping or heaviness in my lower abdomen, some hip pain (I have bursitis) and a slight burning sensation in the anus. I just now noticed a  pain in a spot over maybe my kidney area on my right side lower back. May be nothing, though. Family history of kidney stones... I have a history of injuring my tailbone as a child. Because I have asthma, I am afraid to be seen, but I am a little nervous about this. I've attached a picture. I think the white dots are a reflection from the camera. I am also overdue for the recheck on my ovarian cyst and the follow up on my neck and shoulder pain after getting a cortisone shot. Collar bone " "and back of neck still lumpy (swollen?) since I first noticed it over 7 months ago.  The  thought it might be a slipped disk causing arthritis, but of course I am nervous it could be something else, maybe a cancer that has spread. Please advise. Could a virtual visit work to start so I can reduce the number of visits I need to make? Thank you. Lisset Gates\"    Patient Active Problem List   Diagnosis     Irritable bowel syndrome     Premature ovarian failure     CARDIOVASCULAR SCREENING; LDL GOAL LESS THAN 160     Mild persistent asthma without complication     Trochanteric bursitis of both hips     Right knee pain     Adjustment disorder with depressed mood     Past Surgical History:   Procedure Laterality Date     ARTHROSCOPY KNEE Right 3/2/2018    Procedure: ARTHROSCOPY KNEE;  Right Knee Arthroscopy, Lateral Compartmnt Debridemen and  Anterior Chamber;  Surgeon: Rima Sarmiento MD;  Location: [facility] OR      SECTION           HC DILATION/CURETTAGE DIAG/THER NON OB  2003    D & C for retained placenta one week after the delivery     I&D BARTHOLIN GLAND ABSCESS   and     mcgrath cath      KNEE SURGERY      Rt knee Fx, repair-dislocation problem     KNEE SURGERY      Lt knee reconstructive surgery-dislocation problem     LEEP TX, CERVICAL      LEEP for abnormal pap     MARSUP BARTHOLIN GLAND CYST  08       Social History     Tobacco Use     Smoking status: Never Smoker     Smokeless tobacco: Never Used   Substance Use Topics     Alcohol use: Yes     Comment: social     Family History   Problem Relation Age of Onset     Thyroid Disease Mother      Diabetes Mother         type2     Eye Disorder Mother         cataracts     Gastrointestinal Disease Mother         hep a/has to have her throat stretched     Respiratory Mother         sleep apnea     Heart Disease Father 60     C.A.D. Paternal Grandfather 30        age 30, then again in his 80's     Diabetes Maternal " "Grandfather      Cancer Maternal Grandfather      Thyroid Disease Maternal Grandmother      Gynecology Maternal Grandmother         on bcp to keep period regular, a small uterus     Heart Disease Maternal Grandmother         tachycardia     Allergies Daughter         milk     Asthma Daughter          Current Outpatient Medications   Medication Sig Dispense Refill     albuterol (PROAIR HFA/PROVENTIL HFA/VENTOLIN HFA) 108 (90 Base) MCG/ACT inhaler Inhale 2 puffs into the lungs every 6 hours 18 g 3     diclofenac (VOLTAREN) 75 MG EC tablet Take 1 tablet (75 mg) by mouth 2 times daily 40 tablet 1     FEXOFENADINE  MG OR TABS 1 TABLET DAILY        fluticasone (FLONASE) 50 MCG/ACT nasal spray Spray 2 sprays into both nostrils daily       fluticasone (FLOVENT HFA) 110 MCG/ACT inhaler Inhale 2 puffs into the lungs 2 times daily 12 g 3     Calcium Carbonate-Vitamin D (CALCIUM + D PO) Take 600 mg by mouth.       MULTIPLE VITAMIN PO        Turmeric Curcumin 500 MG CAPS Take 500 mg by mouth daily       VITAMIN D, CHOLECALCIFEROL, PO Take by mouth daily       Allergies   Allergen Reactions     No Known Allergies      BP Readings from Last 3 Encounters:   07/13/20 112/76   01/29/20 112/68   01/14/20 104/62    Wt Readings from Last 3 Encounters:   07/13/20 59.8 kg (131 lb 12.8 oz)   01/29/20 59.4 kg (131 lb)   01/14/20 59.4 kg (131 lb)                  Reviewed and updated as needed this visit by Provider  Tobacco  Allergies  Meds  Problems  Med Hx  Surg Hx  Fam Hx         Review of Systems   Constitutional, HEENT, cardiovascular, pulmonary, musculoskeletal, gi and gu systems are negative, except as otherwise noted.      Objective    /76   Pulse 73   Resp 16   Ht 1.575 m (5' 2\")   Wt 59.8 kg (131 lb 12.8 oz)   LMP 12/25/2013   BMI 24.11 kg/m    Body mass index is 24.11 kg/m .  Physical Exam   GENERAL: healthy, alert and no distress  ABDOMEN: tenderness RLQ, no organomegaly or masses and bowel sounds " normal  RECTAL (female): normal sphincter tone, no rectal masses  MS: mild tenderness along clavicle and anterior right shoulder.  Right shoulder with decreased ROM in internal rotation  Back:  Tenderness to tailbone midline    Diagnostic Test Results:    Sacrum and coccyx x-ray  Radiologist's IMPRESSION: Chronic-appearing angulation of the sacrococcygeal junction. No displaced acute fracture is identified. 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 Sacrum is partially obscured by bowel gas/stool on the frontal view.        Assessment & Plan     1. RLQ abdominal pain  Due to h/o cysts patient will follow up with Ultrasound.  - US Pelvic Complete w Transvaginal; Future    2. Pain in the coccyx    - XR Sacrum and Coccyx 2 Views; Future    3. Chronic right shoulder pain  Patient will follow up with Dr. Larsen.  - Orthopedic & Spine  Referral; Future     Suspect the blood in stool was due to eating beet chips.  No hemorrhoids appreciated today.  Patient will try to eat beet chips again to see if the bloody stool reoccurs to see if this is the cause.  Otherwise, if the blood reoccurs without eating beet chips she will follow up.    Return in about 4 weeks (around 8/10/2020) for if symptoms worsen or fail to improve.    Kristi Rodriguez, NP  [facility]

## 2020-07-13 NOTE — PATIENT INSTRUCTIONS
[facility]     Discharged by : Kristi Rodriguez NP  Paper scripts provided to patient : none     If you have any questions regarding your visit please contact your care team:     Team Betzy              Clinic Hours Telephone Number     Dr. Emanuel Rodriguez, CNP   7am-7pm  Monday - Thursday   7am-5pm  Fridays  [phone]   (Appointment scheduling available 24/7)     RN Line  [phone] option 2     Urgent Care - [place] - 11am-9pm Monday-Friday Saturday-Sunday- 9am-5pm     [place] -   5pm-9pm Monday-Friday Saturday-Sunday- 9am-5pm    [phone] - [place] Zelaya    [phone] - [place]     For a Price Quote for your services, please call our Consumer Price Line at [phone].     What options do I have for visits at the clinic other than the traditional office visit?     To expand how we care for you, many of our providers are utilizing electronic visits (e-visits) and telephone visits, when medically appropriate, for interactions with their patients rather than a visit in the clinic. We also offer nurse visits for many medical concerns. Just like any other service, we will bill your insurance company for this type of visit based on time spent on the phone with your provider. Not all insurance companies cover these visits. Please check with your medical insurance if this type of visit is covered. You will be responsible for any charges that are not paid by your insurance.     E-visits via [employer]: generally incur a $45.00 fee.     Telephone visits:  Time spent on the phone: *charged based on time that is spent on the phone in increments of 10 minutes. Estimated cost:   5-10 mins $30.00   11-20 mins. $59.00   21-30 mins. $85.00       Use [employer] (secure email communication and access to your chart) to send your primary care provider a message or make an appointment. Ask someone on your Team how to sign up for [employer].      As always, Thank you for trusting us with your health care needs!      [facility]:    Scheduling Appointments  Marita Magallanes [facility]  Call: [phone]    Eloy Zaidi [facility]  Call: [phone]    [facility]  Call: [phone]    For Gastroenterology referrals   [facility]   Clinics and Surgery Center, 4th Floor   [address] [place] [address]   Appointments: [phone]    WHERE TO GO FOR CARE?    Clinic    Make an appointment if you:       Are sick (cold, cough, flu, sore throat, earache or in pain).       Have a small injury (sprain, small cut, burn or broken bone).       Need a physical exam, Pap smear, vaccine or prescription refill.       Have questions about your health or medicines.    To reach us:      Call [phone] ([phone]). Open 24 hours every day. (For counseling services, call [phone].)    Log into [employer] at [employer]. (Visit [email or web address] to create an account.) Hospital emergency room    An emergency is a serious or life- threatening problem that must be treated right away.    Call 249 or get to the hospital if you have:      Very bad or sudden:            - Chest pain or pressure         - Bleeding         - Head or belly pain         - Dizziness or trouble seeing, walking or                          Speaking      Problems breathing      Blood in your vomit or you are coughing up blood      A major injury (knocked out, loss of a finger or limb, rape, broken bone protruding from skin)    A mental health crisis. (Or call the Mental Health Crisis line at 1-132.783.6516 or Suicide Prevention Hotline at 1-853.684.4634.)    Open 24 hours every day. You don't need an appointment.     Urgent care    Visit urgent care for sickness or small injuries when the clinic is closed. You don't need an appointment. To check hours or find an urgent care near you, visit  [email or web address]. Online care    Get online care from [facility] for more than 70 common problems, like colds, allergies and infections. Open 24 hours every day at:   [email or web address]   Need help deciding?    For advice about where to be seen, you may call your clinic and ask to speak with a nurse. We're here for you 24 hours every day.         If you are deaf or hard of hearing, please let us know. We provide many free services including sign language interpreters, oral interpreters, TTYs, telephone amplifiers, note takers and written materials.

## 2020-07-14 ENCOUNTER — MYC MEDICAL ADVICE (OUTPATIENT)
Dept: FAMILY MEDICINE | Facility: CLINIC | Age: 45
End: 2020-07-14

## 2020-07-15 NOTE — TELEPHONE ENCOUNTER
I sent patient a [employer] message back.  Kept encounter open incase she responds.  If not, okay to close encounter.    Kristi Rodriguez, DNP, APRN, CNP

## 2020-07-29 ENCOUNTER — OFFICE VISIT (OUTPATIENT)
Dept: ORTHOPEDICS | Facility: CLINIC | Age: 45
End: 2020-07-29
Attending: NURSE PRACTITIONER
Payer: COMMERCIAL

## 2020-07-29 ENCOUNTER — ANCILLARY PROCEDURE (OUTPATIENT)
Dept: GENERAL RADIOLOGY | Facility: CLINIC | Age: 45
End: 2020-07-29
Attending: PREVENTIVE MEDICINE
Payer: COMMERCIAL

## 2020-07-29 VITALS — HEIGHT: 62 IN | BODY MASS INDEX: 24.11 KG/M2 | WEIGHT: 131 LBS

## 2020-07-29 DIAGNOSIS — M25.511 CHRONIC RIGHT SHOULDER PAIN: ICD-10-CM

## 2020-07-29 DIAGNOSIS — M12.811 ROTATOR CUFF ARTHROPATHY, RIGHT: Primary | ICD-10-CM

## 2020-07-29 DIAGNOSIS — M54.12 CERVICAL RADICULAR PAIN: ICD-10-CM

## 2020-07-29 DIAGNOSIS — M51.369 DDD (DEGENERATIVE DISC DISEASE), LUMBAR: ICD-10-CM

## 2020-07-29 DIAGNOSIS — G89.29 CHRONIC RIGHT SHOULDER PAIN: ICD-10-CM

## 2020-07-29 PROCEDURE — 72040 X-RAY EXAM NECK SPINE 2-3 VW: CPT | Performed by: RADIOLOGY

## 2020-07-29 PROCEDURE — 99214 OFFICE O/P EST MOD 30 MIN: CPT | Performed by: PREVENTIVE MEDICINE

## 2020-07-29 RX ORDER — DICLOFENAC SODIUM 75 MG/1
75 TABLET, DELAYED RELEASE ORAL 2 TIMES DAILY PRN
Qty: 40 TABLET | Refills: 1 | Status: SHIPPED | OUTPATIENT
Start: 2020-07-29 | End: 2021-06-09

## 2020-07-29 ASSESSMENT — MIFFLIN-ST. JEOR: SCORE: 1192.46

## 2020-07-29 ASSESSMENT — PATIENT HEALTH QUESTIONNAIRE - PHQ9: SUM OF ALL RESPONSES TO PHQ QUESTIONS 1-9: 10

## 2020-07-29 NOTE — NURSING NOTE
Depression Response    Patient completed the PHQ-9 assessment for depression and scored >9? Yes  Question 9 on the PHQ-9 was positive for suicidality? No  Is the patient already receiving treatment for depression? No  Patient would like to speak with behavioral health team ([facility] only)? No    Pt reported that she speaks with a counselor is not needing additional resources at this time.     I personally notified the following: visit provider and clinic nurse (writer)    Behavioral Health Contact Information    JJ Hughes, [facility]  [facility] Behavioral Health  Pager: [phone]    Phil Tejeda PsyD, AJ  [facility] Behavioral Health  Pager: [phone]    Alexys Diaz RN

## 2020-07-29 NOTE — LETTER
7/29/2020         RE: Lisset MITCHELL Chi  [address] [facility] [address]        Dear Colleague,    Thank you for referring your patient, Lisset MITCHELL Chi, to the [facility]. Please see a copy of my visit note below.    HISTORY OF PRESENT ILLNESS  Ms. Gates is a pleasant 44 year old year old female who presents to clinic today with neck and low back pain  Lisset explains that she has developed more low back pain over the past few months  Reports that she may have broken her tailbone when she was younger  She states that the shoulder injection she got in Jan. Was very helpful but then wore off  The voltaren does help her symptoms and uses occasionally  Has NOT done PT yet  Her neck and clavicle on the right bother her more as well  Has done some HEP  Location: neck and low back  Quality:  achy pain    Severity: 7/10 at worst    Duration: 6 months++  Timing: occurs intermittently  Context: occurs while moving throughout her day  Modifying factors:  resting and non-use makes it better, movement and use makes it worse  Associated signs & symptoms: neck pain and radiating right shoulder and clavicle pain  MEDICAL HISTORY  Patient Active Problem List   Diagnosis     Irritable bowel syndrome     Premature ovarian failure     CARDIOVASCULAR SCREENING; LDL GOAL LESS THAN 160     Mild persistent asthma without complication     Trochanteric bursitis of both hips     Right knee pain     Adjustment disorder with depressed mood       Current Outpatient Medications   Medication Sig Dispense Refill     albuterol (PROAIR HFA/PROVENTIL HFA/VENTOLIN HFA) 108 (90 Base) MCG/ACT inhaler Inhale 2 puffs into the lungs every 6 hours 18 g 3     Calcium Carbonate-Vitamin D (CALCIUM + D PO) Take 600 mg by mouth.       diclofenac (VOLTAREN) 75 MG EC tablet Take 1 tablet (75 mg) by mouth 2 times daily 40 tablet 1     FEXOFENADINE  MG OR TABS 1 TABLET DAILY        fluticasone (FLONASE) 50 MCG/ACT nasal spray Spray 2 sprays  into both nostrils daily       fluticasone (FLOVENT HFA) 110 MCG/ACT inhaler Inhale 2 puffs into the lungs 2 times daily 12 g 3     MULTIPLE VITAMIN PO        Turmeric Curcumin 500 MG CAPS Take 500 mg by mouth daily       VITAMIN D, CHOLECALCIFEROL, PO Take by mouth daily         Allergies   Allergen Reactions     No Known Allergies        Family History   Problem Relation Age of Onset     Thyroid Disease Mother      Diabetes Mother         type2     Eye Disorder Mother         cataracts     Gastrointestinal Disease Mother         hep a/has to have her throat stretched     Respiratory Mother         sleep apnea     Heart Disease Father 60     C.A.D. Paternal Grandfather 30        age 30, then again in his 80's     Diabetes Maternal Grandfather      Cancer Maternal Grandfather      Thyroid Disease Maternal Grandmother      Gynecology Maternal Grandmother         on bcp to keep period regular, a small uterus     Heart Disease Maternal Grandmother         tachycardia     Allergies Daughter         milk     Asthma Daughter      Social History     Socioeconomic History     Marital status:      Spouse name: Not on file     Number of children: 2     Years of education: Not on file     Highest education level: Not on file   Occupational History     Employer: [employer]   Social Needs     Financial resource strain: Not on file     Food insecurity     Worry: Not on file     Inability: Not on file     Transportation needs     Medical: Not on file     Non-medical: Not on file   Tobacco Use     Smoking status: Never Smoker     Smokeless tobacco: Never Used   Substance and Sexual Activity     Alcohol use: Yes     Comment: social     Drug use: No     Sexual activity: Yes     Partners: Male     Birth control/protection: Surgical     Comment:  had vasectomy   Lifestyle     Physical activity     Days per week: Not on file     Minutes per session: Not on file     Stress: Not on file   Relationships      Social connections     Talks on phone: Not on file     Gets together: Not on file     Attends Jehovah's witness service: Not on file     Active member of club or organization: Not on file     Attends meetings of clubs or organizations: Not on file     Relationship status: Not on file     Intimate partner violence     Fear of current or ex partner: Not on file     Emotionally abused: Not on file     Physically abused: Not on file     Forced sexual activity: Not on file   Other Topics Concern      Service No     Blood Transfusions Not Asked     Caffeine Concern No     Occupational Exposure No     Hobby Hazards Not Asked     Sleep Concern No     Stress Concern Yes     Weight Concern Yes     Comment: gains easily     Special Diet No     Back Care Yes     Exercise Yes     Comment: irregularly     Bike Helmet Not Asked     Comment: doesn't bike     Seat Belt No     Self-Exams No     Parent/sibling w/ CABG, MI or angioplasty before 65F 55M? No   Social History Narrative    Caffeine intake/servings daily - 0-1    Calcium intake/servings daily - 2-3    Exercise 2-3  times weekly - describe aerobics    Sunscreen used - Yes    Seatbelts used - Yes    Guns stored in the home - No    Self Breast Exam - Yes    Pap test up to date -  Yes    Eye exam up to date -  Yes    Dental exam up to date -  Yes    DEXA scan up to date -  Not Applicable    Flex Sig/Colonoscopy up to date -  Not Applicable    Mammography up to date -  Not Applicable    Immunizations reviewed and up to date - Yes    Abuse: Current or Past (Physical, Sexual or Emotional) - None current, past    Do you feel safe in your environment - Yes    Do you cope well with stress - Yes    Do you suffer from insomnia - No    Last updated by: Estefani Moraes MA 5/5/2010               Additional medical/Social/Surgical histories reviewed in [facility] and updated as appropriate.     REVIEW OF SYSTEMS (7/29/2020)  10 point ROS of systems including Constitutional, Eyes, Respiratory,  "Cardiovascular, Gastroenterology, Genitourinary, Integumentary, Musculoskeletal, Psychiatric, Allergic/Immunologic were all negative except for pertinent positives noted in my HPI.     PHYSICAL EXAM  Vitals:    07/29/20 0815   Weight: 59.4 kg (131 lb)   Height: 1.575 m (5' 2\")     Vital Signs: Ht 1.575 m (5' 2\")   Wt 59.4 kg (131 lb)   LMP 12/25/2013   BMI 23.96 kg/m   Patient declined being weighed. Body mass index is 23.96 kg/m .    General  - normal appearance, in no obvious distress  HEENT  - conjunctivae not injected, moist mucous membranes, normocephalic/atraumatic head, ears normal appearance, no lesions, mouth normal appearance, no scars, normal dentition and teeth present  CV  - normal radial pulse  Pulm  - normal respiratory pattern, non-labored  Musculoskeletal - right shoulder  - inspection: normal bone and joint alignment, no obvious deformity, no scapular winging, no AC step-off  - palpation: mildly tender RC insertion, normal clavicle, non-tender AC  - ROM:  painful and limited flexion and ER at end range, limited IR  - strength: 5/5  strength, 5/5 in all shoulder planes  - special tests:  (-) Speed's  (+) Neer  (+) Hawkin's  (+) Sergey's  (-) [place]'s  (-) apprehension  (-) subscap lift-off  Neuro  - no sensory or motor deficit, grossly normal coordination, normal muscle tone  Skin  - no ecchymosis, erythema, warmth, or induration, no obvious rash  Psych  - interactive, appropriate, normal mood and affect  Neck: has pain with flexion and extension, with slightly positive spurlings on right  Low back: has pain with flexion and extension in low back, positive SLR on left  ASSESSMENT & PLAN  43 yo female with cervical and lumbar ddd, radicular pain, and right shoulder rotator cuff arthropathy, not reslolved  Reviewed lumbar xrays: shows ddd  Reviewed cervical xrays: shows ddd  Reviewed right shoulder xrays: shows some ac arthritis  Consider shoulder injection  Consider MRIs and ordered neck and " lumbar  Consider ESIs   Consider PT  Refilled voltaren  F/u after MRIs  Discussed her PHQ 9 and recommended she visit with her counseling    Floyd Larsen MD, CAM      Again, thank you for allowing me to participate in the care of your patient.        Sincerely,        Floyd Larsen MD

## 2020-07-29 NOTE — PROGRESS NOTES
HISTORY OF PRESENT ILLNESS  Ms. Gates is a pleasant 44 year old year old female who presents to clinic today with neck and low back pain  Lisset explains that she has developed more low back pain over the past few months  Reports that she may have broken her tailbone when she was younger  She states that the shoulder injection she got in Jan. Was very helpful but then wore off  The voltaren does help her symptoms and uses occasionally  Has NOT done PT yet  Her neck and clavicle on the right bother her more as well  Has done some HEP  Location: neck and low back  Quality:  achy pain    Severity: 7/10 at worst    Duration: 6 months++  Timing: occurs intermittently  Context: occurs while moving throughout her day  Modifying factors:  resting and non-use makes it better, movement and use makes it worse  Associated signs & symptoms: neck pain and radiating right shoulder and clavicle pain  MEDICAL HISTORY  Patient Active Problem List   Diagnosis     Irritable bowel syndrome     Premature ovarian failure     CARDIOVASCULAR SCREENING; LDL GOAL LESS THAN 160     Mild persistent asthma without complication     Trochanteric bursitis of both hips     Right knee pain     Adjustment disorder with depressed mood       Current Outpatient Medications   Medication Sig Dispense Refill     albuterol (PROAIR HFA/PROVENTIL HFA/VENTOLIN HFA) 108 (90 Base) MCG/ACT inhaler Inhale 2 puffs into the lungs every 6 hours 18 g 3     Calcium Carbonate-Vitamin D (CALCIUM + D PO) Take 600 mg by mouth.       diclofenac (VOLTAREN) 75 MG EC tablet Take 1 tablet (75 mg) by mouth 2 times daily 40 tablet 1     FEXOFENADINE  MG OR TABS 1 TABLET DAILY        fluticasone (FLONASE) 50 MCG/ACT nasal spray Spray 2 sprays into both nostrils daily       fluticasone (FLOVENT HFA) 110 MCG/ACT inhaler Inhale 2 puffs into the lungs 2 times daily 12 g 3     MULTIPLE VITAMIN PO        Turmeric Curcumin 500 MG CAPS Take 500 mg by mouth daily       VITAMIN D,  CHOLECALCIFEROL, PO Take by mouth daily         Allergies   Allergen Reactions     No Known Allergies        Family History   Problem Relation Age of Onset     Thyroid Disease Mother      Diabetes Mother         type2     Eye Disorder Mother         cataracts     Gastrointestinal Disease Mother         hep a/has to have her throat stretched     Respiratory Mother         sleep apnea     Heart Disease Father 60     C.A.D. Paternal Grandfather 30        age 30, then again in his 80's     Diabetes Maternal Grandfather      Cancer Maternal Grandfather      Thyroid Disease Maternal Grandmother      Gynecology Maternal Grandmother         on bcp to keep period regular, a small uterus     Heart Disease Maternal Grandmother         tachycardia     Allergies Daughter         milk     Asthma Daughter      Social History     Socioeconomic History     Marital status:      Spouse name: Not on file     Number of children: 2     Years of education: Not on file     Highest education level: Not on file   Occupational History     Employer: [employer]   Social Needs     Financial resource strain: Not on file     Food insecurity     Worry: Not on file     Inability: Not on file     Transportation needs     Medical: Not on file     Non-medical: Not on file   Tobacco Use     Smoking status: Never Smoker     Smokeless tobacco: Never Used   Substance and Sexual Activity     Alcohol use: Yes     Comment: social     Drug use: No     Sexual activity: Yes     Partners: Male     Birth control/protection: Surgical     Comment:  had vasectomy   Lifestyle     Physical activity     Days per week: Not on file     Minutes per session: Not on file     Stress: Not on file   Relationships     Social connections     Talks on phone: Not on file     Gets together: Not on file     Attends [employer] service: Not on file     Active member of club or organization: Not on file     Attends meetings of clubs or organizations: Not on  file     Relationship status: Not on file     Intimate partner violence     Fear of current or ex partner: Not on file     Emotionally abused: Not on file     Physically abused: Not on file     Forced sexual activity: Not on file   Other Topics Concern      Service No     Blood Transfusions Not Asked     Caffeine Concern No     Occupational Exposure No     Hobby Hazards Not Asked     Sleep Concern No     Stress Concern Yes     Weight Concern Yes     Comment: gains easily     Special Diet No     Back Care Yes     Exercise Yes     Comment: irregularly     Bike Helmet Not Asked     Comment: doesn't bike     Seat Belt No     Self-Exams No     Parent/sibling w/ CABG, MI or angioplasty before 65F 55M? No   Social History Narrative    Caffeine intake/servings daily - 0-1    Calcium intake/servings daily - 2-3    Exercise 2-3  times weekly - describe aerobics    Sunscreen used - Yes    Seatbelts used - Yes    Guns stored in the home - No    Self Breast Exam - Yes    Pap test up to date -  Yes    Eye exam up to date -  Yes    Dental exam up to date -  Yes    DEXA scan up to date -  Not Applicable    Flex Sig/Colonoscopy up to date -  Not Applicable    Mammography up to date -  Not Applicable    Immunizations reviewed and up to date - Yes    Abuse: Current or Past (Physical, Sexual or Emotional) - None current, past    Do you feel safe in your environment - Yes    Do you cope well with stress - Yes    Do you suffer from insomnia - No    Last updated by: Estefani Moraes MA 5/5/2010               Additional medical/Social/Surgical histories reviewed in [facility] and updated as appropriate.     REVIEW OF SYSTEMS (7/29/2020)  10 point ROS of systems including Constitutional, Eyes, Respiratory, Cardiovascular, Gastroenterology, Genitourinary, Integumentary, Musculoskeletal, Psychiatric, Allergic/Immunologic were all negative except for pertinent positives noted in my HPI.     PHYSICAL EXAM  Vitals:    07/29/20 0815   Weight: 59.4 kg  "(131 lb)   Height: 1.575 m (5' 2\")     Vital Signs: Ht 1.575 m (5' 2\")   Wt 59.4 kg (131 lb)   LMP 12/25/2013   BMI 23.96 kg/m   Patient declined being weighed. Body mass index is 23.96 kg/m .    General  - normal appearance, in no obvious distress  HEENT  - conjunctivae not injected, moist mucous membranes, normocephalic/atraumatic head, ears normal appearance, no lesions, mouth normal appearance, no scars, normal dentition and teeth present  CV  - normal radial pulse  Pulm  - normal respiratory pattern, non-labored  Musculoskeletal - right shoulder  - inspection: normal bone and joint alignment, no obvious deformity, no scapular winging, no AC step-off  - palpation: mildly tender RC insertion, normal clavicle, non-tender AC  - ROM:  painful and limited flexion and ER at end range, limited IR  - strength: 5/5  strength, 5/5 in all shoulder planes  - special tests:  (-) Speed's  (+) Neer  (+) Hawkin's  (+) Sergey's  (-) [place]'s  (-) apprehension  (-) subscap lift-off  Neuro  - no sensory or motor deficit, grossly normal coordination, normal muscle tone  Skin  - no ecchymosis, erythema, warmth, or induration, no obvious rash  Psych  - interactive, appropriate, normal mood and affect  Neck: has pain with flexion and extension, with slightly positive spurlings on right  Low back: has pain with flexion and extension in low back, positive SLR on left  ASSESSMENT & PLAN  45 yo female with cervical and lumbar ddd, radicular pain, and right shoulder rotator cuff arthropathy, not reslolved  Reviewed lumbar xrays: shows ddd  Reviewed cervical xrays: shows ddd  Reviewed right shoulder xrays: shows some ac arthritis  Consider shoulder injection  Consider MRIs and ordered neck and lumbar  Consider ESIs   Consider PT  Refilled voltaren  F/u after MRIs  Discussed her PHQ 9 and recommended she visit with her counseling    Floyd Larsen MD, CAQSM    "

## 2020-07-29 NOTE — PATIENT INSTRUCTIONS
Thanks for coming today.  [facility]  [address] [place] [address]    To schedule future appointments in Ortho Clinic, you may call [phone].    To schedule ordered imaging by your provider:   Call [facility] [phone]    To schedule an injection ordered by your provider:  Call [facility] Injection scheduling line: [phone]  [employer] available online at:  [email or web address]    Please call if any further questions or concerns ([phone]).  Clinic hours 8 am to 5 pm.    Return to clinic (call) if symptoms worsen or fail to improve.    ----------------------------------------------------------------------------------    We understand that you may already have resources and/or are seeing someone, but we wanted to make sure that you felt comfortable asking for help. Below is just a little overview of depression. Please do not hesitate to reach out to us or your primary care provider to discuss.    Patient Education     Depression  Depression is one of the most common mental health problems today. It is not just a state of unhappiness or sadness. It is a true disease. The cause seems to be related to a decrease in chemicals that transmit signals in the brain. Having a family history of depression, alcoholism, or suicide increases the risk. Chronic illness, chronic pain, migraine headaches, and high emotional stress also increase the risk.  Depression is something we tend to recognize in others, but may have a hard time seeing in ourselves. It can show in many physical and emotional ways:    Loss of appetite    Overeating    Not being able to sleep    Sleeping too much    Tiredness not related to physical exertion    Restlessness or irritability    Slowness of movement or speech    Feeling depressed or withdrawn    Loss of interest in things you once enjoyed    Trouble concentrating, poor memory, trouble making decisions    Thoughts of harming or  killing oneself, or thoughts that life is not worth living    Low self-esteem  The treatment for depression may include both medicine and psychotherapy. Antidepressants can reduce suffering and can improve the ability to function during the depressed period. Therapy can offer emotional support and help you understand emotional factors that may be causing the depression.  Home care    Ongoing care and support help people manage this disease. Find a healthcare provider and therapist who meet your needs. Seek help when you feel like you may be getting ill.    Be kind to yourself. Make it a point to do things that you enjoy (gardening, walking in nature, going to a movie). Reward yourself for small successes.    Take care of your physical body. Eat a balanced diet (low in saturated fat and high in fruits and vegetables). Exercise at least 3 times a week for 30 minutes. Even mild-moderate exercise (like brisk walking) can make you feel better.    Don't drink alcohol, which can make depression worse.    Take medicine as prescribed.    Tell each of your healthcare providers about all of the prescription and over-the-counter medicines, vitamins, and supplements you take. Certain supplements interact with medicines and can result in dangerous side effects. Ask your pharmacist when you have questions about medicine interactions.    Talk with your family and trusted friends about your feelings and thoughts. Ask them to help you recognize behavior changes early so you can get help and, if needed, medicine can be adjusted.    Follow-up care  Follow up with your healthcare provider, or as advised.  Call 911  Call 911 if you:    Have suicidal thoughts, a suicide plan, and the means to carry out the plan; or serious thoughts of hurting someone else     Have trouble breathing    Are very confused    Feel very drowsy or have trouble awakening    Faint or lose consciousness    Have new chest pain that becomes more severe, lasts  longer, or spreads into your shoulder, arm, neck, jaw, or back  When to seek medical advice  Call your healthcare provider right away if any of these happen:    Feeling extreme depression, fear, anxiety, or anger toward yourself or others    Feeling out of control    Feeling that you may try to harm yourself or another    Hearing voices that others do not hear    Seeing things that others do not see    Can t sleep or eat for 3 days in a row    Friends or family express concern over your behavior and ask you to seek help  Date Last Reviewed: 10/1/2017    6828-1761 [employer]. [address] [place] [address]. All rights reserved. This information is not intended as a substitute for professional medical care. Always follow your healthcare professional's instructions.

## 2020-08-17 DIAGNOSIS — M54.12 CERVICAL RADICULAR PAIN: ICD-10-CM

## 2020-08-17 DIAGNOSIS — M51.369 DDD (DEGENERATIVE DISC DISEASE), LUMBAR: ICD-10-CM

## 2020-08-17 NOTE — TELEPHONE ENCOUNTER
tiZANidine (ZANAFLEX) 4 MG tablet  30 tablet  1  7/29/2020   --    Sig - Route: Take 1-2 tablets (4-8 mg) by mouth nightly as needed - Oral    Sent to pharmacy as: tiZANidine HCl 4 MG Oral Tablet (Zanaflex)    Class: E-Prescribe    Order: [id number]    E-Prescribing Status: Receipt confirmed by pharmacy (7/29/2020  9:43 AM CDT)      Received request for 90 day supply from [facility].

## 2020-08-20 ENCOUNTER — ANCILLARY PROCEDURE (OUTPATIENT)
Dept: MRI IMAGING | Facility: CLINIC | Age: 45
End: 2020-08-20
Attending: PREVENTIVE MEDICINE
Payer: COMMERCIAL

## 2020-08-20 DIAGNOSIS — M51.369 DDD (DEGENERATIVE DISC DISEASE), LUMBAR: ICD-10-CM

## 2020-08-20 DIAGNOSIS — M54.12 CERVICAL RADICULAR PAIN: ICD-10-CM

## 2020-08-20 PROCEDURE — 72141 MRI NECK SPINE W/O DYE: CPT | Mod: 51 | Performed by: RADIOLOGY

## 2020-08-20 PROCEDURE — 72148 MRI LUMBAR SPINE W/O DYE: CPT | Performed by: RADIOLOGY

## 2020-09-09 ENCOUNTER — VIRTUAL VISIT (OUTPATIENT)
Dept: ORTHOPEDICS | Facility: CLINIC | Age: 45
End: 2020-09-09
Payer: COMMERCIAL

## 2020-09-09 DIAGNOSIS — M62.830 SPASM OF LUMBAR PARASPINOUS MUSCLE: ICD-10-CM

## 2020-09-09 DIAGNOSIS — M50.20 CERVICAL DISC HERNIATION: Primary | ICD-10-CM

## 2020-09-09 DIAGNOSIS — J45.30 MILD PERSISTENT ASTHMA WITHOUT COMPLICATION: ICD-10-CM

## 2020-09-09 PROCEDURE — 99213 OFFICE O/P EST LOW 20 MIN: CPT | Mod: 95 | Performed by: PREVENTIVE MEDICINE

## 2020-09-09 NOTE — PROGRESS NOTES
"Lisset MITCHELL Chi is a 44 year old female who is being evaluated via a billable video visit.      The patient has been notified of following:     \"This video visit will be conducted via a call between you and your physician/provider. We have found that certain health care needs can be provided without the need for an in-person physical exam.  This service lets us provide the care you need with a video conversation.  If a prescription is necessary we can send it directly to your pharmacy.  If lab work is needed we can place an order for that and you can then stop by our lab to have the test done at a later time.    Video visits are billed at different rates depending on your insurance coverage.  Please reach out to your insurance provider with any questions.    If during the course of the call the physician/provider feels a video visit is not appropriate, you will not be charged for this service.\"    Patient has given verbal consent for Video visit? Yes  How would you like to obtain your AVS? MyChart  If you are dropped from the video visit, the video invite should be resent to: Text to cell phone: 22  Will anyone else be joining your video visit? No      HISTORY OF PRESENT ILLNESS  Ms. Gates is a pleasant 44 year old year old female who presents to clinic today with neck and low back  Lisset explains that she had her MRIs, still having pain in neck and radiates into shoulder, low back is a little better, but tailbone and radiating pain to hip still occurs  Location: neck and low back  Quality:  achy pain    Severity: 7/10 at worst    Duration: past 6 months  Timing: occurs intermittently  Context: occurs while exercising and lifting  Modifying factors:  resting and non-use makes it better, movement and use makes it worse  Associated signs & symptoms: tailbone pain that radiates into hip, neck pain that radiates into shoulder and chest    MEDICAL HISTORY  Patient Active Problem List   Diagnosis     Irritable bowel syndrome "     Premature ovarian failure     CARDIOVASCULAR SCREENING; LDL GOAL LESS THAN 160     Mild persistent asthma without complication     Trochanteric bursitis of both hips     Right knee pain     Adjustment disorder with depressed mood       Current Outpatient Medications   Medication Sig Dispense Refill     albuterol (PROAIR HFA/PROVENTIL HFA/VENTOLIN HFA) 108 (90 Base) MCG/ACT inhaler Inhale 2 puffs into the lungs every 6 hours 18 g 3     Calcium Carbonate-Vitamin D (CALCIUM + D PO) Take 600 mg by mouth.       diclofenac (VOLTAREN) 75 MG EC tablet Take 1 tablet (75 mg) by mouth 2 times daily as needed 40 tablet 1     diclofenac (VOLTAREN) 75 MG EC tablet Take 1 tablet (75 mg) by mouth 2 times daily 40 tablet 1     FEXOFENADINE  MG OR TABS 1 TABLET DAILY        fluticasone (FLONASE) 50 MCG/ACT nasal spray Spray 2 sprays into both nostrils daily       fluticasone (FLOVENT HFA) 110 MCG/ACT inhaler Inhale 2 puffs into the lungs 2 times daily 12 g 3     MULTIPLE VITAMIN PO        tiZANidine (ZANAFLEX) 4 MG tablet Take 1-2 tablets (4-8 mg) by mouth nightly as needed 90 tablet 1     Turmeric Curcumin 500 MG CAPS Take 500 mg by mouth daily       VITAMIN D, CHOLECALCIFEROL, PO Take by mouth daily         Allergies   Allergen Reactions     No Known Allergies        Family History   Problem Relation Age of Onset     Thyroid Disease Mother      Diabetes Mother         type2     Eye Disorder Mother         cataracts     Gastrointestinal Disease Mother         hep a/has to have her throat stretched     Respiratory Mother         sleep apnea     Heart Disease Father 60     C.A.D. Paternal Grandfather 30        age 30, then again in his 80's     Diabetes Maternal Grandfather      Cancer Maternal Grandfather      Thyroid Disease Maternal Grandmother      Gynecology Maternal Grandmother         on bcp to keep period regular, a small uterus     Heart Disease Maternal Grandmother         tachycardia     Allergies Daughter          milk     Asthma Daughter      Social History     Socioeconomic History     Marital status:      Spouse name: Not on file     Number of children: 2     Years of education: Not on file     Highest education level: Not on file   Occupational History     Employer: "[employer]   Social Needs     Financial resource strain: Not on file     Food insecurity     Worry: Not on file     Inability: Not on file     Transportation needs     Medical: Not on file     Non-medical: Not on file   Tobacco Use     Smoking status: Never Smoker     Smokeless tobacco: Never Used   Substance and Sexual Activity     Alcohol use: Yes     Comment: social     Drug use: No     Sexual activity: Yes     Partners: Male     Birth control/protection: Surgical     Comment:  had vasectomy   Lifestyle     Physical activity     Days per week: Not on file     Minutes per session: Not on file     Stress: Not on file   Relationships     Social connections     Talks on phone: Not on file     Gets together: Not on file     Attends Catholic service: Not on file     Active member of club or organization: Not on file     Attends meetings of clubs or organizations: Not on file     Relationship status: Not on file     Intimate partner violence     Fear of current or ex partner: Not on file     Emotionally abused: Not on file     Physically abused: Not on file     Forced sexual activity: Not on file   Other Topics Concern      Service No     Blood Transfusions Not Asked     Caffeine Concern No     Occupational Exposure No     Hobby Hazards Not Asked     Sleep Concern No     Stress Concern Yes     Weight Concern Yes     Comment: gains easily     Special Diet No     Back Care Yes     Exercise Yes     Comment: irregularly     Bike Helmet Not Asked     Comment: doesn't bike     Seat Belt No     Self-Exams No     Parent/sibling w/ CABG, MI or angioplasty before 65F 55M? No   Social History Narrative    Caffeine intake/servings daily -  0-1    Calcium intake/servings daily - 2-3    Exercise 2-3  times weekly - describe aerobics    Sunscreen used - Yes    Seatbelts used - Yes    Guns stored in the home - No    Self Breast Exam - Yes    Pap test up to date -  Yes    Eye exam up to date -  Yes    Dental exam up to date -  Yes    DEXA scan up to date -  Not Applicable    Flex Sig/Colonoscopy up to date -  Not Applicable    Mammography up to date -  Not Applicable    Immunizations reviewed and up to date - Yes    Abuse: Current or Past (Physical, Sexual or Emotional) - None current, past    Do you feel safe in your environment - Yes    Do you cope well with stress - Yes    Do you suffer from insomnia - No    Last updated by: Estefani Moraes MA 5/5/2010               Additional medical/Social/Surgical histories reviewed in [employer] and updated as appropriate.     REVIEW OF SYSTEMS (9/9/2020)  10 point ROS of systems including Constitutional, Eyes, Respiratory, Cardiovascular, Gastroenterology, Genitourinary, Integumentary, Musculoskeletal, Psychiatric, Allergic/Immunologic were all negative except for pertinent positives noted in my HPI.       ASSESSMENT & PLAN  45 yo female with cervical disc herniation radicular pain, not resolved, and lumbar spasms  Reviewed lumbar MRI: shows no significant herniated discs  Reviewed cervical MRI: shows disc herniations  Consider cervical Coleen  Cont. nsaids and use tizanadine, which she has not employed yet  Doesn't want to pursue PT yet  Cont. HEP  F/u in 1 month      Floyd Larsen MD, CAQSM    Video-Visit Details    Type of service:  Video Visit    Video Start Time: 3:30pm  Video End Time: 3:46pm    Originating Location (pt. Location): Home    Distant Location (provider location):  [facility]     Platform used for Video Visit: Stephanie Larsen MD

## 2020-09-09 NOTE — LETTER
"    9/9/2020         RE: Lisset MITCHELL Chi  [address] [address]        Dear Colleague,    Thank you for referring your patient, Lisset MITCHELL Chi, to the [facility]. Please see a copy of my visit note below.    Lisset MITCHELL Chi is a 44 year old female who is being evaluated via a billable video visit.      The patient has been notified of following:     \"This video visit will be conducted via a call between you and your physician/provider. We have found that certain health care needs can be provided without the need for an in-person physical exam.  This service lets us provide the care you need with a video conversation.  If a prescription is necessary we can send it directly to your pharmacy.  If lab work is needed we can place an order for that and you can then stop by our lab to have the test done at a later time.    Video visits are billed at different rates depending on your insurance coverage.  Please reach out to your insurance provider with any questions.    If during the course of the call the physician/provider feels a video visit is not appropriate, you will not be charged for this service.\"    Patient has given verbal consent for Video visit? Yes  How would you like to obtain your AVS? MyChart  If you are dropped from the video visit, the video invite should be resent to: Text to cell phone: 22  Will anyone else be joining your video visit? No      HISTORY OF PRESENT ILLNESS  Ms. Gates is a pleasant 44 year old year old female who presents to clinic today with neck and low back  Lisset explains that she had her MRIs, still having pain in neck and radiates into shoulder, low back is a little better, but tailbone and radiating pain to hip still occurs  Location: neck and low back  Quality:  achy pain    Severity: 7/10 at worst    Duration: past 6 months  Timing: occurs intermittently  Context: occurs while exercising and lifting  Modifying factors:  resting and non-use makes it better, " movement and use makes it worse  Associated signs & symptoms: tailbone pain that radiates into hip, neck pain that radiates into shoulder and chest    MEDICAL HISTORY  Patient Active Problem List   Diagnosis     Irritable bowel syndrome     Premature ovarian failure     CARDIOVASCULAR SCREENING; LDL GOAL LESS THAN 160     Mild persistent asthma without complication     Trochanteric bursitis of both hips     Right knee pain     Adjustment disorder with depressed mood       Current Outpatient Medications   Medication Sig Dispense Refill     albuterol (PROAIR HFA/PROVENTIL HFA/VENTOLIN HFA) 108 (90 Base) MCG/ACT inhaler Inhale 2 puffs into the lungs every 6 hours 18 g 3     Calcium Carbonate-Vitamin D (CALCIUM + D PO) Take 600 mg by mouth.       diclofenac (VOLTAREN) 75 MG EC tablet Take 1 tablet (75 mg) by mouth 2 times daily as needed 40 tablet 1     diclofenac (VOLTAREN) 75 MG EC tablet Take 1 tablet (75 mg) by mouth 2 times daily 40 tablet 1     FEXOFENADINE  MG OR TABS 1 TABLET DAILY        fluticasone (FLONASE) 50 MCG/ACT nasal spray Spray 2 sprays into both nostrils daily       fluticasone (FLOVENT HFA) 110 MCG/ACT inhaler Inhale 2 puffs into the lungs 2 times daily 12 g 3     MULTIPLE VITAMIN PO        tiZANidine (ZANAFLEX) 4 MG tablet Take 1-2 tablets (4-8 mg) by mouth nightly as needed 90 tablet 1     Turmeric Curcumin 500 MG CAPS Take 500 mg by mouth daily       VITAMIN D, CHOLECALCIFEROL, PO Take by mouth daily         Allergies   Allergen Reactions     No Known Allergies        Family History   Problem Relation Age of Onset     Thyroid Disease Mother      Diabetes Mother         type2     Eye Disorder Mother         cataracts     Gastrointestinal Disease Mother         hep a/has to have her throat stretched     Respiratory Mother         sleep apnea     Heart Disease Father 60     C.A.D. Paternal Grandfather 30        age 30, then again in his 80's     Diabetes Maternal Grandfather      Cancer  Maternal Grandfather      Thyroid Disease Maternal Grandmother      Gynecology Maternal Grandmother         on bcp to keep period regular, a small uterus     Heart Disease Maternal Grandmother         tachycardia     Allergies Daughter         milk     Asthma Daughter      Social History     Socioeconomic History     Marital status:      Spouse name: Not on file     Number of children: 2     Years of education: Not on file     Highest education level: Not on file   Occupational History     Employer: [employer]   Social Needs     Financial resource strain: Not on file     Food insecurity     Worry: Not on file     Inability: Not on file     Transportation needs     Medical: Not on file     Non-medical: Not on file   Tobacco Use     Smoking status: Never Smoker     Smokeless tobacco: Never Used   Substance and Sexual Activity     Alcohol use: Yes     Comment: social     Drug use: No     Sexual activity: Yes     Partners: Male     Birth control/protection: Surgical     Comment:  had vasectomy   Lifestyle     Physical activity     Days per week: Not on file     Minutes per session: Not on file     Stress: Not on file   Relationships     Social connections     Talks on phone: Not on file     Gets together: Not on file     Attends Christianity service: Not on file     Active member of club or organization: Not on file     Attends meetings of clubs or organizations: Not on file     Relationship status: Not on file     Intimate partner violence     Fear of current or ex partner: Not on file     Emotionally abused: Not on file     Physically abused: Not on file     Forced sexual activity: Not on file   Other Topics Concern      Service No     Blood Transfusions Not Asked     Caffeine Concern No     Occupational Exposure No     Hobby Hazards Not Asked     Sleep Concern No     Stress Concern Yes     Weight Concern Yes     Comment: gains easily     Special Diet No     Back Care Yes     Exercise  Yes     Comment: irregularly     Bike Helmet Not Asked     Comment: doesn't bike     Seat Belt No     Self-Exams No     Parent/sibling w/ CABG, MI or angioplasty before 65F 55M? No   Social History Narrative    Caffeine intake/servings daily - 0-1    Calcium intake/servings daily - 2-3    Exercise 2-3  times weekly - describe aerobics    Sunscreen used - Yes    Seatbelts used - Yes    Guns stored in the home - No    Self Breast Exam - Yes    Pap test up to date -  Yes    Eye exam up to date -  Yes    Dental exam up to date -  Yes    DEXA scan up to date -  Not Applicable    Flex Sig/Colonoscopy up to date -  Not Applicable    Mammography up to date -  Not Applicable    Immunizations reviewed and up to date - Yes    Abuse: Current or Past (Physical, Sexual or Emotional) - None current, past    Do you feel safe in your environment - Yes    Do you cope well with stress - Yes    Do you suffer from insomnia - No    Last updated by: Estefani Moraes MA 5/5/2010               Additional medical/Social/Surgical histories reviewed in [facility] and updated as appropriate.     REVIEW OF SYSTEMS (9/9/2020)  10 point ROS of systems including Constitutional, Eyes, Respiratory, Cardiovascular, Gastroenterology, Genitourinary, Integumentary, Musculoskeletal, Psychiatric, Allergic/Immunologic were all negative except for pertinent positives noted in my HPI.       ASSESSMENT & PLAN  45 yo female with cervical disc herniation radicular pain, not resolved, and lumbar spasms  Reviewed lumbar MRI: shows no significant herniated discs  Reviewed cervical MRI: shows disc herniations  Consider cervical Coleen  Cont. nsaids and use tizanadine, which she has not employed yet  Doesn't want to pursue PT yet  Cont. HEP  F/u in 1 month      Floyd Larsen MD, CAQSM    Video-Visit Details    Type of service:  Video Visit    Video Start Time: 3:30pm  Video End Time: 3:46pm    Originating Location (pt. Location): Home    Distant Location (provider location):  M  [facility]     Platform used for Video Visit: Stephanie Larsen MD        Again, thank you for allowing me to participate in the care of your patient.        Sincerely,        Floyd Larsen MD

## 2020-09-09 NOTE — LETTER
"    9/9/2020         RE: Lisset MITCHELL Chi  [address] [address]        Dear Colleague,    Thank you for referring your patient, Lisset MITCHELL Chi, to the [facility]. Please see a copy of my visit note below.    Lisset MITCHELL Chi is a 44 year old female who is being evaluated via a billable video visit.      The patient has been notified of following:     \"This video visit will be conducted via a call between you and your physician/provider. We have found that certain health care needs can be provided without the need for an in-person physical exam.  This service lets us provide the care you need with a video conversation.  If a prescription is necessary we can send it directly to your pharmacy.  If lab work is needed we can place an order for that and you can then stop by our lab to have the test done at a later time.    Video visits are billed at different rates depending on your insurance coverage.  Please reach out to your insurance provider with any questions.    If during the course of the call the physician/provider feels a video visit is not appropriate, you will not be charged for this service.\"    Patient has given verbal consent for Video visit? Yes  How would you like to obtain your AVS? MyChart  If you are dropped from the video visit, the video invite should be resent to: Text to cell phone: 22  Will anyone else be joining your video visit? No      HISTORY OF PRESENT ILLNESS  Ms. Gates is a pleasant 44 year old year old female who presents to clinic today with neck and low back  Lisset explains that she had her MRIs, still having pain in neck and radiates into shoulder, low back is a little better, but tailbone and radiating pain to hip still occurs  Location: neck and low back  Quality:  achy pain    Severity: 7/10 at worst    Duration: past 6 months  Timing: occurs intermittently  Context: occurs while exercising and lifting  Modifying factors:  resting and non-use makes it better, " movement and use makes it worse  Associated signs & symptoms: tailbone pain that radiates into hip, neck pain that radiates into shoulder and chest    MEDICAL HISTORY  Patient Active Problem List   Diagnosis     Irritable bowel syndrome     Premature ovarian failure     CARDIOVASCULAR SCREENING; LDL GOAL LESS THAN 160     Mild persistent asthma without complication     Trochanteric bursitis of both hips     Right knee pain     Adjustment disorder with depressed mood       Current Outpatient Medications   Medication Sig Dispense Refill     albuterol (PROAIR HFA/PROVENTIL HFA/VENTOLIN HFA) 108 (90 Base) MCG/ACT inhaler Inhale 2 puffs into the lungs every 6 hours 18 g 3     Calcium Carbonate-Vitamin D (CALCIUM + D PO) Take 600 mg by mouth.       diclofenac (VOLTAREN) 75 MG EC tablet Take 1 tablet (75 mg) by mouth 2 times daily as needed 40 tablet 1     diclofenac (VOLTAREN) 75 MG EC tablet Take 1 tablet (75 mg) by mouth 2 times daily 40 tablet 1     FEXOFENADINE  MG OR TABS 1 TABLET DAILY        fluticasone (FLONASE) 50 MCG/ACT nasal spray Spray 2 sprays into both nostrils daily       fluticasone (FLOVENT HFA) 110 MCG/ACT inhaler Inhale 2 puffs into the lungs 2 times daily 12 g 3     MULTIPLE VITAMIN PO        tiZANidine (ZANAFLEX) 4 MG tablet Take 1-2 tablets (4-8 mg) by mouth nightly as needed 90 tablet 1     Turmeric Curcumin 500 MG CAPS Take 500 mg by mouth daily       VITAMIN D, CHOLECALCIFEROL, PO Take by mouth daily         Allergies   Allergen Reactions     No Known Allergies        Family History   Problem Relation Age of Onset     Thyroid Disease Mother      Diabetes Mother         type2     Eye Disorder Mother         cataracts     Gastrointestinal Disease Mother         hep a/has to have her throat stretched     Respiratory Mother         sleep apnea     Heart Disease Father 60     C.A.D. Paternal Grandfather 30        age 30, then again in his 80's     Diabetes Maternal Grandfather      Cancer  Maternal Grandfather      Thyroid Disease Maternal Grandmother      Gynecology Maternal Grandmother         on bcp to keep period regular, a small uterus     Heart Disease Maternal Grandmother         tachycardia     Allergies Daughter         milk     Asthma Daughter      Social History     Socioeconomic History     Marital status:      Spouse name: Not on file     Number of children: 2     Years of education: Not on file     Highest education level: Not on file   Occupational History     Employer: [employer]   Social Needs     Financial resource strain: Not on file     Food insecurity     Worry: Not on file     Inability: Not on file     Transportation needs     Medical: Not on file     Non-medical: Not on file   Tobacco Use     Smoking status: Never Smoker     Smokeless tobacco: Never Used   Substance and Sexual Activity     Alcohol use: Yes     Comment: social     Drug use: No     Sexual activity: Yes     Partners: Male     Birth control/protection: Surgical     Comment:  had vasectomy   Lifestyle     Physical activity     Days per week: Not on file     Minutes per session: Not on file     Stress: Not on file   Relationships     Social connections     Talks on phone: Not on file     Gets together: Not on file     Attends Catholic service: Not on file     Active member of club or organization: Not on file     Attends meetings of clubs or organizations: Not on file     Relationship status: Not on file     Intimate partner violence     Fear of current or ex partner: Not on file     Emotionally abused: Not on file     Physically abused: Not on file     Forced sexual activity: Not on file   Other Topics Concern      Service No     Blood Transfusions Not Asked     Caffeine Concern No     Occupational Exposure No     Hobby Hazards Not Asked     Sleep Concern No     Stress Concern Yes     Weight Concern Yes     Comment: gains easily     Special Diet No     Back Care Yes     Exercise  Yes     Comment: irregularly     Bike Helmet Not Asked     Comment: doesn't bike     Seat Belt No     Self-Exams No     Parent/sibling w/ CABG, MI or angioplasty before 65F 55M? No   Social History Narrative    Caffeine intake/servings daily - 0-1    Calcium intake/servings daily - 2-3    Exercise 2-3  times weekly - describe aerobics    Sunscreen used - Yes    Seatbelts used - Yes    Guns stored in the home - No    Self Breast Exam - Yes    Pap test up to date -  Yes    Eye exam up to date -  Yes    Dental exam up to date -  Yes    DEXA scan up to date -  Not Applicable    Flex Sig/Colonoscopy up to date -  Not Applicable    Mammography up to date -  Not Applicable    Immunizations reviewed and up to date - Yes    Abuse: Current or Past (Physical, Sexual or Emotional) - None current, past    Do you feel safe in your environment - Yes    Do you cope well with stress - Yes    Do you suffer from insomnia - No    Last updated by: Estefani Moraes MA 5/5/2010               Additional medical/Social/Surgical histories reviewed in [facility] and updated as appropriate.     REVIEW OF SYSTEMS (9/9/2020)  10 point ROS of systems including Constitutional, Eyes, Respiratory, Cardiovascular, Gastroenterology, Genitourinary, Integumentary, Musculoskeletal, Psychiatric, Allergic/Immunologic were all negative except for pertinent positives noted in my HPI.       ASSESSMENT & PLAN  43 yo female with cervical disc herniation radicular pain, not resolved, and lumbar spasms  Reviewed lumbar MRI: shows no significant herniated discs  Reviewed cervical MRI: shows disc herniations  Consider cervical Coleen  Cont. nsaids and use tizanadine, which she has not employed yet  Doesn't want to pursue PT yet  Cont. HEP  F/u in 1 month      Floyd Larsen MD, CAQSM    Video-Visit Details    Type of service:  Video Visit    Video Start Time: 3:30pm  Video End Time: 3:46pm    Originating Location (pt. Location): Home    Distant Location (provider location):  M  [facility]     Platform used for Video Visit: Stephanie Larsen MD        Again, thank you for allowing me to participate in the care of your patient.        Sincerely,        Floyd Larsen MD

## 2020-09-10 RX ORDER — DEXAMETHASONE 4 MG/1
TABLET ORAL
Qty: 36 INHALER | Refills: 0 | Status: SHIPPED | OUTPATIENT
Start: 2020-09-10 | End: 2021-01-06

## 2020-09-10 NOTE — TELEPHONE ENCOUNTER
Prescription approved per [facility] [place] [facility].  Monica Martin RN

## 2020-09-15 ENCOUNTER — NURSE TRIAGE (OUTPATIENT)
Dept: NURSING | Facility: CLINIC | Age: 45
End: 2020-09-15

## 2020-09-16 NOTE — TELEPHONE ENCOUNTER
45 y/o female calls about new medications she started a week ago, Diclofenac, anti-inflammatory and Tinazadine muscle relaxer. She reports swelling in her ankles and the feeling of swelling up to the knees in both legs, she can only see it in her ankles, denies fever, or redness, no blue or coldness to one foot or the other, no difficulty breathing at rest, not painful to touch.    RN reviewed protocols, advised that the recommendation is to be seen in the next 24 hours is swelling does not resolve. RN reviewed med (anti-inflamm & muscle relaxer)  don't usually cause swelling in the extremities but she feels more comfortable not taking them. She will hold these medications until she can be seen, declines warm transfer, will address appointment on her own.        Remedios Hahn RN - [place] Nurse Advisor  9/15/20  11:56PM    Additional Information    Negative: Severe difficulty breathing (e.g., struggling for each breath, speaks in single words)    Negative: Looks like a broken bone or dislocated joint (e.g., crooked or deformed)    Negative: Sounds like a life-threatening emergency to the triager    Negative: Difficulty breathing at rest    Negative: Entire foot is cool or blue in comparison to other side    Negative: [1] Can't walk or can barely walk AND [2] new onset    Negative: [1] Difficulty breathing with exertion (e.g., walking) AND [2] new onset or worsening    Negative: [1] Red area or streak AND [2] fever    Negative: [1] Swelling is painful to touch AND [2] fever    Negative: [1] Cast on leg or ankle AND [2] now increased pain    Negative: Patient sounds very sick or weak to the triager    Negative: SEVERE leg swelling (e.g., swelling extends above knee, entire leg is swollen, weeping fluid)    [1] MODERATE leg swelling (e.g., swelling extends up to knees) AND [2] new onset or worsening    Negative: [1] Red area or streak [2] large (> 2 in. or 5 cm)    Negative: [1] Thigh or calf pain AND [2] only 1 side  AND [3] present > 1 hour    Negative: [1] Thigh, calf, or ankle swelling AND [2] only 1 side    Negative: [1] Thigh, calf, or ankle swelling AND [2] bilateral AND [3] 1 side is more swollen    Protocols used: LEG SWELLING AND EDEMA-SHUKRI-DONNIE Hahn RN - [place] Nurse Advisor  9/16/20  12:07AM

## 2020-10-21 ENCOUNTER — OFFICE VISIT (OUTPATIENT)
Dept: ORTHOPEDICS | Facility: CLINIC | Age: 45
End: 2020-10-21
Payer: COMMERCIAL

## 2020-10-21 VITALS
BODY MASS INDEX: 23.96 KG/M2 | SYSTOLIC BLOOD PRESSURE: 108 MMHG | HEIGHT: 62 IN | DIASTOLIC BLOOD PRESSURE: 76 MMHG | HEART RATE: 70 BPM

## 2020-10-21 DIAGNOSIS — M50.20 CERVICAL DISC HERNIATION: ICD-10-CM

## 2020-10-21 DIAGNOSIS — M51.369 DDD (DEGENERATIVE DISC DISEASE), LUMBAR: Primary | ICD-10-CM

## 2020-10-21 DIAGNOSIS — M51.16 LUMBAR DISC HERNIATION WITH RADICULOPATHY: ICD-10-CM

## 2020-10-21 PROCEDURE — 99214 OFFICE O/P EST MOD 30 MIN: CPT | Performed by: PREVENTIVE MEDICINE

## 2020-10-21 RX ORDER — METHYLPREDNISOLONE 4 MG
TABLET, DOSE PACK ORAL
Qty: 21 TABLET | Refills: 0 | Status: SHIPPED | OUTPATIENT
Start: 2020-10-21 | End: 2021-06-09

## 2020-10-21 RX ORDER — IBUPROFEN 600 MG/1
600 TABLET, FILM COATED ORAL EVERY 8 HOURS PRN
Qty: 60 TABLET | Refills: 1 | Status: SHIPPED | OUTPATIENT
Start: 2020-10-21 | End: 2021-03-08

## 2020-10-21 NOTE — PROGRESS NOTES
"      [facility] FOLLOW-UP VISIT 10/21/2020    Lisset MITCHELL Chi's chief complaint for this visit includes:  Chief Complaint   Patient presents with     Follow Up     Follow up for back, neck, shoulder pain     PCP: Adriana Oconnor    Referring Provider:  Kristi Rodriguez NP  [address] [place] [address]    /76   Pulse 70   Ht 1.575 m (5' 2\")   LMP 12/25/2013   BMI 23.96 kg/m    Data Unavailable       Interval History:     Follow up reason: Follow up for back, neck, shoulder and tailbone pain     Date of injury: No injury     Date last seen: Virtual visit 9/9/20    Following Therapeutic Plan: No     Pain: Unchanged    Function: Unchanged    Interval History: Discontinued taking Diclofenac      Medical History:    Any recent changes to your medical history? No    Any new medication prescribed since last visit? No    Review of Systems:    Do you have fever, chills, weight loss? No    Do you have any vision problems? No    Do you have any chest pain or edema? No    Do you have any shortness of breath or wheezing?  No    Do you have stomach problems? No    Do you have any urinary track issues? No    Do you have any numbness or focal weakness? No    Do you have diabetes? No    Do you have problems with bleeding or clotting? No    Do you have an rashes or other skin lesions? No    "

## 2020-10-21 NOTE — PATIENT INSTRUCTIONS
Thanks for coming today.  [facility]  [address] [place] [address]    To schedule future appointments in Ortho Clinic, you may call [phone].    To schedule ordered imaging by your provider:   Call [facility] [phone]    To schedule an injection ordered by your provider:  Call [facility] Injection scheduling line: [phone]  [facility] available online at:  [email or web address]    Please call if any further questions or concerns ([phone]).  Clinic hours 8 am to 5 pm.    Return to clinic (call) if symptoms worsen or fail to improve.

## 2020-10-21 NOTE — PROGRESS NOTES
HISTORY OF PRESENT ILLNESS  Ms. Gates is a pleasant 45 year old year old female who presents to clinic today with ongoing low back pain and neck pain  Lisset explains that she has had some improvement from medications, but continues to have pain in these areas  Location: neck and low back  Quality:  achy pain    Severity: 7/10 at worst    Duration: past year  Timing: occurs intermittently  Context: occurs while exercising and lifting  Modifying factors:  resting and non-use makes it better, movement and use makes it worse  Associated signs & symptoms: neck pain radiates into right upper back and shoulder  Low back pain radiates down both legs    MEDICAL HISTORY  Patient Active Problem List   Diagnosis     Irritable bowel syndrome     Premature ovarian failure     CARDIOVASCULAR SCREENING; LDL GOAL LESS THAN 160     Mild persistent asthma without complication     Trochanteric bursitis of both hips     Right knee pain     Adjustment disorder with depressed mood       Current Outpatient Medications   Medication Sig Dispense Refill     albuterol (PROAIR HFA/PROVENTIL HFA/VENTOLIN HFA) 108 (90 Base) MCG/ACT inhaler Inhale 2 puffs into the lungs every 6 hours 18 g 3     Calcium Carbonate-Vitamin D (CALCIUM + D PO) Take 600 mg by mouth.       FEXOFENADINE  MG OR TABS 1 TABLET DAILY        FLOVENT  MCG/ACT inhaler TAKE 2 PUFFS BY MOUTH TWICE A DAY 36 Inhaler 0     fluticasone (FLONASE) 50 MCG/ACT nasal spray Spray 2 sprays into both nostrils daily       MULTIPLE VITAMIN PO        tiZANidine (ZANAFLEX) 4 MG tablet Take 1-2 tablets (4-8 mg) by mouth nightly as needed 90 tablet 1     Turmeric Curcumin 500 MG CAPS Take 500 mg by mouth daily       VITAMIN D, CHOLECALCIFEROL, PO Take by mouth daily       diclofenac (VOLTAREN) 75 MG EC tablet Take 1 tablet (75 mg) by mouth 2 times daily as needed (Patient not taking: Reported on 10/21/2020) 40 tablet 1     diclofenac (VOLTAREN) 75 MG EC tablet Take 1 tablet (75 mg) by  mouth 2 times daily (Patient not taking: Reported on 10/21/2020) 40 tablet 1       Allergies   Allergen Reactions     No Known Allergies        Family History   Problem Relation Age of Onset     Thyroid Disease Mother      Diabetes Mother         type2     Eye Disorder Mother         cataracts     Gastrointestinal Disease Mother         hep a/has to have her throat stretched     Respiratory Mother         sleep apnea     Heart Disease Father 60     C.A.D. Paternal Grandfather 30        age 30, then again in his 80's     Diabetes Maternal Grandfather      Cancer Maternal Grandfather      Thyroid Disease Maternal Grandmother      Gynecology Maternal Grandmother         on bcp to keep period regular, a small uterus     Heart Disease Maternal Grandmother         tachycardia     Allergies Daughter         milk     Asthma Daughter      Social History     Socioeconomic History     Marital status:      Spouse name: Not on file     Number of children: 2     Years of education: Not on file     Highest education level: Not on file   Occupational History     Employer: [employer]   Social Needs     Financial resource strain: Not on file     Food insecurity     Worry: Not on file     Inability: Not on file     Transportation needs     Medical: Not on file     Non-medical: Not on file   Tobacco Use     Smoking status: Never Smoker     Smokeless tobacco: Never Used   Substance and Sexual Activity     Alcohol use: Yes     Comment: social     Drug use: No     Sexual activity: Yes     Partners: Male     Birth control/protection: Surgical     Comment:  had vasectomy   Lifestyle     Physical activity     Days per week: Not on file     Minutes per session: Not on file     Stress: Not on file   Relationships     Social connections     Talks on phone: Not on file     Gets together: Not on file     Attends Jewish service: Not on file     Active member of club or organization: Not on file     Attends meetings of  clubs or organizations: Not on file     Relationship status: Not on file     Intimate partner violence     Fear of current or ex partner: Not on file     Emotionally abused: Not on file     Physically abused: Not on file     Forced sexual activity: Not on file   Other Topics Concern      Service No     Blood Transfusions Not Asked     Caffeine Concern No     Occupational Exposure No     Hobby Hazards Not Asked     Sleep Concern No     Stress Concern Yes     Weight Concern Yes     Comment: gains easily     Special Diet No     Back Care Yes     Exercise Yes     Comment: irregularly     Bike Helmet Not Asked     Comment: doesn't bike     Seat Belt No     Self-Exams No     Parent/sibling w/ CABG, MI or angioplasty before 65F 55M? No   Social History Narrative    Caffeine intake/servings daily - 0-1    Calcium intake/servings daily - 2-3    Exercise 2-3  times weekly - describe aerobics    Sunscreen used - Yes    Seatbelts used - Yes    Guns stored in the home - No    Self Breast Exam - Yes    Pap test up to date -  Yes    Eye exam up to date -  Yes    Dental exam up to date -  Yes    DEXA scan up to date -  Not Applicable    Flex Sig/Colonoscopy up to date -  Not Applicable    Mammography up to date -  Not Applicable    Immunizations reviewed and up to date - Yes    Abuse: Current or Past (Physical, Sexual or Emotional) - None current, past    Do you feel safe in your environment - Yes    Do you cope well with stress - Yes    Do you suffer from insomnia - No    Last updated by: Estefani Moraes MA 5/5/2010               Additional medical/Social/Surgical histories reviewed in [facility] and updated as appropriate.     REVIEW OF SYSTEMS (10/21/2020)  10 point ROS of systems including Constitutional, Eyes, Respiratory, Cardiovascular, Gastroenterology, Genitourinary, Integumentary, Musculoskeletal, Psychiatric, Allergic/Immunologic were all negative except for pertinent positives noted in my HPI.     PHYSICAL EXAM  Vitals:     "10/21/20 0834   BP: 108/76   Pulse: 70   Height: 1.575 m (5' 2\")     Vital Signs: /76   Pulse 70   Ht 1.575 m (5' 2\")   LMP 12/25/2013   BMI 23.96 kg/m   Patient declined being weighed. Body mass index is 23.96 kg/m .    General  - normal appearance, in no obvious distress  HEENT  - conjunctivae not injected, moist mucous membranes, normocephalic/atraumatic head, ears normal appearance, no lesions, mouth normal appearance, no scars, normal dentition and teeth present  CV  - normal peripheral perfusion  Pulm  - normal respiratory pattern, non-labored    Musculoskeletal - CERVICAL SPINE  - stance and posture: normal gait without limp, no obvious leg length discrepancy, normal heel and toe walk, normal balance, slightly forward shoulders, head balanced normally on trunk  - inspection: normal bone and joint alignment, no obvious kyphosis  - palpation: no paravertebral or bony tenderness, except at base of neck and trapezius muscles  - ROM: normal and painless flexion, extension, left and right sidebending and rotation with some discomfort  - strength: upper extremities 5/5 in all planes  - special tests:  (+) spurlings    Neuro  - biceps, triceps, supinator DTRs 2+ bilaterally, no sensory or motor deficit, grossly normal coordination, normal muscle tone  Skin  - no ecchymosis, erythema, warmth, or induration, no obvious rash  Psych  - interactive, appropriate, normal mood and affect  Lumbar; has pain with flexion and extension, positive SLR  ASSESSMENT & PLAN  44 yo female with lumbar disc herniation, radicular pain and cervical disc herniations, not improved  Reviewed MRI lumbar: has disc herniation/budlge at L4/5  Ordered CAMMY  Reviewed cervical MRI; shows disc herniations  Order CAMMY  Cont. tizanadine PRN  Ibuprofen PRN  Medrol pack   F/u after ESIs    Appropriate PPE was utilized for prevention of spread of Covid-19.  Floyd Larsen MD, CAQSM    "

## 2020-10-24 DIAGNOSIS — Z11.59 ENCOUNTER FOR SCREENING FOR OTHER VIRAL DISEASES: Primary | ICD-10-CM

## 2020-12-01 DIAGNOSIS — Z11.59 ENCOUNTER FOR SCREENING FOR OTHER VIRAL DISEASES: ICD-10-CM

## 2020-12-01 PROCEDURE — U0003 INFECTIOUS AGENT DETECTION BY NUCLEIC ACID (DNA OR RNA); SEVERE ACUTE RESPIRATORY SYNDROME CORONAVIRUS 2 (SARS-COV-2) (CORONAVIRUS DISEASE [COVID-19]), AMPLIFIED PROBE TECHNIQUE, MAKING USE OF HIGH THROUGHPUT TECHNOLOGIES AS DESCRIBED BY CMS-2020-01-R: HCPCS | Performed by: PREVENTIVE MEDICINE

## 2020-12-02 LAB
SARS-COV-2 RNA SPEC QL NAA+PROBE: NOT DETECTED
SPECIMEN SOURCE: NORMAL

## 2020-12-04 ENCOUNTER — ANCILLARY PROCEDURE (OUTPATIENT)
Dept: GENERAL RADIOLOGY | Facility: CLINIC | Age: 45
End: 2020-12-04
Attending: PREVENTIVE MEDICINE
Payer: COMMERCIAL

## 2020-12-04 DIAGNOSIS — M51.16 LUMBAR DISC HERNIATION WITH RADICULOPATHY: ICD-10-CM

## 2020-12-04 DIAGNOSIS — M51.369 DDD (DEGENERATIVE DISC DISEASE), LUMBAR: ICD-10-CM

## 2020-12-04 PROCEDURE — 62323 NJX INTERLAMINAR LMBR/SAC: CPT | Performed by: RADIOLOGY

## 2020-12-04 RX ORDER — METHYLPREDNISOLONE ACETATE 80 MG/ML
80 INJECTION, SUSPENSION INTRA-ARTICULAR; INTRALESIONAL; INTRAMUSCULAR; SOFT TISSUE ONCE
Status: COMPLETED | OUTPATIENT
Start: 2020-12-04 | End: 2020-12-04

## 2020-12-04 RX ORDER — IOPAMIDOL 408 MG/ML
10 INJECTION, SOLUTION INTRATHECAL ONCE
Status: COMPLETED | OUTPATIENT
Start: 2020-12-04 | End: 2020-12-04

## 2020-12-04 RX ORDER — BUPIVACAINE HYDROCHLORIDE 5 MG/ML
5 INJECTION, SOLUTION EPIDURAL; INTRACAUDAL ONCE
Status: COMPLETED | OUTPATIENT
Start: 2020-12-04 | End: 2020-12-04

## 2020-12-04 RX ADMIN — BUPIVACAINE HYDROCHLORIDE 10 MG: 5 INJECTION, SOLUTION EPIDURAL; INTRACAUDAL at 09:54

## 2020-12-04 RX ADMIN — METHYLPREDNISOLONE ACETATE 80 MG: 80 INJECTION, SUSPENSION INTRA-ARTICULAR; INTRALESIONAL; INTRAMUSCULAR; SOFT TISSUE at 09:53

## 2020-12-04 RX ADMIN — IOPAMIDOL 2 ML: 408 INJECTION, SOLUTION INTRATHECAL at 09:54

## 2020-12-14 DIAGNOSIS — Z11.59 ENCOUNTER FOR SCREENING FOR OTHER VIRAL DISEASES: ICD-10-CM

## 2020-12-14 PROCEDURE — U0003 INFECTIOUS AGENT DETECTION BY NUCLEIC ACID (DNA OR RNA); SEVERE ACUTE RESPIRATORY SYNDROME CORONAVIRUS 2 (SARS-COV-2) (CORONAVIRUS DISEASE [COVID-19]), AMPLIFIED PROBE TECHNIQUE, MAKING USE OF HIGH THROUGHPUT TECHNOLOGIES AS DESCRIBED BY CMS-2020-01-R: HCPCS | Performed by: PREVENTIVE MEDICINE

## 2020-12-15 LAB
SARS-COV-2 RNA SPEC QL NAA+PROBE: NOT DETECTED
SPECIMEN SOURCE: NORMAL

## 2020-12-18 ENCOUNTER — ANCILLARY PROCEDURE (OUTPATIENT)
Dept: GENERAL RADIOLOGY | Facility: CLINIC | Age: 45
End: 2020-12-18
Attending: PREVENTIVE MEDICINE
Payer: COMMERCIAL

## 2020-12-18 DIAGNOSIS — M50.20 CERVICAL DISC HERNIATION: ICD-10-CM

## 2020-12-18 PROCEDURE — 62321 NJX INTERLAMINAR CRV/THRC: CPT | Performed by: RADIOLOGY

## 2020-12-18 RX ORDER — LIDOCAINE HYDROCHLORIDE 10 MG/ML
5 INJECTION, SOLUTION EPIDURAL; INFILTRATION; INTRACAUDAL; PERINEURAL ONCE
Status: COMPLETED | OUTPATIENT
Start: 2020-12-18 | End: 2020-12-18

## 2020-12-18 RX ORDER — IOPAMIDOL 408 MG/ML
10 INJECTION, SOLUTION INTRATHECAL ONCE
Status: COMPLETED | OUTPATIENT
Start: 2020-12-18 | End: 2020-12-18

## 2020-12-18 RX ORDER — BETAMETHASONE SODIUM PHOSPHATE AND BETAMETHASONE ACETATE 3; 3 MG/ML; MG/ML
6 INJECTION, SUSPENSION INTRA-ARTICULAR; INTRALESIONAL; INTRAMUSCULAR; SOFT TISSUE ONCE
Status: COMPLETED | OUTPATIENT
Start: 2020-12-18 | End: 2020-12-18

## 2020-12-18 RX ADMIN — IOPAMIDOL 1 ML: 408 INJECTION, SOLUTION INTRATHECAL at 08:54

## 2020-12-18 RX ADMIN — BETAMETHASONE SODIUM PHOSPHATE AND BETAMETHASONE ACETATE 18 MG: 3; 3 INJECTION, SUSPENSION INTRA-ARTICULAR; INTRALESIONAL; INTRAMUSCULAR; SOFT TISSUE at 08:54

## 2020-12-18 RX ADMIN — LIDOCAINE HYDROCHLORIDE 5 ML: 10 INJECTION, SOLUTION EPIDURAL; INFILTRATION; INTRACAUDAL; PERINEURAL at 08:55

## 2020-12-18 NOTE — PROGRESS NOTES
: Lisset was seen in X-ray today for a cervical epidural injection. Patient rated pain before procedure 7/10. After procedure patient rated pain 3/10. This pain level is (is/is not) acceptable to patient. Patient discharged home with .      AFTER YOU GO HOME    ? DO relax; minimize your activity for 24 hours  ? You may resume normal activity tomorrow  ? You may remove the bandage in the evening or next morning  ? You may resume bathing the next day  ? Drink at least 4 extra glasses of fluid today if not on fluid restrictions  ? DO NOT drive or operate machinery at home or at work for at least 24 hours      VISIT THE EMERGENCY ROOM OR URGENT CARE IF:    ? There is redness or swelling at the injection site  ? There is discharge from the injection site  ? You develop a temperature of 101  F or greater      ADDITIONAL INSTRUCTIONS:     ? You may resume your Coumadin or other blood thinner at your regular dose today.  Follow up with your physician to have your INR rechecked if indicated.  ? If you gain no relief from the injection after two (2) weeks, follow-up with your provider for your options.        Contacts:    During business hours from 8 to 5 pm, you may call [phone] to reach a nurse advisor at [facility].  After hours, call [facility]  [phone].  Ask for the Radiologist on-call.  Someone is on-call 24 hrs/day.  [facility] Toll Free Number   [phone]

## 2021-01-04 ENCOUNTER — APPOINTMENT (OUTPATIENT)
Dept: GENERAL RADIOLOGY | Facility: CLINIC | Age: 46
End: 2021-01-04
Attending: EMERGENCY MEDICINE
Payer: COMMERCIAL

## 2021-01-04 ENCOUNTER — OFFICE VISIT (OUTPATIENT)
Dept: URGENT CARE | Facility: URGENT CARE | Age: 46
End: 2021-01-04
Payer: COMMERCIAL

## 2021-01-04 ENCOUNTER — APPOINTMENT (OUTPATIENT)
Dept: CT IMAGING | Facility: CLINIC | Age: 46
End: 2021-01-04
Attending: EMERGENCY MEDICINE
Payer: COMMERCIAL

## 2021-01-04 ENCOUNTER — HOSPITAL ENCOUNTER (EMERGENCY)
Facility: CLINIC | Age: 46
Discharge: HOME | End: 2021-01-04
Attending: EMERGENCY MEDICINE | Admitting: EMERGENCY MEDICINE
Payer: COMMERCIAL

## 2021-01-04 ENCOUNTER — NURSE TRIAGE (OUTPATIENT)
Dept: FAMILY MEDICINE | Facility: CLINIC | Age: 46
End: 2021-01-04

## 2021-01-04 VITALS
DIASTOLIC BLOOD PRESSURE: 63 MMHG | OXYGEN SATURATION: 96 % | HEART RATE: 69 BPM | RESPIRATION RATE: 16 BRPM | TEMPERATURE: 98.2 F | SYSTOLIC BLOOD PRESSURE: 103 MMHG

## 2021-01-04 VITALS
DIASTOLIC BLOOD PRESSURE: 80 MMHG | BODY MASS INDEX: 23.92 KG/M2 | HEIGHT: 62 IN | WEIGHT: 130 LBS | SYSTOLIC BLOOD PRESSURE: 120 MMHG | HEART RATE: 64 BPM | OXYGEN SATURATION: 98 % | TEMPERATURE: 97.9 F

## 2021-01-04 DIAGNOSIS — R20.2 PARESTHESIAS: ICD-10-CM

## 2021-01-04 DIAGNOSIS — B35.1 ONYCHOMYCOSIS: ICD-10-CM

## 2021-01-04 DIAGNOSIS — J45.30 MILD PERSISTENT ASTHMA WITHOUT COMPLICATION: ICD-10-CM

## 2021-01-04 DIAGNOSIS — R51.9 ACUTE NONINTRACTABLE HEADACHE, UNSPECIFIED HEADACHE TYPE: ICD-10-CM

## 2021-01-04 DIAGNOSIS — R11.0 NAUSEA: ICD-10-CM

## 2021-01-04 DIAGNOSIS — R42 DIZZINESS: ICD-10-CM

## 2021-01-04 DIAGNOSIS — M62.81 GENERALIZED MUSCLE WEAKNESS: ICD-10-CM

## 2021-01-04 DIAGNOSIS — R20.0 NUMBNESS: Primary | ICD-10-CM

## 2021-01-04 DIAGNOSIS — M79.89 TOE SWELLING: ICD-10-CM

## 2021-01-04 LAB
ANION GAP SERPL CALCULATED.3IONS-SCNC: 5 MMOL/L (ref 3–14)
BASOPHILS # BLD AUTO: 0.1 10E9/L (ref 0–0.2)
BASOPHILS NFR BLD AUTO: 1 %
BUN SERPL-MCNC: 13 MG/DL (ref 7–30)
CALCIUM SERPL-MCNC: 9.4 MG/DL (ref 8.5–10.1)
CHLORIDE SERPL-SCNC: 106 MMOL/L (ref 94–109)
CO2 SERPL-SCNC: 26 MMOL/L (ref 20–32)
CREAT SERPL-MCNC: 0.63 MG/DL (ref 0.52–1.04)
CRP SERPL-MCNC: <2.9 MG/L (ref 0–8)
DIFFERENTIAL METHOD BLD: ABNORMAL
EOSINOPHIL # BLD AUTO: 0.1 10E9/L (ref 0–0.7)
EOSINOPHIL NFR BLD AUTO: 1.1 %
ERYTHROCYTE [DISTWIDTH] IN BLOOD BY AUTOMATED COUNT: 12.7 % (ref 10–15)
ERYTHROCYTE [SEDIMENTATION RATE] IN BLOOD BY WESTERGREN METHOD: 7 MM/H (ref 0–20)
GFR SERPL CREATININE-BSD FRML MDRD: >90 ML/MIN/{1.73_M2}
GLUCOSE SERPL-MCNC: 82 MG/DL (ref 70–99)
HCG SERPL QL: NEGATIVE
HCT VFR BLD AUTO: 42.7 % (ref 35–47)
HGB BLD-MCNC: 13.1 G/DL (ref 11.7–15.7)
IMM GRANULOCYTES # BLD: 0 10E9/L (ref 0–0.4)
IMM GRANULOCYTES NFR BLD: 0.2 %
LYMPHOCYTES # BLD AUTO: 1.5 10E9/L (ref 0.8–5.3)
LYMPHOCYTES NFR BLD AUTO: 24.4 %
MAGNESIUM SERPL-MCNC: 2.4 MG/DL (ref 1.6–2.3)
MCH RBC QN AUTO: 30.2 PG (ref 26.5–33)
MCHC RBC AUTO-ENTMCNC: 30.7 G/DL (ref 31.5–36.5)
MCV RBC AUTO: 98 FL (ref 78–100)
MONOCYTES # BLD AUTO: 0.6 10E9/L (ref 0–1.3)
MONOCYTES NFR BLD AUTO: 9.6 %
NEUTROPHILS # BLD AUTO: 3.9 10E9/L (ref 1.6–8.3)
NEUTROPHILS NFR BLD AUTO: 63.7 %
NRBC # BLD AUTO: 0 10*3/UL
NRBC BLD AUTO-RTO: 0 /100
PLATELET # BLD AUTO: 214 10E9/L (ref 150–450)
POTASSIUM SERPL-SCNC: 5.8 MMOL/L (ref 3.4–5.3)
RBC # BLD AUTO: 4.34 10E12/L (ref 3.8–5.2)
SODIUM SERPL-SCNC: 137 MMOL/L (ref 133–144)
TSH SERPL DL<=0.005 MIU/L-ACNC: 1.64 MU/L (ref 0.4–4)
WBC # BLD AUTO: 6.2 10E9/L (ref 4–11)

## 2021-01-04 PROCEDURE — 73660 X-RAY EXAM OF TOE(S): CPT | Mod: LT

## 2021-01-04 PROCEDURE — 80048 BASIC METABOLIC PNL TOTAL CA: CPT | Performed by: EMERGENCY MEDICINE

## 2021-01-04 PROCEDURE — 258N000003 HC RX IP 258 OP 636: Performed by: EMERGENCY MEDICINE

## 2021-01-04 PROCEDURE — 99215 OFFICE O/P EST HI 40 MIN: CPT | Performed by: NURSE PRACTITIONER

## 2021-01-04 PROCEDURE — 99284 EMERGENCY DEPT VISIT MOD MDM: CPT | Performed by: EMERGENCY MEDICINE

## 2021-01-04 PROCEDURE — 96360 HYDRATION IV INFUSION INIT: CPT

## 2021-01-04 PROCEDURE — 85025 COMPLETE CBC W/AUTO DIFF WBC: CPT | Performed by: EMERGENCY MEDICINE

## 2021-01-04 PROCEDURE — 96361 HYDRATE IV INFUSION ADD-ON: CPT

## 2021-01-04 PROCEDURE — 84703 CHORIONIC GONADOTROPIN ASSAY: CPT | Performed by: EMERGENCY MEDICINE

## 2021-01-04 PROCEDURE — 70450 CT HEAD/BRAIN W/O DYE: CPT

## 2021-01-04 PROCEDURE — 72125 CT NECK SPINE W/O DYE: CPT

## 2021-01-04 PROCEDURE — 85652 RBC SED RATE AUTOMATED: CPT | Performed by: EMERGENCY MEDICINE

## 2021-01-04 PROCEDURE — 250N000013 HC RX MED GY IP 250 OP 250 PS 637: Performed by: EMERGENCY MEDICINE

## 2021-01-04 PROCEDURE — 84443 ASSAY THYROID STIM HORMONE: CPT | Performed by: EMERGENCY MEDICINE

## 2021-01-04 PROCEDURE — 99285 EMERGENCY DEPT VISIT HI MDM: CPT | Mod: 25

## 2021-01-04 PROCEDURE — 83735 ASSAY OF MAGNESIUM: CPT | Performed by: EMERGENCY MEDICINE

## 2021-01-04 PROCEDURE — 86140 C-REACTIVE PROTEIN: CPT | Performed by: EMERGENCY MEDICINE

## 2021-01-04 RX ORDER — IBUPROFEN 600 MG/1
600 TABLET, FILM COATED ORAL ONCE
Status: COMPLETED | OUTPATIENT
Start: 2021-01-04 | End: 2021-01-04

## 2021-01-04 RX ORDER — PRENATAL VIT 91/IRON/FOLIC/DHA 28-975-200
COMBINATION PACKAGE (EA) ORAL 2 TIMES DAILY
Qty: 15 G | Refills: 0 | Status: SHIPPED | OUTPATIENT
Start: 2021-01-04 | End: 2022-03-21

## 2021-01-04 RX ADMIN — IBUPROFEN 600 MG: 600 TABLET, FILM COATED ORAL at 21:43

## 2021-01-04 RX ADMIN — SODIUM CHLORIDE 1000 ML: 9 INJECTION, SOLUTION INTRAVENOUS at 19:08

## 2021-01-04 ASSESSMENT — ENCOUNTER SYMPTOMS
DIZZINESS: 1
BRUISES/BLEEDS EASILY: 0
TREMORS: 0
NUMBNESS: 1
DIAPHORESIS: 0
EYE ITCHING: 0
PALPITATIONS: 0
APPETITE CHANGE: 1
FACIAL SWELLING: 1
SINUS PAIN: 0
ADENOPATHY: 0
WHEEZING: 0
SORE THROAT: 0
FEVER: 0
MYALGIAS: 0
EYE PAIN: 0
PHOTOPHOBIA: 0
RHINORRHEA: 0
VOMITING: 0
SHORTNESS OF BREATH: 0
TROUBLE SWALLOWING: 0
ACTIVITY CHANGE: 1
NECK STIFFNESS: 0
NAUSEA: 1
SINUS PRESSURE: 0
WEAKNESS: 0
COLOR CHANGE: 1
NERVOUS/ANXIOUS: 0
HEADACHES: 1
ARTHRALGIAS: 0
CHEST TIGHTNESS: 0
FATIGUE: 1
SLEEP DISTURBANCE: 0
COUGH: 0
SPEECH DIFFICULTY: 0
EYE REDNESS: 0
CONFUSION: 0
LIGHT-HEADEDNESS: 1
CHILLS: 0
ABDOMINAL PAIN: 0

## 2021-01-04 ASSESSMENT — MIFFLIN-ST. JEOR: SCORE: 1182.93

## 2021-01-04 NOTE — PROGRESS NOTES
Assessment & Plan     Numbness    Dizziness    Generalized muscle weakness    Acute nonintractable headache, unspecified headache type    Nausea    Toe swelling    Triaged to higher level of care for left sided facial and upper extremity numbness since this morning  Also having mild headaches, nausea, dizziness, fatigue for 3 days  Discussed neuro concerns and urgent care is limited without CT / MRI  Concern for TIA, brain mass, bleed, MS, trigeminal neuralgia, early shingles  Patient declined ambulance, stable, will drive to local ER now by car and call 911 if deteriorating    20 minutes spent on the date of the encounter doing chart review, history and exam, documentation and further activities as noted above.         Disposition is for patient to present to ER now.    Return in about 1 week (around 1/11/2021) for pcp visit if symptoms continue or worsen.    Alison Cook NP  [facility] CARE [facility]     Lisset MITCHELL Chi is a 45 year old female who presents to clinic today for the following health issues.    HPI     Patient woke up with left arm and facial numbness this morning. She has had 3 days of mild headaches, nausea, dizziness, fatigue, right eye and left toe swelling. She had a fall recently, but denies hitting her head. No relevant neuro, cardiac or autoimmune history.    Review of Systems   Constitutional: Positive for activity change, appetite change and fatigue. Negative for chills, diaphoresis and fever.   HENT: Positive for facial swelling (right eye this morning). Negative for ear discharge, ear pain, rhinorrhea, sinus pressure, sinus pain, sore throat and trouble swallowing.    Eyes: Negative for photophobia, pain, redness, itching and visual disturbance.   Respiratory: Negative for cough, chest tightness, shortness of breath and wheezing.    Cardiovascular: Negative for chest pain, palpitations and peripheral edema.   Gastrointestinal: Positive for nausea.  "Negative for abdominal pain and vomiting.   Musculoskeletal: Negative for arthralgias, gait problem, myalgias and neck stiffness.   Skin: Positive for color change (left great toe red). Negative for pallor and rash.   Neurological: Positive for dizziness, light-headedness, numbness and headaches. Negative for tremors, syncope, speech difficulty and weakness.   Hematological: Negative for adenopathy. Does not bruise/bleed easily.   Psychiatric/Behavioral: Negative for confusion and sleep disturbance. The patient is not nervous/anxious.          Objective    /80   Pulse 64   Temp 97.9  F (36.6  C) (Oral)   Ht 1.575 m (5' 2\")   Wt 59 kg (130 lb)   LMP 12/25/2013   SpO2 98%   BMI 23.78 kg/m    Body mass index is 23.78 kg/m .     Physical Exam  Vitals signs reviewed.   Constitutional:       General: She is not in acute distress.     Appearance: Normal appearance. She is not ill-appearing, toxic-appearing or diaphoretic.   HENT:      Head: Normocephalic and atraumatic.      Right Ear: Tympanic membrane and ear canal normal.      Left Ear: Tympanic membrane and ear canal normal.      Nose: Nose normal.   Eyes:      General:         Right eye: No discharge.         Left eye: No discharge.      Extraocular Movements: Extraocular movements intact.      Conjunctiva/sclera: Conjunctivae normal.      Comments: Pupils equal, round, react to light sluggish   Neck:      Musculoskeletal: Normal range of motion and neck supple. No neck rigidity or muscular tenderness.   Cardiovascular:      Rate and Rhythm: Normal rate.      Pulses: Normal pulses.   Pulmonary:      Effort: Pulmonary effort is normal. No respiratory distress.      Breath sounds: No stridor. No wheezing.   Musculoskeletal: Normal range of motion.         General: Swelling (toe) present.   Lymphadenopathy:      Cervical: No cervical adenopathy.   Skin:     General: Skin is warm and dry.      Coloration: Skin is not pale.      Findings: Erythema present. No " rash.   Neurological:      Mental Status: She is alert and oriented to person, place, and time.      Cranial Nerves: Cranial nerve deficit (facial numbness) present.      Sensory: Sensory deficit present.      Motor: Weakness present.      Coordination: Coordination normal.      Gait: Gait normal.   Psychiatric:         Mood and Affect: Mood normal.         Behavior: Behavior normal.

## 2021-01-04 NOTE — LETTER
January 26, 2021      Lisset MITCHELL Chi  [address] [address]        Dear Lisset,     Dear Lisset MITCHELL Chi,    Your clinic record indicates that you are due for complete physical exam, fasting labs, asthma, and depression follow-up. Please call the  at [phone] to schedule an appointment.     If we indicated that you are due for cholesterol testing, please come in fasting for twelve hours prior to the test.     If you have questions about this letter please contact your provider.    Sincerely,    Your [facility] Clinic Team        Sincerely,      Adriana Oconnor MD

## 2021-01-04 NOTE — TELEPHONE ENCOUNTER
"S-(situation): patient called in with multiple complaints. Right big toe pain with mild redness and swelling. Numbness to the right arm and ear. Right eye is more puffy. Headache.     B-(background): HX of bursitis. Adjustment disorder.     A-(assessment):  No hx of gout in joints but does have a family history. See below       R-(recommendations): Urgent Care at this time.     Patient expressed understanding and acceptance of the plan and had no further questions at this time. Advised to call back if worsening symptoms or no improvement noted.     Answer Assessment - Initial Assessment Questions  1. ONSET: \"When did the pain start?\"       Fungus comes and goes. Treats with tea tree oil.   The nerve pain in the toe and top of foot I've noticed before when I got acupuncture. Would come and go. Specific to that toe.   2. LOCATION: \"Where is the pain located?\"   (e.g., around nail, entire toe, at foot joint)      Around the nail and when she points the foot can feel nerve pain.   3. PAIN: \"How bad is the pain?\"    (Scale 1-10; or mild, moderate, severe)    -  MILD (1-3): doesn't interfere with normal activities     -  MODERATE (4-7): interferes with normal activities (e.g., work or school) or awakens from sleep, limping   Redness/swelling: Mild-moderate  Pain when pointing toe is a 10/10 sharp nerve pain.   Maybe waking from her sleep  4. APPEARANCE: \"What does the toe look like?\" (e.g., redness, swelling, bruising, pallor)      Redness, swelling,   5. CAUSE: \"What do you think is causing the toe pain?\"      Thinks it's from the fungus. I wear hard fold shoes around the house.   6. OTHER SYMPTOMS: \"Do you have any other symptoms?\" (e.g., leg pain, rash, fever, numbness)      I have bad joints but nothing new. Diet was really bad the last few days. I have been retaining a lot of water to the left side of the body. I haven't been feeling comfortable today. I feel like I have high blood pressure so I checked it and it was " "117/66.   No fever.   I noticed that behind the ear is feeling some slight numbness and right arm feels tight and forearm feels numb.   7. PREGNANCY: \"Is there any chance you are pregnant?\" \"When was your last menstrual period?\"      *No Answer*    Answer Assessment - Initial Assessment Questions  1. SYMPTOM: \"What is the main symptom you are concerned about?\" (e.g., weakness, numbness)      Numbness around the right ear and arm   2. ONSET: \"When did this start?\" (minutes, hours, days; while sleeping)      Today   3. LAST NORMAL: \"When was the last time you were normal (no symptoms)?\"      Unsure  4. PATTERN \"Does this come and go, or has it been constant since it started?\"  \"Is it present now?\"      Constant  5. CARDIAC SYMPTOMS: \"Have you had any of the following symptoms: chest pain, difficulty breathing, palpitations?\"      No   6. NEUROLOGIC SYMPTOMS: \"Have you had any of the following symptoms: headache, dizziness, vision loss, double vision, changes in speech, unsteady on your feet?\"      Headache, toe pain, nerve pain in the foot.   7. OTHER SYMPTOMS: \"Do you have any other symptoms?\"      No   8. PREGNANCY: \"Is there any chance you are pregnant?\" \"When was your last menstrual period?\"      No    Protocols used: TOE PAIN-A-OH, NEUROLOGIC DEFICIT-A-OH    Melissa Mackey RN Flex        "

## 2021-01-04 NOTE — ED AVS SNAPSHOT
[facility] Emergency Department  [address]  [place] [facility] [place] [place] [address]  Phone: [phone]  Fax: [phone]                                    Lisset MITCHELL Chi   MRN: [id number]    Department: [facility] Emergency Department   Date of Visit: 1/4/2021           After Visit Summary Signature Page    I have received my discharge instructions, and my questions have been answered. I have discussed any challenges I see with this plan with the nurse or doctor.    ..........................................................................................................................................  Patient/Patient Representative Signature      ..........................................................................................................................................  Patient Representative Print Name and Relationship to Patient    ..................................................               ................................................  Date                                   Time    ..........................................................................................................................................  Reviewed by Signature/Title    ...................................................              ..............................................  Date                                               Time          22EPIC Rev 08/18

## 2021-01-04 NOTE — PATIENT INSTRUCTIONS
Triaged to higher level of care for left sided facial and upper extremity numbness since this morning  Also having mild headaches, nausea, dizziness, fatigue for 3 days  Discussed neuro concerns and urgent care is limited without CT / MRI  Concern for TIA, brain mass, bleed, MS, trigeminal neuralgia, early shingles  Patient declined ambulance, stable, will drive to local ER now by car and call 911 if deteriorating

## 2021-01-05 NOTE — ED PROVIDER NOTES
"ED Provider Note  [facility]      History     Chief Complaint   Patient presents with     Numbness     pt has had a variety of symptoms over ;last 4 days, feeling \"heavy\" has had swelling arround rt eye today, had some numbness on left scalp, and this darrion numbness in left arm     HPI  Lisset MITCHELL Chi is a 45 year old female with a history of Raynaud's disease, DDD, trochanteric bursitis of both hips, and mild persistent asthma who presents to the ED today for evaluation of numbness. Patient reports headaches, nausea, dizziness, and fatigue x3 days. She reports new onset left sided facial and upper extremity numbness since this morning. She was seen in urgent care earlier today and sent here due to neuro concerns and lack of CT/MRI at .   Patient denies any visual changes, facial droop, or focal weakness.  No history of neurologic disorders.  Patient also complains of an ingrown toenail on the left great toe.  She has a history of onychomycosis and has been attempting to use some  cream which has not been helping.  She was able to clip the toenail back.  She is complaining of some \"shooting pain\" up her toe.    Past Medical History  Past Medical History:   Diagnosis Date     Allergic rhinitis due to other allergen      Asthma      IBS (irritable bowel syndrome)      Other internal derangement of knee(867.89)     patella dislocates easily     Premature ovarian failure      Raynaud's disease 3/08     Past Surgical History:   Procedure Laterality Date     ARTHROSCOPY KNEE Right 3/2/2018    Procedure: ARTHROSCOPY KNEE;  Right Knee Arthroscopy, Lateral Compartmnt Debridemen and  Anterior Chamber;  Surgeon: Rima Sarmiento MD;  Location: [facility] OR      SECTION           HC DILATION/CURETTAGE DIAG/THER NON OB  2003    D & C for retained placenta one week after the delivery     I&D BARTHOLIN GLAND ABSCESS   and     mcgrath cath      KNEE SURGERY      " Rt knee Fx, repair-dislocation problem     KNEE SURGERY  1990    Lt knee reconstructive surgery-dislocation problem     LEEP TX, CERVICAL  1995    LEEP for abnormal pap     MARSUP BARTHOLIN GLAND CYST  4/25/08          terbinafine (LAMISIL) 1 % external cream       albuterol (PROAIR HFA/PROVENTIL HFA/VENTOLIN HFA) 108 (90 Base) MCG/ACT inhaler       Calcium Carbonate-Vitamin D (CALCIUM + D PO)       diclofenac (VOLTAREN) 75 MG EC tablet       diclofenac (VOLTAREN) 75 MG EC tablet       FEXOFENADINE  MG OR TABS       FLOVENT  MCG/ACT inhaler       fluticasone (FLONASE) 50 MCG/ACT nasal spray       ibuprofen (ADVIL/MOTRIN) 600 MG tablet       methylPREDNISolone (MEDROL) 4 MG tablet therapy pack       MULTIPLE VITAMIN PO       tiZANidine (ZANAFLEX) 4 MG tablet       Turmeric Curcumin 500 MG CAPS       VITAMIN D, CHOLECALCIFEROL, PO      Allergies   Allergen Reactions     No Known Allergies      Family History  Family History   Problem Relation Age of Onset     Thyroid Disease Mother      Diabetes Mother         type2     Eye Disorder Mother         cataracts     Gastrointestinal Disease Mother         hep a/has to have her throat stretched     Respiratory Mother         sleep apnea     Heart Disease Father 60     C.A.D. Paternal Grandfather 30        age 30, then again in his 80's     Diabetes Maternal Grandfather      Cancer Maternal Grandfather      Thyroid Disease Maternal Grandmother      Gynecology Maternal Grandmother         on bcp to keep period regular, a small uterus     Heart Disease Maternal Grandmother         tachycardia     Allergies Daughter         milk     Asthma Daughter      Social History   Social History     Tobacco Use     Smoking status: Never Smoker     Smokeless tobacco: Never Used   Substance Use Topics     Alcohol use: Yes     Comment: social     Drug use: No      Past medical history, past surgical history, medications, allergies, family history, and social history were  reviewed with the patient. No additional pertinent items.       Review of Systems  A complete review of systems was performed with pertinent positives and negatives noted in the HPI, and all other systems negative.    Physical Exam   BP: 103/63  Pulse: 69  Temp: 98.2  F (36.8  C)  Resp: 16  SpO2: 98 %  Physical Exam  Vitals signs and nursing note reviewed.   Constitutional:       General: She is not in acute distress.     Appearance: Normal appearance. She is not diaphoretic.   HENT:      Head: Atraumatic.      Mouth/Throat:      Pharynx: No oropharyngeal exudate.   Eyes:      General: No scleral icterus.     Pupils: Pupils are equal, round, and reactive to light.   Neck:      Musculoskeletal: Neck supple.   Cardiovascular:      Rate and Rhythm: Normal rate and regular rhythm.      Heart sounds: Normal heart sounds.   Pulmonary:      Effort: No respiratory distress.      Breath sounds: Normal breath sounds.   Abdominal:      General: Bowel sounds are normal.      Palpations: Abdomen is soft.      Tenderness: There is no abdominal tenderness.   Musculoskeletal:         General: No tenderness.   Feet:      Left foot:      Skin integrity: No ulcer, skin breakdown, erythema or warmth.      Toenail Condition: Fungal disease present.  Skin:     General: Skin is warm.      Capillary Refill: Capillary refill takes less than 2 seconds.      Findings: No rash.   Neurological:      General: No focal deficit present.      Mental Status: She is alert and oriented to person, place, and time.      Cranial Nerves: No cranial nerve deficit.      Sensory: No sensory deficit.      Motor: No weakness.      Coordination: Coordination normal.      Gait: Gait normal.      Deep Tendon Reflexes: Reflexes normal.   Psychiatric:         Mood and Affect: Mood normal.         Behavior: Behavior normal.         ED Course      Procedures       Results for orders placed or performed during the hospital encounter of 01/04/21   CT Head w/o Contrast      Status: None    Narrative    CT SCAN OF THE HEAD WITHOUT CONTRAST   1/4/2021 8:43 PM     HISTORY: Left-sided paresthesias    TECHNIQUE:  Axial images of the head and coronal reformations without  IV contrast material. Radiation dose for this scan was reduced using  automated exposure control, adjustment of the mA and/or kV according  to patient size, or iterative reconstruction technique.    COMPARISON: None.    FINDINGS: There is no evidence of intracranial hemorrhage, mass, acute  infarct or anomaly. The ventricles are normal in size, shape and  configuration. The brain parenchyma and subarachnoid spaces are  normal.     The visualized portions of the sinuses and mastoids appear normal. The  bony calvarium and bones of the skull base appear intact.       Impression    IMPRESSION: No CT findings of acute intracranial abnormality.    ADDIE GUZMAN MD   Cervical spine CT w/o contrast     Status: None    Narrative    CT CERVICAL SPINE WITHOUT CONTRAST   1/4/2021 8:44 PM     HISTORY: Radiculopathy. Left-sided paresthesias.     TECHNIQUE: Axial images of the cervical spine were obtained without  intravenous contrast. Multiplanar reformations were performed.   Radiation dose for this scan was reduced using automated exposure  control, adjustment of the mA and/or kV according to patient size, or  iterative reconstruction technique.    COMPARISON: MRI cervical spine 8/20/2020.    FINDINGS: No acute fracture identified. There is straightening of the  normal cervical lordosis, which may be positional. Alignment otherwise  appears within normal limits. The intervertebral disc heights are  maintained throughout the cervical spine, without significant disc  height loss. There are small disc bulges at multiple levels. As seen  on the MRI cervical spine of 8/20/2020, there is a left central disc  herniation at C5-C6 that mildly indents the left ventral aspect of the  spinal cord. There is no high-grade spinal canal stenosis.  No  high-grade osseous neural foraminal narrowing. The paravertebral soft  tissues are within normal limits.      Impression    IMPRESSION:  1. No acute osseous abnormality.  2. Multilevel degenerative disc disease, including a left central disc  herniation at C5-C6, as seen on prior MRI. No high-grade spinal canal  stenosis. No high-grade osseous neural foraminal narrowing identified.    ADDIE GUZMAN MD   XR Toe Left G/E 2 Views     Status: None    Narrative    Examination:  XR TOE LT G/E 2 VW    Date:  1/4/2021 8:49 PM     Clinical Information: left big toe pain and swelling.     Comparison: none.      Impression    Impression:    1.  Left great toe negative for fracture, erosion, or joint  malalignment. Normal soft tissues.    LAWRENCE CLEANING MD   CBC with platelets differential     Status: Abnormal   Result Value Ref Range    WBC 6.2 4.0 - 11.0 10e9/L    RBC Count 4.34 3.8 - 5.2 10e12/L    Hemoglobin 13.1 11.7 - 15.7 g/dL    Hematocrit 42.7 35.0 - 47.0 %    MCV 98 78 - 100 fl    MCH 30.2 26.5 - 33.0 pg    MCHC 30.7 (L) 31.5 - 36.5 g/dL    RDW 12.7 10.0 - 15.0 %    Platelet Count 214 150 - 450 10e9/L    Diff Method Automated Method     % Neutrophils 63.7 %    % Lymphocytes 24.4 %    % Monocytes 9.6 %    % Eosinophils 1.1 %    % Basophils 1.0 %    % Immature Granulocytes 0.2 %    Nucleated RBCs 0 0 /100    Absolute Neutrophil 3.9 1.6 - 8.3 10e9/L    Absolute Lymphocytes 1.5 0.8 - 5.3 10e9/L    Absolute Monocytes 0.6 0.0 - 1.3 10e9/L    Absolute Eosinophils 0.1 0.0 - 0.7 10e9/L    Absolute Basophils 0.1 0.0 - 0.2 10e9/L    Abs Immature Granulocytes 0.0 0 - 0.4 10e9/L    Absolute Nucleated RBC 0.0    Basic metabolic panel     Status: Abnormal   Result Value Ref Range    Sodium 137 133 - 144 mmol/L    Potassium 5.8 (H) 3.4 - 5.3 mmol/L    Chloride 106 94 - 109 mmol/L    Carbon Dioxide 26 20 - 32 mmol/L    Anion Gap 5 3 - 14 mmol/L    Glucose 82 70 - 99 mg/dL    Urea Nitrogen 13 7 - 30 mg/dL    Creatinine 0.63  0.52 - 1.04 mg/dL    GFR Estimate >90 >60 mL/min/[1.73_m2]    GFR Estimate If Black >90 >60 mL/min/[1.73_m2]    Calcium 9.4 8.5 - 10.1 mg/dL   HCG qualitative Blood     Status: None   Result Value Ref Range    HCG Qualitative Serum Negative NEG^Negative   TSH with free T4 reflex     Status: None   Result Value Ref Range    TSH 1.64 0.40 - 4.00 mU/L   Magnesium     Status: Abnormal   Result Value Ref Range    Magnesium 2.4 (H) 1.6 - 2.3 mg/dL   CRP inflammation     Status: None   Result Value Ref Range    CRP Inflammation <2.9 0.0 - 8.0 mg/L   Erythrocyte sedimentation rate auto     Status: None   Result Value Ref Range    Sed Rate 7 0 - 20 mm/h     Medications   0.9% sodium chloride BOLUS (0 mLs Intravenous Stopped 1/4/21 2112)   ibuprofen (ADVIL/MOTRIN) tablet 600 mg (600 mg Oral Given 1/4/21 2143)        Assessments & Plan (with Medical Decision Making)   Patient was seen and examined.  Initial neurologic exam is essentially unremarkable.  CT head and cervical spine were ordered.  CT head unremarkable.  CT cervical spine shows left-sided disc herniation, unchanged from previous MRI.  Blood work unremarkable.  X-ray of the left foot shows no bony destruction, effusion, or soft tissue swelling.  Patient will be discharged home.  She was given a new prescription for Lamisil cream.  She was instructed to follow-up with her primary care physician in the next few days for reassessment and return to the emergency department immediately for any worsening symptoms.  She is comfortable with this plan and was discharged in stable condition.    I have reviewed the nursing notes. I have reviewed the findings, diagnosis, plan and need for follow up with the patient.    Discharge Medication List as of 1/4/2021  9:37 PM      START taking these medications    Details   terbinafine (LAMISIL) 1 % external cream Apply topically 2 times dailyDisp-15 g, [id number]             Final diagnoses:   Paresthesias   Onychomycosis        --  DO CARIDAD Mccullough [facility] EMERGENCY DEPARTMENT  1/4/2021     Cori Noel DO  01/04/21 5521

## 2021-01-06 RX ORDER — DEXAMETHASONE 4 MG/1
TABLET ORAL
Qty: 36 INHALER | Refills: 0 | Status: SHIPPED | OUTPATIENT
Start: 2021-01-06 | End: 2021-04-05

## 2021-01-06 NOTE — TELEPHONE ENCOUNTER
Medication is being filled for 1 time refill only due to:  Patient needs to be seen because needs annual exam/asthma recheck.   Please schedule.  Monica Martin RN

## 2021-01-15 ENCOUNTER — HEALTH MAINTENANCE LETTER (OUTPATIENT)
Age: 46
End: 2021-01-15

## 2021-01-28 ENCOUNTER — VIRTUAL VISIT (OUTPATIENT)
Dept: ORTHOPEDICS | Facility: CLINIC | Age: 46
End: 2021-01-28
Payer: COMMERCIAL

## 2021-01-28 DIAGNOSIS — M54.12 CERVICAL RADICULAR PAIN: ICD-10-CM

## 2021-01-28 DIAGNOSIS — M50.20 CERVICAL DISC HERNIATION: ICD-10-CM

## 2021-01-28 DIAGNOSIS — M51.16 LUMBAR DISC HERNIATION WITH RADICULOPATHY: Primary | ICD-10-CM

## 2021-01-28 PROCEDURE — 99214 OFFICE O/P EST MOD 30 MIN: CPT | Mod: 95 | Performed by: PREVENTIVE MEDICINE

## 2021-01-28 RX ORDER — METHOCARBAMOL 500 MG/1
500 TABLET, FILM COATED ORAL
Qty: 45 TABLET | Refills: 0 | Status: SHIPPED | OUTPATIENT
Start: 2021-01-28 | End: 2021-06-09

## 2021-01-28 NOTE — LETTER
1/28/2021         RE: Lisset MITCHELL Chi  [address] [address]        Dear Colleague,    Thank you for referring your patient, Lisset MITCHELL Chi, to the [facility] [place]. Please see a copy of my visit note below.    Lisset is a 45 year old who is being evaluated via a billable video visit.      How would you like to obtain your AVS? MyChart  If the video visit is dropped, the invitation should be resent by: Text to cell phone: [phone]  Will anyone else be joining your video visit? No    Video Start Time: 8:15 AM      Subjective     Lisset is a 45 year old who presents to discuss her low back pain after having CAMMY about 2 months prior  Doing better, had improvement in her tailbone and hip pain after injection, starting to come back a little  HPI     Review of Systems   Constitutional, HEENT, cardiovascular, pulmonary, gi and gu systems are negative, except as otherwise noted.      Objective           Vitals:  No vitals were obtained today due to virtual visit.    Physical Exam   GENERAL: Healthy, alert and no distress  EYES: Eyes grossly normal to inspection.  No discharge or erythema, or obvious scleral/conjunctival abnormalities.  RESP: No audible wheeze, cough, or visible cyanosis.  No visible retractions or increased work of breathing.    SKIN: Visible skin clear. No significant rash, abnormal pigmentation or lesions.  NEURO: Cranial nerves grossly intact.  Mentation and speech appropriate for age.  PSYCH: Mentation appears normal, affect normal/bright, judgement and insight intact, normal speech and appearance well-groomed.      Assessment: 44 yo female with lumbar disc buldge/herniation, radicular pain, improved, not resolved, cervical disc herniation, radicular pain improved    Plan:   Reviewed plan to start PT  Ordered PT  Given RX for robaxin nightly  Cont. PRN use of ibuprofen  F/u 1 month  Can consider repeat lumbar CAMMY at that time  Cervical pain and issues are  improved overall        Video-Visit Details    Type of service:  Video Visit    Video End Time:8:31 AM    Originating Location (pt. Location): Home    Distant Location (provider location):  [facility] SPORTS MEDICINE [facility]     Platform used for Video Visit: Stephanie      Again, thank you for allowing me to participate in the care of your patient.        Sincerely,        Floyd Larsen MD

## 2021-01-28 NOTE — LETTER
1/28/2021         RE: Lisset MITCHELL Chi  [address] [address]        Dear Colleague,    Thank you for referring your patient, Lisset MITCHELL Chi, to the [facility] [place]. Please see a copy of my visit note below.    Lisset is a 45 year old who is being evaluated via a billable video visit.      How would you like to obtain your AVS? MyChart  If the video visit is dropped, the invitation should be resent by: Text to cell phone: [phone]  Will anyone else be joining your video visit? No    Video Start Time: 8:15 AM      Subjective     Lisset is a 45 year old who presents to discuss her low back pain after having CAMMY about 2 months prior  Doing better, had improvement in her tailbone and hip pain after injection, starting to come back a little  HPI     Review of Systems   Constitutional, HEENT, cardiovascular, pulmonary, gi and gu systems are negative, except as otherwise noted.      Objective           Vitals:  No vitals were obtained today due to virtual visit.    Physical Exam   GENERAL: Healthy, alert and no distress  EYES: Eyes grossly normal to inspection.  No discharge or erythema, or obvious scleral/conjunctival abnormalities.  RESP: No audible wheeze, cough, or visible cyanosis.  No visible retractions or increased work of breathing.    SKIN: Visible skin clear. No significant rash, abnormal pigmentation or lesions.  NEURO: Cranial nerves grossly intact.  Mentation and speech appropriate for age.  PSYCH: Mentation appears normal, affect normal/bright, judgement and insight intact, normal speech and appearance well-groomed.      Assessment: 44 yo female with lumbar disc buldge/herniation, radicular pain, improved, not resolved, cervical disc herniation, radicular pain improved    Plan:   Reviewed plan to start PT  Ordered PT  Given RX for robaxin nightly  Cont. PRN use of ibuprofen  F/u 1 month  Can consider repeat lumbar CAMMY at that time  Cervical pain and issues are  improved overall        Video-Visit Details    Type of service:  Video Visit    Video End Time:8:31 AM    Originating Location (pt. Location): Home    Distant Location (provider location):  [facility] SPORTS MEDICINE [facility]     Platform used for Video Visit: Stephanie      Again, thank you for allowing me to participate in the care of your patient.        Sincerely,        Floyd Larsen MD

## 2021-01-28 NOTE — PROGRESS NOTES
Lisset is a 45 year old who is being evaluated via a billable video visit.      How would you like to obtain your AVS? MyChart  If the video visit is dropped, the invitation should be resent by: Text to cell phone: 22  Will anyone else be joining your video visit? No    Video Start Time: 8:15 AM      Subjective     Lisset is a 45 year old who presents to discuss her low back pain after having CAMMY about 2 months prior  Doing better, had improvement in her tailbone and hip pain after injection, starting to come back a little  HPI     Review of Systems   Constitutional, HEENT, cardiovascular, pulmonary, gi and gu systems are negative, except as otherwise noted.      Objective           Vitals:  No vitals were obtained today due to virtual visit.    Physical Exam   GENERAL: Healthy, alert and no distress  EYES: Eyes grossly normal to inspection.  No discharge or erythema, or obvious scleral/conjunctival abnormalities.  RESP: No audible wheeze, cough, or visible cyanosis.  No visible retractions or increased work of breathing.    SKIN: Visible skin clear. No significant rash, abnormal pigmentation or lesions.  NEURO: Cranial nerves grossly intact.  Mentation and speech appropriate for age.  PSYCH: Mentation appears normal, affect normal/bright, judgement and insight intact, normal speech and appearance well-groomed.      Assessment: 44 yo female with lumbar disc buldge/herniation, radicular pain, improved, not resolved, cervical disc herniation, radicular pain improved    Plan:   Reviewed plan to start PT  Ordered PT  Given RX for robaxin nightly  Cont. PRN use of ibuprofen  F/u 1 month  Can consider repeat lumbar CAMMY at that time  Cervical pain and issues are improved overall        Video-Visit Details    Type of service:  Video Visit    Video End Time:8:31 AM    Originating Location (pt. Location): Home    Distant Location (provider location):  [facility] MEDICINE [facility]     Platform used for  Video Visit: Stephanie

## 2021-02-12 ENCOUNTER — THERAPY VISIT (OUTPATIENT)
Dept: PHYSICAL THERAPY | Facility: CLINIC | Age: 46
End: 2021-02-12
Payer: COMMERCIAL

## 2021-02-12 DIAGNOSIS — M50.20 CERVICAL DISC HERNIATION: ICD-10-CM

## 2021-02-12 DIAGNOSIS — M54.12 CERVICAL RADICULAR PAIN: ICD-10-CM

## 2021-02-12 DIAGNOSIS — M54.2 NECK PAIN: ICD-10-CM

## 2021-02-12 DIAGNOSIS — M51.16 LUMBAR DISC HERNIATION WITH RADICULOPATHY: ICD-10-CM

## 2021-02-12 PROCEDURE — 97110 THERAPEUTIC EXERCISES: CPT | Mod: 95 | Performed by: PHYSICAL THERAPIST

## 2021-02-12 PROCEDURE — 97161 PT EVAL LOW COMPLEX 20 MIN: CPT | Mod: 95 | Performed by: PHYSICAL THERAPIST

## 2021-02-12 NOTE — PROGRESS NOTES
[facility] Initial Evaluation  Subjective:  The history is provided by the patient.   Patient Health History         Pain is reported as 6/10 on pain scale.  General health as reported by patient is good.  Pertinent medical history includes: asthma, depression, menopausal and seizures.   Red flags:  None as reported by patient.  Medical allergies: none.   Surgeries include:  Orthopedic surgery. Other surgery history details: B knees for patellar dislocations.    Current medications:  Pain medication.    Current occupation is .   Primary job tasks include:  Computer work, prolonged sitting and prolonged standing.                  Therapist Generated HPI Evaluation  Problem details: Pain began years ago, more recently began going down the arm. Had an injection about a year ago which helped the arm pain. Does not go beyond arm pit area at this point.         Type of problem:  Cervical spine.    This is a chronic condition.  Condition occurred with:  Insidious onset.  Where condition occurred: for unknown reasons.  Patient reports pain:  Lower cervical spine and cervical right side.  Pain is described as aching and shooting and is intermittent.  Pain radiates to:  Shoulder right and upper arm right (underneath R scapula). Pain timing: no pattern.  Since onset symptoms are gradually improving.  Associated symptoms:  Loss of motion/stiffness and loss of strength. Symptoms are exacerbated by lying down and sitting    Special tests included:  MRI (in Jan/21: left central disc herniation C5-6).    Restrictions due to condition include:  Working in normal job without restrictions.  Barriers include:  None as reported by patient.                        Objective:        Flexibility/Screens:         Spine:  Decreased left spine flexibility:  Levator    Decreased right spine flexibility:  Levator                  Cervical/Thoracic Evaluation    AROM:  AROM Cervical:    Flexion:             WNL  Extension:       WNL  Rotation:         Left: decreased 25%     Right: decreased 25%  Side Bend:      Left: decreased 25%     Right:  Decreased 25%                               Shoulder Evaluation:  ROM:  AROM:  normal (pain at end range flexion, abd)                                        Palpation:  Palpation assessed shoulder: pt reports tenderness/swelling over SC joint.      Mobility Tests:  Mobility wnl shoulder: appears normal via video.                                             + Beightons scale for hypermobility  objective portion of eval limited d/t virtual visit    General     ROS    Assessment/Plan:    Patient is a 45 year old female with cervical complaints.    Patient has the following significant findings with corresponding treatment plan.                Diagnosis 1:  Neck/R UE pain  Pain -  hot/cold therapy, manual therapy, self management, education, directional preference exercise and home program  Decreased ROM/flexibility - manual therapy, therapeutic exercise and home program  Decreased strength - therapeutic exercise, therapeutic activities and home program    Therapy Evaluation Codes:   1) History comprised of:   Personal factors that impact the plan of care:      None.    Comorbidity factors that impact the plan of care are:      hypermobility.     Medications impacting care: None.  2) Examination of Body Systems comprised of:   Body structures and functions that impact the plan of care:      Cervical spine, Shoulder and Thoracic Spine.   Activity limitations that impact the plan of care are:      Bathing, Cooking, Dressing, Lifting, Reading/Computer work, Sitting, Working and Sleeping.  3) Clinical presentation characteristics are:   Stable/Uncomplicated.  4) Decision-Making    Low complexity using standardized patient assessment instrument and/or measureable assessment of functional outcome.  Cumulative Therapy Evaluation is: Low complexity.    Previous and current functional  limitations:  (See Goal Flow Sheet for this information)    Short term and Long term goals: (See Goal Flow Sheet for this information)     Communication ability:  Patient appears to be able to clearly communicate and understand verbal and written communication and follow directions correctly.  Treatment Explanation - The following has been discussed with the patient:   RX ordered/plan of care  Anticipated outcomes  Possible risks and side effects  This patient would benefit from PT intervention to resume normal activities.   Rehab potential is good.    Frequency:  1 X week, once daily  Duration:  for 8 weeks  Discharge Plan:  Achieve all LTG.  Independent in home treatment program.  Reach maximal therapeutic benefit.    Please refer to the daily flowsheet for treatment today, total treatment time and time spent performing 1:1 timed codes.

## 2021-02-24 ENCOUNTER — THERAPY VISIT (OUTPATIENT)
Dept: PHYSICAL THERAPY | Facility: CLINIC | Age: 46
End: 2021-02-24
Payer: COMMERCIAL

## 2021-02-24 DIAGNOSIS — M54.2 NECK PAIN: ICD-10-CM

## 2021-02-24 PROCEDURE — 97110 THERAPEUTIC EXERCISES: CPT | Mod: 95 | Performed by: PHYSICAL THERAPIST

## 2021-03-03 ENCOUNTER — VIRTUAL VISIT (OUTPATIENT)
Dept: ORTHOPEDICS | Facility: CLINIC | Age: 46
End: 2021-03-03
Payer: COMMERCIAL

## 2021-03-03 DIAGNOSIS — M54.2 CERVICALGIA: ICD-10-CM

## 2021-03-03 DIAGNOSIS — M51.16 LUMBAR DISC HERNIATION WITH RADICULOPATHY: Primary | ICD-10-CM

## 2021-03-03 PROCEDURE — 99212 OFFICE O/P EST SF 10 MIN: CPT | Mod: 95 | Performed by: PREVENTIVE MEDICINE

## 2021-03-03 NOTE — PROGRESS NOTES
Lisset is a 45 year old who is being evaluated via a billable video visit.      How would you like to obtain your AVS? MyChart  If the video visit is dropped, the invitation should be resent by: Text to cell phone: 22  Will anyone else be joining your video visit? No    Video Start Time: 7:29 AM    Assessment & Plan   44 yo female with cervical ddd, radicular pain, stable, not resolved, and lumbar ddd, radicular pain, stable, not resolved    Lumbar disc herniation with radiculopathy  Will f/u in 1 month and consider ordering CAMMY  Cont. HEP  Cont. PT      Cervicalgia  Reviewed cervical CT: shows ddd  Consider cervical CAMMY  Cont. HEP  Cont. voltaren PRN and tizanadine PRN  Suggested lidocaine patches and voltaren gel             No follow-ups on file.    Floyd Larsne MD  [facility] SPORTS MEDICINE CLINIC [facility]   Lisset is a 45 year old who presents to discuss her low back and neck pain which has been ok, but continues to have pain in her neck and upper back and lower back    HPI       Review of Systems   Constitutional, HEENT, cardiovascular, pulmonary, gi and gu systems are negative, except as otherwise noted.      Objective           Vitals:  No vitals were obtained today due to virtual visit.    Physical Exam   GENERAL: Healthy, alert and no distress  EYES: Eyes grossly normal to inspection.  No discharge or erythema, or obvious scleral/conjunctival abnormalities.  RESP: No audible wheeze, cough, or visible cyanosis.  No visible retractions or increased work of breathing.    SKIN: Visible skin clear. No significant rash, abnormal pigmentation or lesions.  NEURO: Cranial nerves grossly intact.  Mentation and speech appropriate for age.  PSYCH: Mentation appears normal, affect normal/bright, judgement and insight intact, normal speech and appearance well-groomed.    NecK: has pain with flexion and extension   Lumbar: Has some pain with flexion and extension as well             Video-Visit Details    Type of service:  Video Visit    Video End Time:7:39 AM    Originating Location (pt. Location): Home    Distant Location (provider location):  [facility] SPORTS MEDICINE [facility]     Platform used for Video Visit: SensingStrip

## 2021-03-03 NOTE — LETTER
3/3/2021         RE: Lisset MITCHELL Chi  [address] [address]        Dear Colleague,    Thank you for referring your patient, Lisset MITCHELL Chi, to the [facility] SPORTS MEDICINE [facility]. Please see a copy of my visit note below.    Lisset is a 45 year old who is being evaluated via a billable video visit.      How would you like to obtain your AVS? MyChart  If the video visit is dropped, the invitation should be resent by: Text to cell phone: 22  Will anyone else be joining your video visit? No    Video Start Time: 7:29 AM    Assessment & Plan   44 yo female with cervical ddd, radicular pain, stable, not resolved, and lumbar ddd, radicular pain, stable, not resolved    Lumbar disc herniation with radiculopathy  Will f/u in 1 month and consider ordering CAMMY  Cont. HEP  Cont. PT      Cervicalgia  Reviewed cervical CT: shows ddd  Consider cervical CAMMY  Cont. HEP  Cont. voltaren PRN and tizanadine PRN  Suggested lidocaine patches and voltaren gel             No follow-ups on file.    Floyd Larsen MD  [facility] SPORTS MEDICINE CLINIC [place]    Subjective   Lisset is a 45 year old who presents to discuss her low back and neck pain which has been ok, but continues to have pain in her neck and upper back and lower back    HPI       Review of Systems   Constitutional, HEENT, cardiovascular, pulmonary, gi and gu systems are negative, except as otherwise noted.      Objective           Vitals:  No vitals were obtained today due to virtual visit.    Physical Exam   GENERAL: Healthy, alert and no distress  EYES: Eyes grossly normal to inspection.  No discharge or erythema, or obvious scleral/conjunctival abnormalities.  RESP: No audible wheeze, cough, or visible cyanosis.  No visible retractions or increased work of breathing.    SKIN: Visible skin clear. No significant rash, abnormal pigmentation or lesions.  NEURO: Cranial nerves grossly intact.  Mentation and speech  appropriate for age.  PSYCH: Mentation appears normal, affect normal/bright, judgement and insight intact, normal speech and appearance well-groomed.    NecK: has pain with flexion and extension   Lumbar: Has some pain with flexion and extension as well            Video-Visit Details    Type of service:  Video Visit    Video End Time:7:39 AM    Originating Location (pt. Location): Home    Distant Location (provider location):  [facility] SPORTS MEDICINE [facility]     Platform used for Video Visit: AmWell      Again, thank you for allowing me to participate in the care of your patient.        Sincerely,        Floyd Larsen MD

## 2021-03-03 NOTE — LETTER
3/3/2021         RE: Lisset MITCHELL Chi  [address] [address]        Dear Colleague,    Thank you for referring your patient, Lisset MITCHELL Chi, to the [facility] SPORTS MEDICINE [facility]. Please see a copy of my visit note below.    Lisset is a 45 year old who is being evaluated via a billable video visit.      How would you like to obtain your AVS? MyChart  If the video visit is dropped, the invitation should be resent by: Text to cell phone: 22  Will anyone else be joining your video visit? No    Video Start Time: 7:29 AM    Assessment & Plan   46 yo female with cervical ddd, radicular pain, stable, not resolved, and lumbar ddd, radicular pain, stable, not resolved    Lumbar disc herniation with radiculopathy  Will f/u in 1 month and consider ordering CAMMY  Cont. HEP  Cont. PT      Cervicalgia  Reviewed cervical CT: shows ddd  Consider cervical CAMMY  Cont. HEP  Cont. voltaren PRN and tizanadine PRN  Suggested lidocaine patches and voltaren gel             No follow-ups on file.    Floyd Larsen MD  [facility] SPORTS MEDICINE CLINIC [place]    Subjective   Lisset is a 45 year old who presents to discuss her low back and neck pain which has been ok, but continues to have pain in her neck and upper back and lower back    HPI       Review of Systems   Constitutional, HEENT, cardiovascular, pulmonary, gi and gu systems are negative, except as otherwise noted.      Objective           Vitals:  No vitals were obtained today due to virtual visit.    Physical Exam   GENERAL: Healthy, alert and no distress  EYES: Eyes grossly normal to inspection.  No discharge or erythema, or obvious scleral/conjunctival abnormalities.  RESP: No audible wheeze, cough, or visible cyanosis.  No visible retractions or increased work of breathing.    SKIN: Visible skin clear. No significant rash, abnormal pigmentation or lesions.  NEURO: Cranial nerves grossly intact.  Mentation and speech  appropriate for age.  PSYCH: Mentation appears normal, affect normal/bright, judgement and insight intact, normal speech and appearance well-groomed.    NecK: has pain with flexion and extension   Lumbar: Has some pain with flexion and extension as well            Video-Visit Details    Type of service:  Video Visit    Video End Time:7:39 AM    Originating Location (pt. Location): Home    Distant Location (provider location):  [facility] SPORTS MEDICINE [facility]     Platform used for Video Visit: AmWell      Again, thank you for allowing me to participate in the care of your patient.        Sincerely,        Floyd Larsen MD

## 2021-03-06 DIAGNOSIS — M50.20 CERVICAL DISC HERNIATION: ICD-10-CM

## 2021-03-06 DIAGNOSIS — M51.16 LUMBAR DISC HERNIATION WITH RADICULOPATHY: ICD-10-CM

## 2021-03-08 ENCOUNTER — THERAPY VISIT (OUTPATIENT)
Dept: PHYSICAL THERAPY | Facility: CLINIC | Age: 46
End: 2021-03-08
Payer: COMMERCIAL

## 2021-03-08 ENCOUNTER — MYC MEDICAL ADVICE (OUTPATIENT)
Dept: ORTHOPEDICS | Facility: CLINIC | Age: 46
End: 2021-03-08

## 2021-03-08 DIAGNOSIS — M54.2 NECK PAIN: ICD-10-CM

## 2021-03-08 PROCEDURE — 97112 NEUROMUSCULAR REEDUCATION: CPT | Mod: 95 | Performed by: PHYSICAL THERAPIST

## 2021-03-08 PROCEDURE — 97110 THERAPEUTIC EXERCISES: CPT | Mod: 95 | Performed by: PHYSICAL THERAPIST

## 2021-03-08 RX ORDER — IBUPROFEN 600 MG/1
600 TABLET, FILM COATED ORAL EVERY 8 HOURS PRN
Qty: 60 TABLET | Refills: 1 | Status: SHIPPED | OUTPATIENT
Start: 2021-03-08

## 2021-03-19 ENCOUNTER — THERAPY VISIT (OUTPATIENT)
Dept: PHYSICAL THERAPY | Facility: CLINIC | Age: 46
End: 2021-03-19
Payer: COMMERCIAL

## 2021-03-19 DIAGNOSIS — M54.2 NECK PAIN: ICD-10-CM

## 2021-03-19 PROCEDURE — 97110 THERAPEUTIC EXERCISES: CPT | Mod: 95 | Performed by: PHYSICAL THERAPIST

## 2021-03-21 ENCOUNTER — HEALTH MAINTENANCE LETTER (OUTPATIENT)
Age: 46
End: 2021-03-21

## 2021-03-27 ENCOUNTER — IMMUNIZATION (OUTPATIENT)
Dept: NURSING | Facility: CLINIC | Age: 46
End: 2021-03-27
Payer: COMMERCIAL

## 2021-03-27 PROCEDURE — 0011A PR COVID VAC MODERNA 100 MCG/0.5 ML IM: CPT

## 2021-03-27 PROCEDURE — 91301 PR COVID VAC MODERNA 100 MCG/0.5 ML IM: CPT

## 2021-03-29 ENCOUNTER — THERAPY VISIT (OUTPATIENT)
Dept: PHYSICAL THERAPY | Facility: CLINIC | Age: 46
End: 2021-03-29
Payer: COMMERCIAL

## 2021-03-29 DIAGNOSIS — M54.2 NECK PAIN: ICD-10-CM

## 2021-03-29 PROCEDURE — 97110 THERAPEUTIC EXERCISES: CPT | Mod: 95 | Performed by: PHYSICAL THERAPIST

## 2021-03-29 PROCEDURE — 97112 NEUROMUSCULAR REEDUCATION: CPT | Mod: 95 | Performed by: PHYSICAL THERAPIST

## 2021-04-01 DIAGNOSIS — J45.30 MILD PERSISTENT ASTHMA WITHOUT COMPLICATION: ICD-10-CM

## 2021-04-05 ENCOUNTER — MYC MEDICAL ADVICE (OUTPATIENT)
Dept: FAMILY MEDICINE | Facility: CLINIC | Age: 46
End: 2021-04-05

## 2021-04-05 RX ORDER — DEXAMETHASONE 4 MG/1
TABLET ORAL
Qty: 12 G | Refills: 0 | Status: SHIPPED | OUTPATIENT
Start: 2021-04-05 | End: 2021-05-21

## 2021-04-05 NOTE — TELEPHONE ENCOUNTER
Routing refill request to provider for review/approval because:  ACT out of range.   Greater than 6 months since last office visit, due for follow up    ACT Total Scores 2/19/2018 3/1/2019 4/1/2020   ACT TOTAL SCORE - - -   ASTHMA ER VISITS - - -   ASTHMA HOSPITALIZATIONS - - -   ACT TOTAL SCORE (Goal Greater than or Equal to 20) 23 23 21   In the past 12 months, how many times did you visit the emergency room for your asthma without being admitted to the hospital? 0 0 0   In the past 12 months, how many times were you hospitalized overnight because of your asthma? 0 0 0       Tamiko Colbert, RN, BSN, PHN  [facility]: [place]

## 2021-04-06 ENCOUNTER — VIRTUAL VISIT (OUTPATIENT)
Dept: ORTHOPEDICS | Facility: CLINIC | Age: 46
End: 2021-04-06
Payer: COMMERCIAL

## 2021-04-06 ENCOUNTER — TELEPHONE (OUTPATIENT)
Dept: ORTHOPEDICS | Facility: CLINIC | Age: 46
End: 2021-04-06

## 2021-04-06 DIAGNOSIS — M54.12 CERVICAL RADICULAR PAIN: ICD-10-CM

## 2021-04-06 DIAGNOSIS — M50.20 CERVICAL DISC HERNIATION: Primary | ICD-10-CM

## 2021-04-06 PROCEDURE — 99212 OFFICE O/P EST SF 10 MIN: CPT | Mod: 95 | Performed by: PREVENTIVE MEDICINE

## 2021-04-06 NOTE — LETTER
4/6/2021         RE: Lisset MITCHELL Chi  [address] [address]        Dear Colleague,    Thank you for referring your patient, Lisset MITCHELL Chi, to the [facility] SPORTS MEDICINE [facility]. Please see a copy of my visit note below.    Lisset is a 45 year old who is being evaluated via a billable video visit.      How would you like to obtain your AVS? MyChart  If the video visit is dropped, the invitation should be resent by: Text to cell phone: 22  Will anyone else be joining your video visit? No      Video Start Time: 7:45am    Assessment & Plan   46 yo female with cervical ddd, disc herniation and radicular pain not resolved  Reviewed cervical MRI: shows ddd, disc herniation  Cervical disc herniation    - X-ray cervical/thoracic epidural injection; Future    Cervical radicular pain    - X-ray cervical/thoracic epidural injection; Future    Cont medications and PT as prescribed  F/u with me after cervical CAMMY in person             No follow-ups on file.    Floyd Larsen MD  [facility] SPORTS [facility]    Subjective   Lisset is a 45 year old who presents for her neck an upper back pain  She states that she is 'ok' but she continues to have daily discomfort in her neck and upper back  Has not resolved    HPI       Review of Systems   Constitutional, HEENT, cardiovascular, pulmonary, gi and gu systems are negative, except as otherwise noted.      Objective           Vitals:  No vitals were obtained today due to virtual visit.    Physical Exam   GENERAL: Healthy, alert and no distress  EYES: Eyes grossly normal to inspection.  No discharge or erythema, or obvious scleral/conjunctival abnormalities.  RESP: No audible wheeze, cough, or visible cyanosis.  No visible retractions or increased work of breathing.    SKIN: Visible skin clear. No significant rash, abnormal pigmentation or lesions.  NEURO: Cranial nerves grossly intact.  Mentation and speech appropriate  for age.  PSYCH: Mentation appears normal, affect normal/bright, judgement and insight intact, normal speech and appearance well-groomed.    Neck: has some pain with ROM testing with flexion and extension            Video-Visit Details    Type of service:  Video Visit    Video End Time:8:00am    Originating Location (pt. Location): Home    Distant Location (provider location):  [facility] SPORTS MEDICINE [facility]     Platform used for Video Visit: AmWell      Again, thank you for allowing me to participate in the care of your patient.        Sincerely,        Floyd Larsen MD

## 2021-04-06 NOTE — PROGRESS NOTES
Lisset is a 45 year old who is being evaluated via a billable video visit.      How would you like to obtain your AVS? MyChart  If the video visit is dropped, the invitation should be resent by: Text to cell phone: 22  Will anyone else be joining your video visit? No      Video Start Time: 7:45am    Assessment & Plan   44 yo female with cervical ddd, disc herniation and radicular pain not resolved  Reviewed cervical MRI: shows ddd, disc herniation  Cervical disc herniation    - X-ray cervical/thoracic epidural injection; Future    Cervical radicular pain    - X-ray cervical/thoracic epidural injection; Future    Cont medications and PT as prescribed  F/u with me after cervical CAMMY in person             No follow-ups on file.    Floyd Larsen MD  [facility] SPORTS MEDICINE CLINIC [facility]   Lisset is a 45 year old who presents for her neck an upper back pain  She states that she is 'ok' but she continues to have daily discomfort in her neck and upper back  Has not resolved    HPI       Review of Systems   Constitutional, HEENT, cardiovascular, pulmonary, gi and gu systems are negative, except as otherwise noted.      Objective           Vitals:  No vitals were obtained today due to virtual visit.    Physical Exam   GENERAL: Healthy, alert and no distress  EYES: Eyes grossly normal to inspection.  No discharge or erythema, or obvious scleral/conjunctival abnormalities.  RESP: No audible wheeze, cough, or visible cyanosis.  No visible retractions or increased work of breathing.    SKIN: Visible skin clear. No significant rash, abnormal pigmentation or lesions.  NEURO: Cranial nerves grossly intact.  Mentation and speech appropriate for age.  PSYCH: Mentation appears normal, affect normal/bright, judgement and insight intact, normal speech and appearance well-groomed.    Neck: has some pain with ROM testing with flexion and extension            Video-Visit Details    Type of service:  Video  Visit    Video End Time:8:00am    Originating Location (pt. Location): Home    Distant Location (provider location):  [facility] SPORTS MEDICINE [facility]     Platform used for Video Visit: [employer]

## 2021-04-06 NOTE — LETTER
4/6/2021         RE: Lisset MITCHELL Chi  [address] [address]        Dear Colleague,    Thank you for referring your patient, Lisset MITCHELL Chi, to the [facility] SPORTS MEDICINE [facility]. Please see a copy of my visit note below.    Lisset is a 45 year old who is being evaluated via a billable video visit.      How would you like to obtain your AVS? MyChart  If the video visit is dropped, the invitation should be resent by: Text to cell phone: 22  Will anyone else be joining your video visit? No      Video Start Time: 7:45am    Assessment & Plan   46 yo female with cervical ddd, disc herniation and radicular pain not resolved  Reviewed cervical MRI: shows ddd, disc herniation  Cervical disc herniation    - X-ray cervical/thoracic epidural injection; Future    Cervical radicular pain    - X-ray cervical/thoracic epidural injection; Future    Cont medications and PT as prescribed  F/u with me after cervical CAMMY in person             No follow-ups on file.    Floyd Larsen MD  [facility] SPORTS [facility]    Subjective   Lisset is a 45 year old who presents for her neck an upper back pain  She states that she is 'ok' but she continues to have daily discomfort in her neck and upper back  Has not resolved    HPI       Review of Systems   Constitutional, HEENT, cardiovascular, pulmonary, gi and gu systems are negative, except as otherwise noted.      Objective           Vitals:  No vitals were obtained today due to virtual visit.    Physical Exam   GENERAL: Healthy, alert and no distress  EYES: Eyes grossly normal to inspection.  No discharge or erythema, or obvious scleral/conjunctival abnormalities.  RESP: No audible wheeze, cough, or visible cyanosis.  No visible retractions or increased work of breathing.    SKIN: Visible skin clear. No significant rash, abnormal pigmentation or lesions.  NEURO: Cranial nerves grossly intact.  Mentation and speech appropriate  for age.  PSYCH: Mentation appears normal, affect normal/bright, judgement and insight intact, normal speech and appearance well-groomed.    Neck: has some pain with ROM testing with flexion and extension            Video-Visit Details    Type of service:  Video Visit    Video End Time:8:00am    Originating Location (pt. Location): Home    Distant Location (provider location):  [facility] SPORTS MEDICINE [facility]     Platform used for Video Visit: AmWell      Again, thank you for allowing me to participate in the care of your patient.        Sincerely,        Floyd Larsen MD

## 2021-04-06 NOTE — TELEPHONE ENCOUNTER
4/6 Provided phone number [phone] to schedule epidural injection.     Karen Kat yates Procedure   Orthopedics, Podiatry, Sports Medicine, ENT/Eye Specialties  [facility]   [phone]      ----- Message from Janice Victoria ATC sent at 4/6/2021  9:32 AM CDT -----  Regarding: FW: please call and help patient schedule cervical CAMMY at [facility] with Dr Quinteros    ----- Message -----  From: Floyd Larsen MD  Sent: 4/6/2021   9:25 AM CDT  To: Janice Victoria ATC, #  Subject: please call and help patient schedule cervic#    please call and help patient schedule cervical CAMMY at [facility] with Dr Quinteros  Thanks

## 2021-04-21 ENCOUNTER — THERAPY VISIT (OUTPATIENT)
Dept: PHYSICAL THERAPY | Facility: CLINIC | Age: 46
End: 2021-04-21
Payer: COMMERCIAL

## 2021-04-21 DIAGNOSIS — M54.2 NECK PAIN: ICD-10-CM

## 2021-04-21 PROCEDURE — 97110 THERAPEUTIC EXERCISES: CPT | Mod: 95 | Performed by: PHYSICAL THERAPIST

## 2021-04-24 ENCOUNTER — IMMUNIZATION (OUTPATIENT)
Dept: NURSING | Facility: CLINIC | Age: 46
End: 2021-04-24
Attending: INTERNAL MEDICINE
Payer: COMMERCIAL

## 2021-04-24 PROCEDURE — 0012A PR COVID VAC MODERNA 100 MCG/0.5 ML IM: CPT

## 2021-04-24 PROCEDURE — 91301 PR COVID VAC MODERNA 100 MCG/0.5 ML IM: CPT

## 2021-04-29 ENCOUNTER — THERAPY VISIT (OUTPATIENT)
Dept: PHYSICAL THERAPY | Facility: CLINIC | Age: 46
End: 2021-04-29
Payer: COMMERCIAL

## 2021-04-29 DIAGNOSIS — M54.2 NECK PAIN: ICD-10-CM

## 2021-04-29 PROCEDURE — 97110 THERAPEUTIC EXERCISES: CPT | Mod: GP | Performed by: PHYSICAL THERAPIST

## 2021-04-29 PROCEDURE — 97140 MANUAL THERAPY 1/> REGIONS: CPT | Mod: GP | Performed by: PHYSICAL THERAPIST

## 2021-05-07 ENCOUNTER — THERAPY VISIT (OUTPATIENT)
Dept: PHYSICAL THERAPY | Facility: CLINIC | Age: 46
End: 2021-05-07
Payer: COMMERCIAL

## 2021-05-07 DIAGNOSIS — M54.2 NECK PAIN: ICD-10-CM

## 2021-05-07 PROCEDURE — 97112 NEUROMUSCULAR REEDUCATION: CPT | Mod: GP | Performed by: PHYSICAL THERAPIST

## 2021-05-07 PROCEDURE — 97110 THERAPEUTIC EXERCISES: CPT | Mod: GP | Performed by: PHYSICAL THERAPIST

## 2021-05-07 PROCEDURE — 97140 MANUAL THERAPY 1/> REGIONS: CPT | Mod: GP | Performed by: PHYSICAL THERAPIST

## 2021-05-10 ENCOUNTER — THERAPY VISIT (OUTPATIENT)
Dept: PHYSICAL THERAPY | Facility: CLINIC | Age: 46
End: 2021-05-10
Payer: COMMERCIAL

## 2021-05-10 DIAGNOSIS — M54.2 NECK PAIN: ICD-10-CM

## 2021-05-10 PROCEDURE — 97140 MANUAL THERAPY 1/> REGIONS: CPT | Mod: GP | Performed by: PHYSICAL THERAPIST

## 2021-05-10 PROCEDURE — 97110 THERAPEUTIC EXERCISES: CPT | Mod: GP | Performed by: PHYSICAL THERAPIST

## 2021-05-19 DIAGNOSIS — Z11.59 ENCOUNTER FOR SCREENING FOR OTHER VIRAL DISEASES: ICD-10-CM

## 2021-05-21 DIAGNOSIS — J45.30 MILD PERSISTENT ASTHMA WITHOUT COMPLICATION: ICD-10-CM

## 2021-05-24 ENCOUNTER — THERAPY VISIT (OUTPATIENT)
Dept: PHYSICAL THERAPY | Facility: CLINIC | Age: 46
End: 2021-05-24
Payer: COMMERCIAL

## 2021-05-24 DIAGNOSIS — M54.2 NECK PAIN: ICD-10-CM

## 2021-05-24 PROCEDURE — 97140 MANUAL THERAPY 1/> REGIONS: CPT | Performed by: PHYSICAL THERAPIST

## 2021-05-24 PROCEDURE — 97110 THERAPEUTIC EXERCISES: CPT | Performed by: PHYSICAL THERAPIST

## 2021-05-24 NOTE — TELEPHONE ENCOUNTER
Patient has not read [employer] message.     Team, please assist with scheduling asthma follow up. Once scheduled, forward to provider to consider yaima refill.     Tamiko Colbert, RN, BSN, PHN  [facility]: [place]

## 2021-05-25 RX ORDER — DEXAMETHASONE 4 MG/1
TABLET ORAL
Qty: 12 G | Refills: 0 | Status: SHIPPED | OUTPATIENT
Start: 2021-05-25 | End: 2021-06-09

## 2021-05-25 NOTE — TELEPHONE ENCOUNTER
Routing refill request to provider for review/approval because:  Labs not current:  Asthma assessment score within normal limits in last 6 months    ACT Total Scores 2/19/2018 3/1/2019 4/1/2020   ACT TOTAL SCORE - - -   ASTHMA ER VISITS - - -   ASTHMA HOSPITALIZATIONS - - -   ACT TOTAL SCORE (Goal Greater than or Equal to 20) 23 23 21   In the past 12 months, how many times did you visit the emergency room for your asthma without being admitted to the hospital? 0 0 0   In the past 12 months, how many times were you hospitalized overnight because of your asthma? 0 0 0     Scheduled for f/u 6/4/21    Olimpia Lobo RN

## 2021-06-03 ENCOUNTER — THERAPY VISIT (OUTPATIENT)
Dept: PHYSICAL THERAPY | Facility: CLINIC | Age: 46
End: 2021-06-03
Payer: COMMERCIAL

## 2021-06-03 DIAGNOSIS — M54.2 NECK PAIN: ICD-10-CM

## 2021-06-03 PROCEDURE — 97140 MANUAL THERAPY 1/> REGIONS: CPT | Mod: GP | Performed by: PHYSICAL THERAPIST

## 2021-06-03 PROCEDURE — 97110 THERAPEUTIC EXERCISES: CPT | Mod: GP | Performed by: PHYSICAL THERAPIST

## 2021-06-04 DIAGNOSIS — Z11.59 ENCOUNTER FOR SCREENING FOR OTHER VIRAL DISEASES: ICD-10-CM

## 2021-06-04 LAB
SARS-COV-2 RNA RESP QL NAA+PROBE: NORMAL
SPECIMEN SOURCE: NORMAL

## 2021-06-04 PROCEDURE — U0003 INFECTIOUS AGENT DETECTION BY NUCLEIC ACID (DNA OR RNA); SEVERE ACUTE RESPIRATORY SYNDROME CORONAVIRUS 2 (SARS-COV-2) (CORONAVIRUS DISEASE [COVID-19]), AMPLIFIED PROBE TECHNIQUE, MAKING USE OF HIGH THROUGHPUT TECHNOLOGIES AS DESCRIBED BY CMS-2020-01-R: HCPCS | Performed by: PREVENTIVE MEDICINE

## 2021-06-04 PROCEDURE — U0005 INFEC AGEN DETEC AMPLI PROBE: HCPCS | Performed by: PREVENTIVE MEDICINE

## 2021-06-05 LAB
LABORATORY COMMENT REPORT: NORMAL
SARS-COV-2 RNA RESP QL NAA+PROBE: NEGATIVE
SPECIMEN SOURCE: NORMAL

## 2021-06-07 ENCOUNTER — ANCILLARY PROCEDURE (OUTPATIENT)
Dept: GENERAL RADIOLOGY | Facility: CLINIC | Age: 46
End: 2021-06-07
Attending: PREVENTIVE MEDICINE
Payer: COMMERCIAL

## 2021-06-07 VITALS
OXYGEN SATURATION: 100 % | SYSTOLIC BLOOD PRESSURE: 136 MMHG | TEMPERATURE: 98 F | RESPIRATION RATE: 16 BRPM | DIASTOLIC BLOOD PRESSURE: 82 MMHG | HEART RATE: 72 BPM

## 2021-06-07 DIAGNOSIS — M50.20 CERVICAL DISC HERNIATION: ICD-10-CM

## 2021-06-07 DIAGNOSIS — M54.12 CERVICAL RADICULAR PAIN: ICD-10-CM

## 2021-06-07 PROCEDURE — 62321 NJX INTERLAMINAR CRV/THRC: CPT | Performed by: RADIOLOGY

## 2021-06-07 PROCEDURE — 99207 PR SATISFY VISIT NUMBER: CPT | Performed by: RADIOLOGY

## 2021-06-07 RX ORDER — LIDOCAINE HYDROCHLORIDE 10 MG/ML
30 INJECTION, SOLUTION EPIDURAL; INFILTRATION; INTRACAUDAL; PERINEURAL ONCE
Status: COMPLETED | OUTPATIENT
Start: 2021-06-07 | End: 2021-06-07

## 2021-06-07 RX ORDER — IOPAMIDOL 408 MG/ML
10 INJECTION, SOLUTION INTRATHECAL ONCE
Status: COMPLETED | OUTPATIENT
Start: 2021-06-07 | End: 2021-06-07

## 2021-06-07 RX ORDER — BETAMETHASONE SODIUM PHOSPHATE AND BETAMETHASONE ACETATE 3; 3 MG/ML; MG/ML
6 INJECTION, SUSPENSION INTRA-ARTICULAR; INTRALESIONAL; INTRAMUSCULAR; SOFT TISSUE ONCE
Status: COMPLETED | OUTPATIENT
Start: 2021-06-07 | End: 2021-06-07

## 2021-06-07 RX ADMIN — LIDOCAINE HYDROCHLORIDE 5 ML: 10 INJECTION, SOLUTION EPIDURAL; INFILTRATION; INTRACAUDAL; PERINEURAL at 09:40

## 2021-06-07 RX ADMIN — IOPAMIDOL 4 ML: 408 INJECTION, SOLUTION INTRATHECAL at 09:40

## 2021-06-07 RX ADMIN — BETAMETHASONE SODIUM PHOSPHATE AND BETAMETHASONE ACETATE 30 MG: 3; 3 INJECTION, SUSPENSION INTRA-ARTICULAR; INTRALESIONAL; INTRAMUSCULAR; SOFT TISSUE at 09:39

## 2021-06-09 ENCOUNTER — OFFICE VISIT (OUTPATIENT)
Dept: FAMILY MEDICINE | Facility: CLINIC | Age: 46
End: 2021-06-09
Payer: COMMERCIAL

## 2021-06-09 VITALS
DIASTOLIC BLOOD PRESSURE: 72 MMHG | BODY MASS INDEX: 24.66 KG/M2 | OXYGEN SATURATION: 96 % | TEMPERATURE: 98.3 F | WEIGHT: 134.8 LBS | HEART RATE: 61 BPM | SYSTOLIC BLOOD PRESSURE: 118 MMHG

## 2021-06-09 DIAGNOSIS — J45.30 MILD PERSISTENT ASTHMA WITHOUT COMPLICATION: Primary | ICD-10-CM

## 2021-06-09 DIAGNOSIS — F43.21 ADJUSTMENT DISORDER WITH DEPRESSED MOOD: ICD-10-CM

## 2021-06-09 DIAGNOSIS — B35.1 ONYCHOMYCOSIS: ICD-10-CM

## 2021-06-09 DIAGNOSIS — Z12.31 ENCOUNTER FOR SCREENING MAMMOGRAM FOR BREAST CANCER: ICD-10-CM

## 2021-06-09 DIAGNOSIS — E87.5 HYPERKALEMIA: ICD-10-CM

## 2021-06-09 LAB
ALBUMIN SERPL-MCNC: 4 G/DL (ref 3.4–5)
ALP SERPL-CCNC: 98 U/L (ref 40–150)
ALT SERPL W P-5'-P-CCNC: 24 U/L (ref 0–50)
ANION GAP SERPL CALCULATED.3IONS-SCNC: 6 MMOL/L (ref 3–14)
AST SERPL W P-5'-P-CCNC: 12 U/L (ref 0–45)
BILIRUB SERPL-MCNC: 0.3 MG/DL (ref 0.2–1.3)
BUN SERPL-MCNC: 19 MG/DL (ref 7–30)
CALCIUM SERPL-MCNC: 9 MG/DL (ref 8.5–10.1)
CHLORIDE SERPL-SCNC: 104 MMOL/L (ref 94–109)
CO2 SERPL-SCNC: 30 MMOL/L (ref 20–32)
CREAT SERPL-MCNC: 0.7 MG/DL (ref 0.52–1.04)
GFR SERPL CREATININE-BSD FRML MDRD: >90 ML/MIN/{1.73_M2}
GLUCOSE SERPL-MCNC: 93 MG/DL (ref 70–99)
POTASSIUM SERPL-SCNC: 4 MMOL/L (ref 3.4–5.3)
PROT SERPL-MCNC: 7.3 G/DL (ref 6.8–8.8)
SODIUM SERPL-SCNC: 140 MMOL/L (ref 133–144)

## 2021-06-09 PROCEDURE — 90732 PPSV23 VACC 2 YRS+ SUBQ/IM: CPT | Performed by: FAMILY MEDICINE

## 2021-06-09 PROCEDURE — 99214 OFFICE O/P EST MOD 30 MIN: CPT | Mod: 25 | Performed by: FAMILY MEDICINE

## 2021-06-09 PROCEDURE — 80053 COMPREHEN METABOLIC PANEL: CPT | Performed by: FAMILY MEDICINE

## 2021-06-09 PROCEDURE — 90471 IMMUNIZATION ADMIN: CPT | Performed by: FAMILY MEDICINE

## 2021-06-09 PROCEDURE — 36415 COLL VENOUS BLD VENIPUNCTURE: CPT | Performed by: FAMILY MEDICINE

## 2021-06-09 PROCEDURE — 96127 BRIEF EMOTIONAL/BEHAV ASSMT: CPT | Mod: 59 | Performed by: FAMILY MEDICINE

## 2021-06-09 RX ORDER — ALBUTEROL SULFATE 90 UG/1
2 AEROSOL, METERED RESPIRATORY (INHALATION) EVERY 6 HOURS
Qty: 18 G | Refills: 3 | Status: SHIPPED | OUTPATIENT
Start: 2021-06-09 | End: 2022-12-28

## 2021-06-09 RX ORDER — DEXAMETHASONE 4 MG/1
TABLET ORAL
Qty: 12 G | Refills: 11 | Status: SHIPPED | OUTPATIENT
Start: 2021-06-09 | End: 2022-01-19

## 2021-06-09 RX ORDER — TERBINAFINE HYDROCHLORIDE 250 MG/1
250 TABLET ORAL DAILY
Qty: 90 TABLET | Refills: 0 | Status: SHIPPED | OUTPATIENT
Start: 2021-06-09 | End: 2021-09-07

## 2021-06-09 NOTE — LETTER
My Asthma Action Plan    Name: Lisset MITCHELL Chi   YOB: 1975  Date: 6/9/2021   My doctor: Adriana Myers, DO   My clinic: [facility]        My Control Medicine: Fluticasone propionate (Flovent HFA) - 110 mcg BID  My Rescue Medicine: Albuterol (Proair/Ventolin/Proventil HFA) 2-4 puffs EVERY 4 HOURS as needed. Use a spacer if recommended by your provider.   My Asthma Severity:   Mild Persistent  Know your asthma triggers: dust mites and animal dander  dust mites  pollens  animal dander  mold  cold air            GREEN ZONE   Good Control    I feel good    No cough or wheeze    Can work, sleep and play without asthma symptoms       Take your asthma control medicine every day.     1. If exercise triggers your asthma, take your rescue medication    15 minutes before exercise or sports, and    During exercise if you have asthma symptoms  2. Spacer to use with inhaler: If you have a spacer, make sure to use it with your inhaler             YELLOW ZONE Getting Worse  I have ANY of these:    I do not feel good    Cough or wheeze    Chest feels tight    Wake up at night   1. Keep taking your Green Zone medications  2. Start taking your rescue medicine:    every 20 minutes for up to 1 hour. Then every 4 hours for 24-48 hours.  3. If you stay in the Yellow Zone for more than 12-24 hours, contact your doctor.  4. If you do not return to the Green Zone in 12-24 hours or you get worse, start taking your oral steroid medicine if prescribed by your provider.           RED ZONE Medical Alert - Get Help  I have ANY of these:    I feel awful    Medicine is not helping    Breathing getting harder    Trouble walking or talking    Nose opens wide to breathe       1. Take your rescue medicine NOW  2. If your provider has prescribed an oral steroid medicine, start taking it NOW  3. Call your doctor NOW  4. If you are still in the Red Zone after 20 minutes and you have not reached your doctor:    Take  your rescue medicine again and    Call 911 or go to the emergency room right away    See your regular doctor within 2 weeks of an Emergency Room or Urgent Care visit for follow-up treatment.          Annual Reminders:  Meet with Asthma Educator,  Flu Shot in the Fall, consider Pneumonia Vaccination for patients with asthma (aged 19 and older).    Pharmacy:    [facility] [address]  [facility] [id number] [place] [address]    Electronically signed by Adriana Myers,    Date: 06/09/21                      Asthma Triggers  How To Control Things That Make Your Asthma Worse    Triggers are things that make your asthma worse.  Look at the list below to help you find your triggers and what you can do about them.  You can help prevent asthma flare-ups by staying away from your triggers.      Trigger                                                          What you can do   Cigarette Smoke  Tobacco smoke can make asthma worse. Do not allow smoking in your home, car or around you.  Be sure no one smokes at a child s day care or school.  If you smoke, ask your health care provider for ways to help you quit.  Ask family members to quit too.  Ask your health care provider for a referral to Quit Plan to help you quit smoking, or call 5-752-389-PLAN.     Colds, Flu, Bronchitis  These are common triggers of asthma. Wash your hands often.  Don t touch your eyes, nose or mouth.  Get a flu shot every year.     Dust Mites  These are tiny bugs that live in cloth or carpet. They are too small to see. Wash sheets and blankets in hot water every week.   Encase pillows and mattress in dust mite proof covers.  Avoid having carpet if you can. If you have carpet, vacuum weekly.   Use a dust mask and HEPA vacuum.   Pollen and Outdoor Mold  Some people are allergic to trees, grass, or weed pollen, or molds. Try to keep your windows closed.  Limit time out doors when pollen count is high.   Ask you health care  provider about taking medicine during allergy season.     Animal Dander  Some people are allergic to skin flakes, urine or saliva from pets with fur or feathers. Keep pets with fur or feathers out of your home.    If you can t keep the pet outdoors, then keep the pet out of your bedroom.  Keep the bedroom door closed.  Keep pets off cloth furniture and away from stuffed toys.     Mice, Rats, and Cockroaches   Some people are allergic to the waste from these pests.   Cover food and garbage.  Clean up spills and food crumbs.  Store grease in the refrigerator.   Keep food out of the bedroom.   Indoor Mold  This can be a trigger if your home has high moisture. Fix leaking faucets, pipes, or other sources of water.   Clean moldy surfaces.  Dehumidify basement if it is damp and smelly.   Smoke, Strong Odors, and Sprays  These can reduce air quality. Stay away from strong odors and sprays, such as perfume, powder, hair spray, paints, smoke incense, paint, cleaning products, candles and new carpet.   Exercise or Sports  Some people with asthma have this trigger. Be active!  Ask your doctor about taking medicine before sports or exercise to prevent symptoms.    Warm up for 5-10 minutes before and after sports or exercise.     Other Triggers of Asthma  Cold air:  Cover your nose and mouth with a scarf.  Sometimes laughing or crying can be a trigger.  Some medicines and food can trigger asthma.

## 2021-06-09 NOTE — PROGRESS NOTES
Assessment & Plan     Mild persistent asthma without complication  - Well controlled  - Asthma Action Plan (AAP)  - PPSV23, IM/SUBQ (2+ YRS) - [id number]  - fluticasone (FLOVENT HFA) 110 MCG/ACT inhaler; TAKE 2 PUFFS BY MOUTH TWICE A DAY  - albuterol (PROAIR HFA/PROVENTIL HFA/VENTOLIN HFA) 108 (90 Base) MCG/ACT inhaler; Inhale 2 puffs into the lungs every 6 hours    Adjustment disorder with depressed mood  - Seeing a therapist  - No interested in medication at this time     Hyperkalemia  - Had an incidental finding of hyperkalemia during and ER visit earlier this year, will recheck   - Comprehensive metabolic panel (BMP + Alb, Alk Phos, ALT, AST, Total. Bili, TP)    Onychomycosis  - Failed topical therapies  - Will try oral Lamisil  - Check liver enzymes today and then again in 3 months   - Comprehensive metabolic panel (BMP + Alb, Alk Phos, ALT, AST, Total. Bili, TP)  - terbinafine (LAMISIL) 250 MG tablet; Take 1 tablet (250 mg) by mouth daily    Encounter for screening mammogram for breast cancer  - MA Screening Digital Bilateral; Future    Return in about 3 months (around 9/9/2021) for Liver labs for toenail fungus medication .    Adriana Myers, [facility]    =============================================================================    Subjective   Lisset is a 45 year old who presents for the following health issues     HPI     Asthma Follow-Up    Was ACT completed today?    Yes    ACT Total Scores 4/1/2020   ACT TOTAL SCORE -   ASTHMA ER VISITS -   ASTHMA HOSPITALIZATIONS -   ACT TOTAL SCORE (Goal Greater than or Equal to 20) 21   In the past 12 months, how many times did you visit the emergency room for your asthma without being admitted to the hospital? 0   In the past 12 months, how many times were you hospitalized overnight because of your asthma? 0          How many days per week do you miss taking your asthma controller medication?  0    Please describe any recent  triggers for your asthma: dust mites, pollens, animal dander, mold and cold air    Have you had any Emergency Room Visits, Urgent Care Visits, or Hospital Admissions since your last office visit?  No      How many servings of fruits and vegetables do you eat daily?  4 or more    On average, how many sweetened beverages do you drink each day (Examples: soda, juice, sweet tea, etc.  Do NOT count diet or artificially sweetened beverages)?   0    How many days per week do you exercise enough to make your heart beat faster? 4    How many minutes a day do you exercise enough to make your heart beat faster? 10 - 19    How many days per week do you miss taking your medication? 0    - Toenail fungus left big toe.  Using topical Lamisil and tea tree oil and they haven't helped.      -Lots of stress lately.  Having marital issues, her grandmother recently passed, kids are graduating and getting ready to go to college.  Talking with a therapist and it's helping.       Review of Systems   Constitutional, HEENT, cardiovascular, pulmonary, gi and gu systems are negative, except as otherwise noted.      Objective    /72 (BP Location: Right arm, Patient Position: Chair, Cuff Size: Adult Regular)   Pulse 61   Temp 98.3  F (36.8  C) (Oral)   Wt 61.1 kg (134 lb 12.8 oz)   LMP 12/25/2013   SpO2 96%   BMI 24.66 kg/m    Body mass index is 24.66 kg/m .  Physical Exam   GENERAL: healthy, alert and no distress  RESP: lungs clear to auscultation - no rales, rhonchi or wheezes  CV: regular rates and rhythm, normal S1 S2, no S3 or S4 and no murmur, click or rub  SKIN: Left great toenail with onychomycosis   PSYCH: mentation appears normal, affect flat and tearful

## 2021-06-09 NOTE — NURSING NOTE
Prior to immunization administration, verified patients identity using patient s name and date of birth. Please see Immunization Activity for additional information.     Screening Questionnaire for Adult Immunization    Are you sick today?   No   Do you have allergies to medications, food, a vaccine component or latex?   No   Have you ever had a serious reaction after receiving a vaccination?   No   Do you have a long-term health problem with heart, lung, kidney, or metabolic disease (e.g., diabetes), asthma, a blood disorder, no spleen, complement component deficiency, a cochlear implant, or a spinal fluid leak?  Are you on long-term aspirin therapy?   Yes   Do you have cancer, leukemia, HIV/AIDS, or any other immune system problem?   No   Do you have a parent, brother, or sister with an immune system problem?   No   In the past 3 months, have you taken medications that affect  your immune system, such as prednisone, other steroids, or anticancer drugs; drugs for the treatment of rheumatoid arthritis, Crohn s disease, or psoriasis; or have you had radiation treatments?   Yes   Have you had a seizure, or a brain or other nervous system problem?   Yes   During the past year, have you received a transfusion of blood or blood    products, or been given immune (gamma) globulin or antiviral drug?   No   For women: Are you pregnant or is there a chance you could become       pregnant during the next month?   No   Have you received any vaccinations in the past 4 weeks?   No     Immunization questionnaire was positive for at least one answer.  Notified Dr. Myers.      Patient instructed to remain in clinic for 15 minutes afterwards, and to report any adverse reaction to me immediately.  Vaccine information supplied for pneumococcal.     Screening performed by Annalise Gallardo MA on 6/9/2021 at 1:02 PM.

## 2021-06-10 ASSESSMENT — ANXIETY QUESTIONNAIRES
6. BECOMING EASILY ANNOYED OR IRRITABLE: MORE THAN HALF THE DAYS
1. FEELING NERVOUS, ANXIOUS, OR ON EDGE: NEARLY EVERY DAY
2. NOT BEING ABLE TO STOP OR CONTROL WORRYING: NEARLY EVERY DAY
GAD7 TOTAL SCORE: 17
5. BEING SO RESTLESS THAT IT IS HARD TO SIT STILL: SEVERAL DAYS
IF YOU CHECKED OFF ANY PROBLEMS ON THIS QUESTIONNAIRE, HOW DIFFICULT HAVE THESE PROBLEMS MADE IT FOR YOU TO DO YOUR WORK, TAKE CARE OF THINGS AT HOME, OR GET ALONG WITH OTHER PEOPLE: VERY DIFFICULT
3. WORRYING TOO MUCH ABOUT DIFFERENT THINGS: NEARLY EVERY DAY
7. FEELING AFRAID AS IF SOMETHING AWFUL MIGHT HAPPEN: MORE THAN HALF THE DAYS

## 2021-06-10 ASSESSMENT — PATIENT HEALTH QUESTIONNAIRE - PHQ9
5. POOR APPETITE OR OVEREATING: NEARLY EVERY DAY
SUM OF ALL RESPONSES TO PHQ QUESTIONS 1-9: 8

## 2021-06-11 ASSESSMENT — ANXIETY QUESTIONNAIRES: GAD7 TOTAL SCORE: 17

## 2021-06-11 ASSESSMENT — ASTHMA QUESTIONNAIRES: ACT_TOTALSCORE: 25

## 2021-06-17 ENCOUNTER — ANCILLARY PROCEDURE (OUTPATIENT)
Dept: MAMMOGRAPHY | Facility: CLINIC | Age: 46
End: 2021-06-17
Attending: FAMILY MEDICINE
Payer: COMMERCIAL

## 2021-06-17 DIAGNOSIS — Z12.31 ENCOUNTER FOR SCREENING MAMMOGRAM FOR BREAST CANCER: ICD-10-CM

## 2021-06-17 PROCEDURE — 77067 SCR MAMMO BI INCL CAD: CPT | Mod: TC | Performed by: RADIOLOGY

## 2021-07-02 ENCOUNTER — THERAPY VISIT (OUTPATIENT)
Dept: PHYSICAL THERAPY | Facility: CLINIC | Age: 46
End: 2021-07-02
Payer: COMMERCIAL

## 2021-07-02 DIAGNOSIS — M54.2 NECK PAIN: ICD-10-CM

## 2021-07-02 PROCEDURE — 97110 THERAPEUTIC EXERCISES: CPT | Mod: GP | Performed by: PHYSICAL THERAPIST

## 2021-07-02 PROCEDURE — 97112 NEUROMUSCULAR REEDUCATION: CPT | Mod: GP | Performed by: PHYSICAL THERAPIST

## 2021-07-02 PROCEDURE — 97140 MANUAL THERAPY 1/> REGIONS: CPT | Mod: GP | Performed by: PHYSICAL THERAPIST

## 2021-07-09 ENCOUNTER — THERAPY VISIT (OUTPATIENT)
Dept: PHYSICAL THERAPY | Facility: CLINIC | Age: 46
End: 2021-07-09
Payer: COMMERCIAL

## 2021-07-09 DIAGNOSIS — M54.2 NECK PAIN: ICD-10-CM

## 2021-07-09 PROCEDURE — 97112 NEUROMUSCULAR REEDUCATION: CPT | Mod: GP | Performed by: PHYSICAL THERAPIST

## 2021-07-09 PROCEDURE — 97110 THERAPEUTIC EXERCISES: CPT | Mod: GP | Performed by: PHYSICAL THERAPIST

## 2021-07-20 ENCOUNTER — THERAPY VISIT (OUTPATIENT)
Dept: PHYSICAL THERAPY | Facility: CLINIC | Age: 46
End: 2021-07-20
Payer: COMMERCIAL

## 2021-07-20 DIAGNOSIS — M54.2 NECK PAIN: ICD-10-CM

## 2021-07-20 PROCEDURE — 97112 NEUROMUSCULAR REEDUCATION: CPT | Mod: GP | Performed by: PHYSICAL THERAPIST

## 2021-07-20 PROCEDURE — 97110 THERAPEUTIC EXERCISES: CPT | Mod: GP | Performed by: PHYSICAL THERAPIST

## 2021-07-21 ENCOUNTER — OFFICE VISIT (OUTPATIENT)
Dept: ORTHOPEDICS | Facility: CLINIC | Age: 46
End: 2021-07-21
Payer: COMMERCIAL

## 2021-07-21 VITALS — HEART RATE: 71 BPM | SYSTOLIC BLOOD PRESSURE: 108 MMHG | DIASTOLIC BLOOD PRESSURE: 72 MMHG

## 2021-07-21 DIAGNOSIS — M54.12 CERVICAL RADICULAR PAIN: Primary | ICD-10-CM

## 2021-07-21 DIAGNOSIS — M51.16 LUMBAR DISC HERNIATION WITH RADICULOPATHY: ICD-10-CM

## 2021-07-21 PROCEDURE — 99214 OFFICE O/P EST MOD 30 MIN: CPT | Performed by: PREVENTIVE MEDICINE

## 2021-07-21 ASSESSMENT — PAIN SCALES - GENERAL: PAINLEVEL: NO PAIN (0)

## 2021-07-21 NOTE — PROGRESS NOTES
HISTORY OF PRESENT ILLNESS  Ms. Gates is a pleasant 45 year old year old female who presents to clinic today with neck and low back pain chronic  Lisset explains that she had her cervical injection over a month ago and this did improve some of her symptoms  She continues to have low back pain that radiates into her legs  Location: neck and low back  Quality:  achy pain    Severity:7/10 at worst    Duration: over a year  Timing: occurs intermittently  Context: occurs while exercising and lifting  Modifying factors:  resting and non-use makes it better, movement and use makes it worse  Associated signs & symptoms: low back pain, radiation into legs  Neck pain radiation into shoulders and upper back     MEDICAL HISTORY  Patient Active Problem List   Diagnosis     Irritable bowel syndrome     Premature ovarian failure     CARDIOVASCULAR SCREENING; LDL GOAL LESS THAN 160     Mild persistent asthma without complication     Trochanteric bursitis of both hips     Right knee pain     Adjustment disorder with depressed mood     Neck pain       Current Outpatient Medications   Medication Sig Dispense Refill     albuterol (PROAIR HFA/PROVENTIL HFA/VENTOLIN HFA) 108 (90 Base) MCG/ACT inhaler Inhale 2 puffs into the lungs every 6 hours 18 g 3     Calcium Carbonate-Vitamin D (CALCIUM + D PO) Take 600 mg by mouth.       FEXOFENADINE  MG OR TABS 1 TABLET DAILY        fluticasone (FLONASE) 50 MCG/ACT nasal spray Spray 2 sprays into both nostrils daily       fluticasone (FLOVENT HFA) 110 MCG/ACT inhaler TAKE 2 PUFFS BY MOUTH TWICE A DAY 12 g 11     ibuprofen (ADVIL/MOTRIN) 600 MG tablet TAKE 1 TABLET (600 MG) BY MOUTH EVERY 8 HOURS AS NEEDED FOR MODERATE PAIN 60 tablet 1     MULTIPLE VITAMIN PO        terbinafine (LAMISIL) 1 % external cream Apply topically 2 times daily (Patient taking differently: Apply topically as needed ) 15 g 0     terbinafine (LAMISIL) 250 MG tablet Take 1 tablet (250 mg) by mouth daily 90 tablet 0      Turmeric Curcumin 500 MG CAPS Take 500 mg by mouth daily       UNABLE TO FIND MEDICATION NAME: Provitalize Probiotic that has turmeric and herbs in it       VITAMIN D, CHOLECALCIFEROL, PO Take by mouth daily         Allergies   Allergen Reactions     No Known Allergies        Family History   Problem Relation Age of Onset     Thyroid Disease Mother      Diabetes Mother         type2     Eye Disorder Mother         cataracts     Gastrointestinal Disease Mother         hep a/has to have her throat stretched     Respiratory Mother         sleep apnea     Heart Disease Father 60     C.A.D. Paternal Grandfather 30        age 30, then again in his 80's     Diabetes Maternal Grandfather      Cancer Maternal Grandfather      Thyroid Disease Maternal Grandmother      Gynecology Maternal Grandmother         on bcp to keep period regular, a small uterus     Heart Disease Maternal Grandmother         tachycardia     Allergies Daughter         milk     Asthma Daughter      Social History     Socioeconomic History     Marital status:      Spouse name: Not on file     Number of children: 2     Years of education: Not on file     Highest education level: Not on file   Occupational History     Employer: [employer]   Tobacco Use     Smoking status: Never Smoker     Smokeless tobacco: Never Used   Substance and Sexual Activity     Alcohol use: Yes     Comment: social     Drug use: No     Sexual activity: Yes     Partners: Male     Birth control/protection: Surgical     Comment:  had vasectomy   Other Topics Concern      Service No     Blood Transfusions Not Asked     Caffeine Concern No     Occupational Exposure No     Hobby Hazards Not Asked     Sleep Concern No     Stress Concern Yes     Weight Concern Yes     Comment: gains easily     Special Diet No     Back Care Yes     Exercise Yes     Comment: irregularly     Bike Helmet Not Asked     Comment: doesn't bike     Seat Belt No     Self-Exams No      Parent/sibling w/ CABG, MI or angioplasty before 65F 55M? No   Social History Narrative    Caffeine intake/servings daily - 0-1    Calcium intake/servings daily - 2-3    Exercise 2-3  times weekly - describe aerobics    Sunscreen used - Yes    Seatbelts used - Yes    Guns stored in the home - No    Self Breast Exam - Yes    Pap test up to date -  Yes    Eye exam up to date -  Yes    Dental exam up to date -  Yes    DEXA scan up to date -  Not Applicable    Flex Sig/Colonoscopy up to date -  Not Applicable    Mammography up to date -  Not Applicable    Immunizations reviewed and up to date - Yes    Abuse: Current or Past (Physical, Sexual or Emotional) - None current, past    Do you feel safe in your environment - Yes    Do you cope well with stress - Yes    Do you suffer from insomnia - No    Last updated by: Estefani Moraes MA 5/5/2010             Social Determinants of Health     Financial Resource Strain:      Difficulty of Paying Living Expenses:    Food Insecurity:      Worried About Running Out of Food in the Last Year:      Ran Out of Food in the Last Year:    Transportation Needs:      Lack of Transportation (Medical):      Lack of Transportation (Non-Medical):    Physical Activity:      Days of Exercise per Week:      Minutes of Exercise per Session:    Stress:      Feeling of Stress :    Social Connections:      Frequency of Communication with Friends and Family:      Frequency of Social Gatherings with Friends and Family:      Attends Synagogue Services:      Active Member of Clubs or Organizations:      Attends Club or Organization Meetings:      Marital Status:    Intimate Partner Violence:      Fear of Current or Ex-Partner:      Emotionally Abused:      Physically Abused:      Sexually Abused:        Additional medical/Social/Surgical histories reviewed in [facility] and updated as appropriate.     REVIEW OF SYSTEMS (7/21/2021)  10 point ROS of systems including Constitutional, Eyes, Respiratory,  Cardiovascular, Gastroenterology, Genitourinary, Integumentary, Musculoskeletal, Psychiatric, Allergic/Immunologic were all negative except for pertinent positives noted in my HPI.     PHYSICAL EXAM  Vitals:    07/21/21 1325   BP: 108/72   Pulse: 71     Vital Signs: /72   Pulse 71   LMP 12/25/2013  Patient declined being weighed. There is no height or weight on file to calculate BMI.    General  - normal appearance, in no obvious distress  HEENT  - conjunctivae not injected, moist mucous membranes, normocephalic/atraumatic head, ears normal appearance, no lesions, mouth normal appearance, no scars, normal dentition and teeth present  CV  - normal radial pulse  Pulm  - normal respiratory pattern, non-labored  Musculoskeletal - neck/shoulders  - inspection: normal bone and joint alignment, no obvious deformity, no scapular winging, no AC step-off  - palpation: mildly tender cervical paraspinal muscles, normal clavicle, non-tender AC  - ROM:  Has some limited ROM of cervical spine due to discomfort  - strength: 5/5  strength, 5/5 in all shoulder planes  - special tests:  Slightly positive spurlings  Neuro  - no sensory or motor deficit, grossly normal coordination, normal muscle tone  Skin  - no ecchymosis, erythema, warmth, or induration, no obvious rash  Psych  - interactive, appropriate, normal mood and affect  Lumbar; has some pain with flexion and extension, and positive SLR causing low back pain  ASSESSMENT & PLAN  44 yo female with cervical and lumbar ddd, radicular pain, not resolved    I independently reviewed the following imaging studies:  Cervical MRI: shows ddd, disc herniations  Lumbar MRI : shows ddd, disc herniations  Discussed and ordered lumbar CAMMY  Will consider referral to Dr Reese for surgical consultation  Start physical therapy as well, ordered  Cont. Topical medications and other medications PRN  F/u after lumbar injection    Appropriate PPE was utilized for prevention of spread of  Covid-19.  Floyd Larsen MD, [facility]

## 2021-07-21 NOTE — LETTER
7/21/2021         RE: Lisset MITCHELL Chi  [address] [address]        Dear Colleague,    Thank you for referring your patient, Lisset MITCHELL Chi, to the [facility] [place]. Please see a copy of my visit note below.    HISTORY OF PRESENT ILLNESS  Ms. Gates is a pleasant 45 year old year old female who presents to clinic today with neck and low back pain chronic  Lisset explains that she had her cervical injection over a month ago and this did improve some of her symptoms  She continues to have low back pain that radiates into her legs  Location: neck and low back  Quality:  achy pain    Severity:7/10 at worst    Duration: over a year  Timing: occurs intermittently  Context: occurs while exercising and lifting  Modifying factors:  resting and non-use makes it better, movement and use makes it worse  Associated signs & symptoms: low back pain, radiation into legs  Neck pain radiation into shoulders and upper back     MEDICAL HISTORY  Patient Active Problem List   Diagnosis     Irritable bowel syndrome     Premature ovarian failure     CARDIOVASCULAR SCREENING; LDL GOAL LESS THAN 160     Mild persistent asthma without complication     Trochanteric bursitis of both hips     Right knee pain     Adjustment disorder with depressed mood     Neck pain       Current Outpatient Medications   Medication Sig Dispense Refill     albuterol (PROAIR HFA/PROVENTIL HFA/VENTOLIN HFA) 108 (90 Base) MCG/ACT inhaler Inhale 2 puffs into the lungs every 6 hours 18 g 3     Calcium Carbonate-Vitamin D (CALCIUM + D PO) Take 600 mg by mouth.       FEXOFENADINE  MG OR TABS 1 TABLET DAILY        fluticasone (FLONASE) 50 MCG/ACT nasal spray Spray 2 sprays into both nostrils daily       fluticasone (FLOVENT HFA) 110 MCG/ACT inhaler TAKE 2 PUFFS BY MOUTH TWICE A DAY 12 g 11     ibuprofen (ADVIL/MOTRIN) 600 MG tablet TAKE 1 TABLET (600 MG) BY MOUTH EVERY 8 HOURS AS NEEDED FOR MODERATE PAIN 60 tablet 1      MULTIPLE VITAMIN PO        terbinafine (LAMISIL) 1 % external cream Apply topically 2 times daily (Patient taking differently: Apply topically as needed ) 15 g 0     terbinafine (LAMISIL) 250 MG tablet Take 1 tablet (250 mg) by mouth daily 90 tablet 0     Turmeric Curcumin 500 MG CAPS Take 500 mg by mouth daily       UNABLE TO FIND MEDICATION NAME: Provitalize Probiotic that has turmeric and herbs in it       VITAMIN D, CHOLECALCIFEROL, PO Take by mouth daily         Allergies   Allergen Reactions     No Known Allergies        Family History   Problem Relation Age of Onset     Thyroid Disease Mother      Diabetes Mother         type2     Eye Disorder Mother         cataracts     Gastrointestinal Disease Mother         hep a/has to have her throat stretched     Respiratory Mother         sleep apnea     Heart Disease Father 60     C.A.D. Paternal Grandfather 30        age 30, then again in his 80's     Diabetes Maternal Grandfather      Cancer Maternal Grandfather      Thyroid Disease Maternal Grandmother      Gynecology Maternal Grandmother         on bcp to keep period regular, a small uterus     Heart Disease Maternal Grandmother         tachycardia     Allergies Daughter         milk     Asthma Daughter      Social History     Socioeconomic History     Marital status:      Spouse name: Not on file     Number of children: 2     Years of education: Not on file     Highest education level: Not on file   Occupational History     Employer: [employer]   Tobacco Use     Smoking status: Never Smoker     Smokeless tobacco: Never Used   Substance and Sexual Activity     Alcohol use: Yes     Comment: social     Drug use: No     Sexual activity: Yes     Partners: Male     Birth control/protection: Surgical     Comment:  had vasectomy   Other Topics Concern      Service No     Blood Transfusions Not Asked     Caffeine Concern No     Occupational Exposure No     Hobby Hazards Not Asked      Sleep Concern No     Stress Concern Yes     Weight Concern Yes     Comment: gains easily     Special Diet No     Back Care Yes     Exercise Yes     Comment: irregularly     Bike Helmet Not Asked     Comment: doesn't bike     Seat Belt No     Self-Exams No     Parent/sibling w/ CABG, MI or angioplasty before 65F 55M? No   Social History Narrative    Caffeine intake/servings daily - 0-1    Calcium intake/servings daily - 2-3    Exercise 2-3  times weekly - describe aerobics    Sunscreen used - Yes    Seatbelts used - Yes    Guns stored in the home - No    Self Breast Exam - Yes    Pap test up to date -  Yes    Eye exam up to date -  Yes    Dental exam up to date -  Yes    DEXA scan up to date -  Not Applicable    Flex Sig/Colonoscopy up to date -  Not Applicable    Mammography up to date -  Not Applicable    Immunizations reviewed and up to date - Yes    Abuse: Current or Past (Physical, Sexual or Emotional) - None current, past    Do you feel safe in your environment - Yes    Do you cope well with stress - Yes    Do you suffer from insomnia - No    Last updated by: Estefani Moraes MA 5/5/2010             Social Determinants of Health     Financial Resource Strain:      Difficulty of Paying Living Expenses:    Food Insecurity:      Worried About Running Out of Food in the Last Year:      Ran Out of Food in the Last Year:    Transportation Needs:      Lack of Transportation (Medical):      Lack of Transportation (Non-Medical):    Physical Activity:      Days of Exercise per Week:      Minutes of Exercise per Session:    Stress:      Feeling of Stress :    Social Connections:      Frequency of Communication with Friends and Family:      Frequency of Social Gatherings with Friends and Family:      Attends Hinduism Services:      Active Member of Clubs or Organizations:      Attends Club or Organization Meetings:      Marital Status:    Intimate Partner Violence:      Fear of Current or Ex-Partner:      Emotionally Abused:       Physically Abused:      Sexually Abused:        Additional medical/Social/Surgical histories reviewed in [facility] and updated as appropriate.     REVIEW OF SYSTEMS (7/21/2021)  10 point ROS of systems including Constitutional, Eyes, Respiratory, Cardiovascular, Gastroenterology, Genitourinary, Integumentary, Musculoskeletal, Psychiatric, Allergic/Immunologic were all negative except for pertinent positives noted in my HPI.     PHYSICAL EXAM  Vitals:    07/21/21 1325   BP: 108/72   Pulse: 71     Vital Signs: /72   Pulse 71   LMP 12/25/2013  Patient declined being weighed. There is no height or weight on file to calculate BMI.    General  - normal appearance, in no obvious distress  HEENT  - conjunctivae not injected, moist mucous membranes, normocephalic/atraumatic head, ears normal appearance, no lesions, mouth normal appearance, no scars, normal dentition and teeth present  CV  - normal radial pulse  Pulm  - normal respiratory pattern, non-labored  Musculoskeletal - neck/shoulders  - inspection: normal bone and joint alignment, no obvious deformity, no scapular winging, no AC step-off  - palpation: mildly tender cervical paraspinal muscles, normal clavicle, non-tender AC  - ROM:  Has some limited ROM of cervical spine due to discomfort  - strength: 5/5  strength, 5/5 in all shoulder planes  - special tests:  Slightly positive spurlings  Neuro  - no sensory or motor deficit, grossly normal coordination, normal muscle tone  Skin  - no ecchymosis, erythema, warmth, or induration, no obvious rash  Psych  - interactive, appropriate, normal mood and affect  Lumbar; has some pain with flexion and extension, and positive SLR causing low back pain  ASSESSMENT & PLAN  46 yo female with cervical and lumbar ddd, radicular pain, not resolved    I independently reviewed the following imaging studies:  Cervical MRI: shows ddd, disc herniations  Lumbar MRI : shows ddd, disc herniations  Discussed and ordered lumbar  CAMMY  Will consider referral to Dr Reese for surgical consultation  Start physical therapy as well, ordered  Cont. Topical medications and other medications PRN  F/u after lumbar injection    Appropriate PPE was utilized for prevention of spread of Covid-19.  Floyd Larsen MD, CAQSM        Again, thank you for allowing me to participate in the care of your patient.        Sincerely,        Floyd Larsen MD

## 2021-07-21 NOTE — PATIENT INSTRUCTIONS
Thanks for coming today.  Ortho[facility] Medicine Clinic  [address] [place] [address]    To schedule future appointments in Ortho Clinic, you may call [phone].    To schedule ordered imaging by your provider:   Call [facility] [phone]    To schedule an injection ordered by your provider:  Call [facility] Injection scheduling line: [phone]  [email or web address] available online at:  [email or web address]    Please call if any further questions or concerns ([phone]).  Clinic hours 8 am to 5 pm.    Return to clinic (call) if symptoms worsen or fail to improve.

## 2021-07-22 DIAGNOSIS — Z11.59 ENCOUNTER FOR SCREENING FOR OTHER VIRAL DISEASES: ICD-10-CM

## 2021-07-26 ENCOUNTER — THERAPY VISIT (OUTPATIENT)
Dept: PHYSICAL THERAPY | Facility: CLINIC | Age: 46
End: 2021-07-26
Payer: COMMERCIAL

## 2021-07-26 DIAGNOSIS — M54.2 NECK PAIN: ICD-10-CM

## 2021-07-26 PROCEDURE — 97110 THERAPEUTIC EXERCISES: CPT | Mod: GP | Performed by: PHYSICAL THERAPIST

## 2021-07-26 PROCEDURE — 97140 MANUAL THERAPY 1/> REGIONS: CPT | Mod: GP | Performed by: PHYSICAL THERAPIST

## 2021-07-26 PROCEDURE — 97112 NEUROMUSCULAR REEDUCATION: CPT | Mod: GP | Performed by: PHYSICAL THERAPIST

## 2021-08-13 ENCOUNTER — THERAPY VISIT (OUTPATIENT)
Dept: PHYSICAL THERAPY | Facility: CLINIC | Age: 46
End: 2021-08-13
Payer: COMMERCIAL

## 2021-08-13 DIAGNOSIS — M54.2 NECK PAIN: ICD-10-CM

## 2021-08-13 PROCEDURE — 97140 MANUAL THERAPY 1/> REGIONS: CPT | Mod: GP | Performed by: PHYSICAL THERAPIST

## 2021-08-13 PROCEDURE — 97112 NEUROMUSCULAR REEDUCATION: CPT | Mod: GP | Performed by: PHYSICAL THERAPIST

## 2021-08-13 PROCEDURE — 97110 THERAPEUTIC EXERCISES: CPT | Mod: GP | Performed by: PHYSICAL THERAPIST

## 2021-08-18 ENCOUNTER — THERAPY VISIT (OUTPATIENT)
Dept: PHYSICAL THERAPY | Facility: CLINIC | Age: 46
End: 2021-08-18
Payer: COMMERCIAL

## 2021-08-18 DIAGNOSIS — M54.2 NECK PAIN: ICD-10-CM

## 2021-08-18 PROCEDURE — 97140 MANUAL THERAPY 1/> REGIONS: CPT | Mod: GP | Performed by: PHYSICAL THERAPIST

## 2021-08-18 PROCEDURE — 97110 THERAPEUTIC EXERCISES: CPT | Mod: GP | Performed by: PHYSICAL THERAPIST

## 2021-08-18 PROCEDURE — 97112 NEUROMUSCULAR REEDUCATION: CPT | Mod: GP | Performed by: PHYSICAL THERAPIST

## 2021-08-25 ENCOUNTER — THERAPY VISIT (OUTPATIENT)
Dept: PHYSICAL THERAPY | Facility: CLINIC | Age: 46
End: 2021-08-25
Payer: COMMERCIAL

## 2021-08-25 DIAGNOSIS — M54.2 NECK PAIN: ICD-10-CM

## 2021-08-25 PROCEDURE — 97140 MANUAL THERAPY 1/> REGIONS: CPT | Mod: GP | Performed by: PHYSICAL THERAPIST

## 2021-08-25 PROCEDURE — 97112 NEUROMUSCULAR REEDUCATION: CPT | Mod: GP | Performed by: PHYSICAL THERAPIST

## 2021-08-25 PROCEDURE — 97110 THERAPEUTIC EXERCISES: CPT | Mod: GP | Performed by: PHYSICAL THERAPIST

## 2021-09-01 ENCOUNTER — THERAPY VISIT (OUTPATIENT)
Dept: PHYSICAL THERAPY | Facility: CLINIC | Age: 46
End: 2021-09-01
Payer: COMMERCIAL

## 2021-09-01 DIAGNOSIS — M54.2 NECK PAIN: ICD-10-CM

## 2021-09-01 PROCEDURE — 97110 THERAPEUTIC EXERCISES: CPT | Mod: GP | Performed by: PHYSICAL THERAPIST

## 2021-09-01 PROCEDURE — 97140 MANUAL THERAPY 1/> REGIONS: CPT | Mod: GP | Performed by: PHYSICAL THERAPIST

## 2021-09-05 ENCOUNTER — HEALTH MAINTENANCE LETTER (OUTPATIENT)
Age: 46
End: 2021-09-05

## 2021-09-10 ENCOUNTER — LAB (OUTPATIENT)
Dept: LAB | Facility: CLINIC | Age: 46
End: 2021-09-10
Payer: COMMERCIAL

## 2021-09-10 DIAGNOSIS — Z11.59 ENCOUNTER FOR SCREENING FOR OTHER VIRAL DISEASES: ICD-10-CM

## 2021-09-10 PROCEDURE — U0003 INFECTIOUS AGENT DETECTION BY NUCLEIC ACID (DNA OR RNA); SEVERE ACUTE RESPIRATORY SYNDROME CORONAVIRUS 2 (SARS-COV-2) (CORONAVIRUS DISEASE [COVID-19]), AMPLIFIED PROBE TECHNIQUE, MAKING USE OF HIGH THROUGHPUT TECHNOLOGIES AS DESCRIBED BY CMS-2020-01-R: HCPCS

## 2021-09-10 PROCEDURE — U0005 INFEC AGEN DETEC AMPLI PROBE: HCPCS

## 2021-09-11 LAB — SARS-COV-2 RNA RESP QL NAA+PROBE: NEGATIVE

## 2021-09-13 ENCOUNTER — ANCILLARY PROCEDURE (OUTPATIENT)
Dept: GENERAL RADIOLOGY | Facility: CLINIC | Age: 46
End: 2021-09-13
Attending: PREVENTIVE MEDICINE
Payer: COMMERCIAL

## 2021-09-13 VITALS
RESPIRATION RATE: 16 BRPM | TEMPERATURE: 98.1 F | SYSTOLIC BLOOD PRESSURE: 118 MMHG | DIASTOLIC BLOOD PRESSURE: 80 MMHG | OXYGEN SATURATION: 99 %

## 2021-09-13 DIAGNOSIS — M51.16 LUMBAR DISC HERNIATION WITH RADICULOPATHY: ICD-10-CM

## 2021-09-13 PROCEDURE — 62323 NJX INTERLAMINAR LMBR/SAC: CPT | Performed by: RADIOLOGY

## 2021-09-13 RX ORDER — IOPAMIDOL 408 MG/ML
10 INJECTION, SOLUTION INTRATHECAL ONCE
Status: COMPLETED | OUTPATIENT
Start: 2021-09-13 | End: 2021-09-13

## 2021-09-13 RX ORDER — METHYLPREDNISOLONE ACETATE 80 MG/ML
80 INJECTION, SUSPENSION INTRA-ARTICULAR; INTRALESIONAL; INTRAMUSCULAR; SOFT TISSUE ONCE
Status: COMPLETED | OUTPATIENT
Start: 2021-09-13 | End: 2021-09-13

## 2021-09-13 RX ORDER — BUPIVACAINE HYDROCHLORIDE 5 MG/ML
10 INJECTION, SOLUTION PERINEURAL ONCE
Status: COMPLETED | OUTPATIENT
Start: 2021-09-13 | End: 2021-09-13

## 2021-09-13 RX ORDER — LIDOCAINE HYDROCHLORIDE 10 MG/ML
30 INJECTION, SOLUTION EPIDURAL; INFILTRATION; INTRACAUDAL; PERINEURAL ONCE
Status: COMPLETED | OUTPATIENT
Start: 2021-09-13 | End: 2021-09-13

## 2021-09-13 RX ADMIN — BUPIVACAINE HYDROCHLORIDE 10 MG: 5 INJECTION, SOLUTION PERINEURAL at 09:12

## 2021-09-13 RX ADMIN — IOPAMIDOL 4 ML: 408 INJECTION, SOLUTION INTRATHECAL at 09:13

## 2021-09-13 RX ADMIN — METHYLPREDNISOLONE ACETATE 80 MG: 80 INJECTION, SUSPENSION INTRA-ARTICULAR; INTRALESIONAL; INTRAMUSCULAR; SOFT TISSUE at 09:13

## 2021-09-13 RX ADMIN — LIDOCAINE HYDROCHLORIDE 4 ML: 10 INJECTION, SOLUTION EPIDURAL; INFILTRATION; INTRACAUDAL; PERINEURAL at 09:13

## 2021-09-13 NOTE — PROGRESS NOTES
Pt had epidural injection. Tolerated procedure without issue. Understood discharge instructions. D/C'd to  in waiting room.     Devika Stafford, JEOVANNY

## 2021-09-22 ENCOUNTER — THERAPY VISIT (OUTPATIENT)
Dept: PHYSICAL THERAPY | Facility: CLINIC | Age: 46
End: 2021-09-22
Payer: COMMERCIAL

## 2021-09-22 DIAGNOSIS — M54.2 NECK PAIN: ICD-10-CM

## 2021-09-22 PROCEDURE — 97110 THERAPEUTIC EXERCISES: CPT | Mod: GP | Performed by: PHYSICAL THERAPIST

## 2021-09-22 PROCEDURE — 97112 NEUROMUSCULAR REEDUCATION: CPT | Mod: GP | Performed by: PHYSICAL THERAPIST

## 2021-09-22 PROCEDURE — 97140 MANUAL THERAPY 1/> REGIONS: CPT | Mod: GP | Performed by: PHYSICAL THERAPIST

## 2021-09-29 ENCOUNTER — THERAPY VISIT (OUTPATIENT)
Dept: PHYSICAL THERAPY | Facility: CLINIC | Age: 46
End: 2021-09-29
Payer: COMMERCIAL

## 2021-09-29 DIAGNOSIS — M54.2 NECK PAIN: ICD-10-CM

## 2021-09-29 PROCEDURE — 97110 THERAPEUTIC EXERCISES: CPT | Mod: GP | Performed by: PHYSICAL THERAPIST

## 2021-09-29 PROCEDURE — 97112 NEUROMUSCULAR REEDUCATION: CPT | Mod: GP | Performed by: PHYSICAL THERAPIST

## 2021-10-11 ENCOUNTER — THERAPY VISIT (OUTPATIENT)
Dept: PHYSICAL THERAPY | Facility: CLINIC | Age: 46
End: 2021-10-11
Payer: COMMERCIAL

## 2021-10-11 DIAGNOSIS — M54.2 NECK PAIN: ICD-10-CM

## 2021-10-11 PROCEDURE — 97112 NEUROMUSCULAR REEDUCATION: CPT | Mod: GP | Performed by: PHYSICAL THERAPIST

## 2021-10-11 PROCEDURE — 97110 THERAPEUTIC EXERCISES: CPT | Mod: GP | Performed by: PHYSICAL THERAPIST

## 2021-10-12 ENCOUNTER — OFFICE VISIT (OUTPATIENT)
Dept: FAMILY MEDICINE | Facility: CLINIC | Age: 46
End: 2021-10-12
Payer: COMMERCIAL

## 2021-10-12 VITALS
WEIGHT: 137.2 LBS | SYSTOLIC BLOOD PRESSURE: 124 MMHG | TEMPERATURE: 97.9 F | OXYGEN SATURATION: 100 % | HEART RATE: 75 BPM | HEIGHT: 62 IN | BODY MASS INDEX: 25.25 KG/M2 | RESPIRATION RATE: 16 BRPM | DIASTOLIC BLOOD PRESSURE: 70 MMHG

## 2021-10-12 DIAGNOSIS — S39.011A STRAIN OF ABDOMINAL MUSCLE, INITIAL ENCOUNTER: Primary | ICD-10-CM

## 2021-10-12 DIAGNOSIS — F43.21 ADJUSTMENT DISORDER WITH DEPRESSED MOOD: ICD-10-CM

## 2021-10-12 DIAGNOSIS — R42 LIGHTHEADEDNESS: ICD-10-CM

## 2021-10-12 LAB
ALBUMIN SERPL-MCNC: 3.6 G/DL (ref 3.4–5)
ALP SERPL-CCNC: 118 U/L (ref 40–150)
ALT SERPL W P-5'-P-CCNC: 22 U/L (ref 0–50)
ANION GAP SERPL CALCULATED.3IONS-SCNC: 3 MMOL/L (ref 3–14)
AST SERPL W P-5'-P-CCNC: 15 U/L (ref 0–45)
BILIRUB SERPL-MCNC: 0.3 MG/DL (ref 0.2–1.3)
BUN SERPL-MCNC: 19 MG/DL (ref 7–30)
CALCIUM SERPL-MCNC: 9.5 MG/DL (ref 8.5–10.1)
CHLORIDE BLD-SCNC: 107 MMOL/L (ref 94–109)
CO2 SERPL-SCNC: 30 MMOL/L (ref 20–32)
CREAT SERPL-MCNC: 0.83 MG/DL (ref 0.52–1.04)
ERYTHROCYTE [DISTWIDTH] IN BLOOD BY AUTOMATED COUNT: 13.6 % (ref 10–15)
GFR SERPL CREATININE-BSD FRML MDRD: 85 ML/MIN/1.73M2
GLUCOSE BLD-MCNC: 80 MG/DL (ref 70–99)
HCG UR QL: NEGATIVE
HCT VFR BLD AUTO: 42.6 % (ref 35–47)
HGB BLD-MCNC: 13.5 G/DL (ref 11.7–15.7)
MCH RBC QN AUTO: 29.9 PG (ref 26.5–33)
MCHC RBC AUTO-ENTMCNC: 31.7 G/DL (ref 31.5–36.5)
MCV RBC AUTO: 94 FL (ref 78–100)
PLATELET # BLD AUTO: 221 10E3/UL (ref 150–450)
POTASSIUM BLD-SCNC: 3.9 MMOL/L (ref 3.4–5.3)
PROT SERPL-MCNC: 7.3 G/DL (ref 6.8–8.8)
RBC # BLD AUTO: 4.52 10E6/UL (ref 3.8–5.2)
SODIUM SERPL-SCNC: 140 MMOL/L (ref 133–144)
WBC # BLD AUTO: 4.9 10E3/UL (ref 4–11)

## 2021-10-12 PROCEDURE — 81025 URINE PREGNANCY TEST: CPT | Performed by: NURSE PRACTITIONER

## 2021-10-12 PROCEDURE — 99213 OFFICE O/P EST LOW 20 MIN: CPT | Performed by: NURSE PRACTITIONER

## 2021-10-12 PROCEDURE — 80053 COMPREHEN METABOLIC PANEL: CPT | Performed by: NURSE PRACTITIONER

## 2021-10-12 PROCEDURE — 36415 COLL VENOUS BLD VENIPUNCTURE: CPT | Performed by: NURSE PRACTITIONER

## 2021-10-12 PROCEDURE — 85027 COMPLETE CBC AUTOMATED: CPT | Performed by: NURSE PRACTITIONER

## 2021-10-12 ASSESSMENT — MIFFLIN-ST. JEOR: SCORE: 1215.59

## 2021-10-12 ASSESSMENT — ENCOUNTER SYMPTOMS
NAUSEA: 1
RESPIRATORY NEGATIVE: 1
ENDOCRINE NEGATIVE: 1
ABDOMINAL PAIN: 1
ARTHRALGIAS: 1
EYES NEGATIVE: 1
LIGHT-HEADEDNESS: 1
CONSTITUTIONAL NEGATIVE: 1
NECK PAIN: 1
CARDIOVASCULAR NEGATIVE: 1

## 2021-10-12 ASSESSMENT — PAIN SCALES - GENERAL: PAINLEVEL: MODERATE PAIN (4)
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
## 2021-10-22 ENCOUNTER — THERAPY VISIT (OUTPATIENT)
Dept: PHYSICAL THERAPY | Facility: CLINIC | Age: 46
End: 2021-10-22
Payer: COMMERCIAL

## 2021-10-22 DIAGNOSIS — M54.2 NECK PAIN: ICD-10-CM

## 2021-10-22 PROCEDURE — 97110 THERAPEUTIC EXERCISES: CPT | Mod: GP | Performed by: PHYSICAL THERAPIST

## 2021-10-22 PROCEDURE — 97140 MANUAL THERAPY 1/> REGIONS: CPT | Mod: GP | Performed by: PHYSICAL THERAPIST

## 2021-10-29 ENCOUNTER — THERAPY VISIT (OUTPATIENT)
Dept: PHYSICAL THERAPY | Facility: CLINIC | Age: 46
End: 2021-10-29
Payer: COMMERCIAL

## 2021-10-29 DIAGNOSIS — M54.2 NECK PAIN: ICD-10-CM

## 2021-10-29 PROCEDURE — 97140 MANUAL THERAPY 1/> REGIONS: CPT | Mod: GP | Performed by: PHYSICAL THERAPIST

## 2021-10-29 PROCEDURE — 97112 NEUROMUSCULAR REEDUCATION: CPT | Mod: GP | Performed by: PHYSICAL THERAPIST

## 2021-10-29 PROCEDURE — 97110 THERAPEUTIC EXERCISES: CPT | Mod: GP | Performed by: PHYSICAL THERAPIST

## 2021-11-04 ENCOUNTER — THERAPY VISIT (OUTPATIENT)
Dept: PHYSICAL THERAPY | Facility: CLINIC | Age: 46
End: 2021-11-04
Payer: COMMERCIAL

## 2021-11-04 DIAGNOSIS — M54.2 NECK PAIN: ICD-10-CM

## 2021-11-04 PROCEDURE — 97140 MANUAL THERAPY 1/> REGIONS: CPT | Mod: GP | Performed by: PHYSICAL THERAPIST

## 2021-11-04 PROCEDURE — 97112 NEUROMUSCULAR REEDUCATION: CPT | Mod: GP | Performed by: PHYSICAL THERAPIST

## 2021-11-04 PROCEDURE — 97110 THERAPEUTIC EXERCISES: CPT | Mod: GP | Performed by: PHYSICAL THERAPIST

## 2021-11-17 ENCOUNTER — OFFICE VISIT (OUTPATIENT)
Dept: ORTHOPEDICS | Facility: CLINIC | Age: 46
End: 2021-11-17
Payer: COMMERCIAL

## 2021-11-17 DIAGNOSIS — M51.16 LUMBAR DISC HERNIATION WITH RADICULOPATHY: ICD-10-CM

## 2021-11-17 DIAGNOSIS — M50.20 CERVICAL DISC HERNIATION: ICD-10-CM

## 2021-11-17 DIAGNOSIS — M47.816 ARTHROPATHY OF LUMBAR FACET JOINT: ICD-10-CM

## 2021-11-17 DIAGNOSIS — M54.12 CERVICAL RADICULAR PAIN: Primary | ICD-10-CM

## 2021-11-17 DIAGNOSIS — M24.80 GENERALIZED ARTICULAR HYPERMOBILITY: ICD-10-CM

## 2021-11-17 PROCEDURE — 99214 OFFICE O/P EST MOD 30 MIN: CPT | Performed by: PREVENTIVE MEDICINE

## 2021-11-17 NOTE — PROGRESS NOTES
HISTORY OF PRESENT ILLNESS  Ms. Gates is a pleasant 46 year old year old female who presents to clinic today with chronic neck and low back pain  Lisset explains that she is having increasing symptoms of neck and low back pain  Her last ESIs for her neck was in June and previously about a year ago for lumbar spine  She has been diligent with PT and home exercises  But feels like she is having more increased symptoms and may need injections  Location: neck and low back  Quality:  achy pain    Severity: 6/10 at worst    Duration: worse over past few months  Timing: occurs intermittently  Context: occurs while exercising and lifting  Modifying factors:  resting and non-use makes it better, movement and use makes it worse  Associated signs & symptoms: see above    MEDICAL HISTORY  Patient Active Problem List   Diagnosis     Irritable bowel syndrome     Premature ovarian failure     CARDIOVASCULAR SCREENING; LDL GOAL LESS THAN 160     Mild persistent asthma without complication     Trochanteric bursitis of both hips     Right knee pain     Adjustment disorder with depressed mood     Neck pain       Current Outpatient Medications   Medication Sig Dispense Refill     albuterol (PROAIR HFA/PROVENTIL HFA/VENTOLIN HFA) 108 (90 Base) MCG/ACT inhaler Inhale 2 puffs into the lungs every 6 hours 18 g 3     Calcium Carbonate-Vitamin D (CALCIUM + D PO) Take 600 mg by mouth.       FEXOFENADINE  MG OR TABS 1 TABLET DAILY        fluticasone (FLONASE) 50 MCG/ACT nasal spray Spray 2 sprays into both nostrils daily       fluticasone (FLOVENT HFA) 110 MCG/ACT inhaler TAKE 2 PUFFS BY MOUTH TWICE A DAY 12 g 11     ibuprofen (ADVIL/MOTRIN) 600 MG tablet TAKE 1 TABLET (600 MG) BY MOUTH EVERY 8 HOURS AS NEEDED FOR MODERATE PAIN 60 tablet 1     MULTIPLE VITAMIN PO        Turmeric Curcumin 500 MG CAPS Take 500 mg by mouth daily        UNABLE TO FIND MEDICATION NAME: Provitalize Probiotic that has turmeric and herbs in it       VITAMIN D,  CHOLECALCIFEROL, PO Take by mouth daily       terbinafine (LAMISIL) 1 % external cream Apply topically 2 times daily (Patient not taking: Reported on 10/12/2021) 15 g 0       Allergies   Allergen Reactions     No Known Allergies        Family History   Problem Relation Age of Onset     Thyroid Disease Mother      Diabetes Mother         type2     Eye Disorder Mother         cataracts     Gastrointestinal Disease Mother         hep a/has to have her throat stretched     Respiratory Mother         sleep apnea     Heart Disease Father 60     C.A.D. Paternal Grandfather 30        age 30, then again in his 80's     Diabetes Maternal Grandfather      Cancer Maternal Grandfather      Thyroid Disease Maternal Grandmother      Gynecology Maternal Grandmother         on bcp to keep period regular, a small uterus     Heart Disease Maternal Grandmother         tachycardia     Allergies Daughter         milk     Asthma Daughter      Social History     Socioeconomic History     Marital status:      Spouse name: Not on file     Number of children: 2     Years of education: Not on file     Highest education level: Not on file   Occupational History     Employer: [employer]   Tobacco Use     Smoking status: Never Smoker     Smokeless tobacco: Never Used   Substance and Sexual Activity     Alcohol use: Yes     Comment: social     Drug use: No     Sexual activity: Yes     Partners: Male     Birth control/protection: Surgical     Comment:  had vasectomy   Other Topics Concern      Service No     Blood Transfusions Not Asked     Caffeine Concern No     Occupational Exposure No     Hobby Hazards Not Asked     Sleep Concern No     Stress Concern Yes     Weight Concern Yes     Comment: gains easily     Special Diet No     Back Care Yes     Exercise Yes     Comment: irregularly     Bike Helmet Not Asked     Comment: doesn't bike     Seat Belt No     Self-Exams No     Parent/sibling w/ CABG, MI or  angioplasty before 65F 55M? No   Social History Narrative    Caffeine intake/servings daily - 0-1    Calcium intake/servings daily - 2-3    Exercise 2-3  times weekly - describe aerobics    Sunscreen used - Yes    Seatbelts used - Yes    Guns stored in the home - No    Self Breast Exam - Yes    Pap test up to date -  Yes    Eye exam up to date -  Yes    Dental exam up to date -  Yes    DEXA scan up to date -  Not Applicable    Flex Sig/Colonoscopy up to date -  Not Applicable    Mammography up to date -  Not Applicable    Immunizations reviewed and up to date - Yes    Abuse: Current or Past (Physical, Sexual or Emotional) - None current, past    Do you feel safe in your environment - Yes    Do you cope well with stress - Yes    Do you suffer from insomnia - No    Last updated by: Estefani Moraes MA 5/5/2010             Social Determinants of Health     Financial Resource Strain: Not on file   Food Insecurity: Not on file   Transportation Needs: Not on file   Physical Activity: Not on file   Stress: Not on file   Social Connections: Not on file   Intimate Partner Violence: Not on file   Housing Stability: Not on file       Additional medical/Social/Surgical histories reviewed in [facility] and updated as appropriate.     REVIEW OF SYSTEMS (11/17/2021)  10 point ROS of systems including Constitutional, Eyes, Respiratory, Cardiovascular, Gastroenterology, Genitourinary, Integumentary, Musculoskeletal, Psychiatric, Allergic/Immunologic were all negative except for pertinent positives noted in my HPI.     PHYSICAL EXAM  Vital Signs: [facility] 12/25/2013  Patient declined being weighed. There is no height or weight on file to calculate BMI.    General  - normal appearance, in no obvious distress  HEENT  - conjunctivae not injected, moist mucous membranes, normocephalic/atraumatic head, ears normal appearance, no lesions, mouth normal appearance, no scars, normal dentition and teeth present  CV  - normal peripheral perfusion  Pulm  - normal  respiratory pattern, non-labored    Musculoskeletal - CERVICAL SPINE  - stance and posture: normal gait without limp, no obvious leg length discrepancy, normal heel and toe walk, normal balance, slightly forward shoulders, head balanced normally on trunk  - inspection: normal bone and joint alignment, no obvious kyphosis  - palpation: no paravertebral or bony tenderness, except at base of neck and trapezius muscles  - ROM: normal and painless flexion, extension, left and right sidebending and rotation with some discomfort  - strength: upper extremities 5/5 in all planes  - special tests:  (+) spurlings    Neuro  - biceps, triceps, supinator DTRs 2+ bilaterally, no sensory or motor deficit, grossly normal coordination, normal muscle tone  Skin  - no ecchymosis, erythema, warmth, or induration, no obvious rash  Psych  - interactive, appropriate, normal mood and affect  Lumbar; Has pain with flexion and extension, positive SLR  ASSESSMENT & PLAN  47 yo female with hypermobility syndrome, with cervical ddd, disc herniations and radicular pain, worse and lumbar ddd, disc herniatons, radicular pain worse      I independently reviewed the following imaging studies:  Cervical MRI: shows ddd, disc herniatons  disicussed and ordered CAMMY  Reviewed her previous lumbar MRI which was over a year and half ago, showing ddd, disc herniations  She has had greater than 50% improvement from her epidural injections for her C and L spine in the past  Will order a new lumbar MRI  Will f/u after that   Consider CAMMY lumbar  We also discussed and ordered PT    Appropriate PPE was utilized for prevention of spread of Covid-19.  Floyd Larsen MD, CA[facility]

## 2021-11-17 NOTE — LETTER
11/17/2021         RE: Lisset MITCHELL Chi  [address] [address]        Dear Colleague,    Thank you for referring your patient, Lisset MITCHELL Chi, to the [facility] [place]. Please see a copy of my visit note below.    HISTORY OF PRESENT ILLNESS  Ms. Gates is a pleasant 46 year old year old female who presents to clinic today with chronic neck and low back pain  Lisset explains that she is having increasing symptoms of neck and low back pain  Her last ESIs for her neck was in June and previously about a year ago for lumbar spine  She has been diligent with PT and home exercises  But feels like she is having more increased symptoms and may need injections  Location: neck and low back  Quality:  achy pain    Severity: 6/10 at worst    Duration: worse over past few months  Timing: occurs intermittently  Context: occurs while exercising and lifting  Modifying factors:  resting and non-use makes it better, movement and use makes it worse  Associated signs & symptoms: see above    MEDICAL HISTORY  Patient Active Problem List   Diagnosis     Irritable bowel syndrome     Premature ovarian failure     CARDIOVASCULAR SCREENING; LDL GOAL LESS THAN 160     Mild persistent asthma without complication     Trochanteric bursitis of both hips     Right knee pain     Adjustment disorder with depressed mood     Neck pain       Current Outpatient Medications   Medication Sig Dispense Refill     albuterol (PROAIR HFA/PROVENTIL HFA/VENTOLIN HFA) 108 (90 Base) MCG/ACT inhaler Inhale 2 puffs into the lungs every 6 hours 18 g 3     Calcium Carbonate-Vitamin D (CALCIUM + D PO) Take 600 mg by mouth.       FEXOFENADINE  MG OR TABS 1 TABLET DAILY        fluticasone (FLONASE) 50 MCG/ACT nasal spray Spray 2 sprays into both nostrils daily       fluticasone (FLOVENT HFA) 110 MCG/ACT inhaler TAKE 2 PUFFS BY MOUTH TWICE A DAY 12 g 11     ibuprofen (ADVIL/MOTRIN) 600 MG tablet TAKE 1 TABLET (600 MG) BY  MOUTH EVERY 8 HOURS AS NEEDED FOR MODERATE PAIN 60 tablet 1     MULTIPLE VITAMIN PO        Turmeric Curcumin 500 MG CAPS Take 500 mg by mouth daily        UNABLE TO FIND MEDICATION NAME: Provitalize Probiotic that has turmeric and herbs in it       VITAMIN D, CHOLECALCIFEROL, PO Take by mouth daily       terbinafine (LAMISIL) 1 % external cream Apply topically 2 times daily (Patient not taking: Reported on 10/12/2021) 15 g 0       Allergies   Allergen Reactions     No Known Allergies        Family History   Problem Relation Age of Onset     Thyroid Disease Mother      Diabetes Mother         type2     Eye Disorder Mother         cataracts     Gastrointestinal Disease Mother         hep a/has to have her throat stretched     Respiratory Mother         sleep apnea     Heart Disease Father 60     C.A.D. Paternal Grandfather 30        age 30, then again in his 80's     Diabetes Maternal Grandfather      Cancer Maternal Grandfather      Thyroid Disease Maternal Grandmother      Gynecology Maternal Grandmother         on bcp to keep period regular, a small uterus     Heart Disease Maternal Grandmother         tachycardia     Allergies Daughter         milk     Asthma Daughter      Social History     Socioeconomic History     Marital status:      Spouse name: Not on file     Number of children: 2     Years of education: Not on file     Highest education level: Not on file   Occupational History     Employer: [employer]   Tobacco Use     Smoking status: Never Smoker     Smokeless tobacco: Never Used   Substance and Sexual Activity     Alcohol use: Yes     Comment: social     Drug use: No     Sexual activity: Yes     Partners: Male     Birth control/protection: Surgical     Comment:  had vasectomy   Other Topics Concern      Service No     Blood Transfusions Not Asked     Caffeine Concern No     Occupational Exposure No     Hobby Hazards Not Asked     Sleep Concern No     Stress Concern Yes      Weight Concern Yes     Comment: gains easily     Special Diet No     Back Care Yes     Exercise Yes     Comment: irregularly     Bike Helmet Not Asked     Comment: doesn't bike     Seat Belt No     Self-Exams No     Parent/sibling w/ CABG, MI or angioplasty before 65F 55M? No   Social History Narrative    Caffeine intake/servings daily - 0-1    Calcium intake/servings daily - 2-3    Exercise 2-3  times weekly - describe aerobics    Sunscreen used - Yes    Seatbelts used - Yes    Guns stored in the home - No    Self Breast Exam - Yes    Pap test up to date -  Yes    Eye exam up to date -  Yes    Dental exam up to date -  Yes    DEXA scan up to date -  Not Applicable    Flex Sig/Colonoscopy up to date -  Not Applicable    Mammography up to date -  Not Applicable    Immunizations reviewed and up to date - Yes    Abuse: Current or Past (Physical, Sexual or Emotional) - None current, past    Do you feel safe in your environment - Yes    Do you cope well with stress - Yes    Do you suffer from insomnia - No    Last updated by: Estefani Moraes MA 5/5/2010             Social Determinants of Health     Financial Resource Strain: Not on file   Food Insecurity: Not on file   Transportation Needs: Not on file   Physical Activity: Not on file   Stress: Not on file   Social Connections: Not on file   Intimate Partner Violence: Not on file   Housing Stability: Not on file       Additional medical/Social/Surgical histories [facility] and updated as appropriate.     REVIEW OF SYSTEMS (11/17/2021)  10 point ROS of systems including Constitutional, Eyes, Respiratory, Cardiovascular, Gastroenterology, Genitourinary, Integumentary, Musculoskeletal, Psychiatric, Allergic/Immunologic were all negative except for pertinent positives noted in my HPI.     PHYSICAL EXAM  Vital Signs: [facility] 12/25/2013  Patient declined being weighed. There is no height or weight on file to calculate BMI.    General  - normal appearance, in no obvious  distress  HEENT  - conjunctivae not injected, moist mucous membranes, normocephalic/atraumatic head, ears normal appearance, no lesions, mouth normal appearance, no scars, normal dentition and teeth present  CV  - normal peripheral perfusion  Pulm  - normal respiratory pattern, non-labored    Musculoskeletal - CERVICAL SPINE  - stance and posture: normal gait without limp, no obvious leg length discrepancy, normal heel and toe walk, normal balance, slightly forward shoulders, head balanced normally on trunk  - inspection: normal bone and joint alignment, no obvious kyphosis  - palpation: no paravertebral or bony tenderness, except at base of neck and trapezius muscles  - ROM: normal and painless flexion, extension, left and right sidebending and rotation with some discomfort  - strength: upper extremities 5/5 in all planes  - special tests:  (+) spurlings    Neuro  - biceps, triceps, supinator DTRs 2+ bilaterally, no sensory or motor deficit, grossly normal coordination, normal muscle tone  Skin  - no ecchymosis, erythema, warmth, or induration, no obvious rash  Psych  - interactive, appropriate, normal mood and affect  Lumbar; Has pain with flexion and extension, positive SLR  ASSESSMENT & PLAN  45 yo female with hypermobility syndrome, with cervical ddd, disc herniations and radicular pain, worse and lumbar ddd, disc herniatons, radicular pain worse      I independently reviewed the following imaging studies:  Cervical MRI: shows ddd, disc herniatons  disicussed and ordered CAMMY  Reviewed her previous lumbar MRI which was over a year and half ago, showing ddd, disc herniations  She has had greater than 50% improvement from her epidural injections for her C and L spine in the past  Will order a new lumbar MRI  Will f/u after that   Consider CAMMY lumbar  We also discussed and ordered PT    Appropriate PPE was utilized for prevention of spread of Covid-19.  Floyd Larsen MD, CAM        Again, thank you for allowing  me to participate in the care of your patient.        Sincerely,        Floyd Larsen MD

## 2021-11-22 ENCOUNTER — THERAPY VISIT (OUTPATIENT)
Dept: PHYSICAL THERAPY | Facility: CLINIC | Age: 46
End: 2021-11-22
Payer: COMMERCIAL

## 2021-11-22 DIAGNOSIS — M51.16 LUMBAR DISC HERNIATION WITH RADICULOPATHY: ICD-10-CM

## 2021-11-22 DIAGNOSIS — M47.816 ARTHROPATHY OF LUMBAR FACET JOINT: ICD-10-CM

## 2021-11-22 DIAGNOSIS — M54.12 CERVICAL RADICULAR PAIN: ICD-10-CM

## 2021-11-22 DIAGNOSIS — M24.80 GENERALIZED ARTICULAR HYPERMOBILITY: ICD-10-CM

## 2021-11-22 DIAGNOSIS — M54.2 NECK PAIN: ICD-10-CM

## 2021-11-22 DIAGNOSIS — M50.20 CERVICAL DISC HERNIATION: ICD-10-CM

## 2021-11-22 PROCEDURE — 97140 MANUAL THERAPY 1/> REGIONS: CPT | Mod: GP | Performed by: PHYSICAL THERAPIST

## 2021-11-22 PROCEDURE — 97110 THERAPEUTIC EXERCISES: CPT | Mod: GP | Performed by: PHYSICAL THERAPIST

## 2021-11-28 ENCOUNTER — ANCILLARY PROCEDURE (OUTPATIENT)
Dept: MRI IMAGING | Facility: CLINIC | Age: 46
End: 2021-11-28
Attending: PREVENTIVE MEDICINE
Payer: COMMERCIAL

## 2021-11-28 DIAGNOSIS — M47.816 ARTHROPATHY OF LUMBAR FACET JOINT: ICD-10-CM

## 2021-11-28 DIAGNOSIS — M51.16 LUMBAR DISC HERNIATION WITH RADICULOPATHY: ICD-10-CM

## 2021-11-28 PROCEDURE — 72148 MRI LUMBAR SPINE W/O DYE: CPT | Mod: GC | Performed by: RADIOLOGY

## 2021-11-30 ENCOUNTER — TRANSFERRED RECORDS (OUTPATIENT)
Dept: HEALTH INFORMATION MANAGEMENT | Facility: CLINIC | Age: 46
End: 2021-11-30
Payer: COMMERCIAL

## 2021-12-03 ENCOUNTER — VIRTUAL VISIT (OUTPATIENT)
Dept: ORTHOPEDICS | Facility: CLINIC | Age: 46
End: 2021-12-03
Payer: COMMERCIAL

## 2021-12-03 ENCOUNTER — THERAPY VISIT (OUTPATIENT)
Dept: PHYSICAL THERAPY | Facility: CLINIC | Age: 46
End: 2021-12-03
Payer: COMMERCIAL

## 2021-12-03 DIAGNOSIS — M54.2 NECK PAIN: ICD-10-CM

## 2021-12-03 DIAGNOSIS — M51.16 LUMBAR DISC HERNIATION WITH RADICULOPATHY: Primary | ICD-10-CM

## 2021-12-03 DIAGNOSIS — M47.816 ARTHROPATHY OF LUMBAR FACET JOINT: ICD-10-CM

## 2021-12-03 PROCEDURE — 97112 NEUROMUSCULAR REEDUCATION: CPT | Mod: GP | Performed by: PHYSICAL THERAPIST

## 2021-12-03 PROCEDURE — 99213 OFFICE O/P EST LOW 20 MIN: CPT | Mod: TEL | Performed by: PREVENTIVE MEDICINE

## 2021-12-03 PROCEDURE — 97110 THERAPEUTIC EXERCISES: CPT | Mod: GP | Performed by: PHYSICAL THERAPIST

## 2021-12-03 NOTE — PROGRESS NOTES
Patient is a  46    year old who is being evaluated via a billable telephone visit.      What phone number would you like to be contacted at? CELL  How would you like to obtain your AVS? CATHIE        Subjective   Patient is a   46  year old who presents by phone call visit for the following:   followup for lumbar imaging  Wants to discuss  Possible injection    HPI       Review of Systems   Constitutional, HEENT, cardiovascular, pulmonary, gi and gu systems are negative, except as otherwise noted.      Objective           Vitals:  No vitals were obtained today due to virtual visit.    Physical Exam   healthy, alert and no distress  PSYCH: Alert and oriented times 3; coherent speech, normal   rate and volume, able to articulate logical thoughts, able   to abstract reason, no tangential thoughts, no hallucinations   or delusions  His affect is normal  RESP: No cough, no audible wheezing, able to talk in full sentences  Remainder of exam unable to be completed due to telephone visits    Assessment/Plan  45 yo female with lumbar ddd, facet arthropathy, worse    I independently reviewed the following imaging studies and discussed with patient:    Lumbar MRI; shows ddd, facet arthropathy  Discussed and ordered injection  F/u in 1-2 months        Phone call duration: 20 minutes  Phone call start: 4:45pm  Phone call end: 5:05pm  Dr Larsen

## 2021-12-03 NOTE — LETTER
12/3/2021       RE: Lisset MITCHELL Chi  [address] [address]     Dear Colleague,    Thank you for referring your patient, Lisset MITCHELL Chi, to the [facility] [place] at [facility]. Please see a copy of my visit note below.    Patient is a  46    year old who is being evaluated via a billable telephone visit.      What phone number would you like to be contacted at? CELL  How would you like to obtain your AVS? MYCHART        Subjective   Patient is a   46  year old who presents by phone call visit for the following:   followup for lumbar imaging  Wants to discuss  Possible injection    HPI       Review of Systems   Constitutional, HEENT, cardiovascular, pulmonary, gi and gu systems are negative, except as otherwise noted.      Objective           Vitals:  No vitals were obtained today due to virtual visit.    Physical Exam   healthy, alert and no distress  PSYCH: Alert and oriented times 3; coherent speech, normal   rate and volume, able to articulate logical thoughts, able   to abstract reason, no tangential thoughts, no hallucinations   or delusions  His affect is normal  RESP: No cough, no audible wheezing, able to talk in full sentences  Remainder of exam unable to be completed due to telephone visits    Assessment/Plan  45 yo female with lumbar ddd, facet arthropathy, worse    I independently reviewed the following imaging studies and discussed with patient:    Lumbar MRI; shows ddd, facet arthropathy  Discussed and ordered injection  F/u in 1-2 months        Phone call duration: 20 minutes  Phone call start: 4:45pm  Phone call end: 5:05pm  Dr Larsen      Again, thank you for allowing me to participate in the care of your patient.      Sincerely,    Floyd Larsen MD

## 2021-12-03 NOTE — LETTER
12/3/2021      RE: Lisset MITCHELL Chi  [address] [facility] [address]       Patient is a  46    year old who is being evaluated via a billable telephone visit.      What phone number would you like to be contacted at? CELL  How would you like to obtain your AVS? CATHIE        Subjective   Patient is a   46  year old who presents by phone call visit for the following:   followup for lumbar imaging  Wants to discuss  Possible injection    HPI       Review of Systems   Constitutional, HEENT, cardiovascular, pulmonary, gi and gu systems are negative, except as otherwise noted.      Objective           Vitals:  No vitals were obtained today due to virtual visit.    Physical Exam   healthy, alert and no distress  PSYCH: Alert and oriented times 3; coherent speech, normal   rate and volume, able to articulate logical thoughts, able   to abstract reason, no tangential thoughts, no hallucinations   or delusions  His affect is normal  RESP: No cough, no audible wheezing, able to talk in full sentences  Remainder of exam unable to be completed due to telephone visits    Assessment/Plan  45 yo female with lumbar ddd, facet arthropathy, worse    I independently reviewed the following imaging studies and discussed with patient:    Lumbar MRI; shows ddd, facet arthropathy  Discussed and ordered injection  F/u in 1-2 months        Phone call duration: 20 minutes  Phone call start: 4:45pm  Phone call end: 5:05pm  Dr Suzie Larsen MD

## 2021-12-28 ENCOUNTER — TRANSFERRED RECORDS (OUTPATIENT)
Dept: HEALTH INFORMATION MANAGEMENT | Facility: CLINIC | Age: 46
End: 2021-12-28
Payer: COMMERCIAL

## 2022-01-07 ENCOUNTER — THERAPY VISIT (OUTPATIENT)
Dept: PHYSICAL THERAPY | Facility: CLINIC | Age: 47
End: 2022-01-07
Payer: COMMERCIAL

## 2022-01-07 DIAGNOSIS — M25.561 PAIN IN BOTH KNEES: ICD-10-CM

## 2022-01-07 DIAGNOSIS — M54.2 NECK PAIN: ICD-10-CM

## 2022-01-07 DIAGNOSIS — M25.562 PAIN IN BOTH KNEES: ICD-10-CM

## 2022-01-07 DIAGNOSIS — M25.579 PAIN IN JOINT, ANKLE AND FOOT, UNSPECIFIED LATERALITY: ICD-10-CM

## 2022-01-07 PROCEDURE — 97112 NEUROMUSCULAR REEDUCATION: CPT | Mod: GP | Performed by: PHYSICAL THERAPIST

## 2022-01-07 PROCEDURE — 97161 PT EVAL LOW COMPLEX 20 MIN: CPT | Mod: GP | Performed by: PHYSICAL THERAPIST

## 2022-01-07 PROCEDURE — 97110 THERAPEUTIC EXERCISES: CPT | Mod: GP | Performed by: PHYSICAL THERAPIST

## 2022-01-07 NOTE — PROGRESS NOTES
Physical Therapy Initial Evaluation  Subjective:  The history is provided by the patient.   Therapist Generated HPI Evaluation  Problem details: Patient has had issues/subluxations since she was a teenager. When she lifts her foot to walk, she often has the sensation that her ankle is distracting from the joint, and it is painful when she puts it back down on the ground.         Type of problem:  Bilateral ankles.    This is a chronic condition.  Condition occurred with:  Insidious onset.  Where condition occurred: for unknown reasons.  Patient reports pain:  Anterior, lateral and toes.  Pain is described as aching and sharp and is intermittent.  Pain timing: no pattern.  Since onset symptoms are unchanged.  Associated symptoms:  Loss of strength, catching and buckling/giving out. Symptoms are exacerbated by descending stairs, bending/squatting, standing, ascending stairs and walking (during push off)  and relieved by rest.      Restrictions due to condition include:  Working in normal job without restrictions.  Barriers include:  None as reported by patient.    Therapist Generated HPI Evaluation         Type of problem:  Bilateral knees.    This is a chronic condition.  Condition occurred with:  Insidious onset.  Where condition occurred: for unknown reasons.  Patient reports pain:  Anterior, lateral and medial.  Pain is described as aching and sharp and is intermittent.  Pain timing: no pattern.  Since onset symptoms are unchanged.  Associated symptoms:  Loss of motion/stiffness, buckling/giving out and loss of strength. Symptoms are exacerbated by standing, walking, transfers, descending stairs, ascending stairs and bending/squatting  and relieved by rest.      Restrictions due to condition include:  Working in normal job without restrictions.  Barriers include:  None as reported by patient.    Patient Health History     Pain is reported as 6/10 on pain scale.  General health as reported by patient is  good.  Pertinent medical history includes: asthma, depression, menopausal and seizures.   Red flags:  None as reported by patient.  Medical allergies: none.   Surgeries include:  Orthopedic surgery. Other surgery history details: B knees for patellar dislocations.    Current medications:  Pain medication.    Current occupation is .   Primary job tasks include:  Computer work, prolonged sitting and prolonged standing.       Objective:  System    Ankle/Foot Evaluation  ROM:  AROM is normal.      Strength:    Dorsiflexion:  Left: 4+/5     Pain:   Right: 4+/5   Pain:  Plantarflexion: Left: 5-/5   Pain:   Right: 5-/5  Pain:  Inversion:Left: 4+/5  Pain:     Right: 4+/5  Pain:  Eversion:Left: 4+/5  Pain:  Right: 4+/5  Pain:                      PALPATION: normal    EDEMA: normal          MOBILITY TESTING:       Talocrural Left: hypermobile    Talocrural Right: hypermobile                                               Hip Evaluation  HIP AROM:  AROM:    Left Hip:     Normal    Right Hip:   Normal                    Hip Strength:    Flexion:   Left: 4+/5   Pain:  Right: 4+/5   Pain:                    Extension:  Left: 4+/5  Pain:Right: 5-/5    Pain:                               Knee Evaluation:  ROM:  AROM: normal            Strength:         Quad Set Left: Fair and delayed    Pain:   Quad Set Right: Fair and delayed    Pain:    Special Tests:   Left knee positive for the following special tests:  Patellar Compression and Patellar Tracking-Abduction Lateral  Right knee positive for the following tests:  Patellar Compression and Patellar Tracking-Abduction Lateral  Palpation:    Left knee tenderness present at:  Patellar Medial  Right knee tenderness present at:  Patellar Medial  Edema:  Normal    Mobility Testing:      Patellofemoral Medial:  Left: hypermobile    Right: hypermobile  Patellofemoral Lateral:  Left: hypermobile    Right: hypermobile      Functional Testing:          Quad:    Single Leg  Squat:  Left:      Right:        Bilateral Leg Squat:   Excessive anterior knee excursion, femoral IR and hip substitution                  General Evaluation:                      Balance:    Single Leg Stance--Eyes Open:  Left: 20/30 sec    Right: 25/30 sec                                                     ROS    Assessment/Plan:    Patient is a 46 year old female with both sides knee and both sides ankle complaints.    Patient has the following significant findings with corresponding treatment plan.                Diagnosis 1:  B knee/ankle pain in the setting of hypermobility  Pain -  hot/cold therapy, manual therapy, splint/taping/bracing/orthotics, self management, education and directional preference exercise  Decreased ROM/flexibility - manual therapy, therapeutic exercise and home program  Decreased strength - therapeutic exercise, therapeutic activities and home program  Decreased proprioception - neuro re-education, gait training, therapeutic activities and home program    Therapy Evaluation Codes:   1) History comprised of:   Personal factors that impact the plan of care:      None.    Comorbidity factors that impact the plan of care are:      None.     Medications impacting care: None.  2) Examination of Body Systems comprised of:   Body structures and functions that impact the plan of care:      Ankle, Hip and Knee.   Activity limitations that impact the plan of care are:      Sitting, Sports, Squatting/kneeling, Stairs, Standing and Walking.  3) Clinical presentation characteristics are:   Stable/Uncomplicated.  4) Decision-Making    Low complexity using standardized patient assessment instrument and/or measureable assessment of functional outcome.  Cumulative Therapy Evaluation is: Low complexity.    Previous and current functional limitations:  (See Goal Flow Sheet for this information)    Short term and Long term goals: (See Goal Flow Sheet for this information)     Communication ability:   Patient appears to be able to clearly communicate and understand verbal and written communication and follow directions correctly.  Treatment Explanation - The following has been discussed with the patient:   RX ordered/plan of care  Anticipated outcomes  Possible risks and side effects  This patient would benefit from PT intervention to resume normal activities.   Rehab potential is good.    Frequency:  1 X week, once daily  Duration:  for 8 weeks  Discharge Plan:  Achieve all LTG.  Independent in home treatment program.  Reach maximal therapeutic benefit.    Please refer to the daily flowsheet for treatment today, total treatment time and time spent performing 1:1 timed codes.

## 2022-01-08 PROBLEM — M25.579 PAIN IN JOINT, ANKLE AND FOOT, UNSPECIFIED LATERALITY: Status: ACTIVE | Noted: 2022-01-08

## 2022-01-08 PROBLEM — M25.561 PAIN IN BOTH KNEES: Status: ACTIVE | Noted: 2022-01-08

## 2022-01-08 PROBLEM — M25.562 PAIN IN BOTH KNEES: Status: ACTIVE | Noted: 2022-01-08

## 2022-01-19 ENCOUNTER — OFFICE VISIT (OUTPATIENT)
Dept: FAMILY MEDICINE | Facility: CLINIC | Age: 47
End: 2022-01-19
Payer: COMMERCIAL

## 2022-01-19 VITALS
HEIGHT: 62 IN | SYSTOLIC BLOOD PRESSURE: 112 MMHG | HEART RATE: 58 BPM | OXYGEN SATURATION: 100 % | WEIGHT: 132 LBS | TEMPERATURE: 97.4 F | DIASTOLIC BLOOD PRESSURE: 66 MMHG | BODY MASS INDEX: 24.29 KG/M2 | RESPIRATION RATE: 15 BRPM

## 2022-01-19 DIAGNOSIS — Z13.220 SCREENING FOR HYPERLIPIDEMIA: ICD-10-CM

## 2022-01-19 DIAGNOSIS — G60.9 IDIOPATHIC PERIPHERAL NEUROPATHY: ICD-10-CM

## 2022-01-19 DIAGNOSIS — J45.30 MILD PERSISTENT ASTHMA WITHOUT COMPLICATION: Primary | ICD-10-CM

## 2022-01-19 DIAGNOSIS — R09.82 ALLERGIC RHINITIS WITH POSTNASAL DRIP: ICD-10-CM

## 2022-01-19 DIAGNOSIS — B35.1 ONYCHOMYCOSIS: ICD-10-CM

## 2022-01-19 DIAGNOSIS — J30.9 ALLERGIC RHINITIS WITH POSTNASAL DRIP: ICD-10-CM

## 2022-01-19 DIAGNOSIS — H65.03 BILATERAL ACUTE SEROUS OTITIS MEDIA, RECURRENCE NOT SPECIFIED: ICD-10-CM

## 2022-01-19 DIAGNOSIS — M24.80 GENERALIZED HYPERMOBILITY OF JOINTS: ICD-10-CM

## 2022-01-19 DIAGNOSIS — M25.512 ACUTE PAIN OF LEFT SHOULDER: ICD-10-CM

## 2022-01-19 LAB
BASOPHILS # BLD AUTO: 0 10E3/UL (ref 0–0.2)
BASOPHILS NFR BLD AUTO: 1 %
EOSINOPHIL # BLD AUTO: 0.2 10E3/UL (ref 0–0.7)
EOSINOPHIL NFR BLD AUTO: 3 %
ERYTHROCYTE [DISTWIDTH] IN BLOOD BY AUTOMATED COUNT: 12.8 % (ref 10–15)
HBA1C MFR BLD: 5.4 % (ref 0–5.6)
HCT VFR BLD AUTO: 40 % (ref 35–47)
HGB BLD-MCNC: 12.8 G/DL (ref 11.7–15.7)
LYMPHOCYTES # BLD AUTO: 1.2 10E3/UL (ref 0.8–5.3)
LYMPHOCYTES NFR BLD AUTO: 26 %
MCH RBC QN AUTO: 29.8 PG (ref 26.5–33)
MCHC RBC AUTO-ENTMCNC: 32 G/DL (ref 31.5–36.5)
MCV RBC AUTO: 93 FL (ref 78–100)
MONOCYTES # BLD AUTO: 0.5 10E3/UL (ref 0–1.3)
MONOCYTES NFR BLD AUTO: 12 %
NEUTROPHILS # BLD AUTO: 2.7 10E3/UL (ref 1.6–8.3)
NEUTROPHILS NFR BLD AUTO: 59 %
PLATELET # BLD AUTO: 170 10E3/UL (ref 150–450)
RBC # BLD AUTO: 4.29 10E6/UL (ref 3.8–5.2)
VIT B12 SERPL-MCNC: 631 PG/ML (ref 193–986)
WBC # BLD AUTO: 4.7 10E3/UL (ref 4–11)

## 2022-01-19 PROCEDURE — 83036 HEMOGLOBIN GLYCOSYLATED A1C: CPT | Performed by: FAMILY MEDICINE

## 2022-01-19 PROCEDURE — 80053 COMPREHEN METABOLIC PANEL: CPT | Performed by: FAMILY MEDICINE

## 2022-01-19 PROCEDURE — 82607 VITAMIN B-12: CPT | Performed by: FAMILY MEDICINE

## 2022-01-19 PROCEDURE — 80061 LIPID PANEL: CPT | Performed by: FAMILY MEDICINE

## 2022-01-19 PROCEDURE — 36415 COLL VENOUS BLD VENIPUNCTURE: CPT | Performed by: FAMILY MEDICINE

## 2022-01-19 PROCEDURE — 85025 COMPLETE CBC W/AUTO DIFF WBC: CPT | Performed by: FAMILY MEDICINE

## 2022-01-19 PROCEDURE — 99215 OFFICE O/P EST HI 40 MIN: CPT | Performed by: FAMILY MEDICINE

## 2022-01-19 PROCEDURE — 82306 VITAMIN D 25 HYDROXY: CPT | Performed by: FAMILY MEDICINE

## 2022-01-19 RX ORDER — MONTELUKAST SODIUM 10 MG/1
10 TABLET ORAL AT BEDTIME
Qty: 90 TABLET | Refills: 3 | Status: SHIPPED | OUTPATIENT
Start: 2022-01-19 | End: 2022-07-25

## 2022-01-19 ASSESSMENT — PAIN SCALES - GENERAL: PAINLEVEL: MODERATE PAIN (4)

## 2022-01-19 ASSESSMENT — MIFFLIN-ST. JEOR: SCORE: 1187

## 2022-01-19 ASSESSMENT — ASTHMA QUESTIONNAIRES: ACT_TOTALSCORE: 25

## 2022-01-19 NOTE — PROGRESS NOTES
Assessment & Plan     Mild persistent asthma without complication  Allergic rhinitis with postnasal drip  Bilateral acute serous otitis media, recurrence not specified  - Tinnitus, serous otitis, postnasal drip are all most likely related to allergies.  Already taking Allegra and Flonase.  Add Singulair  - Previously taking Flovent for asthma.  Causing voice hoarseness so she stopped it.  Currently doing well without it.    - Refer to allergy/asthma for consideration of other treatment options for her asthma that don't have the potential for voice side effects   - Adult Allergy/Asthma Referral; Future  - montelukast (SINGULAIR) 10 MG tablet; Take 1 tablet (10 mg) by mouth At Bedtime    Idiopathic peripheral neuropathy  - Bilateral dorsal foot pain previously thought to be related to lumbar radiculopathy   - Check labs today to rule out other potential causes of neuropathy  - She is not ingesting anything that could contribute (meds, supplements, drugs, etc)  - Consider an EMG if this is persistent.  Maybe a peripheral nerve compression?  - Vitamin B12; Future  - Vitamin D Deficiency; Future  - CBC with platelets and differential; Future  - Comprehensive metabolic panel (BMP + Alb, Alk Phos, ALT, AST, Total. Bili, TP); Future  - Hemoglobin A1c; Future    Screening for hyperlipidemia  - Lipid panel reflex to direct LDL Fasting; Future    Acute pain of left shoulder  - Most likely a mild AC injury  - Improving already so advised continued RICE therapy     Onychomycosis  - Improving after topical Lamisil     Generalized hypermobility of joints  - Adult Genetics & Metabolism Referral; Future      50 minutes spent on the date of the encounter doing chart review, review of test results, patient visit and documentation     Return in about 1 week (around 1/26/2022) for lab results.    Adriana Myers [facility]  "KATHY    ================================================================    Subjective   Lisset is a 46 year old who presents for the following health issues     HPI     - Asthma: Previously on Flovent.  Stopped it just before [place].  Has helped a ton with her asthma, but causing voice hoarseness and difficulty singing.  Noticed a big difference in her singing since stopping it.  ACT today is 25.      - Previous tinnitus in both ears (possibly from taking ibuprofen).  Got better after stopping ibuprofen, but still notices it occasionally.        - Has had issues with aspirating saliva over the past month.  Occurs randomly.  Denies symptoms of acid reflux.     - Needs to check her toenails.  Has been using topical Lamisil for 12 weeks.  Feels it has been helpful.     - Has been having worsening sharp nerve pain in both feet and ankles since getting a lumbar injection (L4/5 facet joint) recently (12/28).  Had the injection done at [facility].  Didn't really help with the pain much.  She's concerned about other possible causes for her nerve pain besides her back.      - Fell on 1/14/22.  Slipped on a tile floor and her knee gave out.  Fell onto her left shoulder.  Now it's bothering her with movement.      - She is interested in getting tested for her hypermobility issues.  Gets chronic subluxation of her left patella.  Has had issues with joint hypermobility for her whole life and has a strong family history of this as well.      Review of Systems   Constitutional, HEENT, cardiovascular, pulmonary, GI, , musculoskeletal, neuro, skin, endocrine and psych systems are negative, except as otherwise noted.      Objective    /66 (BP Location: Right arm)   Pulse 58   Temp 97.4  F (36.3  C) (Oral)   Resp 15   Ht 1.575 m (5' 2\")   Wt 59.9 kg (132 lb)   LMP 12/25/2013   SpO2 100%   BMI 24.14 kg/m    Body mass index is 24.14 kg/m .  Physical Exam   GENERAL: healthy, alert and no distress  EYES: Eyes grossly " normal to inspection  HENT: normal cephalic/atraumatic, both ears: clear effusion and mild postnasal drip   NECK: no adenopathy, no asymmetry, masses, or scars and thyroid normal to palpation  RESP: lungs clear to auscultation - no rales, rhonchi or wheezes  CV: regular rates and rhythm, normal S1 S2, no S3 or S4, no murmur, click or rub, peripheral pulses strong and no peripheral edema  MS: Left shoulder with full ROM.  Mild pain with abduction.  TTP over AC joint.  Jobs negative.  Empty can negative.  Hawkin negative.    SKIN: no suspicious lesions or rashes  NEURO: Normal strength and tone and monofilament testing is normal in both feet     MR LUMBAR SPINE W/O CONTRAST 11/28/2021 7:52 AM     Provided History: Osteoarthritis, lumbosacral; Low back pain, > 6 wks;  Lumbar disc herniation with radiculopathy; Arthropathy of lumbar facet  joint     Comparison: MRI 8/20/2020; CT 8/3/2006     Technique: Sagittal T1-weighted, sagittal T2-weighted, sagittal STIR,  sagittal diffusion-weighted, axial T2-weighted, and axial gradient  echo images of the lumbar spine were obtained without the  administration of intravenous contrast.     Findings: Regarding numbering convention, there are 5 lumbar-type  vertebrae assumed for the purposes of this dictation. Normal variant  partial sacralization of L5. The tip of the conus medullaris is at L1.  Regarding alignment, the lumbar vertebral column appears normally  aligned. There is no significant disc height narrowing at any level.  Regarding bone marrow signal intensity, no suspicious abnormality is  visualized on STIR images. 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     On a level by level basis:     T12-L1: Mild disc bulge. No spinal canal or neuroforaminal stenosis.     L1-2: No spinal canal or neuroforaminal stenosis.     L2-3: No spinal canal or neuroforaminal stenosis.     L3-4: No spinal canal or  neuroforaminal stenosis.     L4-5: Mild bilateral facet hypertrophy. No spinal canal stenosis.  Minimal neural foraminal narrowing.     L5-S1: No spinal canal or neuroforaminal stenosis.     Paraspinous tissues are within normal limits.                                                                      Impression:   1.  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  2. No significant canal or foraminal stenosis at any level.      I have personally reviewed the examination and initial interpretation  and I agree with the findings.     GUILLE NIEVES MD

## 2022-01-20 LAB
ALBUMIN SERPL-MCNC: 3.6 G/DL (ref 3.4–5)
ALP SERPL-CCNC: 105 U/L (ref 40–150)
ALT SERPL W P-5'-P-CCNC: 20 U/L (ref 0–50)
ANION GAP SERPL CALCULATED.3IONS-SCNC: 4 MMOL/L (ref 3–14)
AST SERPL W P-5'-P-CCNC: 11 U/L (ref 0–45)
BILIRUB SERPL-MCNC: 0.3 MG/DL (ref 0.2–1.3)
BUN SERPL-MCNC: 10 MG/DL (ref 7–30)
CALCIUM SERPL-MCNC: 8.8 MG/DL (ref 8.5–10.1)
CHLORIDE BLD-SCNC: 107 MMOL/L (ref 94–109)
CHOLEST SERPL-MCNC: 207 MG/DL
CO2 SERPL-SCNC: 28 MMOL/L (ref 20–32)
CREAT SERPL-MCNC: 0.68 MG/DL (ref 0.52–1.04)
DEPRECATED CALCIDIOL+CALCIFEROL SERPL-MC: 43 UG/L (ref 20–75)
FASTING STATUS PATIENT QL REPORTED: YES
GFR SERPL CREATININE-BSD FRML MDRD: >90 ML/MIN/1.73M2
GLUCOSE BLD-MCNC: 86 MG/DL (ref 70–99)
HDLC SERPL-MCNC: 71 MG/DL
LDLC SERPL CALC-MCNC: 120 MG/DL
NONHDLC SERPL-MCNC: 136 MG/DL
POTASSIUM BLD-SCNC: 4.1 MMOL/L (ref 3.4–5.3)
PROT SERPL-MCNC: 7 G/DL (ref 6.8–8.8)
SODIUM SERPL-SCNC: 139 MMOL/L (ref 133–144)
TRIGL SERPL-MCNC: 80 MG/DL

## 2022-01-21 ENCOUNTER — THERAPY VISIT (OUTPATIENT)
Dept: PHYSICAL THERAPY | Facility: CLINIC | Age: 47
End: 2022-01-21
Payer: COMMERCIAL

## 2022-01-21 DIAGNOSIS — M25.579 PAIN IN JOINT, ANKLE AND FOOT, UNSPECIFIED LATERALITY: ICD-10-CM

## 2022-01-21 DIAGNOSIS — M25.561 PAIN IN BOTH KNEES: ICD-10-CM

## 2022-01-21 DIAGNOSIS — M25.562 PAIN IN BOTH KNEES: ICD-10-CM

## 2022-01-21 PROCEDURE — 97110 THERAPEUTIC EXERCISES: CPT | Mod: GP | Performed by: PHYSICAL THERAPIST

## 2022-01-21 PROCEDURE — 97112 NEUROMUSCULAR REEDUCATION: CPT | Mod: GP | Performed by: PHYSICAL THERAPIST

## 2022-01-26 ENCOUNTER — TELEPHONE (OUTPATIENT)
Dept: ORTHOPEDICS | Facility: CLINIC | Age: 47
End: 2022-01-26
Payer: COMMERCIAL

## 2022-01-26 NOTE — TELEPHONE ENCOUNTER
Health Call Center    Phone Message    May a detailed message be left on voicemail: yes     Reason for Call: Other: Pt calling in re: her apt that she missed for her f/up with Dr. Larsen  for the lumbar facet injection, done 12/28/21. Pt is scheduled for virtual visit on 02/02 @ 10:05am but asking if she should come in person instead of virtual.  Asking what Dr. Larsen would prefer.  Pt would like call back at  [phone].     Action Taken: Message routed to:  [facility] ([facility] Orthopedics - [facility] - Dr. Larsen       Travel Screening: Not Applicable

## 2022-01-27 NOTE — TELEPHONE ENCOUNTER
Left detailed voicemail for patient explaining that she can keep appointment on 2/2 for a virtual appointment to discuss how she feels after her injection.       Dejah Richardson MA, ATC

## 2022-01-28 ENCOUNTER — THERAPY VISIT (OUTPATIENT)
Dept: PHYSICAL THERAPY | Facility: CLINIC | Age: 47
End: 2022-01-28
Payer: COMMERCIAL

## 2022-01-28 DIAGNOSIS — M25.561 PAIN IN BOTH KNEES: ICD-10-CM

## 2022-01-28 DIAGNOSIS — M25.579 PAIN IN JOINT, ANKLE AND FOOT, UNSPECIFIED LATERALITY: ICD-10-CM

## 2022-01-28 DIAGNOSIS — M25.562 PAIN IN BOTH KNEES: ICD-10-CM

## 2022-01-28 PROCEDURE — 97112 NEUROMUSCULAR REEDUCATION: CPT | Mod: GP | Performed by: PHYSICAL THERAPIST

## 2022-01-28 PROCEDURE — 97110 THERAPEUTIC EXERCISES: CPT | Mod: GP | Performed by: PHYSICAL THERAPIST

## 2022-01-28 PROCEDURE — 97140 MANUAL THERAPY 1/> REGIONS: CPT | Mod: GP | Performed by: PHYSICAL THERAPIST

## 2022-02-02 ENCOUNTER — VIRTUAL VISIT (OUTPATIENT)
Dept: ORTHOPEDICS | Facility: CLINIC | Age: 47
End: 2022-02-02
Payer: COMMERCIAL

## 2022-02-02 DIAGNOSIS — M51.16 LUMBAR DISC HERNIATION WITH RADICULOPATHY: ICD-10-CM

## 2022-02-02 DIAGNOSIS — M50.20 CERVICAL DISC HERNIATION: ICD-10-CM

## 2022-02-02 DIAGNOSIS — M51.369 DDD (DEGENERATIVE DISC DISEASE), LUMBAR: Primary | ICD-10-CM

## 2022-02-02 DIAGNOSIS — M54.12 CERVICAL RADICULAR PAIN: ICD-10-CM

## 2022-02-02 PROCEDURE — 99213 OFFICE O/P EST LOW 20 MIN: CPT | Mod: 95 | Performed by: PREVENTIVE MEDICINE

## 2022-02-02 NOTE — LETTER
2/2/2022         RE: Lisset MITCHELL Chi  [address] [facility] [address]        Dear Colleague,    Thank you for referring your patient, Lisset MITCHELL Chi, to the [facility] [place]. Please see a copy of my visit note below.    Lisset is a 46 year old who is being evaluated via a billable video visit.      How would you like to obtain your AVS? MyChart  If the video visit is dropped, the invitation should be resent by: Text to cell phone: 22  Will anyone else be joining your video visit? No  Video Start Time: 10:37 AM    Subjective   Lisset is a 46 year old who presents for the following health issues:   followup for lumbar and cervical radicular pain  Has been doing PT and this has been helping her low back and neck pain  She still gets symptoms but they are more controllable  She had lumbar injection last month and this has started to help      HPI       Review of Systems   Constitutional, HEENT, cardiovascular, pulmonary, gi and gu systems are negative, except as otherwise noted.      Objective           Vitals:  No vitals were obtained today due to virtual visit.    Physical Exam   GENERAL: Healthy, alert and no distress  EYES: Eyes grossly normal to inspection.  No discharge or erythema, or obvious scleral/conjunctival abnormalities.  RESP: No audible wheeze, cough, or visible cyanosis.  No visible retractions or increased work of breathing.    SKIN: Visible skin clear. No significant rash, abnormal pigmentation or lesions.  NEURO: Cranial nerves grossly intact.  Mentation and speech appropriate for age.  PSYCH: Mentation appears normal, affect normal/bright, judgement and insight intact, normal speech and appearance well-groomed.    Assessment/Plan  45 yo female with cervical ddd, radicular pain, improved, and lumbar ddd, radicular pain, improved  Cont. PT  Cont. PRN medications  F/u in 1-2 months  Will consider further treatments            Video-Visit Details    Type of  service:  Video Visit    Video End Time:10:50 PM    Originating Location (pt. Location): Home    Distant Location (provider location):  [facility]     Platform used for Video Visit: Stephanie      Again, thank you for allowing me to participate in the care of your patient.        Sincerely,        Floyd Larsen MD

## 2022-02-02 NOTE — PROGRESS NOTES
Lisset is a 46 year old who is being evaluated via a billable video visit.      How would you like to obtain your AVS? MyChart  If the video visit is dropped, the invitation should be resent by: Text to cell phone: 22  Will anyone else be joining your video visit? No  Video Start Time: 10:37 AM    Subjective   Lisset is a 46 year old who presents for the following health issues:   followup for lumbar and cervical radicular pain  Has been doing PT and this has been helping her low back and neck pain  She still gets symptoms but they are more controllable  She had lumbar injection last month and this has started to help      HPI       Review of Systems   Constitutional, HEENT, cardiovascular, pulmonary, gi and gu systems are negative, except as otherwise noted.      Objective           Vitals:  No vitals were obtained today due to virtual visit.    Physical Exam   GENERAL: Healthy, alert and no distress  EYES: Eyes grossly normal to inspection.  No discharge or erythema, or obvious scleral/conjunctival abnormalities.  RESP: No audible wheeze, cough, or visible cyanosis.  No visible retractions or increased work of breathing.    SKIN: Visible skin clear. No significant rash, abnormal pigmentation or lesions.  NEURO: Cranial nerves grossly intact.  Mentation and speech appropriate for age.  PSYCH: Mentation appears normal, affect normal/bright, judgement and insight intact, normal speech and appearance well-groomed.    Assessment/Plan  47 yo female with cervical ddd, radicular pain, improved, and lumbar ddd, radicular pain, improved  Cont. PT  Cont. PRN medications  F/u in 1-2 months  Will consider further treatments            Video-Visit Details    Type of service:  Video Visit    Video End Time:10:50 PM    Originating Location (pt. Location): Home    Distant Location (provider location):  [facility]     Platform used for Video Visit: Stephanie

## 2022-02-02 NOTE — LETTER
2/2/2022         RE: Lisset MITCHELL Chi  [address] [facility] [address]        Dear Colleague,    Thank you for referring your patient, Lsiset MITCHELL Chi, to the [facility] [place]. Please see a copy of my visit note below.    Lisset is a 46 year old who is being evaluated via a billable video visit.      How would you like to obtain your AVS? MyChart  If the video visit is dropped, the invitation should be resent by: Text to cell phone: 22  Will anyone else be joining your video visit? No  Video Start Time: 10:37 AM    Subjective   Lisset is a 46 year old who presents for the following health issues:   followup for lumbar and cervical radicular pain  Has been doing PT and this has been helping her low back and neck pain  She still gets symptoms but they are more controllable  She had lumbar injection last month and this has started to help      HPI       Review of Systems   Constitutional, HEENT, cardiovascular, pulmonary, gi and gu systems are negative, except as otherwise noted.      Objective           Vitals:  No vitals were obtained today due to virtual visit.    Physical Exam   GENERAL: Healthy, alert and no distress  EYES: Eyes grossly normal to inspection.  No discharge or erythema, or obvious scleral/conjunctival abnormalities.  RESP: No audible wheeze, cough, or visible cyanosis.  No visible retractions or increased work of breathing.    SKIN: Visible skin clear. No significant rash, abnormal pigmentation or lesions.  NEURO: Cranial nerves grossly intact.  Mentation and speech appropriate for age.  PSYCH: Mentation appears normal, affect normal/bright, judgement and insight intact, normal speech and appearance well-groomed.    Assessment/Plan  47 yo female with cervical ddd, radicular pain, improved, and lumbar ddd, radicular pain, improved  Cont. PT  Cont. PRN medications  F/u in 1-2 months  Will consider further treatments            Video-Visit Details    Type of  service:  Video Visit    Video End Time:10:50 PM    Originating Location (pt. Location): Home    Distant Location (provider location):  [facility]     Platform used for Video Visit: Stephanie      Again, thank you for allowing me to participate in the care of your patient.        Sincerely,        Floyd Larsen MD

## 2022-02-02 NOTE — PATIENT INSTRUCTIONS
Thanks for coming today.  Ortho[facility] Medicine Clinic  [address] [place] [address]    To schedule future appointments in Ortho Clinic, you may call [phone].    To schedule ordered imaging or an injection ordered by your provider:  Call Central [facility] Injection scheduling line: [phone]    MyChart available online at:  [email or web address]    Please call if any further questions or concerns ([phone]).  Clinic hours 8 am to 5 pm.    Return to clinic (call) if symptoms worsen or fail to improve.

## 2022-02-04 ENCOUNTER — THERAPY VISIT (OUTPATIENT)
Dept: PHYSICAL THERAPY | Facility: CLINIC | Age: 47
End: 2022-02-04
Payer: COMMERCIAL

## 2022-02-04 DIAGNOSIS — M25.579 PAIN IN JOINT, ANKLE AND FOOT, UNSPECIFIED LATERALITY: ICD-10-CM

## 2022-02-04 DIAGNOSIS — M25.562 PAIN IN BOTH KNEES: ICD-10-CM

## 2022-02-04 DIAGNOSIS — M25.561 PAIN IN BOTH KNEES: ICD-10-CM

## 2022-02-04 PROCEDURE — 97112 NEUROMUSCULAR REEDUCATION: CPT | Mod: GP | Performed by: PHYSICAL THERAPIST

## 2022-02-04 PROCEDURE — 97110 THERAPEUTIC EXERCISES: CPT | Mod: GP | Performed by: PHYSICAL THERAPIST

## 2022-02-11 ENCOUNTER — THERAPY VISIT (OUTPATIENT)
Dept: PHYSICAL THERAPY | Facility: CLINIC | Age: 47
End: 2022-02-11
Payer: COMMERCIAL

## 2022-02-11 DIAGNOSIS — M25.579 PAIN IN JOINT, ANKLE AND FOOT, UNSPECIFIED LATERALITY: ICD-10-CM

## 2022-02-11 DIAGNOSIS — M25.561 PAIN IN BOTH KNEES: ICD-10-CM

## 2022-02-11 DIAGNOSIS — M25.562 PAIN IN BOTH KNEES: ICD-10-CM

## 2022-02-11 PROCEDURE — 97110 THERAPEUTIC EXERCISES: CPT | Mod: GP | Performed by: PHYSICAL THERAPIST

## 2022-02-11 PROCEDURE — 97112 NEUROMUSCULAR REEDUCATION: CPT | Mod: GP | Performed by: PHYSICAL THERAPIST

## 2022-02-18 ENCOUNTER — THERAPY VISIT (OUTPATIENT)
Dept: PHYSICAL THERAPY | Facility: CLINIC | Age: 47
End: 2022-02-18
Payer: COMMERCIAL

## 2022-02-18 DIAGNOSIS — M25.562 PAIN IN BOTH KNEES: ICD-10-CM

## 2022-02-18 DIAGNOSIS — M25.579 PAIN IN JOINT, ANKLE AND FOOT, UNSPECIFIED LATERALITY: ICD-10-CM

## 2022-02-18 DIAGNOSIS — M25.561 PAIN IN BOTH KNEES: ICD-10-CM

## 2022-02-18 PROCEDURE — 97112 NEUROMUSCULAR REEDUCATION: CPT | Mod: GP | Performed by: PHYSICAL THERAPIST

## 2022-02-18 PROCEDURE — 97110 THERAPEUTIC EXERCISES: CPT | Mod: GP | Performed by: PHYSICAL THERAPIST

## 2022-02-20 ENCOUNTER — HEALTH MAINTENANCE LETTER (OUTPATIENT)
Age: 47
End: 2022-02-20

## 2022-03-04 ENCOUNTER — THERAPY VISIT (OUTPATIENT)
Dept: PHYSICAL THERAPY | Facility: CLINIC | Age: 47
End: 2022-03-04
Payer: COMMERCIAL

## 2022-03-04 DIAGNOSIS — M25.561 PAIN IN BOTH KNEES: ICD-10-CM

## 2022-03-04 DIAGNOSIS — M25.562 PAIN IN BOTH KNEES: ICD-10-CM

## 2022-03-04 DIAGNOSIS — M25.579 PAIN IN JOINT, ANKLE AND FOOT, UNSPECIFIED LATERALITY: ICD-10-CM

## 2022-03-04 PROCEDURE — 97110 THERAPEUTIC EXERCISES: CPT | Mod: GP | Performed by: PHYSICAL THERAPIST

## 2022-03-04 PROCEDURE — 97112 NEUROMUSCULAR REEDUCATION: CPT | Mod: GP | Performed by: PHYSICAL THERAPIST

## 2022-03-11 ENCOUNTER — THERAPY VISIT (OUTPATIENT)
Dept: PHYSICAL THERAPY | Facility: CLINIC | Age: 47
End: 2022-03-11
Payer: COMMERCIAL

## 2022-03-11 DIAGNOSIS — M25.562 PAIN IN BOTH KNEES: ICD-10-CM

## 2022-03-11 DIAGNOSIS — M25.579 PAIN IN JOINT, ANKLE AND FOOT, UNSPECIFIED LATERALITY: ICD-10-CM

## 2022-03-11 DIAGNOSIS — M25.561 PAIN IN BOTH KNEES: ICD-10-CM

## 2022-03-11 PROCEDURE — 97112 NEUROMUSCULAR REEDUCATION: CPT | Mod: GP | Performed by: PHYSICAL THERAPIST

## 2022-03-11 PROCEDURE — 97110 THERAPEUTIC EXERCISES: CPT | Mod: GP | Performed by: PHYSICAL THERAPIST

## 2022-03-16 ENCOUNTER — ANCILLARY PROCEDURE (OUTPATIENT)
Dept: GENERAL RADIOLOGY | Facility: CLINIC | Age: 47
End: 2022-03-16
Attending: PREVENTIVE MEDICINE
Payer: COMMERCIAL

## 2022-03-16 ENCOUNTER — OFFICE VISIT (OUTPATIENT)
Dept: ORTHOPEDICS | Facility: CLINIC | Age: 47
End: 2022-03-16
Payer: COMMERCIAL

## 2022-03-16 DIAGNOSIS — M25.562 CHRONIC PAIN OF BOTH KNEES: ICD-10-CM

## 2022-03-16 DIAGNOSIS — G89.29 CHRONIC PAIN OF BOTH KNEES: ICD-10-CM

## 2022-03-16 DIAGNOSIS — M70.62 TROCHANTERIC BURSITIS, LEFT HIP: ICD-10-CM

## 2022-03-16 DIAGNOSIS — M54.16 LUMBAR RADICULAR PAIN: ICD-10-CM

## 2022-03-16 DIAGNOSIS — M54.12 CERVICAL RADICULAR PAIN: ICD-10-CM

## 2022-03-16 DIAGNOSIS — M25.561 CHRONIC PAIN OF BOTH KNEES: ICD-10-CM

## 2022-03-16 DIAGNOSIS — M54.12 CERVICAL RADICULAR PAIN: Primary | ICD-10-CM

## 2022-03-16 PROCEDURE — 72040 X-RAY EXAM NECK SPINE 2-3 VW: CPT | Performed by: RADIOLOGY

## 2022-03-16 PROCEDURE — 20611 DRAIN/INJ JOINT/BURSA W/US: CPT | Mod: LT | Performed by: PREVENTIVE MEDICINE

## 2022-03-16 PROCEDURE — 99214 OFFICE O/P EST MOD 30 MIN: CPT | Mod: 25 | Performed by: PREVENTIVE MEDICINE

## 2022-03-16 PROCEDURE — 73562 X-RAY EXAM OF KNEE 3: CPT | Mod: LT | Performed by: RADIOLOGY

## 2022-03-16 PROCEDURE — 72100 X-RAY EXAM L-S SPINE 2/3 VWS: CPT | Performed by: RADIOLOGY

## 2022-03-16 RX ADMIN — TRIAMCINOLONE ACETONIDE 40 MG: 40 INJECTION, SUSPENSION INTRA-ARTICULAR; INTRAMUSCULAR at 16:26

## 2022-03-16 NOTE — LETTER
3/16/2022         RE: Lisset MITCHELL Chi  [address] [id number]        Dear Colleague,    Thank you for referring your patient, Lisset MITCHELL Chi, to the [facility] [place]. Please see a copy of my visit note below.    HISTORY OF PRESENT ILLNESS  Ms. Gates is a pleasant 46 year old year old female who presents to clinic today with left hip and low back and neck pain  Location: neck and low back, left hip  And knee pain for years, with pain with activities, wants to get xrays  Timing: occurs intermittently  Context: occurs while exercising and lifting  Modifying factors:  resting and non-use makes it better, movement and use makes it worse  Associated signs & symptoms: some radiation into legs    MEDICAL HISTORY  Patient Active Problem List   Diagnosis     Irritable bowel syndrome     Premature ovarian failure     CARDIOVASCULAR SCREENING; LDL GOAL LESS THAN 160     Mild persistent asthma without complication     Trochanteric bursitis of both hips     Right knee pain     Adjustment disorder with depressed mood     Neck pain     Pain in both knees     Pain in joint, ankle and foot, unspecified laterality       Current Outpatient Medications   Medication Sig Dispense Refill     albuterol (PROAIR HFA/PROVENTIL HFA/VENTOLIN HFA) 108 (90 Base) MCG/ACT inhaler Inhale 2 puffs into the lungs every 6 hours 18 g 3     Calcium Carbonate-Vitamin D (CALCIUM + D PO) Take 600 mg by mouth.       FEXOFENADINE  MG OR TABS 1 TABLET DAILY        fluticasone (FLONASE) 50 MCG/ACT nasal spray Spray 2 sprays into both nostrils daily       ibuprofen (ADVIL/MOTRIN) 600 MG tablet TAKE 1 TABLET (600 MG) BY MOUTH EVERY 8 HOURS AS NEEDED FOR MODERATE PAIN 60 tablet 1     montelukast (SINGULAIR) 10 MG tablet Take 1 tablet (10 mg) by mouth At Bedtime 90 tablet 3     MULTIPLE VITAMIN PO        terbinafine (LAMISIL) 1 % external cream Apply topically 2 times daily (Patient not taking: Reported on  10/12/2021) 15 g 0     Turmeric Curcumin 500 MG CAPS Take 500 mg by mouth daily        UNABLE TO FIND MEDICATION NAME: Provitalize Probiotic that has turmeric and herbs in it       VITAMIN D, CHOLECALCIFEROL, PO Take by mouth daily         Allergies   Allergen Reactions     No Known Allergies        Family History   Problem Relation Age of Onset     Thyroid Disease Mother      Diabetes Mother         type2     Eye Disorder Mother         cataracts     Gastrointestinal Disease Mother         hep a/has to have her throat stretched     Respiratory Mother         sleep apnea     Heart Disease Father 60     C.A.D. Paternal Grandfather 30        age 30, then again in his 80's     Diabetes Maternal Grandfather      Cancer Maternal Grandfather      Thyroid Disease Maternal Grandmother      Gynecology Maternal Grandmother         on bcp to keep period regular, a small uterus     Heart Disease Maternal Grandmother         tachycardia     Allergies Daughter         milk     Asthma Daughter      Social History     Socioeconomic History     Marital status:      Spouse name: Not on file     Number of children: 2     Years of education: Not on file     Highest education level: Not on file   Occupational History     Employer: [employer]   Tobacco Use     Smoking status: Never Smoker     Smokeless tobacco: Never Used   Substance and Sexual Activity     Alcohol use: Yes     Comment: social     Drug use: No     Sexual activity: Yes     Partners: Male     Birth control/protection: Surgical     Comment:  had vasectomy   Other Topics Concern      Service No     Blood Transfusions Not Asked     Caffeine Concern No     Occupational Exposure No     Hobby Hazards Not Asked     Sleep Concern No     Stress Concern Yes     Weight Concern Yes     Comment: gains easily     Special Diet No     Back Care Yes     Exercise Yes     Comment: irregularly     Bike Helmet Not Asked     Comment: doesn't bike     Seat Belt  No     Self-Exams No     Parent/sibling w/ CABG, MI or angioplasty before 65F 55M? No   Social History Narrative    Caffeine intake/servings daily - 0-1    Calcium intake/servings daily - 2-3    Exercise 2-3  times weekly - describe aerobics    Sunscreen used - Yes    Seatbelts used - Yes    Guns stored in the home - No    Self Breast Exam - Yes    Pap test up to date -  Yes    Eye exam up to date -  Yes    Dental exam up to date -  Yes    DEXA scan up to date -  Not Applicable    Flex Sig/Colonoscopy up to date -  Not Applicable    Mammography up to date -  Not Applicable    Immunizations reviewed and up to date - Yes    Abuse: Current or Past (Physical, Sexual or Emotional) - None current, past    Do you feel safe in your environment - Yes    Do you cope well with stress - Yes    Do you suffer from insomnia - No    Last updated by: Estefani Moraes MA 5/5/2010             Social Determinants of Health     Financial Resource Strain: Not on file   Food Insecurity: Not on file   Transportation Needs: Not on file   Physical Activity: Not on file   Stress: Not on file   Social Connections: Not on file   Intimate Partner Violence: Not on file   Housing Stability: Not on file       Additional medical/Social/Surgical histories reviewed in [facility] and updated as appropriate.     REVIEW OF SYSTEMS (3/16/2022)  10 point ROS of systems including Constitutional, Eyes, Respiratory, Cardiovascular, Gastroenterology, Genitourinary, Integumentary, Musculoskeletal, Psychiatric, Allergic/Immunologic were all negative except for pertinent positives noted in my HPI.     PHYSICAL EXAM  VSS  General  - normal appearance, in no obvious distress  HEENT  - conjunctivae not injected, moist mucous membranes, normocephalic/atraumatic head, ears normal appearance, no lesions, mouth normal appearance, no scars, normal dentition and teeth present  CV  - normal peripheral perfusion  Pulm  - normal respiratory pattern, non-labored  Musculoskeletal - lumbar  spine  - stance: normal gait without limp, no obvious leg length discrepancy, normal heel and toe walk  - inspection: normal bone and joint alignment, no obvious scoliosis  - palpation: no paravertebral or bony tenderness  - ROM: flexion exacerbates pain, normal extension, sidebending, rotation  - strength: lower extremities 5/5 in all planes    Neuro  - patellar and Achilles DTRs 2+ bilaterally, no lower extremity sensory deficit throughout L5 distribution, grossly normal coordination, normal muscle tone  Skin  - no ecchymosis, erythema, warmth, or induration, no obvious rash  Psych  - interactive, appropriate, normal mood and affect  Left hip pain with palpation over bursa, with some pain with external rotation  Cervical: has pain with flexion and extension of neck  Bilateral knee: has some pain with flexion and patellar compression, ligaments intact  ASSESSMENT & PLAN  47 yo female with left hip pain and low back pain and radicular pain and cervical radicular pain, worse    I independently reviewed the following imaging studies:  Cervical xray: shows ddd  Lumbar ddd: shows ddd  Bilateral knees: shows some mild PF arthritis, otherwise normal  After a 20 minute discussion and examination, we decided to perform a same day injection for diagnostic and therapeutic purposes for hip  Appropriate PPE was utilized for prevention of spread of Covid-19.  Flyod Larsen MD, CAQSM  Trochanteric left Hip Injection - Ultrasound Guided  The patient was informed of the risks and the benefits of the procedure and a written consent was signed.  The patient s left lateral hip was prepped with chlorhexidine in sterile fashion.   40 mg of triamcinolone suspension was drawn up into a 5 mL syringe with 4 mL of 1% lidocaine.  Injection was performed using sterile technique.  Under ultrasound guidance a 3.5-inch 22-gauge needle was used to enter the lynne-trochanteric tissue.  Needle placement was visualized and documented with ultrasound  which was deemed necessary to ensure medication did not enter the tendon itself, which could potentially cause tendon damage.   Injection performed long axis to the probe with probe in short axis to the greater trochanter.  Expansion of the lynne-trochanteric tissue was visualized under ultrasound upon injection.  Images were permanently stored for the patient's record.  There were no complications. The patient tolerated the procedure well. There was negligible bleeding.       Large Joint Injection/Arthocentesis: L greater trochanteric bursa    Date/Time: 3/16/2022 4:26 PM  Performed by: lFoyd Larsen MD  Authorized by: Floyd Larsen MD     Indications:  Pain and diagnostic evaluation  Needle Size:  22 G  Guidance: ultrasound    Approach:  Lateral  Location:  Hip      Site:  L greater trochanteric bursa  Medications:  40 mg triamcinolone 40 MG/ML  Outcome:  Tolerated well, no immediate complications  Procedure discussed: discussed risks, benefits, and alternatives    Consent Given by:  Patient  Timeout: timeout called immediately prior to procedure    Prep: patient was prepped and draped in usual sterile fashion     NDC: [address]-6            Again, thank you for allowing me to participate in the care of your patient.        Sincerely,        Floyd Larsen MD

## 2022-03-16 NOTE — PROGRESS NOTES
HISTORY OF PRESENT ILLNESS  Ms. Gates is a pleasant 46 year old year old female who presents to clinic today with left hip and low back and neck pain  Location: neck and low back, left hip  And knee pain for years, with pain with activities, wants to get xrays  Timing: occurs intermittently  Context: occurs while exercising and lifting  Modifying factors:  resting and non-use makes it better, movement and use makes it worse  Associated signs & symptoms: some radiation into legs    MEDICAL HISTORY  Patient Active Problem List   Diagnosis     Irritable bowel syndrome     Premature ovarian failure     CARDIOVASCULAR SCREENING; LDL GOAL LESS THAN 160     Mild persistent asthma without complication     Trochanteric bursitis of both hips     Right knee pain     Adjustment disorder with depressed mood     Neck pain     Pain in both knees     Pain in joint, ankle and foot, unspecified laterality       Current Outpatient Medications   Medication Sig Dispense Refill     albuterol (PROAIR HFA/PROVENTIL HFA/VENTOLIN HFA) 108 (90 Base) MCG/ACT inhaler Inhale 2 puffs into the lungs every 6 hours 18 g 3     Calcium Carbonate-Vitamin D (CALCIUM + D PO) Take 600 mg by mouth.       FEXOFENADINE  MG OR TABS 1 TABLET DAILY        fluticasone (FLONASE) 50 MCG/ACT nasal spray Spray 2 sprays into both nostrils daily       ibuprofen (ADVIL/MOTRIN) 600 MG tablet TAKE 1 TABLET (600 MG) BY MOUTH EVERY 8 HOURS AS NEEDED FOR MODERATE PAIN 60 tablet 1     montelukast (SINGULAIR) 10 MG tablet Take 1 tablet (10 mg) by mouth At Bedtime 90 tablet 3     MULTIPLE VITAMIN PO        terbinafine (LAMISIL) 1 % external cream Apply topically 2 times daily (Patient not taking: Reported on 10/12/2021) 15 g 0     Turmeric Curcumin 500 MG CAPS Take 500 mg by mouth daily        UNABLE TO FIND MEDICATION NAME: Provitalize Probiotic that has turmeric and herbs in it       VITAMIN D, CHOLECALCIFEROL, PO Take by mouth daily         Allergies   Allergen  Reactions     No Known Allergies        Family History   Problem Relation Age of Onset     Thyroid Disease Mother      Diabetes Mother         type2     Eye Disorder Mother         cataracts     Gastrointestinal Disease Mother         hep a/has to have her throat stretched     Respiratory Mother         sleep apnea     Heart Disease Father 60     C.A.D. Paternal Grandfather 30        age 30, then again in his 80's     Diabetes Maternal Grandfather      Cancer Maternal Grandfather      Thyroid Disease Maternal Grandmother      Gynecology Maternal Grandmother         on bcp to keep period regular, a small uterus     Heart Disease Maternal Grandmother         tachycardia     Allergies Daughter         milk     Asthma Daughter      Social History     Socioeconomic History     Marital status:      Spouse name: Not on file     Number of children: 2     Years of education: Not on file     Highest education level: Not on file   Occupational History     Employer: [employer]   Tobacco Use     Smoking status: Never Smoker     Smokeless tobacco: Never Used   Substance and Sexual Activity     Alcohol use: Yes     Comment: social     Drug use: No     Sexual activity: Yes     Partners: Male     Birth control/protection: Surgical     Comment:  had vasectomy   Other Topics Concern      Service No     Blood Transfusions Not Asked     Caffeine Concern No     Occupational Exposure No     Hobby Hazards Not Asked     Sleep Concern No     Stress Concern Yes     Weight Concern Yes     Comment: gains easily     Special Diet No     Back Care Yes     Exercise Yes     Comment: irregularly     Bike Helmet Not Asked     Comment: doesn't bike     Seat Belt No     Self-Exams No     Parent/sibling w/ CABG, MI or angioplasty before 65F 55M? No   Social History Narrative    Caffeine intake/servings daily - 0-1    Calcium intake/servings daily - 2-3    Exercise 2-3  times weekly - describe aerobics    Sunscreen used  - Yes    Seatbelts used - Yes    Guns stored in the home - No    Self Breast Exam - Yes    Pap test up to date -  Yes    Eye exam up to date -  Yes    Dental exam up to date -  Yes    DEXA scan up to date -  Not Applicable    Flex Sig/Colonoscopy up to date -  Not Applicable    Mammography up to date -  Not Applicable    Immunizations reviewed and up to date - Yes    Abuse: Current or Past (Physical, Sexual or Emotional) - None current, past    Do you feel safe in your environment - Yes    Do you cope well with stress - Yes    Do you suffer from insomnia - No    Last updated by: Estefani Moraes MA 5/5/2010             Social Determinants of Health     Financial Resource Strain: Not on file   Food Insecurity: Not on file   Transportation Needs: Not on file   Physical Activity: Not on file   Stress: Not on file   Social Connections: Not on file   Intimate Partner Violence: Not on file   Housing Stability: Not on file       Additional medical/Social/Surgical histories reviewed in [facility] and updated as appropriate.     REVIEW OF SYSTEMS (3/16/2022)  10 point ROS of systems including Constitutional, Eyes, Respiratory, Cardiovascular, Gastroenterology, Genitourinary, Integumentary, Musculoskeletal, Psychiatric, Allergic/Immunologic were all negative except for pertinent positives noted in my HPI.     PHYSICAL EXAM  VSS  General  - normal appearance, in no obvious distress  HEENT  - conjunctivae not injected, moist mucous membranes, normocephalic/atraumatic head, ears normal appearance, no lesions, mouth normal appearance, no scars, normal dentition and teeth present  CV  - normal peripheral perfusion  Pulm  - normal respiratory pattern, non-labored  Musculoskeletal - lumbar spine  - stance: normal gait without limp, no obvious leg length discrepancy, normal heel and toe walk  - inspection: normal bone and joint alignment, no obvious scoliosis  - palpation: no paravertebral or bony tenderness  - ROM: flexion exacerbates pain,  normal extension, sidebending, rotation  - strength: lower extremities 5/5 in all planes    Neuro  - patellar and Achilles DTRs 2+ bilaterally, no lower extremity sensory deficit throughout L5 distribution, grossly normal coordination, normal muscle tone  Skin  - no ecchymosis, erythema, warmth, or induration, no obvious rash  Psych  - interactive, appropriate, normal mood and affect  Left hip pain with palpation over bursa, with some pain with external rotation  Cervical: has pain with flexion and extension of neck  Bilateral knee: has some pain with flexion and patellar compression, ligaments intact  ASSESSMENT & PLAN  45 yo female with left hip pain and low back pain and radicular pain and cervical radicular pain, worse    I independently reviewed the following imaging studies:  Cervical xray: shows ddd  Lumbar ddd: shows ddd  Bilateral knees: shows some mild PF arthritis, otherwise normal  After a 20 minute discussion and examination, we decided to perform a same day injection for diagnostic and therapeutic purposes for hip  Appropriate PPE was utilized for prevention of spread of Covid-19.  Floyd Larsen MD, CAQSM  Trochanteric left Hip Injection - Ultrasound Guided  The patient was informed of the risks and the benefits of the procedure and a written consent was signed.  The patient s left lateral hip was prepped with chlorhexidine in sterile fashion.   40 mg of triamcinolone suspension was drawn up into a 5 mL syringe with 4 mL of 1% lidocaine.  Injection was performed using sterile technique.  Under ultrasound guidance a 3.5-inch 22-gauge needle was used to enter the lynne-trochanteric tissue.  Needle placement was visualized and documented with ultrasound which was deemed necessary to ensure medication did not enter the tendon itself, which could potentially cause tendon damage.   Injection performed long axis to the probe with probe in short axis to the greater trochanter.  Expansion of the  lynne-trochanteric tissue was visualized under ultrasound upon injection.  Images were permanently stored for the patient's record.  There were no complications. The patient tolerated the procedure well. There was negligible bleeding.       Large Joint Injection/Arthocentesis: L greater trochanteric bursa    Date/Time: 3/16/2022 4:26 PM  Performed by: Floyd Larsen MD  Authorized by: Floyd Larsen MD     Indications:  Pain and diagnostic evaluation  Needle Size:  22 G  Guidance: ultrasound    Approach:  Lateral  Location:  Hip      Site:  L greater trochanteric bursa  Medications:  40 mg triamcinolone 40 MG/ML  Outcome:  Tolerated well, no immediate complications  Procedure discussed: discussed risks, benefits, and alternatives    Consent Given by:  Patient  Timeout: timeout called immediately prior to procedure    Prep: patient was prepped and draped in usual sterile fashion     NDC: [address]-0

## 2022-03-21 ENCOUNTER — OFFICE VISIT (OUTPATIENT)
Dept: ALLERGY | Facility: CLINIC | Age: 47
End: 2022-03-21
Payer: COMMERCIAL

## 2022-03-21 VITALS — SYSTOLIC BLOOD PRESSURE: 120 MMHG | DIASTOLIC BLOOD PRESSURE: 84 MMHG | OXYGEN SATURATION: 98 % | HEART RATE: 68 BPM

## 2022-03-21 DIAGNOSIS — J30.89 ALLERGIC RHINITIS DUE TO MOLD: ICD-10-CM

## 2022-03-21 DIAGNOSIS — J45.30 MILD PERSISTENT ASTHMA WITHOUT COMPLICATION: Primary | ICD-10-CM

## 2022-03-21 DIAGNOSIS — E73.9 LACTOSE INTOLERANCE: ICD-10-CM

## 2022-03-21 DIAGNOSIS — J30.1 SEASONAL ALLERGIC RHINITIS DUE TO POLLEN: ICD-10-CM

## 2022-03-21 DIAGNOSIS — J30.89 ALLERGIC RHINITIS DUE TO DUST MITE: ICD-10-CM

## 2022-03-21 DIAGNOSIS — H10.13 ALLERGIC CONJUNCTIVITIS, BILATERAL: ICD-10-CM

## 2022-03-21 DIAGNOSIS — J30.81 ALLERGIC RHINITIS DUE TO ANIMALS: ICD-10-CM

## 2022-03-21 PROCEDURE — 95004 PERQ TESTS W/ALRGNC XTRCS: CPT | Performed by: ALLERGY & IMMUNOLOGY

## 2022-03-21 PROCEDURE — 99204 OFFICE O/P NEW MOD 45 MIN: CPT | Mod: 25 | Performed by: ALLERGY & IMMUNOLOGY

## 2022-03-21 NOTE — LETTER
"    3/21/2022         RE: Lisset MITCHELL Chi  [address] [facility] [id number]        Dear Colleague,    Thank you for referring your patient, Lisset MITCHELL Chi, to the [facility]. Please see a copy of my visit note below.    Lisset MITCHELL Chi was seen in the Allergy Clinic at [facility].    Lisset MITCHELL Chi is a 46 year old White female being seen today at the request of Dr. Myers in consultation for allergies and asthma.    Environmental allergies-- most severe in spring and end of summer into the fall. Symptoms include rhinitis, sneezing, congestion, itching eyes, and watery eyes. Had skin and blood testing previously roughly 15 years ago. She recalls being positive for guinea pigs, and a few other select things.   Since stopping antihistamine this week prior to this appointment she noticed itching on the top of her head. She takes fexofenadine year round. Singulair as needed. Fluticasone nasal spray all year round.   Has a cat and she notices some skin itching or itching eyes but overall not significant.   Had allergy shots 15 years ago or so for roughly 1-2 years. She feels that her allergy symptoms for dogs has improved.     In spring/summer she notices bumps around her finger nails that are itchy. Her father has bumps that are similar. Triggers include heat but no other noted triggers    Lactose intolerance. Dairy causes stomach pain, constipation, loose stools which is usually mild in small amounts. Also causes headaches. Causes hoarseness in throat \"plegm in throat\" within minutes of eating it. This happens with baked dairy products as well. No swollen lips or hives at all. Has tried Lactaid previously but she's unsure that it causes significant improvement. She wonders if intolerance has gotten more severe over time.   No specific food allergies.     Asthma hx diagnosed in adulthood. Never required urgent care or ED visit for asthma exacerbation. Previously on Flovent as needed " for URI but then started taking it daily for >1year. She noticed significant improvement in control of asthma and did not have to use her albuterol inhaler once. But then towards the end of the year she noticed scratchiness in her throat causing her to cough 30 minutes after using the inhaler and then it would subside. She did rinse her mouth out and brush her teeth after use, but did not use a spacer. Also, she rejoined [employer] and noticed that she had voice hoarseness and could not reach the high notes and believes to be associated with Flovent. Her PCP recommended stopping flovent and starting singulair in January 2022 and voice improved. Has only been using singulair intermittently as she tried to establish a routine for taking it daily. Asthma symptoms have been well controlled with the singulair until this past week when she started holding it for this appointment and with the warmer weather outside.   Allergies and laughing are triggers for asthma symptoms.     Past Medical History:   Diagnosis Date     Allergic rhinitis due to other allergen      Asthma      IBS (irritable bowel syndrome)      Other internal derangement of knee(057.71) 2005    patella dislocates easily     Premature ovarian failure      Raynaud's disease 3/08     Family History   Problem Relation Age of Onset     Thyroid Disease Mother      Diabetes Mother         type2     Eye Disorder Mother         cataracts     Gastrointestinal Disease Mother         hep a/has to have her throat stretched     Respiratory Mother         sleep apnea     Heart Disease Father 60     C.A.D. Paternal Grandfather 30        age 30, then again in his 80's     Diabetes Maternal Grandfather      Cancer Maternal Grandfather      Thyroid Disease Maternal Grandmother      Gynecology Maternal Grandmother         on bcp to keep period regular, a small uterus     Heart Disease Maternal Grandmother         tachycardia     Allergies Daughter         milk     Asthma  Daughter      Past Surgical History:   Procedure Laterality Date     ARTHROSCOPY KNEE Right 3/2/2018    Procedure: ARTHROSCOPY KNEE;  Right Knee Arthroscopy, Lateral Compartmnt Debridemen and  Anterior Chamber;  Surgeon: Rima Sarmiento MD;  Location: [facility] OR      SECTION           HC DILATION/CURETTAGE DIAG/THER NON OB  2003    D & C for retained placenta one week after the delivery     I&D BARTHOLIN GLAND ABSCESS   and     mcgrath cath      KNEE SURGERY      Rt knee Fx, repair-dislocation problem     KNEE SURGERY      Lt knee reconstructive surgery-dislocation problem     LEEP TX, CERVICAL  1995    LEEP for abnormal pap     MARSUP BARTHOLIN GLAND CYST  08       ENVIRONMENTAL HISTORY:   [place]isset [place] The home is heated with a forced air. They do have central air conditioning. The patient's bedroom is furnished with carpeting in bedroom, allergen mattress cover and fabric window coverings.  Pets inside the house include 1 cat(s). There is no history of cockroach or mice infestation. Do you smoke cigarettes or other recreational drugs? No Do you vape or use an e-cigarette? No. There is/are 0 smokers living in the house. There is/are 0 who smoke ecigarettes/vape living in the house. The house does not have a damp basement.     SOCIAL HISTORY:   Lisset is employed as an . She lives with her spouse. Her spouse works as a/an .    REVIEW OF SYSTEMS:  General: negative for weight gain. negative for weight loss. negative for changes in sleep.   Eyes: positive  for itching. negative for redness. negative for tearing/watering. negative for vision changes  Ears: negative for fullness. negative for hearing loss. negative for dizziness.   Nose: negative for snoring.negative for changes in smell. positive  for drainage. Positive for congestion.  Throat: positive  for hoarseness. negative for sore throat. negative  for trouble swallowing.   Lungs: negative for cough. negative for shortness of breath.negative for wheezing. negative for sputum production. Positive for chest tightness.  Cardiovascular: negative for chest pain. negative for swelling of ankles. negative for fast or irregular heartbeat.   Gastrointestinal: negative for nausea. negative for heartburn. negative for acid reflux.   Musculoskeletal: negative for joint pain. negative for joint stiffness. negative for joint swelling.   Neurologic: negative for seizures. negative for fainting. negative for weakness.   Psychiatric: negative for changes in mood. negative for anxiety.   Endocrine: negative for cold intolerance. negative for heat intolerance. negative for tremors.   Hematologic: negative for easy bruising. negative for easy bleeding.  Integumentary: negative for rash. negative for scaling. negative for nail changes.       Current Outpatient Medications:      albuterol (PROAIR HFA/PROVENTIL HFA/VENTOLIN HFA) 108 (90 Base) MCG/ACT inhaler, Inhale 2 puffs into the lungs every 6 hours, Disp: 18 g, Rfl: 3     Calcium Carbonate-Vitamin D (CALCIUM + D PO), Take 600 mg by mouth., Disp: , Rfl:      fluticasone (FLONASE) 50 MCG/ACT nasal spray, Spray 2 sprays into both nostrils daily, Disp: , Rfl:      ibuprofen (ADVIL/MOTRIN) 600 MG tablet, TAKE 1 TABLET (600 MG) BY MOUTH EVERY 8 HOURS AS NEEDED FOR MODERATE PAIN, Disp: 60 tablet, Rfl: 1     montelukast (SINGULAIR) 10 MG tablet, Take 1 tablet (10 mg) by mouth At Bedtime, Disp: 90 tablet, Rfl: 3     Turmeric Curcumin 500 MG CAPS, Take 500 mg by mouth daily , Disp: , Rfl:      VITAMIN D, CHOLECALCIFEROL, PO, Take by mouth daily, Disp: , Rfl:      FEXOFENADINE  MG OR TABS, 1 TABLET DAILY  (Patient not taking: No sig reported), Disp: , Rfl:   Immunization History   Administered Date(s) Administered     COVID-19,PF,Moderna 03/27/2021, 04/24/2021, 12/15/2021     Influenza (IIV3) PF 12/05/2002, 10/28/2005, 11/27/2007,  10/04/2021     Influenza Vaccine IM > 6 months Valent IIV4 (Alfuria,Fluzone) 11/01/2015, 10/01/2018, 10/07/2019, 10/04/2020     Influenza Vaccine, 6+MO IM (QUADRIVALENT W/PRESERVATIVES) 10/04/2019     Mantoux Tuberculin Skin Test 03/05/2002     Pneumococcal 23 valent 06/09/2021     TD (ADULT, 7+) 01/14/2020     TDAP Vaccine (Adacel) 03/25/2009     No Known Allergies      EXAM:   /84 (BP Location: Left arm, Patient Position: Sitting, Cuff Size: Adult Regular)   Pulse 68   LMP 12/25/2013   SpO2 98%   GENERAL APPEARANCE: alert and not in distress  SKIN: no rashes  HEAD: atraumatic, normocephalic  EYES: lids and lashes normal, conjunctivae and sclerae clear  ENT: nasal exam showed no discharge, swelling or lesions noted, otoscopy showed external auditory canals clear, tympanic membranes normal, lips normal without lesions, buccal mucosa normal  NECK: trachea midline  LUNGS: no increased work of breathing, clear to ausculation bilaterally   HEART: regular rate, regular rhythm and no murmurs detected  MUSCULOSKELETAL: no musculoskeletal defects are noted  NEURO: no focal deficits noted, mental status intact  PSYCH: age appropriate mood/affect    WORKUP: Skin testing    ENVIRONMENTAL PERCUTANEOUS SKIN TESTING: ADULT  [facility] 3/21/2022   Consent Y   Ordering Physician Dr. Gaytan   Interpreting Physician Dr. Gaytan   Testing Technician Gia BRIZUELA RN   Location Back   Time start: 12:06 PM   Time End: 12:21 PM   Positive Control: Histatrol*ALK 1 mg/ml 5/11   Negative Control: 50% Glycerin 0   Cat Hair*ALK (10,000 BAU/ml) 6/7   AP Dog Hair/Dander (1:100 w/v) 6/15   Dust Mite p. 30,000 AU/ml 4/10   Dust Mite f. (30,000 AU/ml) 0   Cristobal (W/F in millimeters) 0   Joel Grass (100,000 BAU/mL) 35/30   Red [place] (W/F in millimeters) 0   Maple/[place] (W/F in millimeters) 0   Hackberry (W/F in millimeters) 0   [place] (W/F in millimeters) 0   [place] *ALK (W/F in millimeters) 0   American Elm (W/F in  millimeters) 0   [place] (W/F in millimeters) 4/7   Black [place] (W/F in millimeters) 0   Birch Mix (W/F in millimeters) 0   [place] (W/F in millimeters) 0   Oak (W/F in millimeters) 0   C[place]le[place] (W/F in millimeters) 0   [place] (W/F in millimeters) 7/11   White [place] (W/F in millimeters) 5/6   Careless (W/F in millimeters) 8/10   Nettle (W/F in millimeters) 10/7   English Plantain (W/F in millimeters) 0   Kochia (W/F in millimeters) 0   Lamb's Quarter (W/F in millimeters) 0   Marshelder (W/F in millimeters) 0   Ragweed Mix* ALK (W/F in millimeters) 0   Russian Thistle (W/F in millimeters) 0   Sagebrush/Mugwort (W/F in millimeters) 0   Sheep Sorrel (W/F in millimeters) 0   Feather Mix* ALK (W/F in millimeters) 0   Penicillium Mix (1:10 w/v) 0   Curvularia spicifera (1:10 w/v) 6/15   Epicoccum (1:10 w/v) 6/12   Aspergillus fumigatus (1:10 w/v): 6/23   Alternaria tenius (1:10 w/v) 5/17   H. Cladosporium (1:10 w/v) 0   Phoma herbarum (1:10 w/v) 6/12      Appropriate response to controls, positive to cat, dog, grass, trees, weeds, and molds    ASSESSMENT/PLAN:  Lisset MITCHELL Chi is a 46 year old female with hx of mild persistent asthma, environmental allergies, and lactose intolerance.     Asthma; mild persistent- Previously well controlled with low dose Flovent but had side effect of voice hoarseness. Switched to singulair but has not yet established consistent use of it to determine if appropriately controlling asthma. Will continue singulair at this time. If asthma uncontrolled then could consider restarting flovent, however with a spacer, and continued mouth rinsing and teeth brushing to limit side effect of voice hoarseness.     Environmental allergies; overall have been manageable with fexofenadine and flonase daily and now starting singulair regularly. Discussed repeat skin testing with possible consideration of allergy shots with the goal to aid in asthma control by better management of allergy symptoms. Lisset walters  like to proceed with skin testing at this time and will consider the need for shots pending allergy and asthma control.   Skin testing notable for Appropriate response to controls, positive to cat, dog, grass, trees, weeds, and molds. Provided with information to contact insurance for considering cost associated with allerrgy shots.     Lactose intolerance; symptoms most consistent with lactose intolerance and not a true milk protein allergy. Advised patient that there is no testing for this and treatment includes avoidance or use of Lactaid immediately prior to consuming dairy.     Follow-up in 4 months.     Patient was seen and discussed with Dr. Gaytan.     Avis Luz DO   Pediatric Resident PGY-1  [facility]      Physician Attestation   I, Flower Gaytan MD, saw this patient and agree with the findings and plan of care as documented in the note.        1. Mild persistent asthma without complication - Lisset reports that her asthma is currently well controlled. She has been taking montelukast intermittently but trying to take it more consistently. She experienced hoarseness with previous ICS therapy however was not using a spacer device with her inhaler. As her asthma is currently well controlled, we discussed continuing with the montelukast. A trial of ICS or low dose ICS/LABA therapy may be considered if her symptoms flare this spring/summer due to her allergies. We also discussed pursuing allergen immunotherapy treatment for management of both her rhinoconjunctivitis symptoms as well as her asthma.     - continue montelukast - 10mg daily  - take 2 to 4 puffs of albuterol HFA every 4 hours as needed  - consider resuming ICS or ICS/LABA therapy if asthma is not controlled vs pursuing allergen immunotherapy treatment  - ALLERGY SKIN TESTS,ALLERGENS    2. Seasonal allergic rhinitis due to pollen    - continue fexofenadine - 180mg daily  - consider allergen immunotherapy for persistent symptoms - the  risks, benefits, and recommended duration of treatment were reviewed today  - ALLERGY SKIN TESTS,ALLERGENS    3. Allergic rhinitis due to animals    - ALLERGY SKIN TESTS,ALLERGENS    4. Allergic rhinitis due to dust mite    - ALLERGY SKIN TESTS,ALLERGENS    5. Allergic rhinitis due to mold    - ALLERGY SKIN TESTS,ALLERGENS    6. Allergic conjunctivitis, bilateral    - ALLERGY SKIN TESTS,ALLERGENS    7. Lactose intolerance - The differences between IgE mediated food allergies and food intolerances or sensitivities were reviewed today. Lisset's symptoms are consistent with lactose intolerance and additional testing is not recommended at this time.      Follow-up in 3-6 months, sooner if needed      Thank you for allowing me to participate in the care of Lisset MITCHELL Chi.      Flower Gaytan MD, [facility]  Allergy/Immunology  [facility]      Chart documentation done in part with Dragon Voice Recognition Software. Although reviewed after completion, some word and grammatical errors may remain.      Per provider verbal order, placed Adult Environmental Panel scratch test.  Consent was obtained prior to procedure.  Once panels were placed, patient was monitored for 15 minutes in clinic.  Provider read test after 15 minutes..  Pt tolerated procedure well.  All questions and concerns were addressed at office visit.     JOSÉ MIGUEL Hargrove, RN        Again, thank you for allowing me to participate in the care of your patient.        Sincerely,        Flower Gaytan MD

## 2022-03-21 NOTE — PROGRESS NOTES
Per provider verbal order, placed Adult Environmental Panel scratch test.  Consent was obtained prior to procedure.  Once panels were placed, patient was monitored for 15 minutes in clinic.  Provider read test after 15 minutes..  Pt tolerated procedure well.  All questions and concerns were addressed at office visit.     Gia Escobar, MICHELLEN, RN

## 2022-03-21 NOTE — PATIENT INSTRUCTIONS
If you have any questions regarding your allergies, asthma, or what we discussed during your visit today please call the allergy clinic or contact us via [facility].    [facility]: [phone] - call this number with any questions during or after business/clinic hours  [facility] Scheduling Line: [phone]  [facility] Scheduling Line: [phone] - this number is ONLY for scheduling at the [facility] ([facility]) and should not be used to get in touch with the allergy team    All visits for food challenges, medication/drug allergy testing, and drug challenges MUST be scheduled through the allergy clinic nurse. Please call the nurse at [phone] or send a [facility] [employer] message for scheduling. Appointments for these visits that are made through the schedulers or via [facility] may be cancelled or rescheduled.    Clinic Schedule:   [place] - Monday, Tuesday, and Thursday  [address] [place] [address]    [facility] - Wednesday  [address] [place] [address]      These are the billing and diagnosis codes that your insurance company may need to help you determine if allergy shots are covered and what potential out of pocked costs you may have. You may also want to contact the [place] Business Office/Cost of Care Estimate Line at [phone] for more information.    Regular Allergy Shot Visits: 93102 (weekly visits for about 6 months)    Cluster Accelerated Build: 75367 - rapid desensitization (weekly visits for about 8 weeks, each visit is 1.5 to 2 hours long, billed as an office visit with a procedure code). This could be a single unit or multiple units billed per day depending on the length of your visit.    Allergy Shot Serum: 01516 - the amount of serum in total varies depending on how many allergy shots you will need. At the start of treatment, each injection is billed for 30 doses. Treatment consists of a  minimum of 1 injection up to a maximum of 4 injections depending on how many allergens are included in the treatment. You will need 2 to 3 injections in total based on your allergy test results.    Potential Diagnosis Codes:    Allergic Rhinitis Due to Dust Mite or Mold - J30.89  Allergic Rhinitis Due to Animals - J30.81  Seasonal Allergic Rhinitis Due to Pollens - J30.1  Allergic Conjunctivitis - H10.13      Allergy Shots (Immunotherapy)    What You Need to Know      What are allergy shots?  Allergy shots are used to treat allergic reactions. They are given in the upper arm. These shots will slowly build your tolerance to the things you are allergic to (such as pollen, mold and animal dander). They reduce the body's allergic response.    What are the benefits of getting allergy shots?  Allergy shots may:    Relieve symptoms long after treatment has ended    Allow you to take less allergy medicine, or stop taking it altogether    Prevent new allergies in the future    Keep children's allergies from getting worse and turning into asthma    Improve eczema caused by allergies.      The average patient sees a large improvement in his or her symptoms (up to 80%).    Who should have allergy shots?  Allergy shots are helpful when allergies cause:    Asthma    Itchy, watery eyes (allergic conjunctivitis)    Runny nose, sneezing, sore throat and other symptoms (allergic rhinitis)    Severe reaction to insect stings.    For children, it is best to wait until age 5 before starting allergy shots.    How will I feel during and after treatment?  You will have shots once or twice a week for 4 to 8 months. The time may be longer if you experience a reaction or if injections are not received on a regular basis. We will increase the dose each time until we reach a level that works for you. After that, you will have the shots less often. It may take up to a year for symptoms to improve. Many people improve much sooner. You will likely  have shots for 3 to 5 years in total. You will need to see your physician every 6-12 months while receiving allergy shots. Allergy shots can reduce your symptoms a little or lot. Some people find that their allergies go away entirely. Most people have long-lasting relief after treatment ends. You should see your doctor every year to find out if you need more shots.      What are the risks?  With each allergy shot, there is the risk of an allergic reaction. Some reactions are mild. Others can be life threatening and must be treated immediately.    Common reactions  It is common to have a mild reaction such as redness, swelling, pain, and itching in the area of the shot. This can occur right away or up to several hours after the shot. Sometimes the reaction moves into the upper arm. Call your doctor if:    The reaction area is more than 2 inches wide.    You still have the reaction the day after the shot.      Severe reactions  The reactions below are rare, but serious. If not treated, they can lead to very low blood pressure and   even death. If you have any of these, get help at once.    Hives include a rash, swelling and itching on more than one part of the body. They may occur within minutes to hours after a shot.    Swelling of any part of the body. For example, you  may have swelling of the ears, tongue, lips, throat, intestines, hands or feet. Swelling may affect more than one body part. These reactions could occur within minutes to hours after the shot.    Trouble breathing. You may develop sneezing, runny nose, stuffy nose, cough, or asthma symptoms. These reactions can occur within minutes to hours after the shot.    Anaphylactic shock is sudden, severe and may include symptoms such as hives, trouble    breathing, stomach pain, feeling faint or dizzy and low blood pressure. It may cause a loss of  consciousness and could lead to death. This reaction usually occurs within minutes of the shot but can happen a  few hours later. It is very rare.    You might have a severe reaction after the first shot, or it may occur after a series of shots. If you have a reaction, we will treat you right away. In the future, we may change the dose of your allergy shots.    Taking an antihistamine such as cetirizine (Zyrtec), fexofenadine (Allegra), or levocetirizine (Xyzal) 30 to 60 minutes prior to your appointment can help to decrease the risk of an allergic reaction    You should not exercise for 1 hour prior, or 2 hours after, receiving your allergy shots to reduce the risk of an allergic reaction    What happens after each visit?  You must wait in the doctor's office for 30 minutes after each shot. If you have a reaction, we may ask you to stay longer. If you cannot wait the 30 minutes,  you should not receive your allergy shot.    If you have a severe reaction after leaving the doctor's office, use your epinephrine auto-injector and go to the nearest emergency room. If treated promptly, severe reactions are rarely life threatening. We will never give an allergy shot unless medical help is nearby in case of emergency.    What else do I need to know?  If you start taking any new medicines  It is not safe to have these shots while taking certain medicines. If any doctor prescribes new medicine for you, please let us know. This includes:    Beta blockers, often used for heart disease    Blood pressure medicine    Medicine for migraine headaches    Glaucoma medicine.      A note for women  If you get pregnant, tell the office staff at once. Your doctor will decide how often you should receive shots during pregnancy. We will not increase your dose while you are pregnant.    If you will have shots at another clinic  If you will have your allergy shots at another clinic, you must provide the name and address of the doctor who will give the shots. We will ask you to fill out a form before you receive treatment at an outside clinic. Once  allergy serum has been shipped to an outside clinic it cannot be accepted back to one of the [facility] sites.      Will my Insurance cover allergy shots?  You should be aware of the potential expenses associated with allergy shots. Health insurance plans have a wide range of coverage. For specific information about your insurance coverage you should contact your insurance provider before therapy begins.    How much does treatment cost?  Allergy immunotherapy can range in cost depending on your specific therapy plan. You are encouraged to obtain a cost of care estimate prior to starting treatment and reviewing coverage with your insurance company. You may contact the business office at [phone] to request an estimate of the cost.    Can I return my serum?  Because allergy serum is customized to your specific allergies, serum cannot be returned or used for other patients. Once the serum has been made, should you decide to discontinue allergy immunotherapy treatment, you will still be responsible for the cost of your personalized serum. Once the consent has been signed, allergy serum will be ordered and billed.    Patient expectations    You must wait in the allergy clinic for 30 minutes after receiving your allergy shots - there are no exceptions to this rule. If you cannot wait the full 30 minutes you should reschedule your appointment. If you fail to wait 30 minutes your treatment may be discontinued.    You must bring your epinephrine auto-injector with you to each injection visit - allergy shots may not be administered if you do not have your auto-injector with you at the time of your appointment    To reduce the risk of an allergic reaction, do not exercise for 1 hour before or 2 hours after receiving your allergy shots    If you have a fever, are ill, or are having increased asthma symptoms you may not receive your allergy shots. Please contact the clinic ahead of your visit to discuss your  symptoms to determine if you will still be able to receive your shots as scheduled.    Patients are asked to follow instructions as given by the allergy shot care team. If there are any questions or concerns these may be addressed with their primary allergist at a separate time.      If you have any questions or concerns, please speak to your doctor or a member of our staff.      ENVIRONMENTAL PERCUTANEOUS SKIN TESTING: ADULT  [facility] 3/21/2022   Consent Y   Ordering Physician Dr. Gaytan   Interpreting Physician Dr. Gaytan   Testing Technician Gia BRIZUELA RN   Location Back   Time start: 12:06 PM   Time End: 12:21 PM   Positive Control: Histatrol*ALK 1 mg/ml 5/11   Negative Control: 50% Glycerin 0   Cat Hair*ALK (10,000 BAU/ml) 6/7   AP Dog Hair/Dander (1:100 w/v) 6/15   Dust Mite p. 30,000 AU/ml 4/10   Dust Mite f. (30,000 AU/ml) 0   Cristobal (W/F in millimeters) 0   Joel Grass (100,000 BAU/mL) 35/30   Red [place] (W/F in millimeters) 0   Maple/Banks (W/F in millimeters) 0   Hackberry (W/F in millimeters) 0   Hansford (W/F in millimeters) 0   Hertford *ALK (W/F in millimeters) 0   American Elm (W/F in millimeters) 0   Menominee (W/F in millimeters) 4/7   Black [place] (W/F in millimeters) 0   Birch Mix (W/F in millimeters) 0   Palm Springs (W/F in millimeters) 0   Oak (W/F in millimeters) 0   Cocklebur (W/F in millimeters) 0   Clinton (W/F in millimeters) 7/11   White [place] (W/F in millimeters) 5/6   Careless (W/F in millimeters) 8/10   Nettle (W/F in millimeters) 10/7   English Plantain (W/F in millimeters) 0   Kochia (W/F in millimeters) 0   Lamb's Quarter (W/F in millimeters) 0   Marshelder (W/F in millimeters) 0   Ragweed Mix* ALK (W/F in millimeters) 0   Russian Thistle (W/F in millimeters) 0   Sagebrush/Mugwort (W/F in millimeters) 0   Sheep Sorrel (W/F in millimeters) 0   Feather Mix* ALK (W/F in millimeters) 0   Penicillium Mix (1:10 w/v) 0   Curvularia spicifera (1:10 w/v) 6/15   Epicoccum (1:10  "w/v) 6/12   Aspergillus fumigatus (1:10 w/v): 6/23   Alternaria tenius (1:10 w/v) 5/17   H. Cladosporium (1:10 w/v) 0   Phoma herbarum (1:10 w/v) 6/12            Patient Education     Lactose Intolerance    Lactose is a simple sugar found in milk and dairy products. Lactose is found in dairy products such as milk, cheese, cottage cheese, cream, sour cream, ice cream, sherbet, milk solids, powdered milk, and whey.   Lactose is digested with the help of an enzyme the body makes called  lactase.\" People with lactose intolerance can't digest dairy products. This is because their bodies don't make enough lactase. Undigested lactose in the gut can't be absorbed. This can cause nausea, cramping, bloating, gas, diarrhea, and foul-smelling stools. These symptoms occur within 30 minutes to 2 hours after eating. Symptoms may be mild or severe.   Babies are born with the lactose enzyme, which allows them to absorb breast milk. However, lactose intolerance may appear in children as early as 2 to 5 years old. Even if you have been able to eat dairy products all your life, your body may lose the ability to make the lactose enzyme as you get older. Asians,  Americans, Hispanics, and American Indians are prone to get this problem earlier in life.   Lactose intolerance is different from a milk allergy. A milk allergy is an immune system disorder.   Home care  These guidelines will help you care for yourself at home:     Your body doesn t need dairy products to be healthy. So, if your symptoms are severe, it's best to stop eating dairy products that contain lactose.    If your symptoms are milder, you can probably do well consuming smaller amounts of dairy as long as you combine it with nondairy foods. Yogurt with live cultures may be OK because it contains enzymes that digest lactose. Butter, margarine, hard and aged cheeses (cheddar, Swiss) contain less lactose and may be OK to eat. You will have to experiment to learn how " much dairy you can tolerate without getting symptoms. It may help to keep a food diary.    There are many lactose-free dairy products including milk, ice cream, and cheeses that allow you to still enjoy dairy products. You may also take a lactase enzyme as a supplement that will help you digest dairy products.    Everyone needs calcium and vitamin D in their diet for healthy bones. If you reduce or eliminate dairy from your diet, you need to replace it with other food sources that contain these nutrients such as kale, broccoli, white beans, green beans, lima beans, salmon, soy beans, tofu, or fortified juices. Or, you can take daily supplements.    Learn to read food labels. Also, watch for prepared foods that are made with products that contain lactose (such as bread, cereal, lunch meats, salad dressings, cake and cookie mix, and coffee creamers). However, many people can consume small amounts of lactose without symptoms, so an overly restrictive diet is often not needed.    Lactase enzyme supplements can be obtained over-the-counter. They can help control symptoms if you take the enzymes when you eat lactose.  Other products besides milk and milk products may contain lactose. They may be less obvious, so check the labels carefully. These things may not bother you, but should be considered if you continue to have problems:     Bread and other baked goods    Waffles, pancakes, biscuits, cookies, and mixes to make them    Processed breakfast foods such as doughnuts, frozen waffles and pancakes, toaster pastries, and sweet rolls    Processed breakfast cereals    Instant potatoes, soups, and breakfast drinks    Potato chips, corn chips, and other processed snacks    Processed meats like nielson, sausage, hot dogs, and lunch meats    Margarine    Salad dressings    Liquid and powdered milk-based meal replacements    Protein powders and bars    Candies    Nondairy liquid and powdered coffee creamers    Nondairy whipped  toppings  Follow-up care  Follow up with your healthcare provider, or as advised.   When to seek medical advice  Call your healthcare provider right away if any of these occur:     Increasing abdominal pain or swelling    Abdominal pain that moves to the right side of the lower abdomen    Fever of 100.4 F (38 C) or higher, or as directed by your healthcare provider    Repeated vomiting or diarrhea    Blood in the stool or black and tarry stool (depending on the amount of blood, consider calling 911 for emergency help)  [employer] last reviewed this educational content on 8/1/2019 2000-2021 [employer]. All rights reserved. This information is not intended as a substitute for professional medical care. Always follow your healthcare professional's instructions.           Patient Education     Tips for Lactose Intolerance  If you are lactose intolerant, you have trouble digesting lactose. Lactose is a sugar found in milk and other dairy products. Many people are lactose intolerant. Undigested lactose won t hurt you, but it can cause unpleasant symptoms. Symptoms may include gas, bloating, pain, diarrhea, and nausea. To help reduce symptoms, look for ways to limit the amount of lactose you eat. You can also take a lactase supplement before you eat dairy products.   Finding your limit  People with lactose intolerance may think they can t eat or drink any dairy products. This is often not true. Many people with lactose intolerance can eat or drink small amounts of dairy products without symptoms. To find your own limit, keep track of what you eat and drink. Write down when you have symptoms. Learn how much and what kinds of dairy products you can handle.   Tips to reduce symptoms    Choose low-lactose or lactose-free dairy products. These are widely available. They include products like milk, yogurt, cheese, and ice cream, among others.    Eat foods with active cultures, such as yogurt. Active cultures make  lactose easier to digest.    Eat or drink dairy products with other foods to lessen symptoms.    Use fruit juice or lactose-free milk (for instance, soy or almond) to replace some or all of the milk in recipes.    Take lactase enzyme tablets with dairy products to help prevent symptoms.    Avoid eating many high-lactose foods (such as milk, butter, and ice cream) at one time.    Eat other calcium-rich foods  If you eat less dairy, you may be getting less calcium and vitamin D. Ask your doctor about calcium or vitamin D supplements. Also, eat more dairy-free, calcium-rich foods, such as:     Broccoli, lettuce greens, kale, bok stephany (Chinese cabbage), turnip greens    Fish with edible bones (canned salmon or sardines)    Alfalfa sprouts, soy sprouts    Tofu, soybeans, jay beans, navy beans    Almonds, sesame seeds    Calcium-fortified orange juice, soy drink, rice drink    Oranges  Be aware that the calcium from these foods varies. It may not be as well absorbed by the body as calcium from dairy products.    Dairy Substitute   Milk, cream Soy drink, rice drink, nondairy creamer    Cheese Tofu (soy) cheese, some aged cheeses    Butter, margarine  Milk-free margarine, vegetable oil    Ice cream Fruit sorbet, juice bars    [employer] last reviewed this educational content on 6/1/2019 2000-2021 [employer]. All rights reserved. This information is not intended as a substitute for professional medical care. Always follow your healthcare professional's instructions.

## 2022-03-21 NOTE — PROGRESS NOTES
"Lisset MITCHELL Chi was seen in the Allergy Clinic at [facility].    Lisset MITCHELL Chi is a 46 year old White female being seen today at the request of Dr. Myers in consultation for allergies and asthma.    Environmental allergies-- most severe in spring and end of summer into the fall. Symptoms include rhinitis, sneezing, congestion, itching eyes, and watery eyes. Had skin and blood testing previously roughly 15 years ago. She recalls being positive for guinea pigs, and a few other select things.   Since stopping antihistamine this week prior to this appointment she noticed itching on the top of her head. She takes fexofenadine year round. Singulair as needed. Fluticasone nasal spray all year round.   Has a cat and she notices some skin itching or itching eyes but overall not significant.   Had allergy shots 15 years ago or so for roughly 1-2 years. She feels that her allergy symptoms for dogs has improved.     In spring/summer she notices bumps around her finger nails that are itchy. Her father has bumps that are similar. Triggers include heat but no other noted triggers    Lactose intolerance. Dairy causes stomach pain, constipation, loose stools which is usually mild in small amounts. Also causes headaches. Causes hoarseness in throat \"plegm in throat\" within minutes of eating it. This happens with baked dairy products as well. No swollen lips or hives at all. Has tried Lactaid previously but she's unsure that it causes significant improvement. She wonders if intolerance has gotten more severe over time.   No specific food allergies.     Asthma hx diagnosed in adulthood. Never required urgent care or ED visit for asthma exacerbation. Previously on Flovent as needed for URI but then started taking it daily for >1year. She noticed significant improvement in control of asthma and did not have to use her albuterol inhaler once. But then towards the end of the year she noticed scratchiness in her throat " causing her to cough 30 minutes after using the inhaler and then it would subside. She did rinse her mouth out and brush her teeth after use, but did not use a spacer. Also, she rejoined [employer] and noticed that she had voice hoarseness and could not reach the high notes and believes to be associated with Flovent. Her PCP recommended stopping flovent and starting singulair in January 2022 and voice improved. Has only been using singulair intermittently as she tried to establish a routine for taking it daily. Asthma symptoms have been well controlled with the singulair until this past week when she started holding it for this appointment and with the warmer weather outside.   Allergies and laughing are triggers for asthma symptoms.     Past Medical History:   Diagnosis Date     Allergic rhinitis due to other allergen      Asthma      IBS (irritable bowel syndrome)      Other internal derangement of knee(387.89) 2005    patella dislocates easily     Premature ovarian failure      Raynaud's disease 3/08     Family History   Problem Relation Age of Onset     Thyroid Disease Mother      Diabetes Mother         type2     Eye Disorder Mother         cataracts     Gastrointestinal Disease Mother         hep a/has to have her throat stretched     Respiratory Mother         sleep apnea     Heart Disease Father 60     C.A.D. Paternal Grandfather 30        age 30, then again in his 80's     Diabetes Maternal Grandfather      Cancer Maternal Grandfather      Thyroid Disease Maternal Grandmother      Gynecology Maternal Grandmother         on bcp to keep period regular, a small uterus     Heart Disease Maternal Grandmother         tachycardia     Allergies Daughter         milk     Asthma Daughter      Past Surgical History:   Procedure Laterality Date     ARTHROSCOPY KNEE Right 3/2/2018    Procedure: ARTHROSCOPY KNEE;  Right Knee Arthroscopy, Lateral Compartmnt Debridemen and  Anterior Chamber;  Surgeon: Rima Sarmiento  MD SHUKRI;  Location: [facility] OR      SECTION           HC DILATION/CURETTAGE DIAG/THER NON OB  2003    D & C for retained placenta one week after the delivery     I&D BARTHOLIN GLAND ABSCESS   and     mcgrath cath      KNEE SURGERY      Rt knee Fx, repair-dislocation problem     KNEE SURGERY      Lt knee reconstructive surgery-dislocation problem     LEEP TX, CERVICAL  1995    LEEP for abnormal pap     MARSUP BARTHOLIN GLAND CYST  08       ENVIRONMENTAL HISTORY:   Lisset [place] The home is heated with a forced air. They do have central air conditioning. The patient's bedroom is furnished with carpeting in bedroom, allergen mattress cover and fabric window coverings.  Pets inside the house include 1 cat(s). There is no history of cockroach or mice infestation. Do you smoke cigarettes or other recreational drugs? No Do you vape or use an e-cigarette? No. There is/are 0 smokers living in the house. There is/are 0 who smoke ecigarettes/vape living in the house. The house does not have a damp basement.     SOCIAL HISTORY:   Lisset is employed as an . She lives with her spouse. Her spouse works as a/an .    REVIEW OF SYSTEMS:  General: negative for weight gain. negative for weight loss. negative for changes in sleep.   Eyes: positive  for itching. negative for redness. negative for tearing/watering. negative for vision changes  Ears: negative for fullness. negative for hearing loss. negative for dizziness.   Nose: negative for snoring.negative for changes in smell. positive  for drainage. Positive for congestion.  Throat: positive  for hoarseness. negative for sore throat. negative for trouble swallowing.   Lungs: negative for cough. negative for shortness of breath.negative for wheezing. negative for sputum production. Positive for chest tightness.  Cardiovascular: negative for chest pain. negative for swelling of  ankles. negative for fast or irregular heartbeat.   Gastrointestinal: negative for nausea. negative for heartburn. negative for acid reflux.   Musculoskeletal: negative for joint pain. negative for joint stiffness. negative for joint swelling.   Neurologic: negative for seizures. negative for fainting. negative for weakness.   Psychiatric: negative for changes in mood. negative for anxiety.   Endocrine: negative for cold intolerance. negative for heat intolerance. negative for tremors.   Hematologic: negative for easy bruising. negative for easy bleeding.  Integumentary: negative for rash. negative for scaling. negative for nail changes.       Current Outpatient Medications:      albuterol (PROAIR HFA/PROVENTIL HFA/VENTOLIN HFA) 108 (90 Base) MCG/ACT inhaler, Inhale 2 puffs into the lungs every 6 hours, Disp: 18 g, Rfl: 3     Calcium Carbonate-Vitamin D (CALCIUM + D PO), Take 600 mg by mouth., Disp: , Rfl:      fluticasone (FLONASE) 50 MCG/ACT nasal spray, Spray 2 sprays into both nostrils daily, Disp: , Rfl:      ibuprofen (ADVIL/MOTRIN) 600 MG tablet, TAKE 1 TABLET (600 MG) BY MOUTH EVERY 8 HOURS AS NEEDED FOR MODERATE PAIN, Disp: 60 tablet, Rfl: 1     montelukast (SINGULAIR) 10 MG tablet, Take 1 tablet (10 mg) by mouth At Bedtime, Disp: 90 tablet, Rfl: 3     Turmeric Curcumin 500 MG CAPS, Take 500 mg by mouth daily , Disp: , Rfl:      VITAMIN D, CHOLECALCIFEROL, PO, Take by mouth daily, Disp: , Rfl:      FEXOFENADINE  MG OR TABS, 1 TABLET DAILY  (Patient not taking: No sig reported), Disp: , Rfl:   Immunization History   Administered Date(s) Administered     COVID-19,PF,Moderna 03/27/2021, 04/24/2021, 12/15/2021     Influenza (IIV3) PF 12/05/2002, 10/28/2005, 11/27/2007, 10/04/2021     Influenza Vaccine IM > 6 months Valent IIV4 (Alfuria,Fluzone) 11/01/2015, 10/01/2018, 10/07/2019, 10/04/2020     Influenza Vaccine, 6+MO IM (QUADRIVALENT W/PRESERVATIVES) 10/04/2019     Mantoux Tuberculin Skin Test  03/05/2002     Pneumococcal 23 valent 06/09/2021     TD (ADULT, 7+) 01/14/2020     TDAP Vaccine (Adacel) 03/25/2009     No Known Allergies      EXAM:   /84 (BP Location: Left arm, Patient Position: Sitting, Cuff Size: Adult Regular)   Pulse 68   LMP 12/25/2013   SpO2 98%   GENERAL APPEARANCE: alert and not in distress  SKIN: no rashes  HEAD: atraumatic, normocephalic  EYES: lids and lashes normal, conjunctivae and sclerae clear  ENT: nasal exam showed no discharge, swelling or lesions noted, otoscopy showed external auditory canals clear, tympanic membranes normal, lips normal without lesions, buccal mucosa normal  NECK: trachea midline  LUNGS: no increased work of breathing, clear to ausculation bilaterally   HEART: regular rate, regular rhythm and no murmurs detected  MUSCULOSKELETAL: no musculoskeletal defects are noted  NEURO: no focal deficits noted, mental status intact  PSYCH: age appropriate mood/affect    WORKUP: Skin testing    ENVIRONMENTAL PERCUTANEOUS SKIN TESTING: ADULT  [place] [facility] 3/21/2022   Consent Y   Ordering Physician Dr. Gaytan   Interpreting Physician Dr. Gaytan   Testing Technician Gia BRIZUELA RN   Location Back   Time start: 12:06 PM   Time End: 12:21 PM   Positive Control: Histatrol*ALK 1 mg/ml 5/11   Negative Control: 50% Glycerin 0   Cat Hair*ALK (10,000 BAU/ml) 6/7   AP Dog Hair/Dander (1:100 w/v) 6/15   Dust Mite p. 30,000 AU/ml 4/10   Dust Mite f. (30,000 AU/ml) 0   Cristobal (W/F in millimeters) 0   Joel Grass (100,000 BAU/mL) 35/30   Red [place]awnee (W/F in millimeters) 0   Maple/Converse (W/F in millimeters) 0   Hackberry (W/F in millimeters) 0   Morris (W/F in millimeters) 0   Durham *ALK (W/F in millimeters) 0   American Elm (W/F in millimeters) 0   Bottineau (W/F in millimeters) 4/7   Black [place]ivernon (W/F in millimeters) 0   Birch Mix (W/F in millimeters) 0   Commerce (W/F in millimeters) 0   Oak (W/F in millimeters) 0   Cocklebur (W/F in millimeters) 0   Janice  (W/F in millimeters) 7/11   White Judah (W/F in millimeters) 5/6   Careless (W/F in millimeters) 8/10   Nettle (W/F in millimeters) 10/7   English Plantain (W/F in millimeters) 0   Kochia (W/F in millimeters) 0   Lamb's Quarter (W/F in millimeters) 0   Marshelder (W/F in millimeters) 0   Ragweed Mix* ALK (W/F in millimeters) 0   Russian Thistle (W/F in millimeters) 0   Sagebrush/Mugwort (W/F in millimeters) 0   Sheep Sorrel (W/F in millimeters) 0   Feather Mix* ALK (W/F in millimeters) 0   Penicillium Mix (1:10 w/v) 0   Curvularia spicifera (1:10 w/v) 6/15   Epicoccum (1:10 w/v) 6/12   Aspergillus fumigatus (1:10 w/v): 6/23   Alternaria tenius (1:10 w/v) 5/17   H. Cladosporium (1:10 w/v) 0   Phoma herbarum (1:10 w/v) 6/12      Appropriate response to controls, positive to cat, dog, grass, trees, weeds, and molds    ASSESSMENT/PLAN:  Lisset MITCHELL Chi is a 46 year old female with hx of mild persistent asthma, environmental allergies, and lactose intolerance.     Asthma; mild persistent- Previously well controlled with low dose Flovent but had side effect of voice hoarseness. Switched to singulair but has not yet established consistent use of it to determine if appropriately controlling asthma. Will continue singulair at this time. If asthma uncontrolled then could consider restarting flovent, however with a spacer, and continued mouth rinsing and teeth brushing to limit side effect of voice hoarseness.     Environmental allergies; overall have been manageable with fexofenadine and flonase daily and now starting singulair regularly. Discussed repeat skin testing with possible consideration of allergy shots with the goal to aid in asthma control by better management of allergy symptoms. Lisset would like to proceed with skin testing at this time and will consider the need for shots pending allergy and asthma control.   Skin testing notable for Appropriate response to controls, positive to cat, dog, grass, trees, weeds, and  molds. Provided with information to contact insurance for considering cost associated with allerrgy shots.     Lactose intolerance; symptoms most consistent with lactose intolerance and not a true milk protein allergy. Advised patient that there is no testing for this and treatment includes avoidance or use of Lactaid immediately prior to consuming dairy.     Follow-up in 4 months.     Patient was seen and discussed with Dr. Gaytan.     Avis Luz DO   Pediatric Resident PGY-1  [facility]      Physician Attestation   I, Flower Gaytan MD, saw this patient and agree with the findings and plan of care as documented in the note.        1. Mild persistent asthma without complication - Lisset reports that her asthma is currently well controlled. She has been taking montelukast intermittently but trying to take it more consistently. She experienced hoarseness with previous ICS therapy however was not using a spacer device with her inhaler. As her asthma is currently well controlled, we discussed continuing with the montelukast. A trial of ICS or low dose ICS/LABA therapy may be considered if her symptoms flare this spring/summer due to her allergies. We also discussed pursuing allergen immunotherapy treatment for management of both her rhinoconjunctivitis symptoms as well as her asthma.     - continue montelukast - 10mg daily  - take 2 to 4 puffs of albuterol HFA every 4 hours as needed  - consider resuming ICS or ICS/LABA therapy if asthma is not controlled vs pursuing allergen immunotherapy treatment  - ALLERGY SKIN TESTS,ALLERGENS    2. Seasonal allergic rhinitis due to pollen    - continue fexofenadine - 180mg daily  - consider allergen immunotherapy for persistent symptoms - the risks, benefits, and recommended duration of treatment were reviewed today  - ALLERGY SKIN TESTS,ALLERGENS    3. Allergic rhinitis due to animals    - ALLERGY SKIN TESTS,ALLERGENS    4. Allergic rhinitis due to dust mite    -  ALLERGY SKIN TESTS,ALLERGENS    5. Allergic rhinitis due to mold    - ALLERGY SKIN TESTS,ALLERGENS    6. Allergic conjunctivitis, bilateral    - ALLERGY SKIN TESTS,ALLERGENS    7. Lactose intolerance - The differences between IgE mediated food allergies and food intolerances or sensitivities were reviewed today. Lisset's symptoms are consistent with lactose intolerance and additional testing is not recommended at this time.      Follow-up in 3-6 months, sooner if needed      Thank you for allowing me to participate in the care of Lisset MITCHELL Chi.      Flower Gaytan MD, FAAAAI  Allergy/Immunology  [facility]      Chart documentation done in part with Dragon Voice Recognition Software. Although reviewed after completion, some word and grammatical errors may remain.

## 2022-03-24 RX ORDER — TRIAMCINOLONE ACETONIDE 40 MG/ML
40 INJECTION, SUSPENSION INTRA-ARTICULAR; INTRAMUSCULAR
Status: DISCONTINUED | OUTPATIENT
Start: 2022-03-16 | End: 2024-01-19

## 2022-04-01 ENCOUNTER — THERAPY VISIT (OUTPATIENT)
Dept: PHYSICAL THERAPY | Facility: CLINIC | Age: 47
End: 2022-04-01
Payer: COMMERCIAL

## 2022-04-01 DIAGNOSIS — M25.579 PAIN IN JOINT, ANKLE AND FOOT, UNSPECIFIED LATERALITY: ICD-10-CM

## 2022-04-01 DIAGNOSIS — M25.561 PAIN IN BOTH KNEES: ICD-10-CM

## 2022-04-01 DIAGNOSIS — M25.562 PAIN IN BOTH KNEES: ICD-10-CM

## 2022-04-01 PROCEDURE — 97110 THERAPEUTIC EXERCISES: CPT | Mod: GP | Performed by: PHYSICAL THERAPIST

## 2022-04-01 PROCEDURE — 97112 NEUROMUSCULAR REEDUCATION: CPT | Mod: GP | Performed by: PHYSICAL THERAPIST

## 2022-04-15 ENCOUNTER — THERAPY VISIT (OUTPATIENT)
Dept: PHYSICAL THERAPY | Facility: CLINIC | Age: 47
End: 2022-04-15
Payer: COMMERCIAL

## 2022-04-15 DIAGNOSIS — M25.579 PAIN IN JOINT, ANKLE AND FOOT, UNSPECIFIED LATERALITY: ICD-10-CM

## 2022-04-15 DIAGNOSIS — M25.562 PAIN IN BOTH KNEES: Primary | ICD-10-CM

## 2022-04-15 DIAGNOSIS — M25.561 PAIN IN BOTH KNEES: Primary | ICD-10-CM

## 2022-04-15 PROCEDURE — 97112 NEUROMUSCULAR REEDUCATION: CPT | Mod: GP | Performed by: PHYSICAL THERAPIST

## 2022-04-15 PROCEDURE — 97110 THERAPEUTIC EXERCISES: CPT | Mod: GP | Performed by: PHYSICAL THERAPIST

## 2022-05-02 ENCOUNTER — THERAPY VISIT (OUTPATIENT)
Dept: PHYSICAL THERAPY | Facility: CLINIC | Age: 47
End: 2022-05-02
Payer: COMMERCIAL

## 2022-05-02 DIAGNOSIS — M25.579 PAIN IN JOINT, ANKLE AND FOOT, UNSPECIFIED LATERALITY: ICD-10-CM

## 2022-05-02 DIAGNOSIS — M25.562 PAIN IN BOTH KNEES: Primary | ICD-10-CM

## 2022-05-02 DIAGNOSIS — M25.561 PAIN IN BOTH KNEES: Primary | ICD-10-CM

## 2022-05-02 PROCEDURE — 97110 THERAPEUTIC EXERCISES: CPT | Mod: GP | Performed by: PHYSICAL THERAPIST

## 2022-05-02 PROCEDURE — 97112 NEUROMUSCULAR REEDUCATION: CPT | Mod: GP | Performed by: PHYSICAL THERAPIST

## 2022-05-18 ENCOUNTER — THERAPY VISIT (OUTPATIENT)
Dept: PHYSICAL THERAPY | Facility: CLINIC | Age: 47
End: 2022-05-18
Payer: COMMERCIAL

## 2022-05-18 DIAGNOSIS — M25.561 PAIN IN BOTH KNEES: Primary | ICD-10-CM

## 2022-05-18 DIAGNOSIS — M25.579 PAIN IN JOINT, ANKLE AND FOOT, UNSPECIFIED LATERALITY: ICD-10-CM

## 2022-05-18 DIAGNOSIS — M25.562 PAIN IN BOTH KNEES: Primary | ICD-10-CM

## 2022-05-18 PROCEDURE — 97110 THERAPEUTIC EXERCISES: CPT | Mod: GP | Performed by: PHYSICAL THERAPIST

## 2022-05-18 PROCEDURE — 97112 NEUROMUSCULAR REEDUCATION: CPT | Mod: GP | Performed by: PHYSICAL THERAPIST

## 2022-06-15 ENCOUNTER — THERAPY VISIT (OUTPATIENT)
Dept: PHYSICAL THERAPY | Facility: CLINIC | Age: 47
End: 2022-06-15
Payer: COMMERCIAL

## 2022-06-15 DIAGNOSIS — M25.562 PAIN IN BOTH KNEES: Primary | ICD-10-CM

## 2022-06-15 DIAGNOSIS — M25.561 PAIN IN BOTH KNEES: Primary | ICD-10-CM

## 2022-06-15 DIAGNOSIS — M25.579 PAIN IN JOINT, ANKLE AND FOOT, UNSPECIFIED LATERALITY: ICD-10-CM

## 2022-06-15 PROCEDURE — 97110 THERAPEUTIC EXERCISES: CPT | Mod: GP | Performed by: PHYSICAL THERAPIST

## 2022-06-15 PROCEDURE — 97112 NEUROMUSCULAR REEDUCATION: CPT | Mod: GP | Performed by: PHYSICAL THERAPIST

## 2022-07-13 ENCOUNTER — THERAPY VISIT (OUTPATIENT)
Dept: PHYSICAL THERAPY | Facility: CLINIC | Age: 47
End: 2022-07-13
Payer: COMMERCIAL

## 2022-07-13 DIAGNOSIS — M25.562 PAIN IN BOTH KNEES: Primary | ICD-10-CM

## 2022-07-13 DIAGNOSIS — M25.561 PAIN IN BOTH KNEES: Primary | ICD-10-CM

## 2022-07-13 DIAGNOSIS — M25.579 PAIN IN JOINT, ANKLE AND FOOT, UNSPECIFIED LATERALITY: ICD-10-CM

## 2022-07-13 PROCEDURE — 97112 NEUROMUSCULAR REEDUCATION: CPT | Mod: GP | Performed by: PHYSICAL THERAPIST

## 2022-07-13 PROCEDURE — 97110 THERAPEUTIC EXERCISES: CPT | Mod: GP | Performed by: PHYSICAL THERAPIST

## 2022-07-13 NOTE — PROGRESS NOTES
Subjective:  HPI  Physical Exam                    Objective:  System    Physical Exam    General     ROS    Assessment/Plan:    PROGRESS  REPORT    Progress reporting period is from 7/13/22.     SUBJECTIVE  Things continue to go well. She is aware of the proper muscles she should be engaging with various ADL's/projects around the house. She is working various balance and strengthening exercises in during her day.      Initial Pain level: 3/10       OBJECTIVE    Balance:    Single Leg Stance--Eyes Open:  30/30 sec bilat  Bilateral Leg Squat:  good form without cues  Patellar compression negative bilat  Palpation:    Left knee tenderness present at:  Patellar Medial  Right knee tenderness present at:  Patellar Medial  B hip abd 5-/5, ext 5-/5  Independent in HEP    ASSESSMENT/PLAN  Diagnosis 1:  B knee/ankle pain in the setting of hypermobility  Pain -  hot/cold therapy, manual therapy, splint/taping/bracing/orthotics, self management, education and directional preference exercise  Decreased ROM/flexibility - manual therapy, therapeutic exercise and home program  Decreased strength - therapeutic exercise, therapeutic activities and home program  Decreased proprioception - neuro re-education, gait training, therapeutic activities and home program  STG/LTGs have been met or progress has been made towards goals:  Yes (See Goal flow sheet completed today.)  Assessment of Progress: The patient's condition is improving.  The patient's condition has potential to improve.  Self Management Plans:  Patient has been instructed in a home treatment program.  Patient is independent in a home treatment program.  Patient  has been instructed in self management of symptoms.  Patient is independent in self management of symptoms.  I have re-evaluated this patient and find that the nature, scope, duration and intensity of the therapy is appropriate for the medical condition of the patient.  Lisset continues to require the following  intervention to meet STG and LTG's: PT    Recommendations:  This patient would benefit from continued therapy.     Frequency:  1 X a month, once daily  Duration:  for 2 months        Please refer to the daily flowsheet for treatment today, total treatment time and time spent performing 1:1 timed codes.

## 2022-07-25 ENCOUNTER — VIRTUAL VISIT (OUTPATIENT)
Dept: ALLERGY | Facility: CLINIC | Age: 47
End: 2022-07-25
Payer: COMMERCIAL

## 2022-07-25 DIAGNOSIS — J45.30 MILD PERSISTENT ASTHMA WITHOUT COMPLICATION: Primary | ICD-10-CM

## 2022-07-25 DIAGNOSIS — J30.1 SEASONAL ALLERGIC RHINITIS DUE TO POLLEN: ICD-10-CM

## 2022-07-25 DIAGNOSIS — H10.13 ALLERGIC CONJUNCTIVITIS, BILATERAL: ICD-10-CM

## 2022-07-25 DIAGNOSIS — J30.89 ALLERGIC RHINITIS DUE TO DUST MITE: ICD-10-CM

## 2022-07-25 DIAGNOSIS — J30.89 ALLERGIC RHINITIS DUE TO MOLD: ICD-10-CM

## 2022-07-25 DIAGNOSIS — J30.81 ALLERGIC RHINITIS DUE TO ANIMALS: ICD-10-CM

## 2022-07-25 DIAGNOSIS — K90.49 FOOD INTOLERANCE: ICD-10-CM

## 2022-07-25 PROCEDURE — 99214 OFFICE O/P EST MOD 30 MIN: CPT | Mod: 95 | Performed by: ALLERGY & IMMUNOLOGY

## 2022-07-25 RX ORDER — MONTELUKAST SODIUM 10 MG/1
10 TABLET ORAL AT BEDTIME
Qty: 90 TABLET | Refills: 3 | Status: SHIPPED | OUTPATIENT
Start: 2022-07-25 | End: 2023-09-19

## 2022-07-25 ASSESSMENT — ENCOUNTER SYMPTOMS
COUGH: 0
JOINT SWELLING: 0
FEVER: 0
VOMITING: 0
SINUS PRESSURE: 1
HEADACHES: 1
CHILLS: 0
NAUSEA: 0
DIARRHEA: 0
CHEST TIGHTNESS: 0
ADENOPATHY: 0
MYALGIAS: 0
FACIAL SWELLING: 0
EYE ITCHING: 0
EYE REDNESS: 0
ARTHRALGIAS: 0
ACTIVITY CHANGE: 0
EYE DISCHARGE: 0
WHEEZING: 0
RHINORRHEA: 1
SHORTNESS OF BREATH: 0

## 2022-07-25 ASSESSMENT — ASTHMA QUESTIONNAIRES
QUESTION_5 LAST FOUR WEEKS HOW WOULD YOU RATE YOUR ASTHMA CONTROL: COMPLETELY CONTROLLED
ACT_TOTALSCORE: 25
ACT_TOTALSCORE: 25
QUESTION_4 LAST FOUR WEEKS HOW OFTEN HAVE YOU USED YOUR RESCUE INHALER OR NEBULIZER MEDICATION (SUCH AS ALBUTEROL): NOT AT ALL
QUESTION_2 LAST FOUR WEEKS HOW OFTEN HAVE YOU HAD SHORTNESS OF BREATH: NOT AT ALL
QUESTION_3 LAST FOUR WEEKS HOW OFTEN DID YOUR ASTHMA SYMPTOMS (WHEEZING, COUGHING, SHORTNESS OF BREATH, CHEST TIGHTNESS OR PAIN) WAKE YOU UP AT NIGHT OR EARLIER THAN USUAL IN THE MORNING: NOT AT ALL
QUESTION_1 LAST FOUR WEEKS HOW MUCH OF THE TIME DID YOUR ASTHMA KEEP YOU FROM GETTING AS MUCH DONE AT WORK, SCHOOL OR AT HOME: NONE OF THE TIME

## 2022-07-25 NOTE — LETTER
7/25/2022         RE: Lisset MITCHELL Chi  [address] [facility] [address]        Dear Colleague,    Thank you for referring your patient, Lisset MITCHELL Chi, to the [facility]. Please see a copy of my visit note below.    Lisset MITCHELL Chi is a 46 year old who is being evaluated today via a billable video visit    How would you like to obtain your AVS? MyChart  If the video visit is dropped, the invitation should be resent by:   Will anyone else be joining your video visit? No      Video Start Time: 11:56 AM    Video-Visit Details    Type of service:  Video Visit    Video End Time: 12:10 PM    Originating Location (pt. Location): Home    Distant Location (provider location):  [facility]     Platform used for Video Visit: Jorgito    Lisset MITHCELL Chi was seen in the Allergy Clinic at [facility].      Lisset MITCHELL Chi is a 46 year old Not  or  female who is seen today for a follow-up visit. Reports she has been doing well. Taking montelukast daily along with using fluticasone nasal spray and taking fexofenadine. Feels the montelukast has been helpful. Has experienced some drowsiness in the morning but unsure if this is different from baseline. Has more rhinorrhea than congestion typically.    Asthma has been well controlled - rarely using albuterol. No exacerbations or oral steroid use in the past year.    Currently has a stomach bug with symptoms of abdominal discomfort and headache. Prior to this however she had been limiting beef and corn in her diet as she would develop abdominal pain and constipation. Most recently ate both of these foods while camping and developed symptoms. Wondering about a food allergy. No history of acute, IgE mediated symptoms to any foods.      Past Medical History:   Diagnosis Date     Allergic rhinitis due to other allergen      Asthma      IBS (irritable bowel syndrome)      Other internal derangement of  knee(305.89) 2005    patella dislocates easily     Premature ovarian failure      Raynaud's disease 3/08     Family History   Problem Relation Age of Onset     Thyroid Disease Mother      Diabetes Mother         type2     Eye Disorder Mother         cataracts     Gastrointestinal Disease Mother         hep a/has to have her throat stretched     Respiratory Mother         sleep apnea     Heart Disease Father 60     C.A.D. Paternal Grandfather 30        age 30, then again in his 80's     Diabetes Maternal Grandfather      Cancer Maternal Grandfather      Thyroid Disease Maternal Grandmother      Gynecology Maternal Grandmother         on bcp to keep period regular, a small uterus     Heart Disease Maternal Grandmother         tachycardia     Allergies Daughter         milk     Asthma Daughter      Social History     Tobacco Use     Smoking status: Never Smoker     Smokeless tobacco: Never Used   Substance Use Topics     Alcohol use: Yes     Comment: social     Drug use: No     Social History     Social History Narrative    Caffeine intake/servings daily - 0-1    Calcium intake/servings daily - 2-3    Exercise 2-3  times weekly - describe aerobics    Sunscreen used - Yes    Seatbelts used - Yes    Guns stored in the home - No    Self Breast Exam - Yes    Pap test up to date -  Yes    Eye exam up to date -  Yes    Dental exam up to date -  Yes    DEXA scan up to date -  Not Applicable    Flex Sig/Colonoscopy up to date -  Not Applicable    Mammography up to date -  Not Applicable    Immunizations reviewed and up to date - Yes    Abuse: Current or Past (Physical, Sexual or Emotional) - None current, past    Do you feel safe in your environment - Yes    Do you cope well with stress - Yes    Do you suffer from insomnia - No    Last updated by: Estefani Moraes MA 5/5/2010               Past medical, family, and social history were reviewed.    Review of Systems   Constitutional: Negative for activity change, chills and fever.    HENT: Positive for rhinorrhea and sinus pressure. Negative for congestion, dental problem, ear pain, facial swelling, nosebleeds, postnasal drip and sneezing.    Eyes: Negative for discharge, redness and itching.   Respiratory: Negative for cough, chest tightness, shortness of breath and wheezing.    Cardiovascular: Negative for chest pain.   Gastrointestinal: Negative for diarrhea, nausea and vomiting.   Musculoskeletal: Negative for arthralgias, joint swelling and myalgias.   Skin: Negative for rash.   Neurological: Positive for headaches.   Hematological: Negative for adenopathy.   Psychiatric/Behavioral: Negative for behavioral problems and self-injury.         Current Outpatient Medications:      albuterol (PROAIR HFA/PROVENTIL HFA/VENTOLIN HFA) 108 (90 Base) MCG/ACT inhaler, Inhale 2 puffs into the lungs every 6 hours, Disp: 18 g, Rfl: 3     Calcium Carbonate-Vitamin D (CALCIUM + D PO), Take 600 mg by mouth., Disp: , Rfl:      FEXOFENADINE  MG OR TABS, 1 TABLET DAILY  (Patient not taking: No sig reported), Disp: , Rfl:      fluticasone (FLONASE) 50 MCG/ACT nasal spray, Spray 2 sprays into both nostrils daily, Disp: , Rfl:      ibuprofen (ADVIL/MOTRIN) 600 MG tablet, TAKE 1 TABLET (600 MG) BY MOUTH EVERY 8 HOURS AS NEEDED FOR MODERATE PAIN, Disp: 60 tablet, Rfl: 1     montelukast (SINGULAIR) 10 MG tablet, Take 1 tablet (10 mg) by mouth At Bedtime, Disp: 90 tablet, Rfl: 3     Turmeric Curcumin 500 MG CAPS, Take 500 mg by mouth daily , Disp: , Rfl:      VITAMIN D, CHOLECALCIFEROL, PO, Take by mouth daily, Disp: , Rfl:     Current Facility-Administered Medications:      triamcinolone (KENALOG-40) injection 40 mg, 40 mg, , , Floyd Larsen MD, 40 mg at 03/16/22 1626  No Known Allergies    EXAM:   [facility] 12/25/2013   Physical Exam  Vitals and nursing note reviewed.   Constitutional:       Appearance: Normal appearance.   HENT:      Head: Normocephalic and atraumatic.      Right Ear: External ear  normal.      Left Ear: External ear normal.      Nose: No rhinorrhea.   Neck:      Comments: No asymmetry, masses, or scars  Neurological:      Mental Status: She is alert.   Psychiatric:         Mood and Affect: Mood and affect normal.       WORKUP:  None    ASSESSMENT/PLAN:  Lisset MITCHELL Chi is a 46 year old female seen for a follow-up visit.    1. Mild persistent asthma without complication - Well controlled, no exacerbations or oral steroid use in the past year.    - take 2 to 4 puffs of albuterol HFA every 4 hours as needed  - montelukast (SINGULAIR) 10 MG tablet; Take 1 tablet (10 mg) by mouth At Bedtime  Dispense: 90 tablet; Refill: 3    2. Seasonal allergic rhinitis due to pollen    - take 180mg of fexofenadine daily  - use fluticasone nasal spray - 1-2 sprays in each nostril daily  - montelukast (SINGULAIR) 10 MG tablet; Take 1 tablet (10 mg) by mouth At Bedtime  Dispense: 90 tablet; Refill: 3    3. Allergic rhinitis due to dust mite    - montelukast (SINGULAIR) 10 MG tablet; Take 1 tablet (10 mg) by mouth At Bedtime  Dispense: 90 tablet; Refill: 3    4. Allergic rhinitis due to animals    - montelukast (SINGULAIR) 10 MG tablet; Take 1 tablet (10 mg) by mouth At Bedtime  Dispense: 90 tablet; Refill: 3    5. Allergic rhinitis due to mold    - montelukast (SINGULAIR) 10 MG tablet; Take 1 tablet (10 mg) by mouth At Bedtime  Dispense: 90 tablet; Refill: 3    6. Allergic conjunctivitis, bilateral - see above    7. Food intolerance - Symptoms of abdominal discomfort and constipation after eating beef and corn. No history of IgE mediated symptoms. Reviewed signs and symptoms of food allergies vs intolerance. Recommend continued avoidance/limitation of these foods in the diet. No indication for additional allergy testing at this time.      Follow-up in 1 year, sooner if needed      Thank you for allowing me to participate in the care of Lisset MITCHELL Chi.      Flower Gaytan MD, FAAAAI  Allergy/Immunology  [facility]  [facility]      Chart documentation done in part with Dragon Voice Recognition Software. Although reviewed after completion, some word and grammatical errors may remain.      Again, thank you for allowing me to participate in the care of your patient.        Sincerely,        Flower Gaytan MD

## 2022-07-25 NOTE — PROGRESS NOTES
Lisset MITCHELL Chi is a 46 year old who is being evaluated today via a billable video visit    How would you like to obtain your AVS? MyChart  If the video visit is dropped, the invitation should be resent by:   Will anyone else be joining your video visit? No      Video Start Time: 11:56 AM    Video-Visit Details    Type of service:  Video Visit    Video End Time: 12:10 PM    Originating Location (pt. Location): Home    Distant Location (provider location):  [facility]     Platform used for Video Visit: Jorgito    Lisset MITCHELL Chi was seen in the Allergy Clinic at [facility].      Lisset MITCHELL Chi is a 46 year old Not  or  female who is seen today for a follow-up visit. Reports she has been doing well. Taking montelukast daily along with using fluticasone nasal spray and taking fexofenadine. Feels the montelukast has been helpful. Has experienced some drowsiness in the morning but unsure if this is different from baseline. Has more rhinorrhea than congestion typically.    Asthma has been well controlled - rarely using albuterol. No exacerbations or oral steroid use in the past year.    Currently has a stomach bug with symptoms of abdominal discomfort and headache. Prior to this however she had been limiting beef and corn in her diet as she would develop abdominal pain and constipation. Most recently ate both of these foods while camping and developed symptoms. Wondering about a food allergy. No history of acute, IgE mediated symptoms to any foods.      Past Medical History:   Diagnosis Date     Allergic rhinitis due to other allergen      Asthma      IBS (irritable bowel syndrome)      Other internal derangement of knee(228.03) 2005    patella dislocates easily     Premature ovarian failure      Raynaud's disease 3/08     Family History   Problem Relation Age of Onset     Thyroid Disease Mother      Diabetes Mother         type2     Eye Disorder Mother          cataracts     Gastrointestinal Disease Mother         hep a/has to have her throat stretched     Respiratory Mother         sleep apnea     Heart Disease Father 60     C.A.D. Paternal Grandfather 30        age 30, then again in his 80's     Diabetes Maternal Grandfather      Cancer Maternal Grandfather      Thyroid Disease Maternal Grandmother      Gynecology Maternal Grandmother         on bcp to keep period regular, a small uterus     Heart Disease Maternal Grandmother         tachycardia     Allergies Daughter         milk     Asthma Daughter      Social History     Tobacco Use     Smoking status: Never Smoker     Smokeless tobacco: Never Used   Substance Use Topics     Alcohol use: Yes     Comment: social     Drug use: No     Social History     Social History Narrative    Caffeine intake/servings daily - 0-1    Calcium intake/servings daily - 2-3    Exercise 2-3  times weekly - describe aerobics    Sunscreen used - Yes    Seatbelts used - Yes    Guns stored in the home - No    Self Breast Exam - Yes    Pap test up to date -  Yes    Eye exam up to date -  Yes    Dental exam up to date -  Yes    DEXA scan up to date -  Not Applicable    Flex Sig/Colonoscopy up to date -  Not Applicable    Mammography up to date -  Not Applicable    Immunizations reviewed and up to date - Yes    Abuse: Current or Past (Physical, Sexual or Emotional) - None current, past    Do you feel safe in your environment - Yes    Do you cope well with stress - Yes    Do you suffer from insomnia - No    Last updated by: Estefani Moraes MA 5/5/2010               Past medical, family, and social history were reviewed.    Review of Systems   Constitutional: Negative for activity change, chills and fever.   HENT: Positive for rhinorrhea and sinus pressure. Negative for congestion, dental problem, ear pain, facial swelling, nosebleeds, postnasal drip and sneezing.    Eyes: Negative for discharge, redness and itching.   Respiratory: Negative for  cough, chest tightness, shortness of breath and wheezing.    Cardiovascular: Negative for chest pain.   Gastrointestinal: Negative for diarrhea, nausea and vomiting.   Musculoskeletal: Negative for arthralgias, joint swelling and myalgias.   Skin: Negative for rash.   Neurological: Positive for headaches.   Hematological: Negative for adenopathy.   Psychiatric/Behavioral: Negative for behavioral problems and self-injury.         Current Outpatient Medications:      albuterol (PROAIR HFA/PROVENTIL HFA/VENTOLIN HFA) 108 (90 Base) MCG/ACT inhaler, Inhale 2 puffs into the lungs every 6 hours, Disp: 18 g, Rfl: 3     Calcium Carbonate-Vitamin D (CALCIUM + D PO), Take 600 mg by mouth., Disp: , Rfl:      FEXOFENADINE  MG OR TABS, 1 TABLET DAILY  (Patient not taking: No sig reported), Disp: , Rfl:      fluticasone (FLONASE) 50 MCG/ACT nasal spray, Spray 2 sprays into both nostrils daily, Disp: , Rfl:      ibuprofen (ADVIL/MOTRIN) 600 MG tablet, TAKE 1 TABLET (600 MG) BY MOUTH EVERY 8 HOURS AS NEEDED FOR MODERATE PAIN, Disp: 60 tablet, Rfl: 1     montelukast (SINGULAIR) 10 MG tablet, Take 1 tablet (10 mg) by mouth At Bedtime, Disp: 90 tablet, Rfl: 3     Turmeric Curcumin 500 MG CAPS, Take 500 mg by mouth daily , Disp: , Rfl:      VITAMIN D, CHOLECALCIFEROL, PO, Take by mouth daily, Disp: , Rfl:     Current Facility-Administered Medications:      triamcinolone (KENALOG-40) injection 40 mg, 40 mg, , , Floyd Larsen MD, 40 mg at 03/16/22 1626  No Known Allergies    EXAM:   [facility] 12/25/2013   Physical Exam  Vitals and nursing note reviewed.   Constitutional:       Appearance: Normal appearance.   HENT:      Head: Normocephalic and atraumatic.      Right Ear: External ear normal.      Left Ear: External ear normal.      Nose: No rhinorrhea.   Neck:      Comments: No asymmetry, masses, or scars  Neurological:      Mental Status: She is alert.   Psychiatric:         Mood and Affect: Mood and affect normal.        WORKUP:  None    ASSESSMENT/PLAN:  Lisset MITCHELL Chi is a 46 year old female seen for a follow-up visit.    1. Mild persistent asthma without complication - Well controlled, no exacerbations or oral steroid use in the past year.    - take 2 to 4 puffs of albuterol HFA every 4 hours as needed  - montelukast (SINGULAIR) 10 MG tablet; Take 1 tablet (10 mg) by mouth At Bedtime  Dispense: 90 tablet; Refill: 3    2. Seasonal allergic rhinitis due to pollen    - take 180mg of fexofenadine daily  - use fluticasone nasal spray - 1-2 sprays in each nostril daily  - montelukast (SINGULAIR) 10 MG tablet; Take 1 tablet (10 mg) by mouth At Bedtime  Dispense: 90 tablet; Refill: 3    3. Allergic rhinitis due to dust mite    - montelukast (SINGULAIR) 10 MG tablet; Take 1 tablet (10 mg) by mouth At Bedtime  Dispense: 90 tablet; Refill: 3    4. Allergic rhinitis due to animals    - montelukast (SINGULAIR) 10 MG tablet; Take 1 tablet (10 mg) by mouth At Bedtime  Dispense: 90 tablet; Refill: 3    5. Allergic rhinitis due to mold    - montelukast (SINGULAIR) 10 MG tablet; Take 1 tablet (10 mg) by mouth At Bedtime  Dispense: 90 tablet; Refill: 3    6. Allergic conjunctivitis, bilateral - see above    7. Food intolerance - Symptoms of abdominal discomfort and constipation after eating beef and corn. No history of IgE mediated symptoms. Reviewed signs and symptoms of food allergies vs intolerance. Recommend continued avoidance/limitation of these foods in the diet. No indication for additional allergy testing at this time.      Follow-up in 1 year, sooner if needed      Thank you for allowing me to participate in the care of Lisset MITCHELL Chi.      Flower Gaytan MD, FAAAAI  Allergy/Immunology  [facility]      Chart documentation done in part with Dragon Voice Recognition Software. Although reviewed after completion, some word and grammatical errors may remain.

## 2022-08-10 ENCOUNTER — THERAPY VISIT (OUTPATIENT)
Dept: PHYSICAL THERAPY | Facility: CLINIC | Age: 47
End: 2022-08-10
Payer: COMMERCIAL

## 2022-08-10 DIAGNOSIS — M25.562 PAIN IN BOTH KNEES: Primary | ICD-10-CM

## 2022-08-10 DIAGNOSIS — M25.579 PAIN IN JOINT, ANKLE AND FOOT, UNSPECIFIED LATERALITY: ICD-10-CM

## 2022-08-10 DIAGNOSIS — M25.561 PAIN IN BOTH KNEES: Primary | ICD-10-CM

## 2022-08-10 PROCEDURE — 97110 THERAPEUTIC EXERCISES: CPT | Mod: 59 | Performed by: PHYSICAL THERAPIST

## 2022-08-10 PROCEDURE — 97530 THERAPEUTIC ACTIVITIES: CPT | Mod: GP | Performed by: PHYSICAL THERAPIST

## 2022-10-03 ENCOUNTER — MYC MEDICAL ADVICE (OUTPATIENT)
Dept: FAMILY MEDICINE | Facility: CLINIC | Age: 47
End: 2022-10-03

## 2022-10-03 DIAGNOSIS — Z12.11 COLON CANCER SCREENING: Primary | ICD-10-CM

## 2022-10-03 NOTE — TELEPHONE ENCOUNTER
Routing to PCP to consider order for colonoscopy screen.     Tamiko Villalobos, RN, BSN, PHN  [facility]: [place]

## 2022-10-05 ENCOUNTER — TELEPHONE (OUTPATIENT)
Dept: GASTROENTEROLOGY | Facility: CLINIC | Age: 47
End: 2022-10-05

## 2022-10-05 NOTE — TELEPHONE ENCOUNTER
Screening Questions  BLUE  KIND OF PREP RED  LOCATION [review exclusion criteria] GREEN  SEDATION TYPE        Y Are you active on mychart?       Adriana Myers, DO    Ordering/Referring Provider?        PO What type of coverage do you have?      N Have you had a positive covid test in the last 90 days?     21 1. BMI  [BMI 40+ - review exclusion criteria]    Y  2. Are you able to give consent for your medical care? [IF NO,RN REVIEW]        N  3. Are you taking any prescription pain medications on a routine schedule?        3a. EXTENDED PREP What kind of prescription?     N 4. Do you have any chemical dependencies such as alcohol, street drugs, or methadone?    N 5. Do you have any history of post-traumatic stress syndrome, severe anxiety or history of psychosis?      **If yes 3- 5 , please schedule with MAC sedation.**          IF YES TO ANY 6 - 10 - HOSPITAL SETTING ONLY.     N 6.   Do you need assistance transferring?     N 7.   Have you had a heart or lung transplant?    N 8.   Are you currently on dialysis?   N 9.   Do you use daily home oxygen?   N 10. Do you take nitroglycerin?   10a.  If yes, how often?     11. [FEMALES]  N Are you currently pregnant?    11a.  If yes, how many weeks? [ Greater than 12 weeks, OR NEEDED]    N 12. Do you have Pulmonary Hypertension? *NEED PAC APPT AT [facility]*     N 13. [review exclusion criteria]  Do you have any implantable devices in your body (pacemaker, defib, LVAD)?    N 14. In the past 6 months, have you had any heart related issues including cardiomyopathy or heart attack?     14a.  If yes, did it require cardiac stenting if so when?     N 15. Have you had a stroke or Transient ischemic attack (TIA - aka  mini stroke ) within 6 months?      N 16. Do you have mod to severe Obstructive Sleep Apnea?  [Hospital only - Ok at [place]]    N 17. Do you have SEVERE AND UNCONTROLLED asthma? *NEED PAC APPT AT [facility]*     N 18. Are you currently taking any blood thinners?     " 18a. If yes, inform patient to \"follow up w/ ordering provider for bridging instructions.\"    N 19. Do you take the medication Phentermine?    19a. If yes, \"Hold for 7 days before procedure.  Please consult your prescribing provider if you have questions about holding this medication.\"     N  20. Do you have chronic kidney disease?      N  21. Do you have a diagnosis of diabetes?     Y  22. On a regular basis do you go 3-5 days between bowel movements?      23. Preferred LOCAL Pharmacy for Pre Prescription    [ LIST ONLY ONE PHARMACY]       [facility] [id number] [place] [address]        - CLOSING REMINDERS -    Informed patient they will need an adult    Cannot take any type of public or medical transportation alone    Conscious Sedation- Needs  for 6 hours after the procedure       MAC/General-Needs  for 24 hours after procedure    Pre-Procedure Covid test to be completed [[facility] PCR Testing Required]    Confirmed Nurse will call to complete assessment       - SCHEDULING DETAILS -     JOHANNY CHAUHAN  Surgeon    12/2  Date of Procedure  Lower Endoscopy [Colonoscopy]  Type of Procedure Scheduled   [facility] [place]  EXTENDED PREP - If you answer yes to questions #1, #3, #22 (De Guillermoen and CF pts)Which Colonoscopy Prep was Sent?     MODERATE Sedation Type     N PAC / Pre-op Required         Additional comments:            "

## 2022-10-14 ENCOUNTER — THERAPY VISIT (OUTPATIENT)
Dept: PHYSICAL THERAPY | Facility: CLINIC | Age: 47
End: 2022-10-14
Payer: COMMERCIAL

## 2022-10-14 ENCOUNTER — ANCILLARY PROCEDURE (OUTPATIENT)
Dept: MAMMOGRAPHY | Facility: CLINIC | Age: 47
End: 2022-10-14
Attending: FAMILY MEDICINE
Payer: COMMERCIAL

## 2022-10-14 DIAGNOSIS — M54.42 BILATERAL LOW BACK PAIN WITH LEFT-SIDED SCIATICA: ICD-10-CM

## 2022-10-14 DIAGNOSIS — Z12.31 VISIT FOR SCREENING MAMMOGRAM: ICD-10-CM

## 2022-10-14 PROCEDURE — 77063 BREAST TOMOSYNTHESIS BI: CPT | Mod: TC | Performed by: RADIOLOGY

## 2022-10-14 PROCEDURE — 97112 NEUROMUSCULAR REEDUCATION: CPT | Mod: GP | Performed by: PHYSICAL THERAPIST

## 2022-10-14 PROCEDURE — 77067 SCR MAMMO BI INCL CAD: CPT | Mod: TC | Performed by: RADIOLOGY

## 2022-10-14 PROCEDURE — 97161 PT EVAL LOW COMPLEX 20 MIN: CPT | Mod: GP | Performed by: PHYSICAL THERAPIST

## 2022-10-14 PROCEDURE — 97110 THERAPEUTIC EXERCISES: CPT | Mod: GP | Performed by: PHYSICAL THERAPIST

## 2022-10-14 NOTE — PROGRESS NOTES
Physical Therapy Initial Evaluation  Subjective:  Patient Health History     Pain is reported as 6/10 on pain scale.  General health as reported by patient is good.  Pertinent medical history includes: asthma, depression, menopausal and seizures.   Red flags:  None as reported by patient.  Medical allergies: none.   Surgeries include:  Orthopedic surgery. Other surgery history details: B knees for patellar dislocations.    Current medications:  Pain medication.    Current occupation is .   Primary job tasks include:  Computer work, prolonged sitting and prolonged standing.   The history is provided by the patient.   Therapist Generated HPI Evaluation  Problem details: Pain began about a week ago in the low back, L hip pain started shortly after  .         Type of problem:  Lumbar.    This is a recurrent condition.  Condition occurred with:  Insidious onset.  Where condition occurred: for unknown reasons.  Patient reports pain:  Lumbar spine left, lumbar spine right and central lumbar spine.  Pain is described as aching and burning and is intermittent.  Pain radiates to:  Gluteals left. Pain timing: no pattern.  Since onset symptoms are gradually worsening.  Associated symptoms:  Loss of motion/stiffness and loss of strength. Symptoms are exacerbated by walking, standing, bending and lying down  and relieved by rest.    Previous treatment includes physical therapy.   Restrictions due to condition include:  Working in normal job without restrictions.  Barriers include:  None as reported by patient.                        Objective:  Standing Alignment:        Lumbar:  Anterior pelvic tilt  Pelvic:  Normal                         Lumbar/SI Evaluation  ROM:  AROM Lumbar: normal (Pain with ext, L SB)      Lumbar Myotomes:  normal                Lumbar Dermtomes:  normal                Neural Tension/Mobility:    Left side:  Slump positive.  Left side:SLR  negative.     Right side:   Slump or SLR   negative.   Lumbar Palpation:    Tenderness present at Left:    Quadratus Lumborum; Piriformis and Gluteus Medius  Tenderness not present at Left:    Vertebral    Tenderness not present at Right:  Quadratus Lumborum; Piriformis or Vertebral    Lumbar Provocation:      Left negative with:  PROM hip    Right negative with:  PROM hip    SI joint/Sacrum:        Left positive at:    Sacral thrust  Left negative at:    Thigh thrust  Right positive at:    Sacral thrust  Right negative at:  Thigh thrust                                      Hip Evaluation  HIP AROM:  AROM:    Left Hip:     Normal    Right Hip:   Normal                    Hip Strength:      Extension:  Left: 4+/5  Pain:Right: 5/5    Pain:    Abduction:  Left: 4+/5     Pain:Right: 5/5    Pain:                  Hip Special Testing:      Left hip negative for the following special tests:  Mee  Right hip negative for the following special tests:  Mee      Functional Testing:          Quad:      Bilateral leg squat:  Fermoral IR                  General     ROS    Assessment/Plan:    Patient is a 46 year old female with lumbar complaints.    Patient has the following significant findings with corresponding treatment plan.                Diagnosis 1:  LBP/L LE  Pain -  hot/cold therapy, manual therapy, self management, education, directional preference exercise and home program  Decreased ROM/flexibility - manual therapy, therapeutic exercise and home program  Decreased strength - therapeutic exercise, therapeutic activities and home program    Therapy Evaluation Codes:   1) History comprised of:   Personal factors that impact the plan of care:      None.    Comorbidity factors that impact the plan of care are:      Hypermobility.     Medications impacting care: None.  2) Examination of Body Systems comprised of:   Body structures and functions that impact the plan of care:      Hip, Knee and Lumbar spine.   Activity limitations that impact the plan of care  are:      Dressing, Reading/Computer work, Sitting, Squatting/kneeling, Stairs, Standing, Walking and Laying down.  3) Clinical presentation characteristics are:   Stable/Uncomplicated.  4) Decision-Making    Low complexity using standardized patient assessment instrument and/or measureable assessment of functional outcome.  Cumulative Therapy Evaluation is: Low complexity.    Previous and current functional limitations:  (See Goal Flow Sheet for this information)    Short term and Long term goals: (See Goal Flow Sheet for this information)     Communication ability:  Patient appears to be able to clearly communicate and understand verbal and written communication and follow directions correctly.  Treatment Explanation - The following has been discussed with the patient:   RX ordered/plan of care  Anticipated outcomes  Possible risks and side effects  This patient would benefit from PT intervention to resume normal activities.   Rehab potential is good.    Frequency:  1 X week, once daily  Duration:  for 8 weeks  Discharge Plan:  Achieve all LTG.  Independent in home treatment program.  Reach maximal therapeutic benefit.    Please refer to the daily flowsheet for treatment today, total treatment time and time spent performing 1:1 timed codes.

## 2022-10-17 PROBLEM — M54.42 BILATERAL LOW BACK PAIN WITH LEFT-SIDED SCIATICA: Status: ACTIVE | Noted: 2022-10-17

## 2022-10-22 ENCOUNTER — HEALTH MAINTENANCE LETTER (OUTPATIENT)
Age: 47
End: 2022-10-22

## 2022-10-26 ENCOUNTER — MYC MEDICAL ADVICE (OUTPATIENT)
Dept: ORTHOPEDICS | Facility: CLINIC | Age: 47
End: 2022-10-26

## 2022-10-26 DIAGNOSIS — M47.816 ARTHROPATHY OF LUMBAR FACET JOINT: Primary | ICD-10-CM

## 2022-11-02 ENCOUNTER — THERAPY VISIT (OUTPATIENT)
Dept: PHYSICAL THERAPY | Facility: CLINIC | Age: 47
End: 2022-11-02
Payer: COMMERCIAL

## 2022-11-02 DIAGNOSIS — M54.42 BILATERAL LOW BACK PAIN WITH LEFT-SIDED SCIATICA: Primary | ICD-10-CM

## 2022-11-02 PROCEDURE — 97110 THERAPEUTIC EXERCISES: CPT | Mod: GP | Performed by: PHYSICAL THERAPIST

## 2022-11-02 PROCEDURE — 97112 NEUROMUSCULAR REEDUCATION: CPT | Mod: GP | Performed by: PHYSICAL THERAPIST

## 2022-11-11 ENCOUNTER — THERAPY VISIT (OUTPATIENT)
Dept: PHYSICAL THERAPY | Facility: CLINIC | Age: 47
End: 2022-11-11
Payer: COMMERCIAL

## 2022-11-11 DIAGNOSIS — M54.42 BILATERAL LOW BACK PAIN WITH LEFT-SIDED SCIATICA: ICD-10-CM

## 2022-11-11 DIAGNOSIS — M54.2 NECK PAIN: Primary | ICD-10-CM

## 2022-11-11 PROCEDURE — 97110 THERAPEUTIC EXERCISES: CPT | Mod: GP | Performed by: PHYSICAL THERAPIST

## 2022-11-22 ENCOUNTER — TELEPHONE (OUTPATIENT)
Dept: SURGERY | Facility: AMBULATORY SURGERY CENTER | Age: 47
End: 2022-11-22

## 2022-11-22 DIAGNOSIS — Z12.11 ENCOUNTER FOR SCREENING COLONOSCOPY: Primary | ICD-10-CM

## 2022-11-22 RX ORDER — BISACODYL 5 MG
TABLET, DELAYED RELEASE (ENTERIC COATED) ORAL
Qty: 4 TABLET | Refills: 0 | Status: SHIPPED | OUTPATIENT
Start: 2022-11-22 | End: 2022-12-26

## 2022-11-23 NOTE — TELEPHONE ENCOUNTER
Attempted to contact patient regarding upcoming Colonoscopy procedure on 12.02.2022 for pre assessment questions. No answer.     Left message to return call to [phone] #[phone]    Discuss at home rapid antigen COVID test 1-2 days prior to procedure.    Arrival time: 1025    Facility location: [facility] [place]    Sedation type: CS    Indication for procedure: Screening    Anticoagulants: No    Bowel prep recommendation: Extended GoLytely d/t answering yes to 3-5 days between bowel movements    Prep instructions sent via [employer]  Prep prescription sent to    [facility] [address] [place] [address] [address]    Diabetic? No    Vianney Storey RN

## 2022-11-29 ENCOUNTER — VIRTUAL VISIT (OUTPATIENT)
Dept: PHYSICAL THERAPY | Facility: CLINIC | Age: 47
End: 2022-11-29
Payer: COMMERCIAL

## 2022-11-29 DIAGNOSIS — M54.42 BILATERAL LOW BACK PAIN WITH LEFT-SIDED SCIATICA: Primary | ICD-10-CM

## 2022-11-29 PROCEDURE — 97110 THERAPEUTIC EXERCISES: CPT | Mod: GP | Performed by: PHYSICAL THERAPIST

## 2022-11-30 ENCOUNTER — HOSPITAL ENCOUNTER (OUTPATIENT)
Dept: GENERAL RADIOLOGY | Facility: CLINIC | Age: 47
Discharge: HOME | End: 2022-11-30
Attending: PREVENTIVE MEDICINE | Admitting: PREVENTIVE MEDICINE
Payer: COMMERCIAL

## 2022-11-30 VITALS — SYSTOLIC BLOOD PRESSURE: 119 MMHG | HEART RATE: 71 BPM | OXYGEN SATURATION: 100 % | DIASTOLIC BLOOD PRESSURE: 69 MMHG

## 2022-11-30 DIAGNOSIS — M47.816 ARTHROPATHY OF LUMBAR FACET JOINT: ICD-10-CM

## 2022-11-30 DIAGNOSIS — M51.16 LUMBAR DISC HERNIATION WITH RADICULOPATHY: ICD-10-CM

## 2022-11-30 PROCEDURE — 64493 INJ PARAVERT F JNT L/S 1 LEV: CPT | Mod: 50

## 2022-11-30 PROCEDURE — 250N000009 HC RX 250: Performed by: PHYSICIAN ASSISTANT

## 2022-11-30 PROCEDURE — 250N000009 HC RX 250

## 2022-11-30 PROCEDURE — 250N000011 HC RX IP 250 OP 636

## 2022-11-30 PROCEDURE — 250N000011 HC RX IP 250 OP 636: Performed by: PHYSICIAN ASSISTANT

## 2022-11-30 RX ORDER — TRIAMCINOLONE ACETONIDE 40 MG/ML
80 INJECTION, SUSPENSION INTRA-ARTICULAR; INTRAMUSCULAR ONCE
Status: COMPLETED | OUTPATIENT
Start: 2022-11-30 | End: 2022-11-30

## 2022-11-30 RX ORDER — LIDOCAINE HYDROCHLORIDE 10 MG/ML
INJECTION, SOLUTION EPIDURAL; INFILTRATION; INTRACAUDAL; PERINEURAL
Status: COMPLETED
Start: 2022-11-30 | End: 2022-11-30

## 2022-11-30 RX ORDER — LIDOCAINE HYDROCHLORIDE 10 MG/ML
10 INJECTION, SOLUTION EPIDURAL; INFILTRATION; INTRACAUDAL; PERINEURAL ONCE
Status: COMPLETED | OUTPATIENT
Start: 2022-11-30 | End: 2022-11-30

## 2022-11-30 RX ORDER — BUPIVACAINE HYDROCHLORIDE 5 MG/ML
INJECTION, SOLUTION EPIDURAL; INTRACAUDAL
Status: DISCONTINUED
Start: 2022-11-30 | End: 2022-11-30 | Stop reason: WASHOUT

## 2022-11-30 RX ORDER — TRIAMCINOLONE ACETONIDE 40 MG/ML
INJECTION, SUSPENSION INTRA-ARTICULAR; INTRAMUSCULAR
Status: COMPLETED
Start: 2022-11-30 | End: 2022-11-30

## 2022-11-30 RX ORDER — IOPAMIDOL 408 MG/ML
10 INJECTION, SOLUTION INTRATHECAL ONCE
Status: DISCONTINUED | OUTPATIENT
Start: 2022-11-30 | End: 2022-12-01 | Stop reason: HOSPADM

## 2022-11-30 RX ADMIN — TRIAMCINOLONE ACETONIDE 40 MG: 40 INJECTION, SUSPENSION INTRA-ARTICULAR; INTRAMUSCULAR at 09:39

## 2022-11-30 RX ADMIN — LIDOCAINE HYDROCHLORIDE 8 ML: 10 INJECTION, SOLUTION EPIDURAL; INFILTRATION; INTRACAUDAL; PERINEURAL at 09:37

## 2022-11-30 RX ADMIN — LIDOCAINE HYDROCHLORIDE 2 ML: 10 INJECTION, SOLUTION EPIDURAL; INFILTRATION; INTRACAUDAL; PERINEURAL at 09:40

## 2022-11-30 RX ADMIN — LIDOCAINE HYDROCHLORIDE 20 MG: 10 INJECTION, SOLUTION EPIDURAL; INFILTRATION; INTRACAUDAL; PERINEURAL at 09:38

## 2022-11-30 NOTE — PROGRESS NOTES
Pt was in Radiology today for a bilateral L4-L5 Facet joint steroid injections. Pt tolerated ortho procedure well. Pre procedure pain was 4/10 post procedure pain level 2/10.Procedure was completed by NO MCMAHON. There were no complications during this procedure. Pt verbalized understanding of written and verbal instructions and left department in stable and satisfactory condition with cousin Maya. There is no evidence of bleeding or any other complications upon discharge. '

## 2022-11-30 NOTE — PROGRESS NOTES
RADIOLOGY PROCEDURE NOTE  Patient name: Lisset MITCHELL Chi  MRN: [id number]  : 1975    Pre-procedure diagnosis: Back pain  Post-procedure diagnosis: Same    Procedure Date/Time: 2022  9:34 AM  Procedure: Bilateral L4-L5 facet injections  Estimated blood loss: None  Specimen(s) collected with description: none  The patient tolerated the procedure well with no immediate complications.  Significant findings:none    See imaging dictation for procedural details.    Provider name: Matthew Álvarez PA-C  Assistant(s):None

## 2022-12-02 ENCOUNTER — HOSPITAL ENCOUNTER (OUTPATIENT)
Facility: AMBULATORY SURGERY CENTER | Age: 47
Discharge: HOME | End: 2022-12-02
Attending: INTERNAL MEDICINE | Admitting: INTERNAL MEDICINE
Payer: COMMERCIAL

## 2022-12-02 VITALS
DIASTOLIC BLOOD PRESSURE: 76 MMHG | HEIGHT: 62 IN | BODY MASS INDEX: 21.35 KG/M2 | HEART RATE: 53 BPM | SYSTOLIC BLOOD PRESSURE: 123 MMHG | RESPIRATION RATE: 14 BRPM | WEIGHT: 116 LBS | OXYGEN SATURATION: 100 % | TEMPERATURE: 98.6 F

## 2022-12-02 LAB — COLONOSCOPY: NORMAL

## 2022-12-02 PROCEDURE — 45390 COLONOSCOPY W/RESECTION: CPT | Mod: 59,33

## 2022-12-02 PROCEDURE — 88305 TISSUE EXAM BY PATHOLOGIST: CPT | Mod: TC | Performed by: INTERNAL MEDICINE

## 2022-12-02 PROCEDURE — 88305 TISSUE EXAM BY PATHOLOGIST: CPT | Mod: 26 | Performed by: PATHOLOGY

## 2022-12-02 PROCEDURE — 45385 COLONOSCOPY W/LESION REMOVAL: CPT | Mod: 33

## 2022-12-02 RX ORDER — NALOXONE HYDROCHLORIDE 0.4 MG/ML
0.2 INJECTION, SOLUTION INTRAMUSCULAR; INTRAVENOUS; SUBCUTANEOUS
Status: CANCELLED | OUTPATIENT
Start: 2022-12-02

## 2022-12-02 RX ORDER — PROCHLORPERAZINE MALEATE 10 MG
10 TABLET ORAL EVERY 6 HOURS PRN
Status: CANCELLED | OUTPATIENT
Start: 2022-12-02

## 2022-12-02 RX ORDER — ONDANSETRON 4 MG/1
4 TABLET, ORALLY DISINTEGRATING ORAL EVERY 6 HOURS PRN
Status: CANCELLED | OUTPATIENT
Start: 2022-12-02

## 2022-12-02 RX ORDER — NALOXONE HYDROCHLORIDE 0.4 MG/ML
0.4 INJECTION, SOLUTION INTRAMUSCULAR; INTRAVENOUS; SUBCUTANEOUS
Status: CANCELLED | OUTPATIENT
Start: 2022-12-02

## 2022-12-02 RX ORDER — LIDOCAINE 40 MG/G
CREAM TOPICAL
Status: DISCONTINUED | OUTPATIENT
Start: 2022-12-02 | End: 2022-12-03 | Stop reason: HOSPADM

## 2022-12-02 RX ORDER — DIPHENHYDRAMINE HYDROCHLORIDE 50 MG/ML
INJECTION INTRAMUSCULAR; INTRAVENOUS PRN
Status: DISCONTINUED | OUTPATIENT
Start: 2022-12-02 | End: 2022-12-02 | Stop reason: HOSPADM

## 2022-12-02 RX ORDER — ONDANSETRON 2 MG/ML
4 INJECTION INTRAMUSCULAR; INTRAVENOUS
Status: DISCONTINUED | OUTPATIENT
Start: 2022-12-02 | End: 2022-12-03 | Stop reason: HOSPADM

## 2022-12-02 RX ORDER — FLUMAZENIL 0.1 MG/ML
0.2 INJECTION, SOLUTION INTRAVENOUS
Status: CANCELLED | OUTPATIENT
Start: 2022-12-02 | End: 2022-12-03

## 2022-12-02 RX ORDER — FENTANYL CITRATE 50 UG/ML
INJECTION, SOLUTION INTRAMUSCULAR; INTRAVENOUS PRN
Status: DISCONTINUED | OUTPATIENT
Start: 2022-12-02 | End: 2022-12-02 | Stop reason: HOSPADM

## 2022-12-02 RX ORDER — ONDANSETRON 2 MG/ML
4 INJECTION INTRAMUSCULAR; INTRAVENOUS EVERY 6 HOURS PRN
Status: CANCELLED | OUTPATIENT
Start: 2022-12-02

## 2022-12-02 NOTE — H&P
Lisset STEPHEN [facility]  [id number]  female  47 year old      Reason for procedure/surgery: screening colonoscopy      Patient Active Problem List   Diagnosis     Irritable bowel syndrome     Premature ovarian failure     CARDIOVASCULAR SCREENING; LDL GOAL LESS THAN 160     Mild persistent asthma without complication     Trochanteric bursitis of both hips     Right knee pain     Adjustment disorder with depressed mood     Neck pain     Pain in both knees     Pain in joint, ankle and foot, unspecified laterality     Seasonal allergic rhinitis due to pollen     Allergic rhinitis due to animals     Allergic rhinitis due to dust mite     Allergic rhinitis due to mold     Allergic conjunctivitis, bilateral     Bilateral low back pain with left-sided sciatica       Past Surgical History:    Past Surgical History:   Procedure Laterality Date     ARTHROSCOPY KNEE Right 3/2/2018    Procedure: ARTHROSCOPY KNEE;  Right Knee Arthroscopy, Lateral Compartmnt Debridemen and  Anterior Chamber;  Surgeon: Rima Sarmiento MD;  Location: [facility] OR      SECTION           HC DILATION/CURETTAGE DIAG/THER NON OB  2003    D & C for retained placenta one week after the delivery     I&D BARTHOLIN GLAND ABSCESS   and     mcgrath cath      KNEE SURGERY      Rt knee Fx, repair-dislocation problem     KNEE SURGERY      Lt knee reconstructive surgery-dislocation problem     LEEP TX, CERVICAL      LEEP for abnormal pap     MARSUP BARTHOLIN GLAND CYST  08       Past Medical History:   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     Premature ovarian failure      Raynaud's disease 3/08       Social History:   Social History     Tobacco Use     Smoking status: Never     Smokeless tobacco: Never   Substance Use Topics     Alcohol use: Yes      Comment: social       Family History:   Family History   Problem Relation Age of Onset     Thyroid Disease Mother      Diabetes Mother         type2     Eye Disorder Mother         cataracts     Gastrointestinal Disease Mother         hep a/has to have her throat stretched     Respiratory Mother         sleep apnea     Heart Disease Father 60     C.A.D. Paternal Grandfather 30        age 30, then again in his 80's     Diabetes Maternal Grandfather      Cancer Maternal Grandfather      Thyroid Disease Maternal Grandmother      Gynecology Maternal Grandmother         on bcp to keep period regular, a small uterus     Heart Disease Maternal Grandmother         tachycardia     Allergies Daughter         milk     Asthma Daughter        Allergies: No Known Allergies    Active Medications:   Current Outpatient Medications   Medication Sig Dispense Refill     albuterol (PROAIR HFA/PROVENTIL HFA/VENTOLIN HFA) 108 (90 Base) MCG/ACT inhaler Inhale 2 puffs into the lungs every 6 hours 18 g 3     bisacodyl (DULCOLAX) 5 MG EC tablet 2 days prior to procedure, take 2 tablets at 4 pm. 1 day prior to procedure, take 2 tablets at 4 pm. For additional instructions refer to your colonoscopy prep instructions. 4 tablet 0     Calcium Carbonate-Vitamin D (CALCIUM + D PO) Take 600 mg by mouth.       fluticasone (FLONASE) 50 MCG/ACT nasal spray Spray 2 sprays into both nostrils daily       ibuprofen (ADVIL/MOTRIN) 600 MG tablet TAKE 1 TABLET (600 MG) BY MOUTH EVERY 8 HOURS AS NEEDED FOR MODERATE PAIN 60 tablet 1     montelukast (SINGULAIR) 10 MG tablet Take 1 tablet (10 mg) by mouth At Bedtime 90 tablet 3     polyethylene glycol (GOLYTELY) 236 g suspension 2 days prior at 5pm, mix and drink half of a jug of Golytely. Drink an 8 oz. glass of Golytely every 15 minutes until half of the jug is gone. Place remainder of Golytely in the refrigerator. 1 day prior at 5 pm, drink the 2nd half of a jug of Golytely bowel prep. 6 hours before your  check-in time, drink an 8 oz. glass of Golytely every 15 minutes until half of the 2nd jug of Golytely is gone. Discard remainder of second jug. 8000 mL 0     Turmeric Curcumin 500 MG CAPS Take 500 mg by mouth daily        VITAMIN D, CHOLECALCIFEROL, PO Take by mouth daily       FEXOFENADINE  MG OR TABS 1 TABLET DAILY  (Patient not taking: No sig reported)         Systemic Review:   CONSTITUTIONAL: NEGATIVE for fever, chills, change in weight  ENT/MOUTH: NEGATIVE for ear, mouth and throat problems  RESP: NEGATIVE for significant cough or SOB  CV: NEGATIVE for chest pain, palpitations or peripheral edema    Physical Examination:   Vital Signs: [facility] 12/25/2013   GENERAL: healthy, alert and no distress  NECK: no adenopathy, no asymmetry, masses, or scars  RESP: lungs clear to auscultation - no rales, rhonchi or wheezes  CV: regular rate and rhythm, normal S1 S2, no S3 or S4, no murmur, click or rub, no peripheral edema and peripheral pulses strong  ABDOMEN: soft, nontender, no hepatosplenomegaly, no masses and bowel sounds normal  MS: no gross musculoskeletal defects noted, no edema    ASA: 2    Mallampati Score: 2    Plan: Appropriate to proceed as scheduled.      Johnson Petit MD  12/2/2022    PCP:  Adriana Myers

## 2022-12-05 LAB
PATH REPORT.COMMENTS IMP SPEC: NORMAL
PATH REPORT.COMMENTS IMP SPEC: NORMAL
PATH REPORT.FINAL DX SPEC: NORMAL
PATH REPORT.GROSS SPEC: NORMAL
PATH REPORT.MICROSCOPIC SPEC OTHER STN: NORMAL
PATH REPORT.RELEVANT HX SPEC: NORMAL
PHOTO IMAGE: NORMAL

## 2022-12-12 DIAGNOSIS — D12.6 SERRATED ADENOMA OF COLON: Primary | ICD-10-CM

## 2022-12-14 ENCOUNTER — TELEPHONE (OUTPATIENT)
Dept: GASTROENTEROLOGY | Facility: CLINIC | Age: 47
End: 2022-12-14

## 2022-12-14 ENCOUNTER — MYC MEDICAL ADVICE (OUTPATIENT)
Dept: ORTHOPEDICS | Facility: CLINIC | Age: 47
End: 2022-12-14

## 2022-12-14 NOTE — TELEPHONE ENCOUNTER
Screening Questions  BLUE  KIND OF PREP RED  LOCATION [review exclusion criteria] GREEN  SEDATION TYPE        Y Are you active on mychart?       Johnson Petit MD Ordering/Referring Provider?        PREF ONE What type of coverage do you have?      N Have you had a positive covid test in the last 14 days?     21.2 1. BMI  [BMI 40+ - review exclusion criteria]    Y  2. Are you able to give consent for your medical care? [IF NO,RN REVIEW]        N  3. Are you taking any prescription pain medications on a routine schedule?      NA  3a. EXTENDED PREP What kind of prescription?     N 4. Do you have any chemical dependencies such as alcohol, street drugs, or methadone?    N 5. Do you have any history of post-traumatic stress syndrome, severe anxiety or history of psychosis?      **If yes 3- 5 , please schedule with MAC sedation.**          IF YES TO ANY 6 - 10 - HOSPITAL SETTING ONLY.     N 6.   Do you need assistance transferring?     N 7.   Have you had a heart or lung transplant?    N 8.   Are you currently on dialysis?   N 9.   Do you use daily home oxygen?   N 10. Do you take nitroglycerin?   10a. NA If yes, how often?     11. [FEMALES]  N Are you currently pregnant?    11a. NA If yes, how many weeks? [ Greater than 12 weeks, OR NEEDED]    N 12. Do you have Pulmonary Hypertension? *NEED PAC APPT AT [facility]*     N 13. [review exclusion criteria]  Do you have any implantable devices in your body (pacemaker, defib, LVAD)?    N 14. In the past 6 months, have you had any heart related issues including cardiomyopathy or heart attack?     14a. NA If yes, did it require cardiac stenting if so when?     N 15. Have you had a stroke or Transient ischemic attack (TIA - aka  mini stroke ) within 6 months?      N 16. Do you have mod to severe Obstructive Sleep Apnea?  [Hospital only - Ok at [facility]]    N 17. Do you have SEVERE AND UNCONTROLLED asthma? *NEED PAC APPT AT [facility]*     N 18. Are you currently taking any blood  "thinners?     18a. If yes, inform patient to \"follow up w/ ordering provider for bridging instructions.\"    N 19. Do you take the medication Phentermine?    19a. If yes, \"Hold for 7 days before procedure.  Please consult your prescribing provider if you have questions about holding this medication.\"     N  20. Do you have chronic kidney disease?      N  21. Do you have a diagnosis of diabetes?     N  22. On a regular basis do you go 3-5 days between bowel movements?      23. Preferred LOCAL Pharmacy for Pre Prescription    [ LIST ONLY ONE PHARMACY]        [facility] [address] [place] [address]    - CLOSING REMINDERS -    Informed patient they will need an adult    Cannot take any type of public or medical transportation alone    Conscious Sedation- Needs  for 6 hours after the procedure       MAC/General-Needs  for 24 hours after procedure    Pre-Procedure Covid test to be completed [[facility] PCR Testing Required]    Confirmed Nurse will call to complete assessment       - SCHEDULING DETAILS -  N Hospital Setting Required? If yes, what is the exclusion?: VANESSA CHAUHAN  Surgeon    6/05/23  Date of Procedure  Lower Endoscopy [Colonoscopy]  Type of Procedure Scheduled  [facility]   STANDARD GOLYTELY-If you answer yes to questions #8, #20, #21Which Colonoscopy Prep was Sent?     MAC Sedation Type     Y PAC / Pre-op Required                 "

## 2022-12-16 ENCOUNTER — TELEPHONE (OUTPATIENT)
Dept: GASTROENTEROLOGY | Facility: CLINIC | Age: 47
End: 2022-12-16

## 2022-12-16 NOTE — TELEPHONE ENCOUNTER
Caller: Lisset MITCHELL Chi  Reason for Reschedule/Cancellation (please be detailed, any staff messages or encounters to note?): patient request - would like to schedule in [place] instead      Prior to reschedule please review:    Ordering Provider: Johnson Petit    Sedation per order: MAC    Does patient have any ASC Exclusions, please identify?: No      Notes on Cancelled Procedure:    Procedure:Lower Endoscopy [Colonoscopy]     Date: 06/05/23    Location:[facility]; [address] [place] [address]    Surgeon: Damaso        Rescheduled: No. MAC schedule for June has not been released yet. Referral reopened in work queue and deferred until March 2023 for scheduling.

## 2022-12-21 ENCOUNTER — VIRTUAL VISIT (OUTPATIENT)
Dept: PHYSICAL THERAPY | Facility: CLINIC | Age: 47
End: 2022-12-21
Payer: COMMERCIAL

## 2022-12-21 DIAGNOSIS — M54.42 BILATERAL LOW BACK PAIN WITH LEFT-SIDED SCIATICA: Primary | ICD-10-CM

## 2022-12-21 PROBLEM — M54.2 NECK PAIN: Status: RESOLVED | Noted: 2021-02-12 | Resolved: 2022-12-21

## 2022-12-21 PROCEDURE — 97112 NEUROMUSCULAR REEDUCATION: CPT | Mod: GP | Performed by: PHYSICAL THERAPIST

## 2022-12-21 PROCEDURE — 97110 THERAPEUTIC EXERCISES: CPT | Mod: GP | Performed by: PHYSICAL THERAPIST

## 2022-12-26 ENCOUNTER — THERAPY VISIT (OUTPATIENT)
Dept: PHYSICAL THERAPY | Facility: CLINIC | Age: 47
End: 2022-12-26
Payer: COMMERCIAL

## 2022-12-26 ENCOUNTER — OFFICE VISIT (OUTPATIENT)
Dept: FAMILY MEDICINE | Facility: CLINIC | Age: 47
End: 2022-12-26
Payer: COMMERCIAL

## 2022-12-26 VITALS
RESPIRATION RATE: 16 BRPM | OXYGEN SATURATION: 100 % | BODY MASS INDEX: 21.05 KG/M2 | HEIGHT: 62 IN | HEART RATE: 68 BPM | WEIGHT: 114.4 LBS | DIASTOLIC BLOOD PRESSURE: 67 MMHG | TEMPERATURE: 98.5 F | SYSTOLIC BLOOD PRESSURE: 122 MMHG

## 2022-12-26 DIAGNOSIS — M20.039 SWAN-NECK DEFORMITY OF FINGER, UNSPECIFIED LATERALITY: ICD-10-CM

## 2022-12-26 DIAGNOSIS — N94.10 FEMALE DYSPAREUNIA: ICD-10-CM

## 2022-12-26 DIAGNOSIS — M54.42 BILATERAL LOW BACK PAIN WITH LEFT-SIDED SCIATICA: Primary | ICD-10-CM

## 2022-12-26 DIAGNOSIS — M25.562 PAIN IN BOTH KNEES: ICD-10-CM

## 2022-12-26 DIAGNOSIS — Z23 IMMUNIZATION DUE: ICD-10-CM

## 2022-12-26 DIAGNOSIS — M25.561 PAIN IN BOTH KNEES: ICD-10-CM

## 2022-12-26 DIAGNOSIS — Z00.00 ANNUAL PHYSICAL EXAM: Primary | ICD-10-CM

## 2022-12-26 DIAGNOSIS — M25.579 PAIN IN JOINT, ANKLE AND FOOT, UNSPECIFIED LATERALITY: ICD-10-CM

## 2022-12-26 DIAGNOSIS — Z86.0100 HISTORY OF COLONIC POLYPS: ICD-10-CM

## 2022-12-26 DIAGNOSIS — Z12.4 CERVICAL CANCER SCREENING: ICD-10-CM

## 2022-12-26 PROBLEM — M70.62 TROCHANTERIC BURSITIS OF BOTH HIPS: Status: RESOLVED | Noted: 2017-11-15 | Resolved: 2022-12-26

## 2022-12-26 PROBLEM — M70.61 TROCHANTERIC BURSITIS OF BOTH HIPS: Status: RESOLVED | Noted: 2017-11-15 | Resolved: 2022-12-26

## 2022-12-26 PROCEDURE — 90471 IMMUNIZATION ADMIN: CPT | Performed by: FAMILY MEDICINE

## 2022-12-26 PROCEDURE — 87624 HPV HI-RISK TYP POOLED RSLT: CPT | Performed by: FAMILY MEDICINE

## 2022-12-26 PROCEDURE — G0145 SCR C/V CYTO,THINLAYER,RESCR: HCPCS | Performed by: FAMILY MEDICINE

## 2022-12-26 PROCEDURE — 97112 NEUROMUSCULAR REEDUCATION: CPT | Mod: GP | Performed by: PHYSICAL THERAPIST

## 2022-12-26 PROCEDURE — 90677 PCV20 VACCINE IM: CPT | Performed by: FAMILY MEDICINE

## 2022-12-26 PROCEDURE — 97140 MANUAL THERAPY 1/> REGIONS: CPT | Mod: GP | Performed by: PHYSICAL THERAPIST

## 2022-12-26 PROCEDURE — 99396 PREV VISIT EST AGE 40-64: CPT | Mod: 25 | Performed by: FAMILY MEDICINE

## 2022-12-26 PROCEDURE — 99213 OFFICE O/P EST LOW 20 MIN: CPT | Mod: 25 | Performed by: FAMILY MEDICINE

## 2022-12-26 PROCEDURE — 97110 THERAPEUTIC EXERCISES: CPT | Mod: GP | Performed by: PHYSICAL THERAPIST

## 2022-12-26 ASSESSMENT — ENCOUNTER SYMPTOMS
HEADACHES: 0
BREAST MASS: 0
COUGH: 0
EYE PAIN: 0
WEAKNESS: 0
JOINT SWELLING: 0
FEVER: 0
HEARTBURN: 0
NERVOUS/ANXIOUS: 0
PARESTHESIAS: 1
ABDOMINAL PAIN: 0
CONSTIPATION: 0
CHILLS: 0
ARTHRALGIAS: 0
HEMATOCHEZIA: 0
HEMATURIA: 0
FREQUENCY: 0
DYSURIA: 0
SORE THROAT: 0
PALPITATIONS: 0
DIARRHEA: 0
SHORTNESS OF BREATH: 0
MYALGIAS: 0
NAUSEA: 0
DIZZINESS: 0

## 2022-12-26 ASSESSMENT — ASTHMA QUESTIONNAIRES: ACT_TOTALSCORE: 22

## 2022-12-26 NOTE — PROGRESS NOTES
SUBJECTIVE:   CC: Lisset is an 47 year old who presents for preventive health visit.     Chief Complaints and History of Present Illnesses   Patient presents with     Physical        Patient has been advised of split billing requirements and indicates understanding: Yes  Healthy Habits:     Getting at least 3 servings of Calcium per day:  Yes    Bi-annual eye exam:  Yes    Dental care twice a year:  Yes    Sleep apnea or symptoms of sleep apnea:  None    Diet:  Breakfast skipped and Other    Frequency of exercise:  6-7 days/week    Duration of exercise:  15-30 minutes    Taking medications regularly:  Yes    Medication side effects:  None    PHQ-2 Total Score: 1    Additional concerns today:  Yes        Today's PHQ-2 Score:   PHQ-2 ( 1999 Pfizer) 12/26/2022   Q1: Little interest or pleasure in doing things 0   Q2: Feeling down, depressed or hopeless 1   PHQ-2 Score 1   PHQ-2 Total Score (12-17 Years)- Positive if 3 or more points; Administer PHQ-A if positive -   Q1: Little interest or pleasure in doing things Not at all   Q2: Feeling down, depressed or hopeless Several days   PHQ-2 Score 1     HEALTH MAINTENANCE:   - Hep B: defers    - Pap: will do today    - PCV: will do today      The 10-year ASCVD risk score (Ketan DIA, et al., 2019) is: 0.7%    Values used to calculate the score:      Age: 47 years      Sex: Female      Is Non- : No      Diabetic: No      Tobacco smoker: No      Systolic Blood Pressure: 122 mmHg      Is BP treated: No      HDL Cholesterol: 71 mg/dL      Total Cholesterol: 207 mg/dL     ACT Total Scores 1/19/2022 7/25/2022 12/26/2022   ACT TOTAL SCORE - - -   ASTHMA ER VISITS - - -   ASTHMA HOSPITALIZATIONS - - -   ACT TOTAL SCORE (Goal Greater than or Equal to 20) 25 25 22   In the past 12 months, how many times did you visit the emergency room for your asthma without being admitted to the hospital? 0 0 0   In the past 12 months, how many times were you hospitalized  overnight because of your asthma? 0 0 0     INFLAMMATORY LUMBAR SPONDYLOPATHY: patient not sure where this is coming from?     FINGERS LOCKING: concern for ehler's danlos? Has a good physical therapist. Interested in hand therapist due to finger pain and locking now.     DYSPAREUNIA: Has tried lubes and lot very effective.     Have you ever done Advance Care Planning? (For example, a Health Directive, POLST, or a discussion with a medical provider or your loved ones about your wishes): Yes, patient states has an Advance Care Planning document and will bring a copy to the clinic.    Social History     Tobacco Use     Smoking status: Never     Smokeless tobacco: Never   Substance Use Topics     Alcohol use: Yes     Comment: social     If you drink alcohol do you typically have >3 drinks per day or >7 drinks per week? No    Alcohol Use 12/26/2022   Prescreen: >3 drinks/day or >7 drinks/week? No   Prescreen: >3 drinks/day or >7 drinks/week? -   No flowsheet data found.    Reviewed orders with patient.  Reviewed health maintenance and updated orders accordingly - Yes      Breast Cancer Screening:    Breast CA Risk Assessment (FHS-7) 12/26/2022   Do you have a family history of breast, colon, or ovarian cancer? No / Unknown       Mammogram Screening: Recommended annual mammography  Pertinent mammograms are reviewed under the imaging tab.    History of abnormal Pap smear: NO - age 30-65 PAP every 5 years with negative HPV co-testing recommended  PAP / HPV Latest Ref Rng & Units 4/28/2017 1/14/2014 11/2/2010   PAP (Historical) - NIL NIL NIL   HPV16 NEG Negative - -   HPV18 NEG Negative - -   HRHPV NEG Negative - -     Prior LEEP due to HPV positive, did have surveillance paps and now back to routine screening.     Reviewed and updated as needed this visit by clinical staff   Tobacco   Meds              Reviewed and updated as needed this visit by Provider   Tobacco  Allergies  Meds  Problems  Med Hx  Surg Hx  Fam Hx  "          Review of Systems   Constitutional: Negative for chills and fever.   HENT: Negative for congestion, ear pain, hearing loss and sore throat.    Eyes: Negative for pain and visual disturbance.   Respiratory: Negative for cough and shortness of breath.    Cardiovascular: Negative for chest pain, palpitations and peripheral edema.   Gastrointestinal: Negative for abdominal pain, constipation, diarrhea, heartburn, hematochezia and nausea.   Breasts:  Negative for tenderness, breast mass and discharge.   Genitourinary: Negative for dysuria, frequency, genital sores, hematuria, pelvic pain, urgency, vaginal bleeding and vaginal discharge.   Musculoskeletal: Negative for arthralgias, joint swelling and myalgias.   Skin: Negative for rash.   Neurological: Positive for paresthesias. Negative for dizziness, weakness and headaches.   Psychiatric/Behavioral: Negative for mood changes. The patient is not nervous/anxious.      PARESTHESIAS: has completed and injection and is improved.       OBJECTIVE:   /67 (BP Location: Left arm, Patient Position: Sitting, Cuff Size: Adult Regular)   Pulse 68   Temp 98.5  F (36.9  C) (Oral)   Resp 16   Ht 1.575 m (5' 2\")   Wt 51.9 kg (114 lb 6.4 oz)   LMP 12/25/2013   SpO2 100%   BMI 20.92 kg/m    Physical Exam  GENERAL APPEARANCE: healthy, alert and no distress  EYES: Eyes grossly normal to inspection, PERRL and conjunctivae and sclerae normal  HENT: ear canals and TM's normal, nose and mouth without ulcers or lesions, oropharynx clear and oral mucous membranes moist  NECK: no adenopathy, no asymmetry, masses, or scars and thyroid normal to palpation  RESP: lungs clear to auscultation - no rales, rhonchi or wheezes  BREAST: normal without masses, tenderness or nipple discharge and no palpable axillary masses or adenopathy  CV: regular rate and rhythm, normal S1 S2, no S3 or S4, no murmur, click or rub, no peripheral edema and peripheral pulses strong  ABDOMEN: soft, " nontender, no hepatosplenomegaly, no masses and bowel sounds normal   (female): normal female external genitalia, normal urethral meatus, vaginal mucosal atrophy noted and cervix without lesions  MS: no musculoskeletal defects are noted and gait is age appropriate without ataxia  SKIN: no suspicious lesions or rashes  NEURO: Normal strength and tone, sensory exam grossly normal, mentation intact and speech normal  PSYCH: mentation appears normal and affect normal/bright      ASSESSMENT/PLAN:     1. Annual physical exam  - REVIEW OF HEALTH MAINTENANCE PROTOCOL ORDERS    Reviewed health history and health maintenance recommendations.     2. Female dyspareunia  Discussed options of vaginal lubricant vs topical estrogen treatment vs HRT. Defers treatment at this time. Atrophy is present due to premature menopause.     3. [place]-neck deformity of finger, unspecified laterality  - Occupational Therapy Referral; Future    Requests referral to hand therapy.     4. Cervical cancer screening  - Pap Screen with HPV - recommended age 30 - 65 years    5. History of colonic polyps  - Adult GI  Referral - Procedure Only; Future    Will be due for surveillance in 6 months.     6. Immunization due  - Pneumococcal 20 Valent Conjugate (Prevnar 20)     Patient has been advised of split billing requirements and indicates understanding: Yes      COUNSELING:  Reviewed preventive health counseling, as reflected in patient instructions        She reports that she has never smoked. She has never used smokeless tobacco.      Katia Monsivais, [facility]

## 2022-12-28 ENCOUNTER — VIRTUAL VISIT (OUTPATIENT)
Dept: FAMILY MEDICINE | Facility: CLINIC | Age: 47
End: 2022-12-28
Payer: COMMERCIAL

## 2022-12-28 DIAGNOSIS — U07.1 INFECTION DUE TO 2019 NOVEL CORONAVIRUS: Primary | ICD-10-CM

## 2022-12-28 DIAGNOSIS — J45.30 MILD PERSISTENT ASTHMA WITHOUT COMPLICATION: ICD-10-CM

## 2022-12-28 PROCEDURE — 99214 OFFICE O/P EST MOD 30 MIN: CPT | Mod: CS | Performed by: FAMILY MEDICINE

## 2022-12-28 RX ORDER — ALBUTEROL SULFATE 90 UG/1
2 AEROSOL, METERED RESPIRATORY (INHALATION) EVERY 6 HOURS
Qty: 18 G | Refills: 3 | Status: SHIPPED | OUTPATIENT
Start: 2022-12-28 | End: 2023-09-19

## 2022-12-28 NOTE — PATIENT INSTRUCTIONS
Sorry to got Covid.     The vaccines do help in preventing severe symptoms but not necessarily from getting sick from COVID.    The medication was sent to the [facility] in [place].    Please have your daughter  the medication.    You should feel better in 1 to 2 days.    The Paxlovid is an odd metallic aftertaste, its harmless but annoying.    Let us know if your symptoms worsen or there is no improvement within 1 week.

## 2022-12-28 NOTE — PROGRESS NOTES
Lisset is a 47 year old who is being evaluated via a billable telephone visit.      What phone number would you like to be contacted at? [phone]  How would you like to obtain your AVS? MyChart  Distant Location (provider location):  On-site    Assessment & Plan     (U07.1) Infection due to 2019 novel coronavirus  (primary encounter diagnosis)  Comment: Within window of treatment, comorbidities include asthma.  Not on inhaled controller medication, just using allergy medications and albuterol.  Notes increased wheezing but no marked respiratory distress.  We will treat.  No history of renal insufficiency, documented normal kidney function.  Plan: nirmatrelvir and ritonavir (PAXLOVID) therapy         pack       Reviewed side effects.    (J45.30) Mild persistent asthma without complication  Comment: Refill sent for inhaler.  We will hold on steroids for now.  Plan: albuterol (PROAIR HFA/PROVENTIL HFA/VENTOLIN         HFA) 108 (90 Base) MCG/ACT inhaler        Monitor respiratory status, if more severe, advised follow-up visit.    Patient Instructions   Sorry to got Covid.     The vaccines do help in preventing severe symptoms but not necessarily from getting sick from COVID.    The medication was sent to the [facility] in [place].    Please have your daughter  the medication.    You should feel better in 1 to 2 days.    The Paxlovid is an odd metallic aftertaste, its harmless but annoying.    Let us know if your symptoms worsen or there is no improvement within 1 week.         Return in about 1 week (around 1/4/2023), or if symptoms worsen or fail to improve.    Bessie Segundo MD  [facility] MAXX Darling is a 47 year old, presenting for the following health issues:  Covid Concern      HPI       COVID-19 Symptom Review  How many days ago did these symptoms start? 2 days ago     Are any of the following symptoms significant for you?  New or worsening difficulty breathing?  Yes    Please describe what kind of difficulty you are having breathing:Mild dyspnea (able to do ADLs without difficulty, mild shortness of breath with activities such as climbing one or two flights of stairs or walking briskly) - Pt does have Asthma     Worsening cough? Yes, I am coughing up mucus and also having mild cough at times.    Fever or chills? Yes, the highest temperature was 100.7F    Headache: YES    Sore throat: No    Chest pain: Chest heaviness     Diarrhea: No    Body aches? YES    What treatments has patient tried? None   Does patient live in a nursing home, group home, or shelter? No  Does patient have a way to get food/medications during quarantined? Yes, I have a friend or family member who can help me.      Review of Systems   Constitutional, HEENT, cardiovascular, pulmonary, gi and gu systems are negative, except as otherwise noted.      Objective           Vitals:  No vitals were obtained today due to virtual visit.    Physical Exam   healthy, alert and no distress  PSYCH: Alert   Her affect is normal  RESP: No cough, no audible wheezing, able to talk in full sentences  Remainder of exam unable to be completed due to telephone visits      Bessie Segundo MD         Phone call duration: 12 minutes    Bessie Segundo MD

## 2022-12-30 LAB
BKR LAB AP GYN ADEQUACY: NORMAL
BKR LAB AP GYN INTERPRETATION: NORMAL
BKR LAB AP HPV REFLEX: NORMAL
BKR LAB AP PREVIOUS ABNORMAL: NORMAL
PATH REPORT.COMMENTS IMP SPEC: NORMAL
PATH REPORT.COMMENTS IMP SPEC: NORMAL
PATH REPORT.RELEVANT HX SPEC: NORMAL

## 2023-01-03 PROBLEM — Z98.890 HISTORY OF LOOP ELECTRICAL EXCISION PROCEDURE (LEEP): Status: ACTIVE | Noted: 2023-01-03

## 2023-01-03 LAB
HUMAN PAPILLOMA VIRUS 16 DNA: NEGATIVE
HUMAN PAPILLOMA VIRUS 18 DNA: NEGATIVE
HUMAN PAPILLOMA VIRUS FINAL DIAGNOSIS: NORMAL
HUMAN PAPILLOMA VIRUS OTHER HR: NEGATIVE

## 2023-01-04 ENCOUNTER — VIRTUAL VISIT (OUTPATIENT)
Dept: ORTHOPEDICS | Facility: CLINIC | Age: 48
End: 2023-01-04
Payer: COMMERCIAL

## 2023-01-04 DIAGNOSIS — G89.29 CHRONIC COCCYGEAL PAIN: ICD-10-CM

## 2023-01-04 DIAGNOSIS — R10.2 CHRONIC PELVIC PAIN IN FEMALE: Primary | ICD-10-CM

## 2023-01-04 DIAGNOSIS — M53.3 CHRONIC COCCYGEAL PAIN: ICD-10-CM

## 2023-01-04 DIAGNOSIS — G89.29 CHRONIC PELVIC PAIN IN FEMALE: Primary | ICD-10-CM

## 2023-01-04 PROCEDURE — 99213 OFFICE O/P EST LOW 20 MIN: CPT | Mod: TEL | Performed by: PREVENTIVE MEDICINE

## 2023-01-04 NOTE — LETTER
1/4/2023         RE: Lisset MITCHELL Chi  [address] [address]        Dear Colleague,    Thank you for referring your patient, Lisset MITCHELL Chi, to the [facility] [place]. Please see a copy of my visit note below.    Patient is a  47   year old who is being evaluated via a billable telephone visit.      What phone number would you like to be contacted at? CELL  How would you like to obtain your AVS? MYCHART        Subjective   Patient is a 47    year old who presents by phone call visit for the following:   Discussed her Cammy and current home PT regimen  Had some improvement from previous injection  Wants to discuss other areas of PT, possibly pelvic health PT  HPI       Review of Systems   8 systems reviewed and normal      Objective           Vitals:  No vitals were obtained today due to virtual visit.    Physical Exam   Healthy appearing  PSYCH: Alert and oriented times 3; coherent speech, normal   rate and volume, able to articulate logical thoughts, able   to abstract reason, no tangential thoughts, no hallucinations   or delusions  His affect is nl  RESP: No cough, no audible wheezing, able to talk in full sentences  Remainder of exam unable to be completed due to telephone visits    Assessment/Plan  46 yo female with lumbar radicular pain, lumbar ddd, chronic low back and pelvic pain    I independently reviewed the following imaging studies and discussed with patient:  Lumbar MRI; shows ddd, disc herniations  Discussed and can consider repeat CAMMY  Discussed starting more PT and adding pelvic health  F/u 1 months          Phone call duration: 20 minutes  Phone call start: 715am  Phone call end: 735am  Dr Larsen      Again, thank you for allowing me to participate in the care of your patient.        Sincerely,        Floyd Larsen MD

## 2023-01-04 NOTE — LETTER
1/4/2023         RE: Lisset MITCHELL Chi  [address] [address]        Dear Colleague,    Thank you for referring your patient, Lisset MITCHELL Chi, to the [facility] [place]. Please see a copy of my visit note below.    Patient is a  47   year old who is being evaluated via a billable telephone visit.      What phone number would you like to be contacted at? CELL  How would you like to obtain your AVS? MYCHART        Subjective   Patient is a 47    year old who presents by phone call visit for the following:   Discussed her Cammy and current home PT regimen  Had some improvement from previous injection  Wants to discuss other areas of PT, possibly pelvic health PT  HPI       Review of Systems   8 systems reviewed and normal      Objective           Vitals:  No vitals were obtained today due to virtual visit.    Physical Exam   Healthy appearing  PSYCH: Alert and oriented times 3; coherent speech, normal   rate and volume, able to articulate logical thoughts, able   to abstract reason, no tangential thoughts, no hallucinations   or delusions  His affect is nl  RESP: No cough, no audible wheezing, able to talk in full sentences  Remainder of exam unable to be completed due to telephone visits    Assessment/Plan  48 yo female with lumbar radicular pain, lumbar ddd, chronic low back and pelvic pain    I independently reviewed the following imaging studies and discussed with patient:  Lumbar MRI; shows ddd, disc herniations  Discussed and can consider repeat CAMMY  Discussed starting more PT and adding pelvic health  F/u 1 months          Phone call duration: 20 minutes  Phone call start: 715am  Phone call end: 735am  Dr Larsen      Again, thank you for allowing me to participate in the care of your patient.        Sincerely,        Floyd Larsen MD

## 2023-01-06 NOTE — PROGRESS NOTES
Hand Therapy Initial Evaluation    Current Date:  2023    Diagnosis: [place]-neck deformity of finger  DOI: 22 (script date); Katia Yao     Subjective:  Lisset MITCHELL Chi is a 47 year old female.    Patient reports symptoms of the bilateral hand pain which occurred due to hypermobility. Since onset symptoms are Unchanged  General health as reported by patient is good.      Past Medical History:   Diagnosis Date     Allergic rhinitis due to other allergen      Asthma      Bilateral low back pain with left-sided sciatica 10/17/2022     IBS (irritable bowel syndrome)      Other internal derangement of knee(717.89)     patella dislocates easily     Premature ovarian failure      Raynaud's disease 3/08     Trochanteric bursitis of both hips 11/15/2017     Past Surgical History:   Procedure Laterality Date     ARTHROSCOPY KNEE Right 3/2/2018    Procedure: ARTHROSCOPY KNEE;  Right Knee Arthroscopy, Lateral Compartmnt Debridemen and  Anterior Chamber;  Surgeon: Rima Sarmiento MD;  Location: [facility] OR      SECTION           COLONOSCOPY N/A 2022    Procedure: COLONOSCOPY, WITH HOT POLYPECTOMY, TATTOO, AND CLIPS;  Surgeon: Johnson Petit MD;  Location: [facility] OR      DILATION/CURETTAGE DIAG/THER NON OB  2003    D & C for retained placenta one week after the delivery     I&D BARTHOLIN GLAND ABSCESS   and     mcgrath cath      KNEE SURGERY      Rt knee Fx, repair-dislocation problem     KNEE SURGERY      Lt knee reconstructive surgery-dislocation problem     LEEP TX, CERVICAL      LEEP for abnormal pap     MARSUP BARTHOLIN GLAND CYST  08     Current occupation is desk job/education   Job Tasks: typing     Occupational Profile Information:  Right hand dominant  Prior functional level:  no limitations  Patient reports symptoms of stiffness/loss of motion and weakness/loss of strength  Special tests: none.    Previous treatment: none  Barriers  include:none  Mobility: No difficulty  Transportation: drives  Currently working in normal job without restrictions    Functional Outcome Measure:   Upper Extremity Functional Index Score:  SCORE:   Column Totals: /80: 75   (A lower score indicates greater disability.)    Objective:  Pain Level (Scale 0-10)   1/13/2023   At Rest 0/10    With Use 0/10      Edema  None    Sensation   WNL throughout all nerve distributions; per patient report     ROM:   Pt comments on stability of fingers: reports IF, LF, RF, SF get locked up in swan neck positions; left worse than right;     Hyperextension noted per joint:  Present = +/ Absent = -  @td@  Hyperextension PIP DIP   Index R:+   L: + R:  L:   MF R: -   L:+ R:  L:   RF R: +  L: + R:  L:   SF R:+  L: + R:  L:   Thumb IP R: +   L: +     Thumb MP R: -   L: -        Strength   (Measured in pounds)  Pain Report: - none  + mild    ++ moderate    +++ severe    1/13/2023 1/13/2023   Trials L  R   1  2  3 38#  40#, +   Average 38#  40#      Assessment:  Patient presents with symptoms consistent with diagnosis of swan neck deformity of finger,  with conservative intervention.     Patient's limitations or Problem List includes:  Hypermobility and Decreased  of the bilateral hand  which interferes with the patient's ability to perform Work Tasks, Recreational Activities and Household Chores as compared to previous level of function.    Rehab Potential:  Good - Return to full activity, some limitations    Patient will benefit from skilled Occupational Therapy to increase  strength, stability of wrist and stability of fingers to return to previous activity level and resume normal daily tasks and to reach their rehab potential.    Barriers to Learning:  No barrier    Communication Issues:  Patient appears to be able to clearly communicate and understand verbal and written communication and follow directions correctly.    Chart Review: Brief history including review of medical  and/or therapy records relating to the presenting problem    Identified Performance Deficits: home establishment and management, meal preparation and cleanup, work and leisure activities    Assessment of Occupational Performance:  3-5 Performance Deficits    Clinical Decision Making (Complexity): Low complexity    Treatment Explanation:  The following has been discussed with the patient:  RX ordered/plan of care  Anticipated outcomes  Possible risks and side effects    Plan:  Frequency:  1 X week, once daily  Duration:  for 8 weeks    Treatment Plan:   Therapeutic Exercise:  AROM, Tendon Gliding, Place and Hold, Isotonics and Isometrics  Neuromuscular re-education:  Isometrics and Stabilization, proprioceptive training  Orthotic Fabrication:  Static, Finger based, Hand based and Forearm based  Self Care:  Ergonomic Considerations and Work Tasks    Discharge Plan:  Achieve all LTG.  Independent in home treatment program.  Reach maximal therapeutic benefit.    Home Exercise Program:  Finger Stability  Sets: 3x/day  Repetitions: 10 reps (with each finger)  Notes:keep pain-free;  Can also work on applying gentle pressure through each finger; make sure finger joints do not hyperextend  Hand Strengthening Gripping  Sets: 3x/day  Repetitions: 10 reps  Notes:pain-free  Wrist Stabilization Ball on Wall  Sets: 3x/day  Repetitions: 10 reps  Description: Use partially inflated ball.  Notes:pain-free    Next Visit:  Assess oval 8 splint fit and elbow muff splint   Progress HEP as appropriate   Review joint protection techniques

## 2023-01-10 ENCOUNTER — THERAPY VISIT (OUTPATIENT)
Dept: PHYSICAL THERAPY | Facility: CLINIC | Age: 48
End: 2023-01-10
Attending: PREVENTIVE MEDICINE
Payer: COMMERCIAL

## 2023-01-10 DIAGNOSIS — G89.29 CHRONIC COCCYGEAL PAIN: ICD-10-CM

## 2023-01-10 DIAGNOSIS — G89.29 CHRONIC PELVIC PAIN IN FEMALE: ICD-10-CM

## 2023-01-10 DIAGNOSIS — R10.2 CHRONIC PELVIC PAIN IN FEMALE: ICD-10-CM

## 2023-01-10 DIAGNOSIS — M53.3 CHRONIC COCCYGEAL PAIN: ICD-10-CM

## 2023-01-10 PROCEDURE — 97110 THERAPEUTIC EXERCISES: CPT | Mod: GP | Performed by: PHYSICAL THERAPIST

## 2023-01-10 PROCEDURE — 97530 THERAPEUTIC ACTIVITIES: CPT | Mod: GP | Performed by: PHYSICAL THERAPIST

## 2023-01-10 PROCEDURE — 97161 PT EVAL LOW COMPLEX 20 MIN: CPT | Mod: GP | Performed by: PHYSICAL THERAPIST

## 2023-01-10 NOTE — PROGRESS NOTES
Physical Therapy Initial Evaluation  Subjective:  The history is provided by the patient. No  was used.   Patient Health History  Lisset MITCHELL Chi being seen for pelvic floor .     Date of Onset: unsure.   Problem occurred: unsure-disc degeneration? ? menopause?    Pain is reported as 5/10 on pain scale.  General health as reported by patient is good.  Pertinent medical history includes: asthma, menopausal, mental illness, osteoarthritis and seizures.   Red flags:  Changes in bowel and bladder habits and pain at rest/night.     Surgeries include:  Orthopedic surgery. Other surgery history details: knees.     Other medications details: allergy/ashtma.    Current occupation is .   Primary job tasks include:  Computer work, prolonged sitting and prolonged standing.                                  History of current episode:    Onset date/MD order: Chronic pelvic pain.    CC/Present symptoms: Patient reports that she has had issues with upper and lower back that have been ongoing for awhile, back issues include some disc issues and she's had injections as well.  She does report that she has tailbone pain, feels like you can crack it when she was younger after she had a backwards fall during ice skating.  Did not have imaging then, but has had imaging since.   Has noticed a difference in back pain with kegel exercises, feels better with squeeze.  Went through menopause about 10 years ago, unsure of why the early onset.  Reports pain with sex and uses good lubricant, seems to help with intitial penetration, but still feels like a stabbing pain with deeper penetration.  Does have some burning with urination after having intercourse, does have a small amount of blood after the first time having intercourse.  Has pain with sitting in hip flexors, exacerbated with sitting at desk.    HPI/SMHx/Childbirth hx::.  Tried to deliver naturally, had an emergency  twin  "birth.  Had pre-eclampsia, HELLP syndrome.  20 years.  Had her \"intestines thrown into abdomen\"  Had a D&C due to placenta in uterus 11 days after birth.  Has a lot of scar tissue in abdomen tranverse on either side of umbilicus.    Pain rating (0-10): 5/10 pain, 9/10 with intercourse   Conditioning is improving/unchanging/worsening: unchanging    Hx of or present sexually transmitted disease: HPV, has since tested negative   Current occupation: , sit-stand desk, stands for most of day  Current activity: Exercises throughout the day doing physical therapy, online workout doing isometric things; walks when weather is nice   Goals: Reduce pain during sex and in tailbone area  Red flags:    Urination:  Do you leak on the way to the bathroom or with a strong urge to void? no   Do you leak with cough,sneeze, jumping, running? no  Any other activities that cause leaking?  no  Do you have triggers that make you feel you can't wait to go to the bathroom?  What are they? no.  Type of pad and number used per day? n/a  When you leak what is the amount? n/a  How long can you delay the need to urinate? normal.   Do you feel excessive pressure in pelvic floor:no.  When?   Frequency of daytime urination:wnl  Frequency of nighttime urination:0-1  Can you stop the flow of urine when on the toilet? yes  Is the volume of urine passed usually:  (8sec rule= 250ml with average bladder storing 400-600ml) average  Do you strain to pass urine? Rarely to never  Do you have a slow or hesitant urinary stream? no  Do you have difficulty initiating the urine stream? no  Is urination painful? no  How many bladder infections have you had in last 12 months?0  Fluid intake(one glass is 8oz or one cup) 8 glasses/day, 1caffinated glasses/day  0 alcohol glasses/day.  Bowel habits:  Frequency of bowel movements? 3 times a day  Consistancy of stool?  [place] Stool Scale type 4  She was having type 1 stools consistently, had a " colonoscopy and three polyps removed, seems to be better  Has a lactose intolerance, beef and corn are difficult.    Do you ignore the urge to defecate? no  Do you strain to pass stool? No, not usually   Pelvic Pain:  Do you have any pelvic pain with intercourse, exams, use of tampons? Tailbone pain almost always  Male partner ()  No pain with external stimulation, some difficulty with orgasm  Is initial penetration during intercourse painful? yes  Is deeper penetration painful? yes  Do you use lubricant?  Yes, unsure  Are you sexually active? rarely  Have you ever been worried for your physical safety? In the past  Have you practiced the PF(kegel) exercises for 4 or more weeks?on occassion  Marinoff Scale:Level 2- but other reasons also limit frequency  (Level 3: Abstinence from intercourse because of severe pain. Level 2: Painful intercourse which limites frequency of activity. Level 1: Painful intercourse not severe enough to prevent activity.)    OBJECTIVE:      Treatment/Education provided this session: please see flow sheet for details    Objective:  System         Lumbar/SI Evaluation  ROM:  AROM Lumbar: normal                                                Pelvic Dysfunction Evaluation:        Flexibility:    Tightness present at:Piriformis    Abdominal Wall:    Diastasis Recti:  Normal    Scar Mobility:  Wnl, some scar tissue L>R   Pelvic Clock Exam:    Ischiocavernosis pain:  +  Bulbocavernosis pain:  +  Transverse Perineal:  +  Levator ANI:  +++        External Assessment:    Skin Condition:  Normal    Bearing Down/Coughing:  Normal  Tissue Symmetry:  Normal  Introitus:  Normal  Muscle Contraction/Perineal Mobility:  Slight lift, no urogential triangle descent  Internal Assessment:      Contraction/Grade:  Fair squeeze, definite lift (3)        Symmetry of Contraction Response:  Slow relaxation of PFM             Hip Evaluation  HIP AROM:  AROM:    Left Hip:     Normal    Right Hip:    Normal                                     General     ROS    ASSESSMENT/PLAN:    Patient is a 47 year old female with pelvic complaints.    Patient has the following significant findings with corresponding treatment plan.                Diagnosis 1:  Pelvic, tailbone  Pain -  manual therapy, self management, education, directional preference exercise and home program  Decreased ROM/flexibility - manual therapy and therapeutic exercise  Decreased joint mobility - manual therapy and therapeutic exercise  Decreased strength - therapeutic exercise and therapeutic activities  Decreased proprioception - neuro re-education and therapeutic activities  Impaired gait - gait training  Impaired muscle performance - neuro re-education  Decreased function - therapeutic activities  Impaired posture - neuro re-education    Previous and current functional limitations:  (See Goal Flow Sheet for this information)    Short term and Long term goals: (See Goal Flow Sheet for this information)     Communication ability:  Patient appears to be able to clearly communicate and understand verbal and written communication and follow directions correctly.  Treatment Explanation - The following has been discussed with the patient:   RX ordered/plan of care  Anticipated outcomes  Possible risks and side effects  This patient would benefit from PT intervention to resume normal activities.   Rehab potential is good.    Frequency:  Every 2 weeks, once daily  Duration:  for 12 weeks  Discharge Plan:  Achieve all LTG.  Independent in home treatment program.  Reach maximal therapeutic benefit.    Please refer to the daily flowsheet for treatment today, total treatment time and time spent performing 1:1 timed codes.

## 2023-01-13 ENCOUNTER — THERAPY VISIT (OUTPATIENT)
Dept: PHYSICAL THERAPY | Facility: CLINIC | Age: 48
End: 2023-01-13
Payer: COMMERCIAL

## 2023-01-13 ENCOUNTER — THERAPY VISIT (OUTPATIENT)
Dept: OCCUPATIONAL THERAPY | Facility: CLINIC | Age: 48
End: 2023-01-13
Attending: FAMILY MEDICINE
Payer: COMMERCIAL

## 2023-01-13 DIAGNOSIS — M20.039 SWAN-NECK DEFORMITY OF FINGER, UNSPECIFIED LATERALITY: ICD-10-CM

## 2023-01-13 DIAGNOSIS — M25.341 INSTABILITY OF FINGER JOINT OF RIGHT HAND: ICD-10-CM

## 2023-01-13 DIAGNOSIS — M25.342 INSTABILITY OF FINGER JOINT OF LEFT HAND: ICD-10-CM

## 2023-01-13 DIAGNOSIS — M54.42 BILATERAL LOW BACK PAIN WITH LEFT-SIDED SCIATICA: Primary | ICD-10-CM

## 2023-01-13 PROBLEM — M25.562 PAIN IN BOTH KNEES: Status: RESOLVED | Noted: 2022-01-08 | Resolved: 2023-01-13

## 2023-01-13 PROBLEM — M25.561 PAIN IN BOTH KNEES: Status: RESOLVED | Noted: 2022-01-08 | Resolved: 2023-01-13

## 2023-01-13 PROBLEM — M25.579 PAIN IN JOINT, ANKLE AND FOOT, UNSPECIFIED LATERALITY: Status: RESOLVED | Noted: 2022-01-08 | Resolved: 2023-01-13

## 2023-01-13 PROCEDURE — 97165 OT EVAL LOW COMPLEX 30 MIN: CPT | Mod: GO

## 2023-01-13 PROCEDURE — 97535 SELF CARE MNGMENT TRAINING: CPT | Mod: GO

## 2023-01-13 PROCEDURE — 97112 NEUROMUSCULAR REEDUCATION: CPT | Mod: GP | Performed by: PHYSICAL THERAPIST

## 2023-01-13 PROCEDURE — 97110 THERAPEUTIC EXERCISES: CPT | Mod: GP | Performed by: PHYSICAL THERAPIST

## 2023-01-13 PROCEDURE — 97110 THERAPEUTIC EXERCISES: CPT | Mod: GO

## 2023-01-13 NOTE — PROGRESS NOTES
Subjective:  HPI  Physical Exam                    Objective:  System    Physical Exam    General     ROS    Assessment/Plan:    PROGRESS  REPORT    Progress reporting period is from 1/13/23.     SUBJECTIVE  Pt was evaluated by pelvic health PT and is very encouraged as she was able to reproduce her tailbone pain on exam. Reports overall not much change since last session.       Initial Pain level: 3/10       OBJECTIVE  Requires cues for ant pelvic tilt during standing exercises  Tender/tight L QL  MMT: glute ext 4+/5 L, 4+/5 R  Lumbar AROM WNL in all planes, pain with ext  Able to fire TA independently with normal breathing pattern      ASSESSMENT/PLAN  Diagnosis 1:  LBP/L LE  Pain -  hot/cold therapy, manual therapy, self management, education, directional preference exercise and home program  Decreased ROM/flexibility - manual therapy, therapeutic exercise and home program  Decreased strength - therapeutic exercise, therapeutic activities and home program  STG/LTGs have been met or progress has been made towards goals:  Yes (See Goal flow sheet completed today.)  Assessment of Progress: The patient's progress has slowed.  The patient has had set backs in their progress.  Self Management Plans:  Patient has been instructed in a home treatment program.  Patient  has been instructed in self management of symptoms.  I have re-evaluated this patient and find that the nature, scope, duration and intensity of the therapy is appropriate for the medical condition of the patient.  Lisset continues to require the following intervention to meet STG and LTG's: PT      Recommendations:  This patient would benefit from continued therapy.  Frequency:  1 X a month, once daily  Duration:  for 2 months as needed. Suspect pelvic health physical therapy will be more beneficial for her at this point as it relates to her tailbone/low back pain.    Please refer to the daily flowsheet for treatment today, total treatment time and time  spent performing 1:1 timed codes.

## 2023-01-13 NOTE — PROGRESS NOTES
[facility]    OUTPATIENT Occupational Therapy ORTHOPEDIC EVALUATION  PLAN OF TREATMENT FOR OUTPATIENT REHABILITATION  (COMPLETE FOR INITIAL CLAIMS ONLY)  Patient's Last Name, First Name, M.I.  YOB: 1975  Lisset Gates    Provider s Name:  [facility]   Medical Record No.  [id number]   Start of Care Date:   1/13/23   Onset Date:   12/26/22 (script date)   Treatment Diagnosis:  [place]-neck deformity of finger Medical Diagnosis:  [place]-neck deformity of finger, unspecified laterality       Goals:     01/13/23 0500   Goal #1   Goal #1 self care   Previous Performance Level Independent   Current Functional Task Position   Current Performance Level PIP hyperextension in B IF, LF, RF, SF   STG Target Performance Orthosis   STG Target Perform Level Independent management of orthosis   Due Date 02/10/23   LTG Target Task/Performance Independent management of symptoms  (Independent management of orthosis)   Due Date 03/10/23         Therapy Frequency:   1x/week   Predicted Duration of Therapy Intervention:   8 weeks     Pedro Cedillo OTR                 I CERTIFY THE NEED FOR THESE SERVICES FURNISHED UNDER        THIS PLAN OF TREATMENT AND WHILE UNDER MY CARE     (Physician attestation of this document indicates review and certification of the therapy plan).                     Certification Date From:    1/13/23  Certification Date To:   3/10/23    Referring Provider:  Katia Monsivais    Initial Assessment        See Epic Evaluation

## 2023-01-13 NOTE — PROGRESS NOTES
Patient is a  47   year old who is being evaluated via a billable telephone visit.      What phone number would you like to be contacted at? CELL  How would you like to obtain your AVS? CATHIE        Subjective   Patient is a 47    year old who presents by phone call visit for the following:   Discussed her Cammy and current home PT regimen  Had some improvement from previous injection  Wants to discuss other areas of PT, possibly pelvic health PT  HPI       Review of Systems   8 systems reviewed and normal      Objective           Vitals:  No vitals were obtained today due to virtual visit.    Physical Exam   Healthy appearing  PSYCH: Alert and oriented times 3; coherent speech, normal   rate and volume, able to articulate logical thoughts, able   to abstract reason, no tangential thoughts, no hallucinations   or delusions  His affect is nl  RESP: No cough, no audible wheezing, able to talk in full sentences  Remainder of exam unable to be completed due to telephone visits    Assessment/Plan  48 yo female with lumbar radicular pain, lumbar ddd, chronic low back and pelvic pain    I independently reviewed the following imaging studies and discussed with patient:  Lumbar MRI; shows ddd, disc herniations  Discussed and can consider repeat CAMMY  Discussed starting more PT and adding pelvic health  F/u 1 months          Phone call duration: 20 minutes  Phone call start: 715am  Phone call end: 735am  Dr Larsen

## 2023-01-25 ENCOUNTER — THERAPY VISIT (OUTPATIENT)
Dept: PHYSICAL THERAPY | Facility: CLINIC | Age: 48
End: 2023-01-25
Payer: COMMERCIAL

## 2023-01-25 DIAGNOSIS — M53.3 CHRONIC COCCYGEAL PAIN: ICD-10-CM

## 2023-01-25 DIAGNOSIS — G89.29 CHRONIC PELVIC PAIN IN FEMALE: Primary | ICD-10-CM

## 2023-01-25 DIAGNOSIS — R10.2 CHRONIC PELVIC PAIN IN FEMALE: Primary | ICD-10-CM

## 2023-01-25 DIAGNOSIS — G89.29 CHRONIC COCCYGEAL PAIN: ICD-10-CM

## 2023-01-25 PROCEDURE — 97140 MANUAL THERAPY 1/> REGIONS: CPT | Mod: GP | Performed by: PHYSICAL THERAPIST

## 2023-01-25 PROCEDURE — 97110 THERAPEUTIC EXERCISES: CPT | Mod: GP | Performed by: PHYSICAL THERAPIST

## 2023-01-25 PROCEDURE — 97530 THERAPEUTIC ACTIVITIES: CPT | Mod: GP | Performed by: PHYSICAL THERAPIST

## 2023-01-27 ENCOUNTER — THERAPY VISIT (OUTPATIENT)
Dept: PHYSICAL THERAPY | Facility: CLINIC | Age: 48
End: 2023-01-27
Payer: COMMERCIAL

## 2023-01-27 DIAGNOSIS — M25.529 ELBOW PAIN: Primary | ICD-10-CM

## 2023-01-27 DIAGNOSIS — M24.9 HYPERMOBILITY OF JOINT: ICD-10-CM

## 2023-01-27 PROCEDURE — 97110 THERAPEUTIC EXERCISES: CPT | Mod: GP | Performed by: PHYSICAL THERAPIST

## 2023-01-27 PROCEDURE — 97161 PT EVAL LOW COMPLEX 20 MIN: CPT | Mod: GP | Performed by: PHYSICAL THERAPIST

## 2023-02-01 NOTE — PROGRESS NOTES
Physical Therapy Initial Evaluation  Subjective:  The history is provided by the patient.   Therapist Generated HPI Evaluation  Problem details: Pain has been off an on for years. Worsening recently..         Type of problem:  Right elbow.    This is a recurrent condition.  Condition occurred with:  Insidious onset.  Where condition occurred: for unknown reasons.  Patient reports pain:  Anterior, in the joint, medial, lateral and posterior.  Pain is described as aching and is intermittent.  Pain timing: no pattern.  Since onset symptoms are unchanged.  Associated symptoms:  Loss of strength and catching. Symptoms are exacerbated by activity, carrying and certain positions  and relieved by rest.      Restrictions due to condition include:  Working in normal job without restrictions.  Barriers include:  None as reported by patient.    Patient Health History     Pain is reported as 6/10 on pain scale.  General health as reported by patient is good.  Pertinent medical history includes: asthma, depression, menopausal and seizures.   Red flags:  None as reported by patient.  Medical allergies: none.   Surgeries include:  Orthopedic surgery. Other surgery history details: B knees for patellar dislocations.    Current medications:  Pain medication.    Current occupation is .   Primary job tasks include:  Computer work, prolonged sitting and prolonged standing.   The history is provided by the patient.                       Objective:  System                   Shoulder Evaluation:  ROM:  AROM:  normal                                  Strength:    Flexion: Left:5/5   Pain:    Right: 5/5     Pain:     Abduction:  Left: 5/5  Pain:    Right: 4+/5     Pain:  Adduction:  Left: 5/5    Pain:    Right: 5/5     Pain:  Internal Rotation:  Left:5-/5     Pain:    Right: 4+/5     Pain:  External Rotation:   Left:4+/5     Pain:   Right:4+/5     Pain:            Stability Testing:  normal      Special Tests:         Right shoulder negative for the following special tests:Impingement         ROM:  AROM:  normal                          Strength:    Flexion Elbow:  Left: 5-/5 Pain:    Right: 5-/5 Pain:  Extension Elbow: Left: 5/5 Pain:    Right: 4+/5 Pain:  Flexion Wrist:  Left: 4+/5 Pain:    Right: 4+/5 Pain:  Extension Wrist: Left: 4+/5 Pain:  Right: 4+/5 Pain:  Supination Elbow/Wrist: Left: 5/5 Pain:    Right: 4+/5 Pain:  Pronation Elbow/Wrist: Left: 4+/5 Pain:        Right: 4+/5 Pain:    :     Left: even bilat - not formally measured         Special Testing:      Right wrist/elbow negative for the following special tests: Lateral Epicondylitis; Medial Epicondylitis or Phalen's  Palpation:      Right wrist/elbow tenderness present at:Pronator Teres; Wrist Extensors and BicepsRight wrist/elbow tenderness not present at:Wrist Flexors  Mobility:    Proximal Radioulnar:  Right:  Hypermobile  Distal Radioulnar:  Right:  Hypermobile  Humeroulnar:  Right: hypermobile                                    General     ROS    Assessment/Plan:    Patient is a 47 year old female with right side elbow complaints.    Patient has the following significant findings with corresponding treatment plan.                Diagnosis 1:  R elbow pain in the setting of hypermobility  Pain -  hot/cold therapy, manual therapy, self management, education and home program  Decreased strength - therapeutic exercise, therapeutic activities and home program  Decreased proprioception - neuro re-education and therapeutic activities    Therapy Evaluation Codes:   1) History comprised of:   Personal factors that impact the plan of care:      None.    Comorbidity factors that impact the plan of care are:      None.     Medications impacting care: None.  2) Examination of Body Systems comprised of:   Body structures and functions that impact the plan of care:      Elbow, Shoulder, Thoracic Spine and Wrist.   Activity limitations that impact the plan of  care are:      Bathing, Cooking, Grasping, Lifting and Reading/Computer work.  3) Clinical presentation characteristics are:   Stable/Uncomplicated.  4) Decision-Making    Low complexity using standardized patient assessment instrument and/or measureable assessment of functional outcome.  Cumulative Therapy Evaluation is: Low complexity.    Previous and current functional limitations:  (See Goal Flow Sheet for this information)    Short term and Long term goals: (See Goal Flow Sheet for this information)     Communication ability:  Patient appears to be able to clearly communicate and understand verbal and written communication and follow directions correctly.  Treatment Explanation - The following has been discussed with the patient:   RX ordered/plan of care  Anticipated outcomes  Possible risks and side effects  This patient would benefit from PT intervention to resume normal activities.   Rehab potential is good.    Frequency:  1 X week, once daily  Duration:  for 8 weeks  Discharge Plan:  Achieve all LTG.  Independent in home treatment program.  Reach maximal therapeutic benefit.    Please refer to the daily flowsheet for treatment today, total treatment time and time spent performing 1:1 timed codes.

## 2023-02-03 NOTE — PROGRESS NOTES
Diagnosis: [place]-neck deformity of finger  DOI: 12/26/22 (script date); Katia Yao           Treat   - goal (independent management of orthosis; PIP hyperextension of B IF, LF, RF, and SF )   - assess oval 8 splint    -  Review exercises (finger stability, gripping, wrist stabilization on wall)       - thumb stability? Thumb spica brace?

## 2023-02-08 ENCOUNTER — THERAPY VISIT (OUTPATIENT)
Dept: PHYSICAL THERAPY | Facility: CLINIC | Age: 48
End: 2023-02-08
Payer: COMMERCIAL

## 2023-02-08 DIAGNOSIS — R10.2 CHRONIC PELVIC PAIN IN FEMALE: Primary | ICD-10-CM

## 2023-02-08 DIAGNOSIS — G89.29 CHRONIC COCCYGEAL PAIN: ICD-10-CM

## 2023-02-08 DIAGNOSIS — G89.29 CHRONIC PELVIC PAIN IN FEMALE: Primary | ICD-10-CM

## 2023-02-08 DIAGNOSIS — M53.3 CHRONIC COCCYGEAL PAIN: ICD-10-CM

## 2023-02-08 PROCEDURE — 97530 THERAPEUTIC ACTIVITIES: CPT | Mod: GP | Performed by: PHYSICAL THERAPIST

## 2023-02-08 PROCEDURE — 97140 MANUAL THERAPY 1/> REGIONS: CPT | Mod: GP | Performed by: PHYSICAL THERAPIST

## 2023-02-10 ENCOUNTER — THERAPY VISIT (OUTPATIENT)
Dept: OCCUPATIONAL THERAPY | Facility: CLINIC | Age: 48
End: 2023-02-10
Payer: COMMERCIAL

## 2023-02-10 ENCOUNTER — THERAPY VISIT (OUTPATIENT)
Dept: PHYSICAL THERAPY | Facility: CLINIC | Age: 48
End: 2023-02-10
Payer: COMMERCIAL

## 2023-02-10 DIAGNOSIS — M20.039 SWAN-NECK DEFORMITY OF FINGER, UNSPECIFIED LATERALITY: Primary | ICD-10-CM

## 2023-02-10 DIAGNOSIS — M25.529 ELBOW PAIN: Primary | ICD-10-CM

## 2023-02-10 DIAGNOSIS — M25.341 INSTABILITY OF FINGER JOINT OF RIGHT HAND: ICD-10-CM

## 2023-02-10 DIAGNOSIS — M25.342 INSTABILITY OF FINGER JOINT OF LEFT HAND: ICD-10-CM

## 2023-02-10 PROCEDURE — 97112 NEUROMUSCULAR REEDUCATION: CPT | Mod: GP | Performed by: PHYSICAL THERAPIST

## 2023-02-10 PROCEDURE — 97110 THERAPEUTIC EXERCISES: CPT | Mod: GP | Performed by: PHYSICAL THERAPIST

## 2023-02-10 PROCEDURE — 97535 SELF CARE MNGMENT TRAINING: CPT | Mod: GO

## 2023-02-10 PROCEDURE — 97110 THERAPEUTIC EXERCISES: CPT | Mod: GO

## 2023-02-15 ENCOUNTER — OFFICE VISIT (OUTPATIENT)
Dept: ORTHOPEDICS | Facility: CLINIC | Age: 48
End: 2023-02-15
Payer: COMMERCIAL

## 2023-02-15 DIAGNOSIS — G89.29 CHRONIC PELVIC PAIN IN FEMALE: Primary | ICD-10-CM

## 2023-02-15 DIAGNOSIS — M53.3 CHRONIC COCCYGEAL PAIN: ICD-10-CM

## 2023-02-15 DIAGNOSIS — G89.29 CHRONIC COCCYGEAL PAIN: ICD-10-CM

## 2023-02-15 DIAGNOSIS — R10.2 CHRONIC PELVIC PAIN IN FEMALE: Primary | ICD-10-CM

## 2023-02-15 PROCEDURE — 99213 OFFICE O/P EST LOW 20 MIN: CPT | Performed by: PREVENTIVE MEDICINE

## 2023-02-15 NOTE — PROGRESS NOTES
HISTORY OF PRESENT ILLNESS  Ms. Gates is a pleasant 47 year old year old female who presents to clinic today with chronic low back pain  Lisset explains that she has been going to PT for pelvic floor pain, she states it has been going well, she also wants to discuss her back pain   She thinks she is making progress with her physical therapy    Location: pelvis, lumbar spine     Quality:  achy pain    Severity: 7/10 at worst    Duration: see above  Timing: occurs intermittently  Context: occurs while exercising and lifting and using the joint  Modifying factors:  resting and non-use makes it better, movement and use makes it worse  Associated signs & symptoms: no radiation into legs today    MEDICAL HISTORY  Patient Active Problem List   Diagnosis     Irritable bowel syndrome     Premature ovarian failure  - LMP March 2013 @ 36 yo     Mild persistent asthma without complication     Adjustment disorder with depressed mood     Seasonal allergic rhinitis due to pollen     Allergic rhinitis due to animals     Allergic rhinitis due to dust mite     Allergic rhinitis due to mold     Allergic conjunctivitis, bilateral     History of colonic polyps     Female dyspareunia     History of loop electrical excision procedure (LEEP)     [place]-neck deformity of finger, unspecified laterality     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       FEXOFENADINE  MG OR TABS 1 TABLET DAILY        fluticasone (FLONASE) 50 MCG/ACT nasal spray Spray 2 sprays into both nostrils daily       ibuprofen (ADVIL/MOTRIN) 600 MG tablet TAKE 1 TABLET (600 MG) BY MOUTH EVERY 8 HOURS AS NEEDED FOR MODERATE PAIN 60 tablet 1      montelukast (SINGULAIR) 10 MG tablet Take 1 tablet (10 mg) by mouth At Bedtime 90 tablet 3     Turmeric Curcumin 500 MG CAPS Take 500 mg by mouth daily        VITAMIN D, CHOLECALCIFEROL, PO Take by mouth daily         No Known Allergies    Family History   Problem Relation Age of Onset     Thyroid Disease Mother      Diabetes Mother         type2     Eye Disorder Mother         cataracts     Gastrointestinal Disease Mother         hep a/has to have her throat stretched (esophageal stricturing)     Respiratory Mother         sleep apnea     Heart Disease Father 60     Thyroid Disease Maternal Grandmother      Gynecology Maternal Grandmother         on bcp to keep period regular, a small uterus     Heart Disease Maternal Grandmother         tachycardia     Diabetes Maternal Grandfather      Cancer Maternal Grandfather      C.A.D. Paternal Grandfather 30        age 30, then again in his 80's     Allergies Daughter         milk     Asthma Daughter      Social History     Socioeconomic History     Marital status:      Number of children: 2   Occupational History     Employer: [employer]   Tobacco Use     Smoking status: Never     Smokeless tobacco: Never   Vaping Use     Vaping Use: Never used   Substance and Sexual Activity     Alcohol use: Yes     Comment: social     Drug use: No     Sexual activity: Yes     Partners: Male     Birth control/protection: Surgical     Comment:  had vasectomy   Other Topics Concern      Service No     Caffeine Concern No     Occupational Exposure No     Sleep Concern No     Stress Concern Yes     Weight Concern Yes     Comment: gains easily     Special Diet No     Back Care Yes     Exercise Yes     Comment: irregularly     Seat Belt No     Self-Exams No     Parent/sibling w/ CABG, MI or angioplasty before 65F 55M? No   Social History Narrative    Caffeine intake/servings daily - 0-1    Calcium intake/servings daily - 2-3    Exercise 2-3  times weekly -  describe aerobics    Sunscreen used - Yes    Seatbelts used - Yes    Guns stored in the home - No    Self Breast Exam - Yes    Pap test up to date -  Yes    Eye exam up to date -  Yes    Dental exam up to date -  Yes    DEXA scan up to date -  Not Applicable    Flex Sig/Colonoscopy up to date -  Not Applicable    Mammography up to date -  Not Applicable    Immunizations reviewed and up to date - Yes    Abuse: Current or Past (Physical, Sexual or Emotional) - None current, past    Do you feel safe in your environment - Yes    Do you cope well with stress - Yes    Do you suffer from insomnia - No    Last updated by: Estefani Moraes MA 5/5/2010               Additional medical/Social/Surgical histories reviewed in [facility] and updated as appropriate.     REVIEW OF SYSTEMS (2/15/2023)  10 point ROS of systems including Constitutional, Eyes, Respiratory, Cardiovascular, Gastroenterology, Genitourinary, Integumentary, Musculoskeletal, Psychiatric, Allergic/Immunologic were all negative except for pertinent positives noted in my HPI.     PHYSICAL EXAM  VSS    General  - normal appearance, in no obvious distress  HEENT  - conjunctivae not injected, moist mucous membranes, normocephalic/atraumatic head, ears normal appearance, no lesions, mouth normal appearance, no scars, normal dentition and teeth present  CV  - normal peripheral perfusion  Pulm  - normal respiratory pattern, non-labored  Musculoskeletal - lumbar spine  - stance: normal gait without limp, no obvious leg length discrepancy, normal heel and toe walk  - inspection: normal bone and joint alignment, no obvious scoliosis  - palpation: no paravertebral or bony tenderness  - ROM: flexion exacerbates pain, normal extension, sidebending, rotation  - strength: lower extremities 5/5 in all planes  - special tests:  (+) straight leg raise- some low back pain  (+) slump test- some low back pain  Neuro  - patellar and Achilles DTRs 2+ bilaterally, today, no lower extremity sensory  deficit throughout L5 distribution, grossly normal coordination, normal muscle tone  Skin  - no ecchymosis, erythema, warmth, or induration, no obvious rash  Psych  - interactive, appropriate, normal mood and affect  Has some ttp over bilateral SI joints, no pain in hip joints with ROM testing  ASSESSMENT & PLAN  48 yo female with chronic low back pain, lumbar ddd, facet arthropathy, radicular pain, stable, not resolved, and chronic pelvic floor dysfunction, improving    I independently reviewed the following imaging studies:  Lumbar MRI:shows ddd, facet arthropathy  Can consider repeat imaging for lumbar MRI  Will continue PT  F/u in 1-2 months  Can consider further imaging    Patient has been doing home exercise physical therapy program for this problem      Appropriate PPE was utilized for prevention of spread of Covid-19.  Floyd Larsen MD, CAM

## 2023-02-15 NOTE — LETTER
2/15/2023         RE: Lisset MITCHELL Chi  [address] [address]        Dear Colleague,    Thank you for referring your patient, Lisset MITCHELL Chi, to the [facility] [place]. Please see a copy of my visit note below.    HISTORY OF PRESENT ILLNESS  Ms. Gates is a pleasant 47 year old year old female who presents to clinic today with chronic low back pain  Lisset explains that she has been going to PT for pelvic floor pain, she states it has been going well, she also wants to discuss her back pain   She thinks she is making progress with her physical therapy    Location: pelvis, lumbar spine     Quality:  achy pain    Severity: 7/10 at worst    Duration: see above  Timing: occurs intermittently  Context: occurs while exercising and lifting and using the joint  Modifying factors:  resting and non-use makes it better, movement and use makes it worse  Associated signs & symptoms: no radiation into legs today    MEDICAL HISTORY  Patient Active Problem List   Diagnosis     Irritable bowel syndrome     Premature ovarian failure  - LMP March 2013 @ 36 yo     Mild persistent asthma without complication     Adjustment disorder with depressed mood     Seasonal allergic rhinitis due to pollen     Allergic rhinitis due to animals     Allergic rhinitis due to dust mite     Allergic rhinitis due to mold     Allergic conjunctivitis, bilateral     History of colonic polyps     Female dyspareunia     History of loop electrical excision procedure (LEEP)     [place]-neck deformity of finger, unspecified laterality     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       FEXOFENADINE   MG OR TABS 1 TABLET DAILY        fluticasone (FLONASE) 50 MCG/ACT nasal spray Spray 2 sprays into both nostrils daily       ibuprofen (ADVIL/MOTRIN) 600 MG tablet TAKE 1 TABLET (600 MG) BY MOUTH EVERY 8 HOURS AS NEEDED FOR MODERATE PAIN 60 tablet 1     montelukast (SINGULAIR) 10 MG tablet Take 1 tablet (10 mg) by mouth At Bedtime 90 tablet 3     Turmeric Curcumin 500 MG CAPS Take 500 mg by mouth daily        VITAMIN D, CHOLECALCIFEROL, PO Take by mouth daily         No Known Allergies    Family History   Problem Relation Age of Onset     Thyroid Disease Mother      Diabetes Mother         type2     Eye Disorder Mother         cataracts     Gastrointestinal Disease Mother         hep a/has to have her throat stretched (esophageal stricturing)     Respiratory Mother         sleep apnea     Heart Disease Father 60     Thyroid Disease Maternal Grandmother      Gynecology Maternal Grandmother         on bcp to keep period regular, a small uterus     Heart Disease Maternal Grandmother         tachycardia     Diabetes Maternal Grandfather      Cancer Maternal Grandfather      C.A.D. Paternal Grandfather 30        age 30, then again in his 80's     Allergies Daughter         milk     Asthma Daughter      Social History     Socioeconomic History     Marital status:      Number of children: 2   Occupational History     Employer: [employer]   Tobacco Use     Smoking status: Never     Smokeless tobacco: Never   Vaping Use     Vaping Use: Never used   Substance and Sexual Activity     Alcohol use: Yes     Comment: social     Drug use: No     Sexual activity: Yes     Partners: Male     Birth control/protection: Surgical     Comment:  had vasectomy   Other Topics Concern      Service No     Caffeine Concern No     Occupational Exposure No     Sleep Concern No     Stress Concern Yes     Weight Concern Yes     Comment: gains easily     Special Diet No     Back Care Yes     Exercise Yes     Comment:  irregularly     Seat Belt No     Self-Exams No     Parent/sibling w/ CABG, MI or angioplasty before 65F 55M? No   Social History Narrative    Caffeine intake/servings daily - 0-1    Calcium intake/servings daily - 2-3    Exercise 2-3  times weekly - describe aerobics    Sunscreen used - Yes    Seatbelts used - Yes    Guns stored in the home - No    Self Breast Exam - Yes    Pap test up to date -  Yes    Eye exam up to date -  Yes    Dental exam up to date -  Yes    DEXA scan up to date -  Not Applicable    Flex Sig/Colonoscopy up to date -  Not Applicable    Mammography up to date -  Not Applicable    Immunizations reviewed and up to date - Yes    Abuse: Current or Past (Physical, Sexual or Emotional) - None current, past    Do you feel safe in your environment - Yes    Do you cope well with stress - Yes    Do you suffer from insomnia - No    Last updated by: Estefani Moraes MA 5/5/2010               Additional medical/Social/Surgical histories reviewed in [facility] and updated as appropriate.     REVIEW OF SYSTEMS (2/15/2023)  10 point ROS of systems including Constitutional, Eyes, Respiratory, Cardiovascular, Gastroenterology, Genitourinary, Integumentary, Musculoskeletal, Psychiatric, Allergic/Immunologic were all negative except for pertinent positives noted in my HPI.     PHYSICAL EXAM  VSS    General  - normal appearance, in no obvious distress  HEENT  - conjunctivae not injected, moist mucous membranes, normocephalic/atraumatic head, ears normal appearance, no lesions, mouth normal appearance, no scars, normal dentition and teeth present  CV  - normal peripheral perfusion  Pulm  - normal respiratory pattern, non-labored  Musculoskeletal - lumbar spine  - stance: normal gait without limp, no obvious leg length discrepancy, normal heel and toe walk  - inspection: normal bone and joint alignment, no obvious scoliosis  - palpation: no paravertebral or bony tenderness  - ROM: flexion exacerbates pain, normal extension,  sidebending, rotation  - strength: lower extremities 5/5 in all planes  - special tests:  (+) straight leg raise- some low back pain  (+) slump test- some low back pain  Neuro  - patellar and Achilles DTRs 2+ bilaterally, today, no lower extremity sensory deficit throughout L5 distribution, grossly normal coordination, normal muscle tone  Skin  - no ecchymosis, erythema, warmth, or induration, no obvious rash  Psych  - interactive, appropriate, normal mood and affect  Has some ttp over bilateral SI joints, no pain in hip joints with ROM testing  ASSESSMENT & PLAN  46 yo female with chronic low back pain, lumbar ddd, facet arthropathy, radicular pain, stable, not resolved, and chronic pelvic floor dysfunction, improving    I independently reviewed the following imaging studies:  Lumbar MRI:shows ddd, facet arthropathy  Can consider repeat imaging for lumbar MRI  Will continue PT  F/u in 1-2 months  Can consider further imaging    Patient has been doing home exercise physical therapy program for this problem      Appropriate PPE was utilized for prevention of spread of Covid-19.  Floyd Larsen MD, [facility]          Again, thank you for allowing me to participate in the care of your patient.        Sincerely,        Floyd Larsen MD

## 2023-02-22 ENCOUNTER — THERAPY VISIT (OUTPATIENT)
Dept: PHYSICAL THERAPY | Facility: CLINIC | Age: 48
End: 2023-02-22
Payer: COMMERCIAL

## 2023-02-22 DIAGNOSIS — R10.2 CHRONIC PELVIC PAIN IN FEMALE: Primary | ICD-10-CM

## 2023-02-22 DIAGNOSIS — M53.3 CHRONIC COCCYGEAL PAIN: ICD-10-CM

## 2023-02-22 DIAGNOSIS — G89.29 CHRONIC PELVIC PAIN IN FEMALE: Primary | ICD-10-CM

## 2023-02-22 DIAGNOSIS — G89.29 CHRONIC COCCYGEAL PAIN: ICD-10-CM

## 2023-02-22 PROCEDURE — 97110 THERAPEUTIC EXERCISES: CPT | Mod: GP | Performed by: PHYSICAL THERAPIST

## 2023-02-22 PROCEDURE — 97530 THERAPEUTIC ACTIVITIES: CPT | Mod: GP | Performed by: PHYSICAL THERAPIST

## 2023-03-03 ENCOUNTER — THERAPY VISIT (OUTPATIENT)
Dept: PHYSICAL THERAPY | Facility: CLINIC | Age: 48
End: 2023-03-03
Payer: COMMERCIAL

## 2023-03-03 DIAGNOSIS — M25.529 ELBOW PAIN: Primary | ICD-10-CM

## 2023-03-03 PROCEDURE — 97112 NEUROMUSCULAR REEDUCATION: CPT | Mod: GP | Performed by: PHYSICAL THERAPIST

## 2023-03-03 PROCEDURE — 97110 THERAPEUTIC EXERCISES: CPT | Mod: GP | Performed by: PHYSICAL THERAPIST

## 2023-03-06 NOTE — PROGRESS NOTES
***SOAP DUE     Treat   - assess splint   - review ergonomics   - review HEP?                   **PN next visit

## 2023-03-08 ENCOUNTER — THERAPY VISIT (OUTPATIENT)
Dept: PHYSICAL THERAPY | Facility: CLINIC | Age: 48
End: 2023-03-08
Payer: COMMERCIAL

## 2023-03-08 DIAGNOSIS — M53.3 CHRONIC COCCYGEAL PAIN: ICD-10-CM

## 2023-03-08 DIAGNOSIS — R10.2 CHRONIC PELVIC PAIN IN FEMALE: Primary | ICD-10-CM

## 2023-03-08 DIAGNOSIS — G89.29 CHRONIC PELVIC PAIN IN FEMALE: Primary | ICD-10-CM

## 2023-03-08 DIAGNOSIS — G89.29 CHRONIC COCCYGEAL PAIN: ICD-10-CM

## 2023-03-08 PROCEDURE — 97530 THERAPEUTIC ACTIVITIES: CPT | Mod: GP | Performed by: PHYSICAL THERAPIST

## 2023-03-08 PROCEDURE — 97110 THERAPEUTIC EXERCISES: CPT | Mod: GP | Performed by: PHYSICAL THERAPIST

## 2023-03-08 PROCEDURE — 97140 MANUAL THERAPY 1/> REGIONS: CPT | Mod: GP | Performed by: PHYSICAL THERAPIST

## 2023-03-10 ENCOUNTER — THERAPY VISIT (OUTPATIENT)
Dept: OCCUPATIONAL THERAPY | Facility: CLINIC | Age: 48
End: 2023-03-10
Payer: COMMERCIAL

## 2023-03-10 DIAGNOSIS — M25.342 INSTABILITY OF FINGER JOINT OF LEFT HAND: ICD-10-CM

## 2023-03-10 DIAGNOSIS — M20.039 SWAN-NECK DEFORMITY OF FINGER, UNSPECIFIED LATERALITY: Primary | ICD-10-CM

## 2023-03-10 DIAGNOSIS — M25.341 INSTABILITY OF FINGER JOINT OF RIGHT HAND: ICD-10-CM

## 2023-03-10 PROCEDURE — 97110 THERAPEUTIC EXERCISES: CPT | Mod: GO

## 2023-03-15 ENCOUNTER — THERAPY VISIT (OUTPATIENT)
Dept: PHYSICAL THERAPY | Facility: CLINIC | Age: 48
End: 2023-03-15
Payer: COMMERCIAL

## 2023-03-15 DIAGNOSIS — M25.529 ELBOW PAIN: Primary | ICD-10-CM

## 2023-03-15 PROCEDURE — 97110 THERAPEUTIC EXERCISES: CPT | Mod: GP | Performed by: PHYSICAL THERAPIST

## 2023-03-22 ENCOUNTER — THERAPY VISIT (OUTPATIENT)
Dept: PHYSICAL THERAPY | Facility: CLINIC | Age: 48
End: 2023-03-22
Payer: COMMERCIAL

## 2023-03-22 DIAGNOSIS — G89.29 CHRONIC PELVIC PAIN IN FEMALE: Primary | ICD-10-CM

## 2023-03-22 DIAGNOSIS — M53.3 CHRONIC COCCYGEAL PAIN: ICD-10-CM

## 2023-03-22 DIAGNOSIS — R10.2 CHRONIC PELVIC PAIN IN FEMALE: Primary | ICD-10-CM

## 2023-03-22 DIAGNOSIS — G89.29 CHRONIC COCCYGEAL PAIN: ICD-10-CM

## 2023-03-22 PROCEDURE — 97530 THERAPEUTIC ACTIVITIES: CPT | Mod: GP | Performed by: PHYSICAL THERAPIST

## 2023-03-22 PROCEDURE — 97110 THERAPEUTIC EXERCISES: CPT | Mod: GP | Performed by: PHYSICAL THERAPIST

## 2023-03-22 PROCEDURE — 97140 MANUAL THERAPY 1/> REGIONS: CPT | Mod: GP | Performed by: PHYSICAL THERAPIST

## 2023-03-28 NOTE — PROGRESS NOTES
PROGRESS  REPORT    Progress reporting period is from eval to 3/22/23.       SUBJECTIVE  Subjective changes noted by patient:  .  Subjective: Things have been going fine, forgot to do stretches in front of leg but didn't feel pain.  Wall ball squats going well.    Current pain level is 2/10  .     Previous pain level was  7/10  .   Changes in function:  Yes (See Goal flowsheet attached for changes in current functional level)  Adverse reaction to treatment or activity: None    OBJECTIVE  Changes noted in objective findings:  Yes,   Objective: Reviewed vaginal moisturizer, continue using.  Able to insert therawand and identify trigger points.  L>R TTP to B LA, OI, tolerates increased pressure to trigger point therapy.     ASSESSMENT/PLAN  Updated problem list and treatment plan: Diagnosis 1:   Pelvic floor dysfunction  Pain -  manual therapy, self management, education, directional preference exercise and home program  Decreased ROM/flexibility - manual therapy and therapeutic exercise  Decreased strength - therapeutic exercise and therapeutic activities  Impaired muscle performance - neuro re-education  Decreased function - therapeutic activities  STG/LTGs have been met or progress has been made towards goals:  Yes (See Goal flow sheet completed today.)  Assessment of Progress: The patient's condition is improving.  Self Management Plans:  Patient has been instructed in a home treatment program.  I have re-evaluated this patient and find that the nature, scope, duration and intensity of the therapy is appropriate for the medical condition of the patient.  Lisset continues to require the following intervention to meet STG and LTG's:  PT    Recommendations:  This patient would benefit from continued therapy.     Frequency:  2 X a month, once daily  Duration:  for 6 weeks        Please refer to the daily flowsheet for treatment today, total treatment time and time spent performing 1:1 timed codes.

## 2023-03-31 ENCOUNTER — THERAPY VISIT (OUTPATIENT)
Dept: PHYSICAL THERAPY | Facility: CLINIC | Age: 48
End: 2023-03-31
Payer: COMMERCIAL

## 2023-03-31 DIAGNOSIS — R10.2 CHRONIC PELVIC PAIN IN FEMALE: Primary | ICD-10-CM

## 2023-03-31 DIAGNOSIS — G89.29 CHRONIC PELVIC PAIN IN FEMALE: Primary | ICD-10-CM

## 2023-03-31 DIAGNOSIS — M25.529 ELBOW PAIN: ICD-10-CM

## 2023-03-31 PROCEDURE — 97110 THERAPEUTIC EXERCISES: CPT | Mod: GP | Performed by: PHYSICAL THERAPIST

## 2023-03-31 PROCEDURE — 97112 NEUROMUSCULAR REEDUCATION: CPT | Mod: GP | Performed by: PHYSICAL THERAPIST

## 2023-04-05 ENCOUNTER — THERAPY VISIT (OUTPATIENT)
Dept: PHYSICAL THERAPY | Facility: CLINIC | Age: 48
End: 2023-04-05
Payer: COMMERCIAL

## 2023-04-05 DIAGNOSIS — M53.3 CHRONIC COCCYGEAL PAIN: ICD-10-CM

## 2023-04-05 DIAGNOSIS — G89.29 CHRONIC COCCYGEAL PAIN: ICD-10-CM

## 2023-04-05 DIAGNOSIS — R10.2 CHRONIC PELVIC PAIN IN FEMALE: Primary | ICD-10-CM

## 2023-04-05 DIAGNOSIS — G89.29 CHRONIC PELVIC PAIN IN FEMALE: Primary | ICD-10-CM

## 2023-04-05 PROCEDURE — 97530 THERAPEUTIC ACTIVITIES: CPT | Mod: GP | Performed by: PHYSICAL THERAPIST

## 2023-04-05 PROCEDURE — 97110 THERAPEUTIC EXERCISES: CPT | Mod: GP | Performed by: PHYSICAL THERAPIST

## 2023-04-06 ENCOUNTER — MYC MEDICAL ADVICE (OUTPATIENT)
Dept: ORTHOPEDICS | Facility: CLINIC | Age: 48
End: 2023-04-06

## 2023-04-06 ENCOUNTER — ANCILLARY PROCEDURE (OUTPATIENT)
Dept: MRI IMAGING | Facility: CLINIC | Age: 48
End: 2023-04-06
Attending: PREVENTIVE MEDICINE
Payer: COMMERCIAL

## 2023-04-06 ENCOUNTER — TELEPHONE (OUTPATIENT)
Dept: ORTHOPEDICS | Facility: CLINIC | Age: 48
End: 2023-04-06

## 2023-04-06 DIAGNOSIS — M51.369 DDD (DEGENERATIVE DISC DISEASE), LUMBAR: ICD-10-CM

## 2023-04-06 DIAGNOSIS — M47.816 ARTHROPATHY OF LUMBAR FACET JOINT: ICD-10-CM

## 2023-04-06 DIAGNOSIS — M51.16 LUMBAR DISC HERNIATION WITH RADICULOPATHY: ICD-10-CM

## 2023-04-06 PROCEDURE — 72148 MRI LUMBAR SPINE W/O DYE: CPT | Performed by: STUDENT IN AN ORGANIZED HEALTH CARE EDUCATION/TRAINING PROGRAM

## 2023-04-06 NOTE — TELEPHONE ENCOUNTER
Left Voicemail (1st Attempt) for the patient to call back and schedule the following:    Appointment type: return  Provider: dr. turk  Return date: next opening   Specialty phone number: [phone]  Additonal Notes: discuss results of mri in person or virtual    Karen yates Procedure   Orthopedics, Podiatry, Sports Medicine, Ent ,Eye , Audiology, Adult Endocrine & Diabetes, Nutrition & Medication Therapy Management Specialties   [facility]

## 2023-04-14 ENCOUNTER — THERAPY VISIT (OUTPATIENT)
Dept: PHYSICAL THERAPY | Facility: CLINIC | Age: 48
End: 2023-04-14
Payer: COMMERCIAL

## 2023-04-14 DIAGNOSIS — M25.529 ELBOW PAIN: Primary | ICD-10-CM

## 2023-04-14 PROCEDURE — 97110 THERAPEUTIC EXERCISES: CPT | Mod: GP | Performed by: PHYSICAL THERAPIST

## 2023-04-14 PROCEDURE — 97140 MANUAL THERAPY 1/> REGIONS: CPT | Mod: GP | Performed by: PHYSICAL THERAPIST

## 2023-04-19 ENCOUNTER — OFFICE VISIT (OUTPATIENT)
Dept: ORTHOPEDICS | Facility: CLINIC | Age: 48
End: 2023-04-19
Payer: COMMERCIAL

## 2023-04-19 DIAGNOSIS — M70.62 TROCHANTERIC BURSITIS OF LEFT HIP: ICD-10-CM

## 2023-04-19 DIAGNOSIS — M51.16 LUMBAR DISC HERNIATION WITH RADICULOPATHY: Primary | ICD-10-CM

## 2023-04-19 DIAGNOSIS — M47.816 LUMBAR FACET ARTHROPATHY: ICD-10-CM

## 2023-04-19 DIAGNOSIS — M51.369 DDD (DEGENERATIVE DISC DISEASE), LUMBAR: ICD-10-CM

## 2023-04-19 PROCEDURE — 99214 OFFICE O/P EST MOD 30 MIN: CPT | Mod: 25 | Performed by: PREVENTIVE MEDICINE

## 2023-04-19 PROCEDURE — 20611 DRAIN/INJ JOINT/BURSA W/US: CPT | Mod: LT | Performed by: PREVENTIVE MEDICINE

## 2023-04-19 RX ORDER — TRIAMCINOLONE ACETONIDE 40 MG/ML
40 INJECTION, SUSPENSION INTRA-ARTICULAR; INTRAMUSCULAR
Status: DISCONTINUED | OUTPATIENT
Start: 2023-04-19 | End: 2024-01-19

## 2023-04-19 RX ORDER — METHOCARBAMOL 500 MG/1
500 TABLET, FILM COATED ORAL
Qty: 30 TABLET | Refills: 0 | Status: SHIPPED | OUTPATIENT
Start: 2023-04-19 | End: 2023-05-22

## 2023-04-19 RX ADMIN — TRIAMCINOLONE ACETONIDE 40 MG: 40 INJECTION, SUSPENSION INTRA-ARTICULAR; INTRAMUSCULAR at 16:41

## 2023-04-19 NOTE — PROGRESS NOTES
HISTORY OF PRESENT ILLNESS  Ms. Gates is a pleasant 47 year old year old female who presents to clinic today with the following:  What problem are you here for? Lumbar pain, left hip pain   Her bursa on her hip is more sore today and wants to know if she can get a bursa injection and review her lumbar MRI  Low back pain has been more persistent lately as well  How long have you had this problem?  2-3 years    Have you had any recent imaging of this problem? Xrays/MRI/CT scans? yes    Have you had treatments for this problem in the past?  -Medications? Yes   -Physical therapy? yes  -Injections? yes  -Surgery? no    How severe is this problem today? 0-10 scale? 4-6    How severe has this problem been at WORST in the past? 0-10 scale? 7-8    What do you think caused this problem? unsure    Does this problem or its symptoms cause difficulty for you falling asleep or staying asleep? yes    Anything else you want us to know about this problem? no          MEDICAL HISTORY  Patient Active Problem List   Diagnosis     Irritable bowel syndrome     Premature ovarian failure  - LMP March 2013 @ 38 yo     Mild persistent asthma without complication     Adjustment disorder with depressed mood     Seasonal allergic rhinitis due to pollen     Allergic rhinitis due to animals     Allergic rhinitis due to dust mite     Allergic rhinitis due to mold     Allergic conjunctivitis, bilateral     History of colonic polyps     Female dyspareunia     History of loop electrical excision procedure (LEEP)     [place]-neck deformity of finger, unspecified laterality     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       FEXOFENADINE HCL  180 MG OR TABS 1 TABLET DAILY        fluticasone (FLONASE) 50 MCG/ACT nasal spray Spray 2 sprays into both nostrils daily       ibuprofen (ADVIL/MOTRIN) 600 MG tablet TAKE 1 TABLET (600 MG) BY MOUTH EVERY 8 HOURS AS NEEDED FOR MODERATE PAIN 60 tablet 1     montelukast (SINGULAIR) 10 MG tablet Take 1 tablet (10 mg) by mouth At Bedtime 90 tablet 3     Turmeric Curcumin 500 MG CAPS Take 500 mg by mouth daily        VITAMIN D, CHOLECALCIFEROL, PO Take by mouth daily         No Known Allergies    Family History   Problem Relation Age of Onset     Thyroid Disease Mother      Diabetes Mother         type2     Eye Disorder Mother         cataracts     Gastrointestinal Disease Mother         hep a/has to have her throat stretched (esophageal stricturing)     Respiratory Mother         sleep apnea     Heart Disease Father 60     Thyroid Disease Maternal Grandmother      Gynecology Maternal Grandmother         on bcp to keep period regular, a small uterus     Heart Disease Maternal Grandmother         tachycardia     Diabetes Maternal Grandfather      Cancer Maternal Grandfather      C.A.D. Paternal Grandfather 30        age 30, then again in his 80's     Allergies Daughter         milk     Asthma Daughter      Social History     Socioeconomic History     Marital status:      Number of children: 2   Occupational History     Employer: [employer]   Tobacco Use     Smoking status: Never     Smokeless tobacco: Never   Vaping Use     Vaping status: Never Used   Substance and Sexual Activity     Alcohol use: Yes     Comment: social     Drug use: No     Sexual activity: Yes     Partners: Male     Birth control/protection: Surgical     Comment:  had vasectomy   Other Topics Concern      Service No     Caffeine Concern No     Occupational Exposure No     Sleep Concern No     Stress Concern Yes     Weight Concern Yes     Comment: gains easily     Special Diet No     Back Care Yes     Exercise Yes      Comment: irregularly     Seat Belt No     Self-Exams No     Parent/sibling w/ CABG, MI or angioplasty before 65F 55M? No   Social History Narrative    Caffeine intake/servings daily - 0-1    Calcium intake/servings daily - 2-3    Exercise 2-3  times weekly - describe aerobics    Sunscreen used - Yes    Seatbelts used - Yes    Guns stored in the home - No    Self Breast Exam - Yes    Pap test up to date -  Yes    Eye exam up to date -  Yes    Dental exam up to date -  Yes    DEXA scan up to date -  Not Applicable    Flex Sig/Colonoscopy up to date -  Not Applicable    Mammography up to date -  Not Applicable    Immunizations reviewed and up to date - Yes    Abuse: Current or Past (Physical, Sexual or Emotional) - None current, past    Do you feel safe in your environment - Yes    Do you cope well with stress - Yes    Do you suffer from insomnia - No    Last updated by: Estefani Moraes MA 5/5/2010               Additional medical/Social/Surgical histories reviewed in [facility] and updated as appropriate.     REVIEW OF SYSTEMS (4/19/2023)  10 point ROS of systems including Constitutional, Eyes, Respiratory, Cardiovascular, Gastroenterology, Genitourinary, Integumentary, Musculoskeletal, Psychiatric, Allergic/Immunologic were all negative except for pertinent positives noted in my HPI.     PHYSICAL EXAM  VSS  Vital Signs: LMP 12/25/2013  Patient declined being weighed. There is no height or weight on file to calculate BMI.    General  - normal appearance, in no obvious distress  HEENT  - conjunctivae not injected, moist mucous membranes, normocephalic/atraumatic head, ears normal appearance, no lesions, mouth normal appearance, no scars, normal dentition and teeth present  CV  - normal femoral pulse  Pulm  - normal respiratory pattern, non-labored  Musculoskeletal - left hip  - stance: normal gait without limp,  no obvious leg length discrepancy, normal heel and toe walk, single leg squat displays knee valgus, contralateral hip  drop, internal rotation of the hip   - inspection: no swelling or effusion,  normal bone and joint alignment, no obvious deformity  - palpation: tender over the greater trochanter  - ROM: pain with active abduction, normal and painless flexion, extension, IR, ER, adduction  - strength: 5/5 in all planes  - special tests:  (-) EMETERIO  (-) FADIR  no pain with axial femoral load  Neuro  - no sensory or motor deficit, grossly normal coordination, normal muscle tone  Skin  - no ecchymosis, erythema, warmth, or induration, no obvious rash  Psych  - interactive, appropriate, normal mood and affect  Lumbar: has pain with flexion/extension and positive slr on left  ASSESSMENT & PLAN  48 yo female with lumbar facet arthropathy, lumbar ddd, radicular pain and left hip trochanteric bursitis, worse    I independently reviewed the following imaging studies:  Lumbar MRI: shows ddd, facet arthropathy  Discussed and ordered lumbar facet injections L3/4  After a 20 minute discussion and examination, we decided to perform a same day injection for diagnostic and therapeutic purposes for left hip  Given RX for robaxin    Patient HAS  completed physical therapy for 4-6 weeks  Patient has been doing home exercise physical therapy program for this problem      Appropriate PPE was utilized for prevention of spread of Covid-19.  Floyd Larsen MD, CAM    Trochanteric Hip Injection - Ultrasound Guided  The patient was informed of the risks and the benefits of the procedure and a written consent was signed.  The patient s left lateral hip was prepped with chlorhexidine in sterile fashion.   40 mg of triamcinolone suspension was drawn up into a 5 mL syringe with 4 mL of 1% lidocaine.  Injection was performed using sterile technique.  Under ultrasound guidance a 3.5-inch 22-gauge needle was used to enter the lynne-trochanteric tissue.  Needle placement was visualized and documented with ultrasound which was deemed necessary to ensure medication  did not enter the tendon itself, which could potentially cause tendon damage.   Injection performed long axis to the probe with probe in short axis to the greater trochanter.  Expansion of the lynne-trochanteric tissue was visualized under ultrasound upon injection.  Images were permanently stored for the patient's record.  There were no complications. The patient tolerated the procedure well. There was negligible bleeding.       Large Joint Injection/Arthocentesis: L greater trochanteric bursa    Date/Time: 4/19/2023 4:41 PM    Performed by: Floyd Larsen MD  Authorized by: Floyd Larsen MD    Indications:  Pain and diagnostic evaluation  Needle Size:  22 G  Guidance: ultrasound    Approach:  Lateral  Location:  Hip      Site:  L greater trochanteric bursa  Medications:  40 mg triamcinolone 40 MG/ML  Outcome:  Tolerated well, no immediate complications  Procedure discussed: discussed risks, benefits, and alternatives    Consent Given by:  Patient  Timeout: timeout called immediately prior to procedure    Prep: patient was prepped and draped in usual sterile fashion

## 2023-04-19 NOTE — LETTER
4/19/2023         RE: Lisset MITCHELL Chi  [address] [address]        Dear Colleague,    Thank you for referring your patient, Lisset MITCHELL Chi, to the [facility] [place]. Please see a copy of my visit note below.    HISTORY OF PRESENT ILLNESS  Ms. Gates is a pleasant 47 year old year old female who presents to clinic today with the following:  What problem are you here for? Lumbar pain, left hip pain   Her bursa on her hip is more sore today and wants to know if she can get a bursa injection and review her lumbar MRI  Low back pain has been more persistent lately as well  How long have you had this problem?  2-3 years    Have you had any recent imaging of this problem? Xrays/MRI/CT scans? yes    Have you had treatments for this problem in the past?  -Medications? Yes   -Physical therapy? yes  -Injections? yes  -Surgery? no    How severe is this problem today? 0-10 scale? 4-6    How severe has this problem been at WORST in the past? 0-10 scale? 7-8    What do you think caused this problem? unsure    Does this problem or its symptoms cause difficulty for you falling asleep or staying asleep? yes    Anything else you want us to know about this problem? no          MEDICAL HISTORY  Patient Active Problem List   Diagnosis     Irritable bowel syndrome     Premature ovarian failure  - LMP March 2013 @ 38 yo     Mild persistent asthma without complication     Adjustment disorder with depressed mood     Seasonal allergic rhinitis due to pollen     Allergic rhinitis due to animals     Allergic rhinitis due to dust mite     Allergic rhinitis due to mold     Allergic conjunctivitis, bilateral     History of colonic polyps     Female dyspareunia     History of loop electrical excision procedure (LEEP)     [place]-neck deformity of finger, unspecified laterality     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       FEXOFENADINE  MG OR TABS 1 TABLET DAILY        fluticasone (FLONASE) 50 MCG/ACT nasal spray Spray 2 sprays into both nostrils daily       ibuprofen (ADVIL/MOTRIN) 600 MG tablet TAKE 1 TABLET (600 MG) BY MOUTH EVERY 8 HOURS AS NEEDED FOR MODERATE PAIN 60 tablet 1     montelukast (SINGULAIR) 10 MG tablet Take 1 tablet (10 mg) by mouth At Bedtime 90 tablet 3     Turmeric Curcumin 500 MG CAPS Take 500 mg by mouth daily        VITAMIN D, CHOLECALCIFEROL, PO Take by mouth daily         No Known Allergies    Family History   Problem Relation Age of Onset     Thyroid Disease Mother      Diabetes Mother         type2     Eye Disorder Mother         cataracts     Gastrointestinal Disease Mother         hep a/has to have her throat stretched (esophageal stricturing)     Respiratory Mother         sleep apnea     Heart Disease Father 60     Thyroid Disease Maternal Grandmother      Gynecology Maternal Grandmother         on bcp to keep period regular, a small uterus     Heart Disease Maternal Grandmother         tachycardia     Diabetes Maternal Grandfather      Cancer Maternal Grandfather      C.A.D. Paternal Grandfather 30        age 30, then again in his 80's     Allergies Daughter         milk     Asthma Daughter      Social History     Socioeconomic History     Marital status:      Number of children: 2   Occupational History     Employer: [employer]   Tobacco Use     Smoking status: Never     Smokeless tobacco: Never   Vaping Use     Vaping status: Never Used   Substance and Sexual Activity     Alcohol use: Yes     Comment: social     Drug use: No     Sexual activity: Yes     Partners: Male     Birth control/protection: Surgical     Comment:  had  vasectomy   Other Topics Concern      Service No     Caffeine Concern No     Occupational Exposure No     Sleep Concern No     Stress Concern Yes     Weight Concern Yes     Comment: gains easily     Special Diet No     Back Care Yes     Exercise Yes     Comment: irregularly     Seat Belt No     Self-Exams No     Parent/sibling w/ CABG, MI or angioplasty before 65F 55M? No   Social History Narrative    Caffeine intake/servings daily - 0-1    Calcium intake/servings daily - 2-3    Exercise 2-3  times weekly - describe aerobics    Sunscreen used - Yes    Seatbelts used - Yes    Guns stored in the home - No    Self Breast Exam - Yes    Pap test up to date -  Yes    Eye exam up to date -  Yes    Dental exam up to date -  Yes    DEXA scan up to date -  Not Applicable    Flex Sig/Colonoscopy up to date -  Not Applicable    Mammography up to date -  Not Applicable    Immunizations reviewed and up to date - Yes    Abuse: Current or Past (Physical, Sexual or Emotional) - None current, past    Do you feel safe in your environment - Yes    Do you cope well with stress - Yes    Do you suffer from insomnia - No    Last updated by: Estefani Moraes MA 5/5/2010               Additional medical/Social/Surgical histories reviewed in [facility] and updated as appropriate.     REVIEW OF SYSTEMS (4/19/2023)  10 point ROS of systems including Constitutional, Eyes, Respiratory, Cardiovascular, Gastroenterology, Genitourinary, Integumentary, Musculoskeletal, Psychiatric, Allergic/Immunologic were all negative except for pertinent positives noted in my HPI.     PHYSICAL EXAM  VSS  Vital Signs: LMP 12/25/2013  Patient declined being weighed. There is no height or weight on file to calculate BMI.    General  - normal appearance, in no obvious distress  HEENT  - conjunctivae not injected, moist mucous membranes, normocephalic/atraumatic head, ears normal appearance, no lesions, mouth normal appearance, no scars, normal dentition and teeth  present  CV  - normal femoral pulse  Pulm  - normal respiratory pattern, non-labored  Musculoskeletal - left hip  - stance: normal gait without limp,  no obvious leg length discrepancy, normal heel and toe walk, single leg squat displays knee valgus, contralateral hip drop, internal rotation of the hip   - inspection: no swelling or effusion,  normal bone and joint alignment, no obvious deformity  - palpation: tender over the greater trochanter  - ROM: pain with active abduction, normal and painless flexion, extension, IR, ER, adduction  - strength: 5/5 in all planes  - special tests:  (-) EMETERIO  (-) FADIR  no pain with axial femoral load  Neuro  - no sensory or motor deficit, grossly normal coordination, normal muscle tone  Skin  - no ecchymosis, erythema, warmth, or induration, no obvious rash  Psych  - interactive, appropriate, normal mood and affect  Lumbar: has pain with flexion/extension and positive slr on left  ASSESSMENT & PLAN  48 yo female with lumbar facet arthropathy, lumbar ddd, radicular pain and left hip trochanteric bursitis, worse    I independently reviewed the following imaging studies:  Lumbar MRI: shows ddd, facet arthropathy  Discussed and ordered lumbar facet injections L3/4  After a 20 minute discussion and examination, we decided to perform a same day injection for diagnostic and therapeutic purposes for left hip  Given RX for robaxin    Patient HAS  completed physical therapy for 4-6 weeks  Patient has been doing home exercise physical therapy program for this problem      Appropriate PPE was utilized for prevention of spread of Covid-19.  Floyd Larsen MD, CAQSM    Trochanteric Hip Injection - Ultrasound Guided  The patient was informed of the risks and the benefits of the procedure and a written consent was signed.  The patient s left lateral hip was prepped with chlorhexidine in sterile fashion.   40 mg of triamcinolone suspension was drawn up into a 5 mL syringe with 4 mL of 1%  lidocaine.  Injection was performed using sterile technique.  Under ultrasound guidance a 3.5-inch 22-gauge needle was used to enter the lynne-trochanteric tissue.  Needle placement was visualized and documented with ultrasound which was deemed necessary to ensure medication did not enter the tendon itself, which could potentially cause tendon damage.   Injection performed long axis to the probe with probe in short axis to the greater trochanter.  Expansion of the lynne-trochanteric tissue was visualized under ultrasound upon injection.  Images were permanently stored for the patient's record.  There were no complications. The patient tolerated the procedure well. There was negligible bleeding.       Large Joint Injection/Arthocentesis: L greater trochanteric bursa    Date/Time: 4/19/2023 4:41 PM    Performed by: Floyd Larsen MD  Authorized by: Floyd Larsen MD    Indications:  Pain and diagnostic evaluation  Needle Size:  22 G  Guidance: ultrasound    Approach:  Lateral  Location:  Hip      Site:  L greater trochanteric bursa  Medications:  40 mg triamcinolone 40 MG/ML  Outcome:  Tolerated well, no immediate complications  Procedure discussed: discussed risks, benefits, and alternatives    Consent Given by:  Patient  Timeout: timeout called immediately prior to procedure    Prep: patient was prepped and draped in usual sterile fashion            Again, thank you for allowing me to participate in the care of your patient.        Sincerely,        Floyd Larsen MD

## 2023-04-21 ENCOUNTER — TELEPHONE (OUTPATIENT)
Dept: GASTROENTEROLOGY | Facility: CLINIC | Age: 48
End: 2023-04-21
Payer: COMMERCIAL

## 2023-04-21 NOTE — TELEPHONE ENCOUNTER
Screening Questions  BLUE  KIND OF PREP RED  LOCATION [review exclusion criteria] GREEN  SEDATION TYPE        Y Are you active on mychart?       LUX Ordering/Referring Provider?        HP What type of coverage do you have?      N Have you had a positive covid test in the last 14 days?     21 1. BMI  [BMI 40+ - review exclusion criteria]    Y  2. Are you able to give consent for your medical care? [IF NO,RN REVIEW]          N  3. Are you taking any prescription pain medications on a routine schedule   (ex narcotics: oxycodone, roxicodone, oxycontin,  and percocet)? [RN Review]          3a. EXTENDED PREP What kind of prescription?     N 4. Do you have any chemical dependencies such as alcohol, street drugs, or methadone?        **If yes 3- 5 , please schedule with MAC sedation.**          IF YES TO ANY 6 - 10 - HOSPITAL SETTING ONLY.     N 6.   Do you need assistance transferring?     N 7.   Have you had a heart or lung transplant?    N 8.   Are you currently on dialysis?   N 9.   Do you use daily home oxygen?   N 10. Do you take nitroglycerin?   10a.  If yes, how often?     11. [FEMALES]  N Are you currently pregnant?    11a.  If yes, how many weeks? [ Greater than 12 weeks, OR NEEDED]    N 12. Do you have Pulmonary Hypertension? *NEED PAC APPT AT [facility] w/ MAC*     N 13. [review exclusion criteria]  Do you have any implantable devices in your body (pacemaker, defib, LVAD)?    N 14. In the past 6 months, have you had any heart related issues including cardiomyopathy or heart attack?     14a.  If yes, did it require cardiac stenting if so when?     N 15. Have you had a stroke or Transient ischemic attack (TIA - aka  mini stroke ) within 6 months?      N 16. Do you have mod to severe Obstructive Sleep Apnea?  [Hospital only]    N 17. Do you have SEVERE AND UNCONTROLLED asthma? *NEED PAC APPT AT [facility] w/MAC*       18. Are you currently taking any blood thinners?     18a. No. Continue to 19.   18b. Yes/no Blood Thinner:  "No [CONTINUE TO #19]    N 19. Do you take the medication Phentermine?    19a. If yes, \"Hold for 7 days before procedure.  Please consult your prescribing provider if you have questions about holding this medication.\"     N  20. Do you have chronic kidney disease?      N  21. Do you have a diagnosis of diabetes?     N  22. On a regular basis do you go 3-5 days between bowel movements?      23. Preferred LOCAL Pharmacy for Pre Prescription    [ LIST ONLY ONE PHARMACY]   [facility] [address] [place] [address] [address]    - CLOSING REMINDERS -    Informed patient they will need an adult    Cannot take any type of public or medical transportation alone    Conscious Sedation- Needs  for 6 hours after the procedure       MAC/General-Needs  for 24 hours after procedure    Pre-Procedure Covid test to be completed [[facility] PCR Testing Required]    Confirmed Nurse will call to complete assessment       - SCHEDULING DETAILS -  NO Hospital Setting Required? If yes, what is the exclusion?:   JOHANNY CHAUHAN  Surgeon    7/14  Date of Procedure  Lower Endoscopy [Colonoscopy]  Type of Procedure Scheduled  [facility] [place] Location   PREP REVIEW REQUESTED   MAC per order Sedation Type     N PAC / Pre-op Required                 "

## 2023-04-28 ENCOUNTER — MYC MEDICAL ADVICE (OUTPATIENT)
Dept: ORTHOPEDICS | Facility: CLINIC | Age: 48
End: 2023-04-28

## 2023-04-28 DIAGNOSIS — M23.51 RECURRENT RIGHT KNEE INSTABILITY: ICD-10-CM

## 2023-04-28 DIAGNOSIS — M24.9 KNEE JOINT HYPERMOBILITY: Primary | ICD-10-CM

## 2023-04-28 DIAGNOSIS — M25.561 RIGHT KNEE PAIN: ICD-10-CM

## 2023-04-28 PROBLEM — M25.341: Status: RESOLVED | Noted: 2023-01-13 | Resolved: 2023-04-28

## 2023-04-28 PROBLEM — M25.342: Status: RESOLVED | Noted: 2023-01-13 | Resolved: 2023-04-28

## 2023-04-28 PROBLEM — M20.039: Status: RESOLVED | Noted: 2023-01-13 | Resolved: 2023-04-28

## 2023-04-28 NOTE — PROGRESS NOTES
Patient did not return to therapy. Current episode of care will be resolved. D/C from hand therapy.

## 2023-05-05 ENCOUNTER — THERAPY VISIT (OUTPATIENT)
Dept: PHYSICAL THERAPY | Facility: CLINIC | Age: 48
End: 2023-05-05
Payer: COMMERCIAL

## 2023-05-05 DIAGNOSIS — M53.3 CHRONIC COCCYGEAL PAIN: ICD-10-CM

## 2023-05-05 DIAGNOSIS — G89.29 CHRONIC COCCYGEAL PAIN: ICD-10-CM

## 2023-05-05 DIAGNOSIS — R10.2 CHRONIC PELVIC PAIN IN FEMALE: Primary | ICD-10-CM

## 2023-05-05 DIAGNOSIS — G89.29 CHRONIC PELVIC PAIN IN FEMALE: Primary | ICD-10-CM

## 2023-05-05 PROCEDURE — 97530 THERAPEUTIC ACTIVITIES: CPT | Mod: GP | Performed by: PHYSICAL THERAPIST

## 2023-05-05 PROCEDURE — 97110 THERAPEUTIC EXERCISES: CPT | Mod: GP | Performed by: PHYSICAL THERAPIST

## 2023-05-05 PROCEDURE — 97112 NEUROMUSCULAR REEDUCATION: CPT | Mod: GP | Performed by: PHYSICAL THERAPIST

## 2023-05-10 ENCOUNTER — THERAPY VISIT (OUTPATIENT)
Dept: PHYSICAL THERAPY | Facility: CLINIC | Age: 48
End: 2023-05-10
Attending: PREVENTIVE MEDICINE
Payer: COMMERCIAL

## 2023-05-10 DIAGNOSIS — M25.561 RIGHT KNEE PAIN: ICD-10-CM

## 2023-05-10 DIAGNOSIS — M25.562 BILATERAL KNEE PAIN: ICD-10-CM

## 2023-05-10 DIAGNOSIS — M25.561 BILATERAL KNEE PAIN: ICD-10-CM

## 2023-05-10 DIAGNOSIS — M23.51 RECURRENT RIGHT KNEE INSTABILITY: ICD-10-CM

## 2023-05-10 DIAGNOSIS — M24.9 KNEE JOINT HYPERMOBILITY: ICD-10-CM

## 2023-05-10 PROCEDURE — 97110 THERAPEUTIC EXERCISES: CPT | Mod: GP | Performed by: PHYSICAL THERAPIST

## 2023-05-10 PROCEDURE — 97161 PT EVAL LOW COMPLEX 20 MIN: CPT | Mod: GP | Performed by: PHYSICAL THERAPIST

## 2023-05-10 ASSESSMENT — ACTIVITIES OF DAILY LIVING (ADL)
RISE FROM A CHAIR: ACTIVITY IS MINIMALLY DIFFICULT
STAND: ACTIVITY IS MINIMALLY DIFFICULT
WALK: ACTIVITY IS SOMEWHAT DIFFICULT
HOW_WOULD_YOU_RATE_THE_OVERALL_FUNCTION_OF_YOUR_KNEE_DURING_YOUR_USUAL_DAILY_ACTIVITIES?: ABNORMAL
STIFFNESS: I DO NOT HAVE THE SYMPTOM
HOW_WOULD_YOU_RATE_THE_CURRENT_FUNCTION_OF_YOUR_KNEE_DURING_YOUR_USUAL_DAILY_ACTIVITIES_ON_A_SCALE_FROM_0_TO_100_WITH_100_BEING_YOUR_LEVEL_OF_KNEE_FUNCTION_PRIOR_TO_YOUR_INJURY_AND_0_BEING_THE_INABILITY_TO_PERFORM_ANY_OF_YOUR_USUAL_DAILY_ACTIVITIES?: 50
SQUAT: ACTIVITY IS FAIRLY DIFFICULT
WEAKNESS: THE SYMPTOM AFFECTS MY ACTIVITY MODERATELY
SWELLING: THE SYMPTOM AFFECTS MY ACTIVITY SLIGHTLY
AS_A_RESULT_OF_YOUR_KNEE_INJURY,_HOW_WOULD_YOU_RATE_YOUR_CURRENT_LEVEL_OF_DAILY_ACTIVITY?: ABNORMAL
GO UP STAIRS: ACTIVITY IS SOMEWHAT DIFFICULT
KNEE_ACTIVITY_OF_DAILY_LIVING_SUM: 37
PAIN: THE SYMPTOM AFFECTS MY ACTIVITY SEVERELY
KNEE_ACTIVITY_OF_DAILY_LIVING_SCORE: 52.86
RAW_SCORE: 37
SIT WITH YOUR KNEE BENT: ACTIVITY IS VERY DIFFICULT
LIMPING: THE SYMPTOM AFFECTS MY ACTIVITY MODERATELY
GO DOWN STAIRS: ACTIVITY IS VERY DIFFICULT
GIVING WAY, BUCKLING OR SHIFTING OF KNEE: THE SYMPTOM AFFECTS MY ACTIVITY MODERATELY
KNEEL ON THE FRONT OF YOUR KNEE: ACTIVITY IS MINIMALLY DIFFICULT

## 2023-05-10 NOTE — PROGRESS NOTES
Physical Therapy Initial Evaluation  Subjective:  The history is provided by the patient.   Therapist Generated HPI Evaluation  Problem details: Went camping and did a lot of hiking on downward slopes and on wet/muddy trails. Pain is above patella - has not had pain in this location before with previous bouts of knee pain. Feels different. Of note pt recently had injection in L hip prior to camping trip.         Type of problem:  Bilateral knees (R>L).    This is a new condition.      Patient reports pain:  Anterior.  Pain is described as aching and is intermittent.  Pain timing: no pattern.  Since onset symptoms are unchanged.  Associated symptoms:  Loss of strength and edema. Symptoms are exacerbated by certain positions, descending stairs and bending/squatting  and relieved by rest.      Restrictions due to condition include:  Working in normal job without restrictions.  Barriers include:  None as reported by patient.    Pain is reported as 6/10 on pain scale.  General health as reported by patient is good.  Pertinent medical history includes: asthma, depression, menopausal and seizures.   Red flags:  None as reported by patient.  Medical allergies: none.   Surgeries include:  Orthopedic surgery. Other surgery history details: 2 on R knee for patellar dislocations.    Current medications:  Pain medication.    Current occupation is .   Primary job tasks include:  Computer work, prolonged sitting and prolonged standing.   The history is provided by the patient.                     Objective:  System                                           Hip Evaluation  HIP AROM:  AROM:    Left Hip:     Normal    Right Hip:   Normal                    Hip Strength:    Flexion:   Left: 4+/5   Pain:  Right: 4+/5   Pain:                    Extension:  Left: 4+/5  Pain:Right: 4+/5    Pain:    Abduction:  Left: 4+/5     Pain:Right: 4+/5    Pain:  Adduction:  Left: 5/5    Pain:Right: 5/5   Pain:                 Hip Special Testing:      Left hip negative for the following special tests:  Michael's  Right hip negative for the following special tests:  Michael's           Knee Evaluation:  ROM:  AROM: normal            Strength:         Quad Set Left: WNL    Pain:   Quad Set Right: Delayed and fair    Pain:    Special Tests:   Left knee positive for the following special tests:  Patellar Compression  Left knee negative for the following special tests:  Meninscal and Patellar Tracking-Abduction Lateral  Right knee positive for the following tests:  Patellar Compression; Patellar Tracking-Abduction Lateral and IT Band Friction  Right knee negative for the following special tests:  Meniscal  Palpation:      Left knee tenderness not present at:  Lateral Joint Line; Patellar Tendon and IT Band  Right knee tenderness present at:  IT Band  Right knee tenderness not present at:  Lateral Joint Line and Patellar Tendon  Edema:  Normal    Mobility Testing:        Patellofemoral Lateral:  Right: hypermobile      Functional Testing:          Quad:    Single Leg Squat:  Left:      Right:        Bilateral Leg Squat:   Femoral IR and hip substitution              General     ROS    Assessment/Plan:    Patient is a 47 year old female with both sides knee complaints.    Patient has the following significant findings with corresponding treatment plan.                Diagnosis 1:  B knee pain R>L  Pain -  hot/cold therapy, manual therapy, self management, education, directional preference exercise and home program  Decreased ROM/flexibility - manual therapy, therapeutic exercise and home program  Decreased strength - therapeutic exercise, therapeutic activities and home program    Therapy Evaluation Codes:   1) History comprised of:   Personal factors that impact the plan of care:      None.    Comorbidity factors that impact the plan of care are:      None.     Medications impacting care: None.  2) Examination of Body Systems comprised of:   Body  structures and functions that impact the plan of care:      Ankle, Hip and Knee.   Activity limitations that impact the plan of care are:      Jumping, Sitting, Squatting/kneeling and Stairs.  3) Clinical presentation characteristics are:   Stable/Uncomplicated.  4) Decision-Making    Low complexity using standardized patient assessment instrument and/or measureable assessment of functional outcome.  Cumulative Therapy Evaluation is: Low complexity.    Previous and current functional limitations:  (See Goal Flow Sheet for this information)    Short term and Long term goals: (See Goal Flow Sheet for this information)     Communication ability:  Patient appears to be able to clearly communicate and understand verbal and written communication and follow directions correctly.  Treatment Explanation - The following has been discussed with the patient:   RX ordered/plan of care  Anticipated outcomes  Possible risks and side effects  This patient would benefit from PT intervention to resume normal activities.   Rehab potential is good.    Frequency:  1 X week, once daily  Duration:  for 8 weeks  Discharge Plan:  Achieve all LTG.  Independent in home treatment program.  Reach maximal therapeutic benefit.    Please refer to the daily flowsheet for treatment today, total treatment time and time spent performing 1:1 timed codes.

## 2023-05-17 DIAGNOSIS — M51.16 LUMBAR DISC HERNIATION WITH RADICULOPATHY: ICD-10-CM

## 2023-05-18 NOTE — TELEPHONE ENCOUNTER
Prescription refill requested for:   methocarbamol (ROBAXIN) 500 MG tablet    Last Written Prescription Date:  4/19/23  Last Fill Quantity: 30,   # refills: 0  Last Office Visit: 4/19/23  Future Office visit:           Flower Young, EMT

## 2023-05-22 RX ORDER — METHOCARBAMOL 500 MG/1
500 TABLET, FILM COATED ORAL
Qty: 30 TABLET | Refills: 0 | Status: SHIPPED | OUTPATIENT
Start: 2023-05-22

## 2023-05-24 ENCOUNTER — THERAPY VISIT (OUTPATIENT)
Dept: PHYSICAL THERAPY | Facility: CLINIC | Age: 48
End: 2023-05-24
Payer: COMMERCIAL

## 2023-05-24 DIAGNOSIS — M62.89 PELVIC FLOOR DYSFUNCTION: ICD-10-CM

## 2023-05-24 PROCEDURE — 97140 MANUAL THERAPY 1/> REGIONS: CPT | Mod: GP | Performed by: PHYSICAL THERAPIST

## 2023-05-24 PROCEDURE — 97530 THERAPEUTIC ACTIVITIES: CPT | Mod: GP | Performed by: PHYSICAL THERAPIST

## 2023-05-24 PROCEDURE — 97110 THERAPEUTIC EXERCISES: CPT | Mod: GP | Performed by: PHYSICAL THERAPIST

## 2023-05-30 ENCOUNTER — THERAPY VISIT (OUTPATIENT)
Dept: PHYSICAL THERAPY | Facility: CLINIC | Age: 48
End: 2023-05-30
Payer: COMMERCIAL

## 2023-05-30 DIAGNOSIS — M25.562 BILATERAL KNEE PAIN: ICD-10-CM

## 2023-05-30 DIAGNOSIS — M25.561 RIGHT KNEE PAIN: Primary | ICD-10-CM

## 2023-05-30 DIAGNOSIS — M25.561 BILATERAL KNEE PAIN: ICD-10-CM

## 2023-05-30 PROCEDURE — 97112 NEUROMUSCULAR REEDUCATION: CPT | Mod: GP | Performed by: PHYSICAL THERAPIST

## 2023-05-30 PROCEDURE — 97110 THERAPEUTIC EXERCISES: CPT | Mod: GP | Performed by: PHYSICAL THERAPIST

## 2023-05-31 PROBLEM — M62.89 PELVIC FLOOR DYSFUNCTION: Status: ACTIVE | Noted: 2023-05-31

## 2023-06-16 ENCOUNTER — THERAPY VISIT (OUTPATIENT)
Dept: PHYSICAL THERAPY | Facility: CLINIC | Age: 48
End: 2023-06-16
Attending: PREVENTIVE MEDICINE
Payer: COMMERCIAL

## 2023-06-16 DIAGNOSIS — M25.561 RIGHT KNEE PAIN, UNSPECIFIED CHRONICITY: Primary | ICD-10-CM

## 2023-06-16 DIAGNOSIS — M25.562 PAIN IN BOTH KNEES, UNSPECIFIED CHRONICITY: ICD-10-CM

## 2023-06-16 DIAGNOSIS — M25.561 PAIN IN BOTH KNEES, UNSPECIFIED CHRONICITY: ICD-10-CM

## 2023-06-16 PROCEDURE — 97112 NEUROMUSCULAR REEDUCATION: CPT | Mod: GP | Performed by: PHYSICAL THERAPIST

## 2023-06-16 PROCEDURE — 97110 THERAPEUTIC EXERCISES: CPT | Mod: GP | Performed by: PHYSICAL THERAPIST

## 2023-06-30 ENCOUNTER — THERAPY VISIT (OUTPATIENT)
Dept: PHYSICAL THERAPY | Facility: CLINIC | Age: 48
End: 2023-06-30
Attending: PREVENTIVE MEDICINE
Payer: COMMERCIAL

## 2023-06-30 DIAGNOSIS — M25.561 RIGHT KNEE PAIN, UNSPECIFIED CHRONICITY: Primary | ICD-10-CM

## 2023-06-30 DIAGNOSIS — M25.561 PAIN IN BOTH KNEES, UNSPECIFIED CHRONICITY: ICD-10-CM

## 2023-06-30 DIAGNOSIS — M25.562 PAIN IN BOTH KNEES, UNSPECIFIED CHRONICITY: ICD-10-CM

## 2023-06-30 PROCEDURE — 97112 NEUROMUSCULAR REEDUCATION: CPT | Mod: GP | Performed by: PHYSICAL THERAPIST

## 2023-06-30 PROCEDURE — 97110 THERAPEUTIC EXERCISES: CPT | Mod: GP | Performed by: PHYSICAL THERAPIST

## 2023-07-05 ENCOUNTER — TELEPHONE (OUTPATIENT)
Dept: GASTROENTEROLOGY | Facility: CLINIC | Age: 48
End: 2023-07-05

## 2023-07-05 NOTE — TELEPHONE ENCOUNTER
Pre assessment completed for upcoming procedure.      Procedure details:    Patient scheduled for Colonoscopy  on 7/14/23.     Arrival time: 0730. Procedure time 0830    Pre op exam needed? N/A    Facility location: [facility]; [address] [place] [address], [address] Floor, [place] [address]    Sedation type: MAC    Indication for procedure: History of colonic polyps    COVID policy reviewed.    Designated  policy reviewed. Instructed to have someone stay 24 hours post procedure.       Chart review:     Electronic implanted devices? No    Diabetic? No    Diabetic medication HOLDING recommendations: (if applicable)  Oral diabetic medications: No  Diabetic injectables: No  Insulin: No      Medication review:    Anticoagulants? No    NSAIDS? Yes.  Ibuprofen (Advil, Motrin).  Holding interval of 1 day before procedure.    Other medication HOLDING recommendations:  N/A      Prep for procedure:     Bowel prep recommendation: Miralax prep without magnesium citrate    Prep instructions sent via [employer]     Reviewed procedure prep instructions.     Patient verbalized understanding and had no questions or concerns at this time.        Vianney Overton RN  Endoscopy Procedure Pre Assessment RN  [phone] option 4

## 2023-07-07 ENCOUNTER — THERAPY VISIT (OUTPATIENT)
Dept: PHYSICAL THERAPY | Facility: CLINIC | Age: 48
End: 2023-07-07
Payer: COMMERCIAL

## 2023-07-07 DIAGNOSIS — M62.89 PELVIC FLOOR DYSFUNCTION: ICD-10-CM

## 2023-07-07 DIAGNOSIS — N94.10 FEMALE DYSPAREUNIA: Primary | ICD-10-CM

## 2023-07-07 PROCEDURE — 97530 THERAPEUTIC ACTIVITIES: CPT | Mod: GP | Performed by: PHYSICAL THERAPIST

## 2023-07-07 PROCEDURE — 97112 NEUROMUSCULAR REEDUCATION: CPT | Mod: GP | Performed by: PHYSICAL THERAPIST

## 2023-07-07 PROCEDURE — 97140 MANUAL THERAPY 1/> REGIONS: CPT | Mod: GP | Performed by: PHYSICAL THERAPIST

## 2023-07-14 ENCOUNTER — HOSPITAL ENCOUNTER (OUTPATIENT)
Facility: AMBULATORY SURGERY CENTER | Age: 48
Discharge: HOME | End: 2023-07-14
Attending: INTERNAL MEDICINE
Payer: COMMERCIAL

## 2023-07-14 ENCOUNTER — ANESTHESIA (OUTPATIENT)
Dept: SURGERY | Facility: AMBULATORY SURGERY CENTER | Age: 48
End: 2023-07-14
Payer: COMMERCIAL

## 2023-07-14 ENCOUNTER — ANESTHESIA EVENT (OUTPATIENT)
Dept: SURGERY | Facility: AMBULATORY SURGERY CENTER | Age: 48
End: 2023-07-14
Payer: COMMERCIAL

## 2023-07-14 VITALS
SYSTOLIC BLOOD PRESSURE: 115 MMHG | HEIGHT: 62 IN | HEART RATE: 78 BPM | DIASTOLIC BLOOD PRESSURE: 67 MMHG | WEIGHT: 116 LBS | TEMPERATURE: 97 F | BODY MASS INDEX: 21.35 KG/M2 | RESPIRATION RATE: 16 BRPM | OXYGEN SATURATION: 100 %

## 2023-07-14 VITALS — HEART RATE: 95 BPM

## 2023-07-14 LAB — COLONOSCOPY: NORMAL

## 2023-07-14 PROCEDURE — 45380 COLONOSCOPY AND BIOPSY: CPT | Mod: 59 | Performed by: INTERNAL MEDICINE

## 2023-07-14 PROCEDURE — 999N000128 HC STATISTIC PERIPHERAL IV START W/O US GUIDANCE

## 2023-07-14 PROCEDURE — 88305 TISSUE EXAM BY PATHOLOGIST: CPT | Mod: 26 | Performed by: PATHOLOGY

## 2023-07-14 PROCEDURE — 88305 TISSUE EXAM BY PATHOLOGIST: CPT | Mod: TC | Performed by: INTERNAL MEDICINE

## 2023-07-14 PROCEDURE — 45385 COLONOSCOPY W/LESION REMOVAL: CPT | Performed by: INTERNAL MEDICINE

## 2023-07-14 RX ORDER — LIDOCAINE 40 MG/G
CREAM TOPICAL
Status: DISCONTINUED | OUTPATIENT
Start: 2023-07-14 | End: 2023-07-14 | Stop reason: HOSPADM

## 2023-07-14 RX ORDER — GLYCOPYRROLATE 0.2 MG/ML
INJECTION, SOLUTION INTRAMUSCULAR; INTRAVENOUS PRN
Status: DISCONTINUED | OUTPATIENT
Start: 2023-07-14 | End: 2023-07-14

## 2023-07-14 RX ORDER — ONDANSETRON 2 MG/ML
4 INJECTION INTRAMUSCULAR; INTRAVENOUS
Status: DISCONTINUED | OUTPATIENT
Start: 2023-07-14 | End: 2023-07-14 | Stop reason: HOSPADM

## 2023-07-14 RX ORDER — SODIUM CHLORIDE, SODIUM LACTATE, POTASSIUM CHLORIDE, CALCIUM CHLORIDE 600; 310; 30; 20 MG/100ML; MG/100ML; MG/100ML; MG/100ML
INJECTION, SOLUTION INTRAVENOUS CONTINUOUS
Status: DISCONTINUED | OUTPATIENT
Start: 2023-07-14 | End: 2023-07-14 | Stop reason: HOSPADM

## 2023-07-14 RX ORDER — ONDANSETRON 2 MG/ML
4 INJECTION INTRAMUSCULAR; INTRAVENOUS EVERY 6 HOURS PRN
Status: DISCONTINUED | OUTPATIENT
Start: 2023-07-14 | End: 2023-07-15 | Stop reason: HOSPADM

## 2023-07-14 RX ORDER — ONDANSETRON 4 MG/1
4 TABLET, ORALLY DISINTEGRATING ORAL EVERY 6 HOURS PRN
Status: DISCONTINUED | OUTPATIENT
Start: 2023-07-14 | End: 2023-07-15 | Stop reason: HOSPADM

## 2023-07-14 RX ORDER — SODIUM CHLORIDE, SODIUM LACTATE, POTASSIUM CHLORIDE, CALCIUM CHLORIDE 600; 310; 30; 20 MG/100ML; MG/100ML; MG/100ML; MG/100ML
INJECTION, SOLUTION INTRAVENOUS CONTINUOUS PRN
Status: DISCONTINUED | OUTPATIENT
Start: 2023-07-14 | End: 2023-07-14

## 2023-07-14 RX ORDER — KETAMINE HYDROCHLORIDE 10 MG/ML
INJECTION INTRAMUSCULAR; INTRAVENOUS PRN
Status: DISCONTINUED | OUTPATIENT
Start: 2023-07-14 | End: 2023-07-14

## 2023-07-14 RX ORDER — PROCHLORPERAZINE MALEATE 10 MG
10 TABLET ORAL EVERY 6 HOURS PRN
Status: DISCONTINUED | OUTPATIENT
Start: 2023-07-14 | End: 2023-07-15 | Stop reason: HOSPADM

## 2023-07-14 RX ORDER — SIMETHICONE
LIQUID (ML) MISCELLANEOUS DAILY PRN
Status: DISCONTINUED | OUTPATIENT
Start: 2023-07-14 | End: 2023-07-14 | Stop reason: HOSPADM

## 2023-07-14 RX ORDER — ONDANSETRON 2 MG/ML
INJECTION INTRAMUSCULAR; INTRAVENOUS PRN
Status: DISCONTINUED | OUTPATIENT
Start: 2023-07-14 | End: 2023-07-14

## 2023-07-14 RX ORDER — FLUMAZENIL 0.1 MG/ML
0.2 INJECTION, SOLUTION INTRAVENOUS
Status: ACTIVE | OUTPATIENT
Start: 2023-07-14 | End: 2023-07-14

## 2023-07-14 RX ORDER — NALOXONE HYDROCHLORIDE 0.4 MG/ML
0.2 INJECTION, SOLUTION INTRAMUSCULAR; INTRAVENOUS; SUBCUTANEOUS
Status: DISCONTINUED | OUTPATIENT
Start: 2023-07-14 | End: 2023-07-15 | Stop reason: HOSPADM

## 2023-07-14 RX ORDER — PROPOFOL 10 MG/ML
INJECTION, EMULSION INTRAVENOUS CONTINUOUS PRN
Status: DISCONTINUED | OUTPATIENT
Start: 2023-07-14 | End: 2023-07-14

## 2023-07-14 RX ORDER — PROPOFOL 10 MG/ML
INJECTION, EMULSION INTRAVENOUS PRN
Status: DISCONTINUED | OUTPATIENT
Start: 2023-07-14 | End: 2023-07-14

## 2023-07-14 RX ORDER — LIDOCAINE HYDROCHLORIDE 20 MG/ML
INJECTION, SOLUTION INFILTRATION; PERINEURAL PRN
Status: DISCONTINUED | OUTPATIENT
Start: 2023-07-14 | End: 2023-07-14

## 2023-07-14 RX ORDER — NALOXONE HYDROCHLORIDE 0.4 MG/ML
0.4 INJECTION, SOLUTION INTRAMUSCULAR; INTRAVENOUS; SUBCUTANEOUS
Status: DISCONTINUED | OUTPATIENT
Start: 2023-07-14 | End: 2023-07-15 | Stop reason: HOSPADM

## 2023-07-14 RX ADMIN — ONDANSETRON 4 MG: 2 INJECTION INTRAMUSCULAR; INTRAVENOUS at 08:53

## 2023-07-14 RX ADMIN — LIDOCAINE HYDROCHLORIDE 60 MG: 20 INJECTION, SOLUTION INFILTRATION; PERINEURAL at 08:45

## 2023-07-14 RX ADMIN — GLYCOPYRROLATE 0.2 MG: 0.2 INJECTION, SOLUTION INTRAMUSCULAR; INTRAVENOUS at 08:50

## 2023-07-14 RX ADMIN — PROPOFOL 80 MG: 10 INJECTION, EMULSION INTRAVENOUS at 08:45

## 2023-07-14 RX ADMIN — KETAMINE HYDROCHLORIDE 10 MG: 10 INJECTION INTRAMUSCULAR; INTRAVENOUS at 08:56

## 2023-07-14 RX ADMIN — PROPOFOL 150 MCG/KG/MIN: 10 INJECTION, EMULSION INTRAVENOUS at 08:45

## 2023-07-14 RX ADMIN — SODIUM CHLORIDE, SODIUM LACTATE, POTASSIUM CHLORIDE, CALCIUM CHLORIDE: 600; 310; 30; 20 INJECTION, SOLUTION INTRAVENOUS at 08:42

## 2023-07-14 RX ADMIN — SODIUM CHLORIDE, SODIUM LACTATE, POTASSIUM CHLORIDE, CALCIUM CHLORIDE: 600; 310; 30; 20 INJECTION, SOLUTION INTRAVENOUS at 08:41

## 2023-07-14 RX ADMIN — KETAMINE HYDROCHLORIDE 10 MG: 10 INJECTION INTRAMUSCULAR; INTRAVENOUS at 08:50

## 2023-07-14 NOTE — ANESTHESIA PREPROCEDURE EVALUATION
Anesthesia Pre-Procedure Evaluation    Patient: Lisset MITCHELL Chi   MRN: [id number] : 1975        Procedure : Procedure(s):  Colonoscopy          Past Medical History:   Diagnosis Date     Allergic rhinitis due to other allergen      Asthma      Bilateral low back pain with left-sided sciatica 10/17/2022     IBS (irritable bowel syndrome)      Other internal derangement of knee(717.89)     patella dislocates easily     Premature ovarian failure      Raynaud's disease 3/08     Trochanteric bursitis of both hips 11/15/2017      Past Surgical History:   Procedure Laterality Date     ARTHROSCOPY KNEE Right 3/2/2018    Procedure: ARTHROSCOPY KNEE;  Right Knee Arthroscopy, Lateral Compartmnt Debridemen and  Anterior Chamber;  Surgeon: Rima Sarmiento MD;  Location: [facility] OR      SECTION           COLONOSCOPY N/A 2022    Procedure: COLONOSCOPY, WITH HOT POLYPECTOMY, TATTOO, AND CLIPS;  Surgeon: Johnson Petit MD;  Location: [facility] [place]      DILATION/CURETTAGE DIAG/THER NON OB  2003    D & C for retained placenta one week after the delivery     I&D BARTHOLIN GLAND ABSCESS   and     mcgrath cath      KNEE SURGERY      Rt knee Fx, repair-dislocation problem     KNEE SURGERY      Lt knee reconstructive surgery-dislocation problem     LEEP TX, CERVICAL      LEEP for abnormal pap     MARSUP BARTHOLIN GLAND CYST  08      No Known Allergies   Social History     Tobacco Use     Smoking status: Never     Smokeless tobacco: Never   Substance Use Topics     Alcohol use: Yes     Comment: social      Wt Readings from Last 1 Encounters:   23 52.6 kg (116 lb)        Anesthesia Evaluation            ROS/MED HX  ENT/Pulmonary:     (+) allergic rhinitis, asthma     Neurologic:       Cardiovascular:       METS/Exercise Tolerance:     Hematologic:       Musculoskeletal:       GI/Hepatic:       Renal/Genitourinary:       Endo:       Psychiatric/Substance Use:       Infectious  Disease:       Malignancy:       Other:            Physical Exam    Airway  airway exam normal      Mallampati: II   TM distance: > 3 FB   Neck ROM: full   Mouth opening: > 3 cm    Respiratory Devices and Support         Dental       (+) Completely normal teeth      Cardiovascular          Rhythm and rate: regular and normal     Pulmonary   pulmonary exam normal        breath sounds clear to auscultation           OUTSIDE LABS:  CBC:   Lab Results   Component Value Date    WBC 4.7 01/19/2022    WBC 4.9 10/12/2021    HGB 12.8 01/19/2022    HGB 13.5 10/12/2021    HCT 40.0 01/19/2022    HCT 42.6 10/12/2021     01/19/2022     10/12/2021     BMP:   Lab Results   Component Value Date     01/19/2022     10/12/2021    POTASSIUM 4.1 01/19/2022    POTASSIUM 3.9 10/12/2021    CHLORIDE 107 01/19/2022    CHLORIDE 107 10/12/2021    CO2 28 01/19/2022    CO2 30 10/12/2021    BUN 10 01/19/2022    BUN 19 10/12/2021    CR 0.68 01/19/2022    CR 0.83 10/12/2021    GLC 86 01/19/2022    GLC 80 10/12/2021     COAGS: No results found for: PTT, INR, FIBR  POC:   Lab Results   Component Value Date    HCG Negative 10/12/2021    HCGS Negative 01/04/2021     HEPATIC:   Lab Results   Component Value Date    ALBUMIN 3.6 01/19/2022    PROTTOTAL 7.0 01/19/2022    ALT 20 01/19/2022    AST 11 01/19/2022    ALKPHOS 105 01/19/2022    BILITOTAL 0.3 01/19/2022     OTHER:   Lab Results   Component Value Date    A1C 5.4 01/19/2022    LEANDRO 8.8 01/19/2022    MAG 2.4 (H) 01/04/2021    LIPASE 35 08/02/2006    TSH 1.64 01/04/2021    T4 0.78 02/17/2010    CRP <2.9 01/04/2021    SED 7 01/04/2021       Anesthesia Plan    ASA Status:  2   NPO Status:  NPO Appropriate    Anesthesia Type: MAC.     - Reason for MAC: immobility needed, straight local not clinically adequate   Induction: Intravenous.   Maintenance: TIVA.        Consents    Anesthesia Plan(s) and associated risks, benefits, and realistic alternatives discussed. Questions answered  and patient/representative(s) expressed understanding.    - Discussed:     - Discussed with:  Patient      - Extended Intubation/Ventilatory Support Discussed: No.      - Patient is DNR/DNI Status: No    Use of blood products discussed: No .     Postoperative Care    Pain management: IV analgesics, Oral pain medications, Multi-modal analgesia.   PONV prophylaxis: Ondansetron (or other 5HT-3), Background Propofol Infusion     Comments:                Aureliano Zhu MD

## 2023-07-14 NOTE — ANESTHESIA CARE TRANSFER NOTE
Patient: Lisset MITCHELL Chi    Procedure: Procedure(s):  COLONOSCOPY, WITH POLYPECTOMY AND BIOPSY       Diagnosis: Serrated adenoma of colon [D12.6]  Diagnosis Additional Information: No value filed.    Anesthesia Type:   MAC     Note:      Level of Consciousness: awake  Oxygen Supplementation: room air    Independent Airway: airway patency satisfactory and stable        Patient transferred to: Phase II    Handoff Report: Identifed the Patient, Identified the Reponsible Provider, Reviewed the pertinent medical history, Discussed the surgical course, Reviewed Intra-OP anesthesia mangement and issues during anesthesia, Set expectations for post-procedure period and Allowed opportunity for questions and acknowledgement of understanding      Vitals:  Vitals Value Taken Time   BP     Temp     Pulse     Resp     SpO2         Electronically Signed By: JOSE Bethea CRNA  July 14, 2023  9:19 AM

## 2023-07-14 NOTE — H&P
Lisset STEPHEN [facility]  [id number]  female  47 year old      Reason for procedure/surgery: surveillance ho polyps    Patient Active Problem List   Diagnosis     Irritable bowel syndrome     Premature ovarian failure  - LMP 2013 @ 38 yo     Mild persistent asthma without complication     Adjustment disorder with depressed mood     Seasonal allergic rhinitis due to pollen     Allergic rhinitis due to animals     Allergic rhinitis due to dust mite     Allergic rhinitis due to mold     Allergic conjunctivitis, bilateral     History of colonic polyps     Female dyspareunia     History of loop electrical excision procedure (LEEP)     Right knee pain     Bilateral knee pain     Pelvic floor dysfunction       Past Surgical History:    Past Surgical History:   Procedure Laterality Date     ARTHROSCOPY KNEE Right 3/2/2018    Procedure: ARTHROSCOPY KNEE;  Right Knee Arthroscopy, Lateral Compartmnt Debridemen and  Anterior Chamber;  Surgeon: Rima Sarmiento MD;  Location: [facility] OR      SECTION           COLONOSCOPY N/A 2022    Procedure: COLONOSCOPY, WITH HOT POLYPECTOMY, TATTOO, AND CLIPS;  Surgeon: Johnson Petit MD;  Location: [facility] OR      DILATION/CURETTAGE DIAG/THER NON OB  2003    D & C for retained placenta one week after the delivery     I&D BARTHOLIN GLAND ABSCESS   and     mcgrath cath      KNEE SURGERY      Rt knee Fx, repair-dislocation problem     KNEE SURGERY      Lt knee reconstructive surgery-dislocation problem     LEEP TX, CERVICAL      LEEP for abnormal pap     MARSUP BARTHOLIN GLAND CYST  08       Past Medical History:   Past Medical History:   Diagnosis Date     Allergic rhinitis due to other allergen      Asthma      Bilateral low back pain with left-sided sciatica 10/17/2022     IBS (irritable bowel syndrome)      Other internal derangement of knee(217.63)     patella dislocates easily     Premature ovarian failure      Raynaud's disease 3/08      Trochanteric bursitis of both hips 11/15/2017       Social History:   Social History     Tobacco Use     Smoking status: Never     Smokeless tobacco: Never   Substance Use Topics     Alcohol use: Yes     Comment: social       Family History:   Family History   Problem Relation Age of Onset     Thyroid Disease Mother      Diabetes Mother         type2     Eye Disorder Mother         cataracts     Gastrointestinal Disease Mother         hep a/has to have her throat stretched (esophageal stricturing)     Respiratory Mother         sleep apnea     Heart Disease Father 60     Thyroid Disease Maternal Grandmother      Gynecology Maternal Grandmother         on bcp to keep period regular, a small uterus     Heart Disease Maternal Grandmother         tachycardia     Diabetes Maternal Grandfather      Cancer Maternal Grandfather      C.A.D. Paternal Grandfather 30        age 30, then again in his 80's     Allergies Daughter         milk     Asthma Daughter        Allergies: No Known Allergies    Active Medications:   Current Outpatient Medications   Medication Sig Dispense Refill     albuterol (PROAIR HFA/PROVENTIL HFA/VENTOLIN HFA) 108 (90 Base) MCG/ACT inhaler Inhale 2 puffs into the lungs every 6 hours 18 g 3     Calcium Carbonate-Vitamin D (CALCIUM + D PO) Take 600 mg by mouth.       FEXOFENADINE  MG OR TABS 1 TABLET DAILY        fluticasone (FLONASE) 50 MCG/ACT nasal spray Spray 2 sprays into both nostrils daily       ibuprofen (ADVIL/MOTRIN) 600 MG tablet TAKE 1 TABLET (600 MG) BY MOUTH EVERY 8 HOURS AS NEEDED FOR MODERATE PAIN 60 tablet 1     methocarbamol (ROBAXIN) 500 MG tablet TAKE 1 TABLET (500 MG) BY MOUTH NIGHTLY AS NEEDED FOR MUSCLE SPASMS 30 tablet 0     montelukast (SINGULAIR) 10 MG tablet Take 1 tablet (10 mg) by mouth At Bedtime 90 tablet 3     Turmeric Curcumin 500 MG CAPS Take 500 mg by mouth daily        VITAMIN D, CHOLECALCIFEROL, PO Take by mouth daily         Systemic Review:   CONSTITUTIONAL:  NEGATIVE for fever, chills, change in weight  ENT/MOUTH: NEGATIVE for ear, mouth and throat problems  RESP: NEGATIVE for significant cough or SOB  CV: NEGATIVE for chest pain, palpitations or peripheral edema    Physical Examination:   Vital Signs: [facility] 12/25/2013   GENERAL: healthy, alert and no distress  NECK: no adenopathy, no asymmetry, masses, or scars  RESP: lungs clear to auscultation - no rales, rhonchi or wheezes  CV: regular rate and rhythm, normal S1 S2, no S3 or S4, no murmur, click or rub, no peripheral edema and peripheral pulses strong  ABDOMEN: soft, nontender, no hepatosplenomegaly, no masses and bowel sounds normal  MS: no gross musculoskeletal defects noted, no edema    ASA: 2    Mallampati Score: 2    Plan: Appropriate to proceed as scheduled.      Johnson Petit MD  7/14/2023    PCP:  No Ref-Primary, Physician

## 2023-07-14 NOTE — ANESTHESIA POSTPROCEDURE EVALUATION
Patient: Lisset MITCHELL Chi    Procedure: Procedure(s):  COLONOSCOPY, WITH POLYPECTOMY AND BIOPSY       Anesthesia Type:  MAC    Note:  Disposition: Outpatient   Postop Pain Control: Uneventful            Sign Out: Well controlled pain   PONV: No   Neuro/Psych: Uneventful            Sign Out: Acceptable/Baseline neuro status   Airway/Respiratory: Uneventful            Sign Out: Acceptable/Baseline resp. status   CV/Hemodynamics: Uneventful            Sign Out: Acceptable CV status   Other NRE: NONE   DID A NON-ROUTINE EVENT OCCUR? No           Last vitals:  Vitals Value Taken Time   /67 07/14/23 0945   Temp 36.1  C (97  F) 07/14/23 0930   Pulse 78 07/14/23 0945   Resp 16 07/14/23 0945   SpO2 100 % 07/14/23 0945       Electronically Signed By: Aureliano Zhu MD  July 14, 2023  1:07 PM

## 2023-07-28 ENCOUNTER — THERAPY VISIT (OUTPATIENT)
Dept: PHYSICAL THERAPY | Facility: CLINIC | Age: 48
End: 2023-07-28
Payer: COMMERCIAL

## 2023-07-28 DIAGNOSIS — M25.562 PAIN IN BOTH KNEES, UNSPECIFIED CHRONICITY: ICD-10-CM

## 2023-07-28 DIAGNOSIS — M25.561 RIGHT KNEE PAIN, UNSPECIFIED CHRONICITY: Primary | ICD-10-CM

## 2023-07-28 DIAGNOSIS — M25.561 PAIN IN BOTH KNEES, UNSPECIFIED CHRONICITY: ICD-10-CM

## 2023-07-28 PROCEDURE — 97112 NEUROMUSCULAR REEDUCATION: CPT | Mod: GP | Performed by: PHYSICAL THERAPIST

## 2023-07-28 PROCEDURE — 97110 THERAPEUTIC EXERCISES: CPT | Mod: GP | Performed by: PHYSICAL THERAPIST

## 2023-08-04 ENCOUNTER — THERAPY VISIT (OUTPATIENT)
Dept: PHYSICAL THERAPY | Facility: CLINIC | Age: 48
End: 2023-08-04
Payer: COMMERCIAL

## 2023-08-04 DIAGNOSIS — M62.89 PELVIC FLOOR DYSFUNCTION: ICD-10-CM

## 2023-08-04 DIAGNOSIS — N94.10 FEMALE DYSPAREUNIA: Primary | ICD-10-CM

## 2023-08-04 PROCEDURE — 97112 NEUROMUSCULAR REEDUCATION: CPT | Mod: GP | Performed by: PHYSICAL THERAPIST

## 2023-08-04 PROCEDURE — 97110 THERAPEUTIC EXERCISES: CPT | Mod: GP | Performed by: PHYSICAL THERAPIST

## 2023-08-04 PROCEDURE — 97530 THERAPEUTIC ACTIVITIES: CPT | Mod: GP | Performed by: PHYSICAL THERAPIST

## 2023-08-23 ENCOUNTER — THERAPY VISIT (OUTPATIENT)
Dept: PHYSICAL THERAPY | Facility: CLINIC | Age: 48
End: 2023-08-23
Payer: COMMERCIAL

## 2023-08-23 DIAGNOSIS — M62.89 PELVIC FLOOR DYSFUNCTION: ICD-10-CM

## 2023-08-23 DIAGNOSIS — N94.10 FEMALE DYSPAREUNIA: Primary | ICD-10-CM

## 2023-08-23 PROCEDURE — 97140 MANUAL THERAPY 1/> REGIONS: CPT | Mod: GP | Performed by: PHYSICAL THERAPIST

## 2023-08-23 PROCEDURE — 97530 THERAPEUTIC ACTIVITIES: CPT | Mod: GP | Performed by: PHYSICAL THERAPIST

## 2023-08-29 DIAGNOSIS — J30.1 SEASONAL ALLERGIC RHINITIS DUE TO POLLEN: ICD-10-CM

## 2023-08-29 DIAGNOSIS — J45.30 MILD PERSISTENT ASTHMA WITHOUT COMPLICATION: ICD-10-CM

## 2023-08-29 DIAGNOSIS — J30.89 ALLERGIC RHINITIS DUE TO DUST MITE: ICD-10-CM

## 2023-08-29 DIAGNOSIS — J30.81 ALLERGIC RHINITIS DUE TO ANIMALS: ICD-10-CM

## 2023-08-29 DIAGNOSIS — J30.89 ALLERGIC RHINITIS DUE TO MOLD: ICD-10-CM

## 2023-08-29 RX ORDER — MONTELUKAST SODIUM 10 MG/1
10 TABLET ORAL AT BEDTIME
Qty: 90 TABLET | Refills: 3 | OUTPATIENT
Start: 2023-08-29

## 2023-08-29 NOTE — TELEPHONE ENCOUNTER
"Routing refill request to provider for review/approval because:  Patient needs to be seen because it has been more than 6 months since last office visit.    LOV: virtual with Dr. Gaytan on 7/25/2022, Follow-up in 1 year   No upcoming appointments scheduled with allergy.    Routing to Dr. Bui in Dr. Gaytan's absence.    Bhavana WHITE RN, BSN, PHN    Requested Prescriptions   Pending Prescriptions Disp Refills    montelukast (SINGULAIR) 10 MG tablet 90 tablet 3     Sig: Take 1 tablet (10 mg) by mouth At Bedtime       Leukotriene Inhibitors Protocol Failed - 8/29/2023  7:19 AM        Failed - Asthma control assessment score within normal limits in last 6 months     Please review ACT score.           Failed - Recent (6 mo) or future (30 days) visit within the authorizing provider's specialty     Patient had office visit in the last 6 months or has a visit in the next 30 days with authorizing provider or within the authorizing provider's specialty.  See \"Patient Info\" tab in inbasket, or \"Choose Columns\" in Meds & Orders section of the refill encounter.            Passed - Patient is age 12 or older     If patient is under 16, ok to refill using age based dosing.           Passed - Medication is active on med list             "

## 2023-08-29 NOTE — TELEPHONE ENCOUNTER
My chart message sent to patient advising of Dr. Bui's message below.    Bhavana WHITE RN, BSN, PHN

## 2023-08-29 NOTE — TELEPHONE ENCOUNTER
Declined.  The patient needs a follow-up appointment with Dr. Gaytan for further refills.    Chris Bui MD

## 2023-08-31 ENCOUNTER — MYC MEDICAL ADVICE (OUTPATIENT)
Dept: FAMILY MEDICINE | Facility: CLINIC | Age: 48
End: 2023-08-31
Payer: COMMERCIAL

## 2023-08-31 DIAGNOSIS — J30.1 SEASONAL ALLERGIC RHINITIS DUE TO POLLEN: ICD-10-CM

## 2023-08-31 DIAGNOSIS — J45.30 MILD PERSISTENT ASTHMA WITHOUT COMPLICATION: ICD-10-CM

## 2023-08-31 DIAGNOSIS — J30.81 ALLERGIC RHINITIS DUE TO ANIMALS: ICD-10-CM

## 2023-08-31 DIAGNOSIS — J30.89 ALLERGIC RHINITIS DUE TO MOLD: ICD-10-CM

## 2023-08-31 DIAGNOSIS — J30.89 ALLERGIC RHINITIS DUE TO DUST MITE: ICD-10-CM

## 2023-09-01 RX ORDER — MONTELUKAST SODIUM 10 MG/1
10 TABLET ORAL AT BEDTIME
Qty: 90 TABLET | Refills: 0 | OUTPATIENT
Start: 2023-09-01

## 2023-09-01 NOTE — TELEPHONE ENCOUNTER
I take over prescriptions at visits. I am comfortable managing her singulair and I can address at next visit.  Katia Monsivais, DO

## 2023-09-01 NOTE — TELEPHONE ENCOUNTER
Dr. Monsivais,    Are comfortable taking over prescribing montelukast? I see that allergy declined a refill because her last visit with them was 7/25/22.    Please advise,    Myron Storey RN     [facility]

## 2023-09-08 ENCOUNTER — THERAPY VISIT (OUTPATIENT)
Dept: PHYSICAL THERAPY | Facility: CLINIC | Age: 48
End: 2023-09-08
Payer: COMMERCIAL

## 2023-09-08 DIAGNOSIS — M25.561 PAIN IN BOTH KNEES, UNSPECIFIED CHRONICITY: ICD-10-CM

## 2023-09-08 DIAGNOSIS — M25.562 PAIN IN BOTH KNEES, UNSPECIFIED CHRONICITY: ICD-10-CM

## 2023-09-08 DIAGNOSIS — M25.561 RIGHT KNEE PAIN, UNSPECIFIED CHRONICITY: Primary | ICD-10-CM

## 2023-09-08 PROCEDURE — 97110 THERAPEUTIC EXERCISES: CPT | Mod: GP | Performed by: PHYSICAL THERAPIST

## 2023-09-19 ENCOUNTER — VIRTUAL VISIT (OUTPATIENT)
Dept: ALLERGY | Facility: CLINIC | Age: 48
End: 2023-09-19
Payer: COMMERCIAL

## 2023-09-19 DIAGNOSIS — J30.81 ALLERGIC RHINITIS DUE TO ANIMALS: ICD-10-CM

## 2023-09-19 DIAGNOSIS — J30.89 ALLERGIC RHINITIS DUE TO DUST MITE: ICD-10-CM

## 2023-09-19 DIAGNOSIS — J30.1 SEASONAL ALLERGIC RHINITIS DUE TO POLLEN: ICD-10-CM

## 2023-09-19 DIAGNOSIS — J30.89 ALLERGIC RHINITIS DUE TO MOLD: ICD-10-CM

## 2023-09-19 DIAGNOSIS — J45.30 MILD PERSISTENT ASTHMA WITHOUT COMPLICATION: ICD-10-CM

## 2023-09-19 PROCEDURE — 99213 OFFICE O/P EST LOW 20 MIN: CPT | Mod: VID | Performed by: ALLERGY & IMMUNOLOGY

## 2023-09-19 RX ORDER — ALBUTEROL SULFATE 90 UG/1
2 AEROSOL, METERED RESPIRATORY (INHALATION) EVERY 4 HOURS PRN
Qty: 18 G | Refills: 1 | Status: SHIPPED | OUTPATIENT
Start: 2023-09-19 | End: 2024-01-19

## 2023-09-19 RX ORDER — MONTELUKAST SODIUM 10 MG/1
10 TABLET ORAL AT BEDTIME
Qty: 90 TABLET | Refills: 3 | Status: SHIPPED | OUTPATIENT
Start: 2023-09-19 | End: 2024-09-10

## 2023-09-19 NOTE — PROGRESS NOTES
Lisset MITCHELL Chi is a 47 year old who is being evaluated today via a billable video visit    How would you like to obtain your AVS? MyChart  If the video visit is dropped, the invitation should be resent by: Text to cell phone: [phone]  Will anyone else be joining your video visit? No      Video Start Time: 4:20 PM    Video-Visit Details    Type of service:  Video Visit    Video End Time: 4:30 PM    Originating Location (pt. Location): Home    Distant Location (provider location):  [facility]     Platform used for Video Visit: Jorgito    Lisset MITCHELL Chi was seen in the Allergy Clinic at [facility].      Lisset MITCHELL Chi is a 47 year old Not  or  female who is seen today for a follow-up visit.    Reports her asthma has been well controlled. She is taking montelukast daily and has not needed to use albuterol since starting this medication. She has not had any exacerbations, oral steroid use, or urgent care/ED visits in the past year. She reports having some ups and downs in her mood but is not sure whether this is due to the medication or general stresses with life. She has not had any other side effects attributable to the medication.    Continues to take fexofenadine and fluticasone nasal spray daily. Overall these medications work well for her but she has some degree of congestion on a regular basis.      Past Medical History:   Diagnosis Date    Allergic rhinitis due to other allergen     Asthma     Bilateral low back pain with left-sided sciatica 10/17/2022    IBS (irritable bowel syndrome)     Other internal derangement of knee(397.92) 2005    patella dislocates easily    Premature ovarian failure     Raynaud's disease 3/08    Trochanteric bursitis of both hips 11/15/2017     Family History   Problem Relation Age of Onset    Thyroid Disease Mother     Diabetes Mother         type2    Eye Disorder Mother         cataracts    Gastrointestinal Disease Mother          hep a/has to have her throat stretched (esophageal stricturing)    Respiratory Mother         sleep apnea    Heart Disease Father 60    Thyroid Disease Maternal Grandmother     Gynecology Maternal Grandmother         on bcp to keep period regular, a small uterus    Heart Disease Maternal Grandmother         tachycardia    Diabetes Maternal Grandfather     Cancer Maternal Grandfather     C.A.D. Paternal Grandfather 30        age 30, then again in his 80's    Allergies Daughter         milk    Asthma Daughter      Social History     Tobacco Use    Smoking status: Never    Smokeless tobacco: Never   Vaping Use    Vaping Use: Never used   Substance Use Topics    Alcohol use: Yes     Comment: social    Drug use: No     Social History     Social History Narrative    Caffeine intake/servings daily - 0-1    Calcium intake/servings daily - 2-3    Exercise 2-3  times weekly - describe aerobics    Sunscreen used - Yes    Seatbelts used - Yes    Guns stored in the home - No    Self Breast Exam - Yes    Pap test up to date -  Yes    Eye exam up to date -  Yes    Dental exam up to date -  Yes    DEXA scan up to date -  Not Applicable    Flex Sig/Colonoscopy up to date -  Not Applicable    Mammography up to date -  Not Applicable    Immunizations reviewed and up to date - Yes    Abuse: Current or Past (Physical, Sexual or Emotional) - None current, past    Do you feel safe in your environment - Yes    Do you cope well with stress - Yes    Do you suffer from insomnia - No    Last updated by: Estefani Moraes MA 5/5/2010               Past medical, family, and social history were reviewed.        Current Outpatient Medications:     albuterol (PROAIR HFA/PROVENTIL HFA/VENTOLIN HFA) 108 (90 Base) MCG/ACT inhaler, Inhale 2 puffs into the lungs every 4 hours as needed for shortness of breath, wheezing or cough, Disp: 18 g, Rfl: 1    montelukast (SINGULAIR) 10 MG tablet, Take 1 tablet (10 mg) by mouth At Bedtime, Disp: 90 tablet, Rfl: 3     Calcium Carbonate-Vitamin D (CALCIUM + D PO), Take 600 mg by mouth., Disp: , Rfl:     FEXOFENADINE  MG OR TABS, 1 TABLET DAILY , Disp: , Rfl:     fluticasone (FLONASE) 50 MCG/ACT nasal spray, Spray 2 sprays into both nostrils daily, Disp: , Rfl:     ibuprofen (ADVIL/MOTRIN) 600 MG tablet, TAKE 1 TABLET (600 MG) BY MOUTH EVERY 8 HOURS AS NEEDED FOR MODERATE PAIN, Disp: 60 tablet, Rfl: 1    methocarbamol (ROBAXIN) 500 MG tablet, TAKE 1 TABLET (500 MG) BY MOUTH NIGHTLY AS NEEDED FOR MUSCLE SPASMS, Disp: 30 tablet, Rfl: 0    Turmeric Curcumin 500 MG CAPS, Take 500 mg by mouth daily , Disp: , Rfl:     VITAMIN D, CHOLECALCIFEROL, PO, Take by mouth daily, Disp: , Rfl:     Current Facility-Administered Medications:     triamcinolone (KENALOG-40) injection 40 mg, 40 mg, , , Floyd Larsen MD, 40 mg at 04/19/23 1641    triamcinolone (KENALOG-40) injection 40 mg, 40 mg, , , Floyd Larsen MD, 40 mg at 03/16/22 1626  No Known Allergies    EXAM:   [facility] 12/25/2013   Physical Exam  Constitutional:       Appearance: Normal appearance.   HENT:      Head: Normocephalic and atraumatic.      Right Ear: External ear normal.      Left Ear: External ear normal.      Nose: No rhinorrhea.   Pulmonary:      Effort: Pulmonary effort is normal.      Comments: Speaking comfortably in full sentences  Neurological:      General: No focal deficit present.      Mental Status: She is alert.   Psychiatric:         Mood and Affect: Mood and affect normal.           WORKUP:  None    ASSESSMENT/PLAN:  Lisset MITCHELL Chi is a 47 year old female seen for a follow-up visit.    1. Mild persistent asthma without complication - well controlled, no exacerbations or oral steroid use in the past year. Continue with montelukast and intermittent albuterol as needed.    - montelukast (SINGULAIR) 10 MG tablet; Take 1 tablet (10 mg) by mouth At Bedtime  Dispense: 90 tablet; Refill: 3  - albuterol (PROAIR HFA/PROVENTIL HFA/VENTOLIN HFA) 108 (90 Base)  MCG/ACT inhaler; Inhale 2 puffs into the lungs every 4 hours as needed for shortness of breath, wheezing or cough  Dispense: 18 g; Refill: 1    2. Seasonal allergic rhinitis due to pollen - controlled with antihistamines and nasal steroid in addition to montelukast.    - continue fexofenadine - 180mg daily  - continue fluticasone nasal spray - 2 sprays in each nostril daily  - montelukast (SINGULAIR) 10 MG tablet; Take 1 tablet (10 mg) by mouth At Bedtime  Dispense: 90 tablet; Refill: 3    3. Allergic rhinitis due to dust mite    - montelukast (SINGULAIR) 10 MG tablet; Take 1 tablet (10 mg) by mouth At Bedtime  Dispense: 90 tablet; Refill: 3    4. Allergic rhinitis due to animals    - montelukast (SINGULAIR) 10 MG tablet; Take 1 tablet (10 mg) by mouth At Bedtime  Dispense: 90 tablet; Refill: 3    5. Allergic rhinitis due to mold    - montelukast (SINGULAIR) 10 MG tablet; Take 1 tablet (10 mg) by mouth At Bedtime  Dispense: 90 tablet; Refill: 3      Follow-up in 1 year, sooner if needed      Thank you for allowing me to participate in the care of Lisset MITCHELL Chi.      Flower Gaytan MD, FAAAAI  Allergy/Immunology  [facility]      Chart documentation done in part with Dragon Voice Recognition Software. Although reviewed after completion, some word and grammatical errors may remain.

## 2023-09-19 NOTE — LETTER
9/19/2023         RE: Lisset MITCHELL Chi  [address] [id number]        Dear Colleague,    Thank you for referring your patient, Lisset MITCHELL Chi, to the [facility]. Please see a copy of my visit note below.    Lisset MITCHELL Chi is a 47 year old who is being evaluated today via a billable video visit    How would you like to obtain your AVS? MyChart  If the video visit is dropped, the invitation should be resent by: Text to cell phone: [phone]  Will anyone else be joining your video visit? No      Video Start Time: 4:20 PM    Video-Visit Details    Type of service:  Video Visit    Video End Time: 4:30 PM    Originating Location (pt. Location): Home    Distant Location (provider location):  [facility]     Platform used for Video Visit: Jorgito    Lisset MITCHELL Chi was seen in the Allergy Clinic at [facility].      Lisset MITCHELL Chi is a 47 year old Not  or  female who is seen today for a follow-up visit.    Reports her asthma has been well controlled. She is taking montelukast daily and has not needed to use albuterol since starting this medication. She has not had any exacerbations, oral steroid use, or urgent care/ED visits in the past year. She reports having some ups and downs in her mood but is not sure whether this is due to the medication or general stresses with life. She has not had any other side effects attributable to the medication.    Continues to take fexofenadine and fluticasone nasal spray daily. Overall these medications work well for her but she has some degree of congestion on a regular basis.      Past Medical History:   Diagnosis Date     Allergic rhinitis due to other allergen      Asthma      Bilateral low back pain with left-sided sciatica 10/17/2022     IBS (irritable bowel syndrome)      Other internal derangement of knee(739.73) 2005    patella dislocates easily     Premature ovarian failure      Raynaud's disease  3/08     Trochanteric bursitis of both hips 11/15/2017     Family History   Problem Relation Age of Onset     Thyroid Disease Mother      Diabetes Mother         type2     Eye Disorder Mother         cataracts     Gastrointestinal Disease Mother         hep a/has to have her throat stretched (esophageal stricturing)     Respiratory Mother         sleep apnea     Heart Disease Father 60     Thyroid Disease Maternal Grandmother      Gynecology Maternal Grandmother         on bcp to keep period regular, a small uterus     Heart Disease Maternal Grandmother         tachycardia     Diabetes Maternal Grandfather      Cancer Maternal Grandfather      C.A.D. Paternal Grandfather 30        age 30, then again in his 80's     Allergies Daughter         milk     Asthma Daughter      Social History     Tobacco Use     Smoking status: Never     Smokeless tobacco: Never   Vaping Use     Vaping Use: Never used   Substance Use Topics     Alcohol use: Yes     Comment: social     Drug use: No     Social History     Social History Narrative    Caffeine intake/servings daily - 0-1    Calcium intake/servings daily - 2-3    Exercise 2-3  times weekly - describe aerobics    Sunscreen used - Yes    Seatbelts used - Yes    Guns stored in the home - No    Self Breast Exam - Yes    Pap test up to date -  Yes    Eye exam up to date -  Yes    Dental exam up to date -  Yes    DEXA scan up to date -  Not Applicable    Flex Sig/Colonoscopy up to date -  Not Applicable    Mammography up to date -  Not Applicable    Immunizations reviewed and up to date - Yes    Abuse: Current or Past (Physical, Sexual or Emotional) - None current, past    Do you feel safe in your environment - Yes    Do you cope well with stress - Yes    Do you suffer from insomnia - No    Last updated by: Estefani Moraes MA 5/5/2010               Past medical, family, and social history were reviewed.        Current Outpatient Medications:      albuterol (PROAIR HFA/PROVENTIL  HFA/VENTOLIN HFA) 108 (90 Base) MCG/ACT inhaler, Inhale 2 puffs into the lungs every 4 hours as needed for shortness of breath, wheezing or cough, Disp: 18 g, Rfl: 1     montelukast (SINGULAIR) 10 MG tablet, Take 1 tablet (10 mg) by mouth At Bedtime, Disp: 90 tablet, Rfl: 3     Calcium Carbonate-Vitamin D (CALCIUM + D PO), Take 600 mg by mouth., Disp: , Rfl:      FEXOFENADINE  MG OR TABS, 1 TABLET DAILY , Disp: , Rfl:      fluticasone (FLONASE) 50 MCG/ACT nasal spray, Spray 2 sprays into both nostrils daily, Disp: , Rfl:      ibuprofen (ADVIL/MOTRIN) 600 MG tablet, TAKE 1 TABLET (600 MG) BY MOUTH EVERY 8 HOURS AS NEEDED FOR MODERATE PAIN, Disp: 60 tablet, Rfl: 1     methocarbamol (ROBAXIN) 500 MG tablet, TAKE 1 TABLET (500 MG) BY MOUTH NIGHTLY AS NEEDED FOR MUSCLE SPASMS, Disp: 30 tablet, Rfl: 0     Turmeric Curcumin 500 MG CAPS, Take 500 mg by mouth daily , Disp: , Rfl:      VITAMIN D, CHOLECALCIFEROL, PO, Take by mouth daily, Disp: , Rfl:     Current Facility-Administered Medications:      triamcinolone (KENALOG-40) injection 40 mg, 40 mg, , , Floyd Larsen MD, 40 mg at 04/19/23 1641     triamcinolone (KENALOG-40) injection 40 mg, 40 mg, , , Floyd Larsen MD, 40 mg at 03/16/22 1626  No Known Allergies    EXAM:   [facility] 12/25/2013   Physical Exam  Constitutional:       Appearance: Normal appearance.   HENT:      Head: Normocephalic and atraumatic.      Right Ear: External ear normal.      Left Ear: External ear normal.      Nose: No rhinorrhea.   Pulmonary:      Effort: Pulmonary effort is normal.      Comments: Speaking comfortably in full sentences  Neurological:      General: No focal deficit present.      Mental Status: She is alert.   Psychiatric:         Mood and Affect: Mood and affect normal.           WORKUP:  None    ASSESSMENT/PLAN:  Lisset MITCHELL Chi is a 47 year old female seen for a follow-up visit.    1. Mild persistent asthma without complication - well controlled, no exacerbations  or oral steroid use in the past year. Continue with montelukast and intermittent albuterol as needed.    - montelukast (SINGULAIR) 10 MG tablet; Take 1 tablet (10 mg) by mouth At Bedtime  Dispense: 90 tablet; Refill: 3  - albuterol (PROAIR HFA/PROVENTIL HFA/VENTOLIN HFA) 108 (90 Base) MCG/ACT inhaler; Inhale 2 puffs into the lungs every 4 hours as needed for shortness of breath, wheezing or cough  Dispense: 18 g; Refill: 1    2. Seasonal allergic rhinitis due to pollen - controlled with antihistamines and nasal steroid in addition to montelukast.    - continue fexofenadine - 180mg daily  - continue fluticasone nasal spray - 2 sprays in each nostril daily  - montelukast (SINGULAIR) 10 MG tablet; Take 1 tablet (10 mg) by mouth At Bedtime  Dispense: 90 tablet; Refill: 3    3. Allergic rhinitis due to dust mite    - montelukast (SINGULAIR) 10 MG tablet; Take 1 tablet (10 mg) by mouth At Bedtime  Dispense: 90 tablet; Refill: 3    4. Allergic rhinitis due to animals    - montelukast (SINGULAIR) 10 MG tablet; Take 1 tablet (10 mg) by mouth At Bedtime  Dispense: 90 tablet; Refill: 3    5. Allergic rhinitis due to mold    - montelukast (SINGULAIR) 10 MG tablet; Take 1 tablet (10 mg) by mouth At Bedtime  Dispense: 90 tablet; Refill: 3      Follow-up in 1 year, sooner if needed      Thank you for allowing me to participate in the care of Lisset MITCHELL Chi.      Flower Gaytan MD, FAAAAI  Allergy/Immunology  [facility]      Chart documentation done in part with Dragon Voice Recognition Software. Although reviewed after completion, some word and grammatical errors may remain.      Again, thank you for allowing me to participate in the care of your patient.        Sincerely,        Flower Gaytan MD

## 2023-09-20 ENCOUNTER — PATIENT OUTREACH (OUTPATIENT)
Dept: CARE COORDINATION | Facility: CLINIC | Age: 48
End: 2023-09-20
Payer: COMMERCIAL

## 2023-09-22 ENCOUNTER — THERAPY VISIT (OUTPATIENT)
Dept: PHYSICAL THERAPY | Facility: CLINIC | Age: 48
End: 2023-09-22
Payer: COMMERCIAL

## 2023-09-22 DIAGNOSIS — N94.10 FEMALE DYSPAREUNIA: Primary | ICD-10-CM

## 2023-09-22 DIAGNOSIS — M62.89 PELVIC FLOOR DYSFUNCTION: ICD-10-CM

## 2023-09-22 PROCEDURE — 97530 THERAPEUTIC ACTIVITIES: CPT | Mod: GP | Performed by: PHYSICAL THERAPIST

## 2023-09-22 PROCEDURE — 97110 THERAPEUTIC EXERCISES: CPT | Mod: GP | Performed by: PHYSICAL THERAPIST

## 2023-09-22 PROCEDURE — 97140 MANUAL THERAPY 1/> REGIONS: CPT | Mod: GP | Performed by: PHYSICAL THERAPIST

## 2023-10-06 ENCOUNTER — THERAPY VISIT (OUTPATIENT)
Dept: PHYSICAL THERAPY | Facility: CLINIC | Age: 48
End: 2023-10-06
Payer: COMMERCIAL

## 2023-10-06 DIAGNOSIS — G89.29 CHRONIC PAIN OF BOTH KNEES: ICD-10-CM

## 2023-10-06 DIAGNOSIS — M25.561 CHRONIC PAIN OF BOTH KNEES: ICD-10-CM

## 2023-10-06 DIAGNOSIS — M25.561 CHRONIC PAIN OF RIGHT KNEE: Primary | ICD-10-CM

## 2023-10-06 DIAGNOSIS — M25.562 CHRONIC PAIN OF BOTH KNEES: ICD-10-CM

## 2023-10-06 DIAGNOSIS — G89.29 CHRONIC PAIN OF RIGHT KNEE: Primary | ICD-10-CM

## 2023-10-06 PROCEDURE — 97110 THERAPEUTIC EXERCISES: CPT | Mod: GP | Performed by: PHYSICAL THERAPIST

## 2023-10-16 ENCOUNTER — THERAPY VISIT (OUTPATIENT)
Dept: PHYSICAL THERAPY | Facility: CLINIC | Age: 48
End: 2023-10-16
Payer: COMMERCIAL

## 2023-10-16 DIAGNOSIS — G89.29 CHRONIC PAIN OF RIGHT KNEE: Primary | ICD-10-CM

## 2023-10-16 DIAGNOSIS — G89.29 CHRONIC PAIN OF BOTH KNEES: ICD-10-CM

## 2023-10-16 DIAGNOSIS — M25.561 CHRONIC PAIN OF BOTH KNEES: ICD-10-CM

## 2023-10-16 DIAGNOSIS — M25.561 CHRONIC PAIN OF RIGHT KNEE: Primary | ICD-10-CM

## 2023-10-16 DIAGNOSIS — M25.562 CHRONIC PAIN OF BOTH KNEES: ICD-10-CM

## 2023-10-16 PROCEDURE — 97110 THERAPEUTIC EXERCISES: CPT | Mod: GP | Performed by: PHYSICAL THERAPIST

## 2023-10-16 PROCEDURE — 97112 NEUROMUSCULAR REEDUCATION: CPT | Mod: GP | Performed by: PHYSICAL THERAPIST

## 2023-10-18 ENCOUNTER — THERAPY VISIT (OUTPATIENT)
Dept: PHYSICAL THERAPY | Facility: CLINIC | Age: 48
End: 2023-10-18
Payer: COMMERCIAL

## 2023-10-18 DIAGNOSIS — N94.10 FEMALE DYSPAREUNIA: Primary | ICD-10-CM

## 2023-10-18 DIAGNOSIS — M62.89 PELVIC FLOOR DYSFUNCTION: ICD-10-CM

## 2023-10-18 PROCEDURE — 97530 THERAPEUTIC ACTIVITIES: CPT | Mod: GP | Performed by: PHYSICAL THERAPIST

## 2023-10-18 PROCEDURE — 97110 THERAPEUTIC EXERCISES: CPT | Mod: GP | Performed by: PHYSICAL THERAPIST

## 2023-10-27 ENCOUNTER — PATIENT OUTREACH (OUTPATIENT)
Dept: GASTROENTEROLOGY | Facility: CLINIC | Age: 48
End: 2023-10-27
Payer: COMMERCIAL

## 2023-11-01 ENCOUNTER — THERAPY VISIT (OUTPATIENT)
Dept: PHYSICAL THERAPY | Facility: CLINIC | Age: 48
End: 2023-11-01
Payer: COMMERCIAL

## 2023-11-01 DIAGNOSIS — M25.562 PAIN IN BOTH KNEES, UNSPECIFIED CHRONICITY: ICD-10-CM

## 2023-11-01 DIAGNOSIS — M25.561 RIGHT KNEE PAIN, UNSPECIFIED CHRONICITY: Primary | ICD-10-CM

## 2023-11-01 DIAGNOSIS — M25.561 PAIN IN BOTH KNEES, UNSPECIFIED CHRONICITY: ICD-10-CM

## 2023-11-01 PROCEDURE — 97140 MANUAL THERAPY 1/> REGIONS: CPT | Mod: GP | Performed by: PHYSICAL THERAPIST

## 2023-11-01 PROCEDURE — 97110 THERAPEUTIC EXERCISES: CPT | Mod: GP | Performed by: PHYSICAL THERAPIST

## 2023-11-13 ENCOUNTER — THERAPY VISIT (OUTPATIENT)
Dept: PHYSICAL THERAPY | Facility: CLINIC | Age: 48
End: 2023-11-13
Payer: COMMERCIAL

## 2023-11-13 DIAGNOSIS — G89.29 CHRONIC PAIN OF BOTH KNEES: ICD-10-CM

## 2023-11-13 DIAGNOSIS — M25.561 CHRONIC PAIN OF BOTH KNEES: ICD-10-CM

## 2023-11-13 DIAGNOSIS — G89.29 CHRONIC PAIN OF RIGHT KNEE: Primary | ICD-10-CM

## 2023-11-13 DIAGNOSIS — M25.561 CHRONIC PAIN OF RIGHT KNEE: Primary | ICD-10-CM

## 2023-11-13 DIAGNOSIS — M25.562 CHRONIC PAIN OF BOTH KNEES: ICD-10-CM

## 2023-11-13 PROCEDURE — 97110 THERAPEUTIC EXERCISES: CPT | Mod: GP | Performed by: PHYSICAL THERAPIST

## 2023-11-21 ENCOUNTER — OFFICE VISIT (OUTPATIENT)
Dept: ORTHOPEDICS | Facility: CLINIC | Age: 48
End: 2023-11-21
Payer: COMMERCIAL

## 2023-11-21 ENCOUNTER — ANCILLARY PROCEDURE (OUTPATIENT)
Dept: GENERAL RADIOLOGY | Facility: CLINIC | Age: 48
End: 2023-11-21
Attending: PREVENTIVE MEDICINE
Payer: COMMERCIAL

## 2023-11-21 DIAGNOSIS — M51.16 LUMBAR DISC HERNIATION WITH RADICULOPATHY: ICD-10-CM

## 2023-11-21 DIAGNOSIS — M70.62 TROCHANTERIC BURSITIS OF LEFT HIP: Primary | ICD-10-CM

## 2023-11-21 DIAGNOSIS — M70.62 TROCHANTERIC BURSITIS OF LEFT HIP: ICD-10-CM

## 2023-11-21 PROCEDURE — 99214 OFFICE O/P EST MOD 30 MIN: CPT | Mod: 25 | Performed by: PREVENTIVE MEDICINE

## 2023-11-21 PROCEDURE — 73522 X-RAY EXAM HIPS BI 3-4 VIEWS: CPT | Performed by: RADIOLOGY

## 2023-11-21 PROCEDURE — 20611 DRAIN/INJ JOINT/BURSA W/US: CPT | Mod: LT | Performed by: PREVENTIVE MEDICINE

## 2023-11-21 RX ORDER — TRIAMCINOLONE ACETONIDE 40 MG/ML
40 INJECTION, SUSPENSION INTRA-ARTICULAR; INTRAMUSCULAR
Status: DISCONTINUED | OUTPATIENT
Start: 2023-11-21 | End: 2024-01-19

## 2023-11-21 RX ADMIN — TRIAMCINOLONE ACETONIDE 40 MG: 40 INJECTION, SUSPENSION INTRA-ARTICULAR; INTRAMUSCULAR at 10:08

## 2023-11-21 NOTE — LETTER
11/21/2023         RE: Lisset MITCHELL Chi  [address] [facility] [id number]        Dear Colleague,    Thank you for referring your patient, Lisset MITCHELL Chi, to the [facility] [place]. Please see a copy of my visit note below.    HISTORY OF PRESENT ILLNESS  Ms. Gates is a pleasant 48 year old year old female who presents to clinic today with the following:  What problem are you here for? Left hip pain   Had trochanteric bursa injection last year and improved her pain and symptoms for over 3 months no pain  Still has some low back pain  How long have you had this problem? 2-3 years     Have you had any recent imaging of this problem? Xrays/MRI/CT scans? Yes     Have you had treatments for this problem in the past?  -Medications? yes  -Physical therapy? yes  -Injections? yes  -Surgery? no    How severe is this problem today? 0-10 scale? 4    How severe has this problem been at WORST in the past? 0-10 scale? 8    What do you think caused this problem? No     Does this problem or its symptoms cause difficulty for you falling asleep or staying asleep? Yes     Anything else you want us to know about this problem? no          MEDICAL HISTORY  Patient Active Problem List   Diagnosis     Irritable bowel syndrome     Premature ovarian failure  - LMP March 2013 @ 38 yo     Mild persistent asthma without complication     Adjustment disorder with depressed mood     Seasonal allergic rhinitis due to pollen     Allergic rhinitis due to animals     Allergic rhinitis due to dust mite     Allergic rhinitis due to mold     Allergic conjunctivitis, bilateral     History of colonic polyps     Female dyspareunia     History of loop electrical excision procedure (LEEP)     Right knee pain     Bilateral knee pain     Pelvic floor dysfunction       Current Outpatient Medications   Medication Sig Dispense Refill     albuterol (PROAIR HFA/PROVENTIL HFA/VENTOLIN HFA) 108 (90 Base) MCG/ACT inhaler Inhale 2 puffs  into the lungs every 4 hours as needed for shortness of breath, wheezing or cough 18 g 1     Calcium Carbonate-Vitamin D (CALCIUM + D PO) Take 600 mg by mouth.       FEXOFENADINE  MG OR TABS 1 TABLET DAILY        fluticasone (FLONASE) 50 MCG/ACT nasal spray Spray 2 sprays into both nostrils daily       ibuprofen (ADVIL/MOTRIN) 600 MG tablet TAKE 1 TABLET (600 MG) BY MOUTH EVERY 8 HOURS AS NEEDED FOR MODERATE PAIN 60 tablet 1     methocarbamol (ROBAXIN) 500 MG tablet TAKE 1 TABLET (500 MG) BY MOUTH NIGHTLY AS NEEDED FOR MUSCLE SPASMS 30 tablet 0     montelukast (SINGULAIR) 10 MG tablet Take 1 tablet (10 mg) by mouth At Bedtime 90 tablet 3     Turmeric Curcumin 500 MG CAPS Take 500 mg by mouth daily        VITAMIN D, CHOLECALCIFEROL, PO Take by mouth daily         No Known Allergies    Family History   Problem Relation Age of Onset     Thyroid Disease Mother      Diabetes Mother         type2     Eye Disorder Mother         cataracts     Gastrointestinal Disease Mother         hep a/has to have her throat stretched (esophageal stricturing)     Respiratory Mother         sleep apnea     Heart Disease Father 60     Thyroid Disease Maternal Grandmother      Gynecology Maternal Grandmother         on bcp to keep period regular, a small uterus     Heart Disease Maternal Grandmother         tachycardia     Diabetes Maternal Grandfather      Cancer Maternal Grandfather      C.A.D. Paternal Grandfather 30        age 30, then again in his 80's     Allergies Daughter         milk     Asthma Daughter      Social History     Socioeconomic History     Marital status:      Number of children: 2   Occupational History     Employer: [employer]   Tobacco Use     Smoking status: Never     Smokeless tobacco: Never   Vaping Use     Vaping Use: Never used   Substance and Sexual Activity     Alcohol use: Yes     Comment: social     Drug use: No     Sexual activity: Yes     Partners: Male     Birth  control/protection: Surgical     Comment:  had vasectomy   Other Topics Concern      Service No     Caffeine Concern No     Occupational Exposure No     Sleep Concern No     Stress Concern Yes     Weight Concern Yes     Comment: gains easily     Special Diet No     Back Care Yes     Exercise Yes     Comment: irregularly     Seat Belt No     Self-Exams No     Parent/sibling w/ CABG, MI or angioplasty before 65F 55M? No   Social History Narrative    Caffeine intake/servings daily - 0-1    Calcium intake/servings daily - 2-3    Exercise 2-3  times weekly - describe aerobics    Sunscreen used - Yes    Seatbelts used - Yes    Guns stored in the home - No    Self Breast Exam - Yes    Pap test up to date -  Yes    Eye exam up to date -  Yes    Dental exam up to date -  Yes    DEXA scan up to date -  Not Applicable    Flex Sig/Colonoscopy up to date -  Not Applicable    Mammography up to date -  Not Applicable    Immunizations reviewed and up to date - Yes    Abuse: Current or Past (Physical, Sexual or Emotional) - None current, past    Do you feel safe in your environment - Yes    Do you cope well with stress - Yes    Do you suffer from insomnia - No    Last updated by: Estefani Moraes MA 5/5/2010               Additional medical/Social/Surgical histories reviewed in [facility] and updated as appropriate.     REVIEW OF SYSTEMS (11/21/2023)  10 point ROS of systems including Constitutional, Eyes, Respiratory, Cardiovascular, Gastroenterology, Genitourinary, Integumentary, Musculoskeletal, Psychiatric, Allergic/Immunologic were all negative except for pertinent positives noted in my HPI.     PHYSICAL EXAM  VSS  Vital Signs: LMP 12/25/2013  Patient declined being weighed. There is no height or weight on file to calculate BMI.    General  - normal appearance, in no obvious distress  HEENT  - conjunctivae not injected, moist mucous membranes, normocephalic/atraumatic head, ears normal appearance, no lesions, mouth normal  appearance, no scars, normal dentition and teeth present  CV  - normal femoral pulse  Pulm  - normal respiratory pattern, non-labored  Musculoskeletal -left hip  - stance: normal gait without limp,  no obvious leg length discrepancy, normal heel and toe walk, single leg squat displays knee valgus, contralateral hip drop, internal rotation of the hip   - inspection: no swelling or effusion,  normal bone and joint alignment, no obvious deformity  - palpation: tender over the greater trochanter  - ROM: pain with active abduction, normal and painless flexion, extension, IR, ER, adduction  - strength: 5/5 in all planes  - special tests:  (-) EMETERIO  (-) FADIR  no pain with axial femoral load  Neuro  - no sensory or motor deficit, grossly normal coordination, normal muscle tone  Skin  - no ecchymosis, erythema, warmth, or induration, no obvious rash  Psych  - interactive, appropriate, normal mood and affect   ASSESSMENT & PLAN  49 yo female with lumbar ddd, radicular pain, left hip trochanteric bursitis, worse    I independently reviewed the following imaging studies:  Bilateral hip xrays: shows no significant abnormalities  Lumbar MRI: shows ddd, disc herniations  After a 20 minute discussion and examination, we decided to perform a same day injection for diagnostic and therapeutic purposes for  Left hip bursa  Cont. HEP  Followup in 1 month consider ordering lumbar CAMMY  Patient has been doing home exercise physical therapy program for this problem      Appropriate PPE was utilized for prevention of spread of Covid-19.  Floyd Larsen MD, [facility]  Trochanteric Hip Injection - Ultrasound Guided  The patient was informed of the risks and the benefits of the procedure and a written consent was signed.  The patient s left lateral hip was prepped with chlorhexidine in sterile fashion.   40 mg of triamcinolone suspension was drawn up into a 5 mL syringe with 4 mL of 1% lidocaine.  Injection was performed using sterile technique.   Under ultrasound guidance a 3.5-inch 22-gauge needle was used to enter the lynne-trochanteric tissue.  Needle placement was visualized and documented with ultrasound which was deemed necessary to ensure medication did not enter the tendon itself, which could potentially cause tendon damage.   Injection performed long axis to the probe with probe in short axis to the greater trochanter.  Expansion of the lynne-trochanteric tissue was visualized under ultrasound upon injection.  Images were permanently stored for the patient's record.  There were no complications. The patient tolerated the procedure well. There was negligible bleeding.        Large Joint Injection/Arthocentesis: L greater trochanteric bursa    Date/Time: 2023 10:08 AM    Performed by: Floyd Larsen MD  Authorized by: Floyd Larsen MD    Indications:  Pain and diagnostic evaluation  Needle Size:  22 G  Guidance: ultrasound    Approach:  Lateral  Location:  Hip      Site:  L greater trochanteric bursa  Medications:  40 mg triamcinolone 40 MG/ML  Outcome:  Tolerated well, no immediate complications  Procedure discussed: discussed risks, benefits, and alternatives    Consent Given by:  Patient  Timeout: timeout called immediately prior to procedure    Prep: patient was prepped and draped in usual sterile fashion      [facility]   ORTHOPEDICS & SPORTS MEDICINE  [address] [place] [address]  Dept: [phone]  ______________________________________________________________________________    Patient: Lisset MITCHELL Chi   : 1975   MRN: [id number]   2023    INVASIVE PROCEDURE SAFETY CHECKLIST    Date: 23  Procedure:Left hip bursa injection  Patient Name: Lisset MITCHELL Chi  MRN: [id number]  YOB: 1975    Action: Complete sections as appropriate. Any discrepancy results in a HARD COPY until resolved.     PRE PROCEDURE:  Patient ID verified with 2 identifiers (name and  or MRN):  Yes  Procedure and site verified with patient/designee (when able): Yes  Accurate consent documentation in medical record: Yes  H&P (or appropriate assessment) documented in medical record: Yes  H&P must be up to 20 days prior to procedure and updates within 24 hours of procedure as applicable: Yes  Relevant diagnostic and radiology test results appropriately labeled and displayed as applicable: Yes  Procedure site(s) marked with provider initials: Yes    TIMEOUT:  Time-Out performed immediately prior to starting procedure, including verbal and active participation of all team members addressing the following:Yes  * Correct patient identify  * Confirmed that the correct side and site are marked  * An accurate procedure consent form  * Agreement on the procedure to be done  * Correct patient position  * Relevant images and results are properly labeled and appropriately displayed  * The need to administer antibiotics or fluids for irrigation purposes during the procedure as applicable   * Safety precautions based on patient history or medication use    DURING PROCEDURE: Verification of correct person, site, and procedures any time the responsibility for care of the patient is transferred to another member of the care team.       Prior to injection, verified patient identity using patient's name and date of birth.  Due to injection administration, patient instructed to remain in clinic for 15 minutes  afterwards, and to report any adverse reaction to me immediately.    Bursa injection was performed.      Drug Amount Wasted:  None.  Vial/Syringe: Single dose vial  Expiration Date:  7/1/25      SKY Klein  November 21, 2023      Again, thank you for allowing me to participate in the care of your patient.        Sincerely,        Floyd Larsen MD

## 2023-11-21 NOTE — PROGRESS NOTES
HISTORY OF PRESENT ILLNESS  Ms. Gates is a pleasant 48 year old year old female who presents to clinic today with the following:  What problem are you here for? Left hip pain   Had trochanteric bursa injection last year and improved her pain and symptoms for over 3 months no pain  Still has some low back pain  How long have you had this problem? 2-3 years     Have you had any recent imaging of this problem? Xrays/MRI/CT scans? Yes     Have you had treatments for this problem in the past?  -Medications? yes  -Physical therapy? yes  -Injections? yes  -Surgery? no    How severe is this problem today? 0-10 scale? 4    How severe has this problem been at WORST in the past? 0-10 scale? 8    What do you think caused this problem? No     Does this problem or its symptoms cause difficulty for you falling asleep or staying asleep? Yes     Anything else you want us to know about this problem? no          MEDICAL HISTORY  Patient Active Problem List   Diagnosis    Irritable bowel syndrome    Premature ovarian failure  - LMP March 2013 @ 36 yo    Mild persistent asthma without complication    Adjustment disorder with depressed mood    Seasonal allergic rhinitis due to pollen    Allergic rhinitis due to animals    Allergic rhinitis due to dust mite    Allergic rhinitis due to mold    Allergic conjunctivitis, bilateral    History of colonic polyps    Female dyspareunia    History of loop electrical excision procedure (LEEP)    Right knee pain    Bilateral knee pain    Pelvic floor dysfunction       Current Outpatient Medications   Medication Sig Dispense Refill    albuterol (PROAIR HFA/PROVENTIL HFA/VENTOLIN HFA) 108 (90 Base) MCG/ACT inhaler Inhale 2 puffs into the lungs every 4 hours as needed for shortness of breath, wheezing or cough 18 g 1    Calcium Carbonate-Vitamin D (CALCIUM + D PO) Take 600 mg by mouth.      FEXOFENADINE  MG OR TABS 1 TABLET DAILY       fluticasone (FLONASE) 50 MCG/ACT nasal spray Spray 2 sprays  into both nostrils daily      ibuprofen (ADVIL/MOTRIN) 600 MG tablet TAKE 1 TABLET (600 MG) BY MOUTH EVERY 8 HOURS AS NEEDED FOR MODERATE PAIN 60 tablet 1    methocarbamol (ROBAXIN) 500 MG tablet TAKE 1 TABLET (500 MG) BY MOUTH NIGHTLY AS NEEDED FOR MUSCLE SPASMS 30 tablet 0    montelukast (SINGULAIR) 10 MG tablet Take 1 tablet (10 mg) by mouth At Bedtime 90 tablet 3    Turmeric Curcumin 500 MG CAPS Take 500 mg by mouth daily       VITAMIN D, CHOLECALCIFEROL, PO Take by mouth daily         No Known Allergies    Family History   Problem Relation Age of Onset    Thyroid Disease Mother     Diabetes Mother         type2    Eye Disorder Mother         cataracts    Gastrointestinal Disease Mother         hep a/has to have her throat stretched (esophageal stricturing)    Respiratory Mother         sleep apnea    Heart Disease Father 60    Thyroid Disease Maternal Grandmother     Gynecology Maternal Grandmother         on bcp to keep period regular, a small uterus    Heart Disease Maternal Grandmother         tachycardia    Diabetes Maternal Grandfather     Cancer Maternal Grandfather     C.A.D. Paternal Grandfather 30        age 30, then again in his 80's    Allergies Daughter         milk    Asthma Daughter      Social History     Socioeconomic History    Marital status:     Number of children: 2   Occupational History     Employer: [employer]   Tobacco Use    Smoking status: Never    Smokeless tobacco: Never   Vaping Use    Vaping Use: Never used   Substance and Sexual Activity    Alcohol use: Yes     Comment: social    Drug use: No    Sexual activity: Yes     Partners: Male     Birth control/protection: Surgical     Comment:  had vasectomy   Other Topics Concern     Service No    Caffeine Concern No    Occupational Exposure No    Sleep Concern No    Stress Concern Yes    Weight Concern Yes     Comment: gains easily    Special Diet No    Back Care Yes    Exercise Yes     Comment:  irregularly    Seat Belt No    Self-Exams No    Parent/sibling w/ CABG, MI or angioplasty before 65F 55M? No   Social History Narrative    Caffeine intake/servings daily - 0-1    Calcium intake/servings daily - 2-3    Exercise 2-3  times weekly - describe aerobics    Sunscreen used - Yes    Seatbelts used - Yes    Guns stored in the home - No    Self Breast Exam - Yes    Pap test up to date -  Yes    Eye exam up to date -  Yes    Dental exam up to date -  Yes    DEXA scan up to date -  Not Applicable    Flex Sig/Colonoscopy up to date -  Not Applicable    Mammography up to date -  Not Applicable    Immunizations reviewed and up to date - Yes    Abuse: Current or Past (Physical, Sexual or Emotional) - None current, past    Do you feel safe in your environment - Yes    Do you cope well with stress - Yes    Do you suffer from insomnia - No    Last updated by: Estefani Moraes MA 5/5/2010               Additional medical/Social/Surgical histories reviewed in [facility] and updated as appropriate.     REVIEW OF SYSTEMS (11/21/2023)  10 point ROS of systems including Constitutional, Eyes, Respiratory, Cardiovascular, Gastroenterology, Genitourinary, Integumentary, Musculoskeletal, Psychiatric, Allergic/Immunologic were all negative except for pertinent positives noted in my HPI.     PHYSICAL EXAM  VSS  Vital Signs: LMP 12/25/2013  Patient declined being weighed. There is no height or weight on file to calculate BMI.    General  - normal appearance, in no obvious distress  HEENT  - conjunctivae not injected, moist mucous membranes, normocephalic/atraumatic head, ears normal appearance, no lesions, mouth normal appearance, no scars, normal dentition and teeth present  CV  - normal femoral pulse  Pulm  - normal respiratory pattern, non-labored  Musculoskeletal -left hip  - stance: normal gait without limp,  no obvious leg length discrepancy, normal heel and toe walk, single leg squat displays knee valgus, contralateral hip drop, internal  rotation of the hip   - inspection: no swelling or effusion,  normal bone and joint alignment, no obvious deformity  - palpation: tender over the greater trochanter  - ROM: pain with active abduction, normal and painless flexion, extension, IR, ER, adduction  - strength: 5/5 in all planes  - special tests:  (-) EMETERIO  (-) FADIR  no pain with axial femoral load  Neuro  - no sensory or motor deficit, grossly normal coordination, normal muscle tone  Skin  - no ecchymosis, erythema, warmth, or induration, no obvious rash  Psych  - interactive, appropriate, normal mood and affect   ASSESSMENT & PLAN  49 yo female with lumbar ddd, radicular pain, left hip trochanteric bursitis, worse    I independently reviewed the following imaging studies:  Bilateral hip xrays: shows no significant abnormalities  Lumbar MRI: shows ddd, disc herniations  After a 20 minute discussion and examination, we decided to perform a same day injection for diagnostic and therapeutic purposes for  Left hip bursa  Cont. HEP  Followup in 1 month consider ordering lumbar CAMMY  Patient has been doing home exercise physical therapy program for this problem      Appropriate PPE was utilized for prevention of spread of Covid-19.  Floyd Larsen MD, [facility]  Trochanteric Hip Injection - Ultrasound Guided  The patient was informed of the risks and the benefits of the procedure and a written consent was signed.  The patient s left lateral hip was prepped with chlorhexidine in sterile fashion.   40 mg of triamcinolone suspension was drawn up into a 5 mL syringe with 4 mL of 1% lidocaine.  Injection was performed using sterile technique.  Under ultrasound guidance a 3.5-inch 22-gauge needle was used to enter the lynne-trochanteric tissue.  Needle placement was visualized and documented with ultrasound which was deemed necessary to ensure medication did not enter the tendon itself, which could potentially cause tendon damage.   Injection performed long axis to the  probe with probe in short axis to the greater trochanter.  Expansion of the lynne-trochanteric tissue was visualized under ultrasound upon injection.  Images were permanently stored for the patient's record.  There were no complications. The patient tolerated the procedure well. There was negligible bleeding.        Large Joint Injection/Arthocentesis: L greater trochanteric bursa    Date/Time: 2023 10:08 AM    Performed by: Floyd Larsen MD  Authorized by: Floyd Larsen MD    Indications:  Pain and diagnostic evaluation  Needle Size:  22 G  Guidance: ultrasound    Approach:  Lateral  Location:  Hip      Site:  L greater trochanteric bursa  Medications:  40 mg triamcinolone 40 MG/ML  Outcome:  Tolerated well, no immediate complications  Procedure discussed: discussed risks, benefits, and alternatives    Consent Given by:  Patient  Timeout: timeout called immediately prior to procedure    Prep: patient was prepped and draped in usual sterile fashion      [facility]   ORTHOPEDICS & SPORTS MEDICINE  [address] [place] [address]  Dept: [phone]  ______________________________________________________________________________    Patient: Lisset MITCHELL Chi   : 1975   MRN: [id number]   2023    INVASIVE PROCEDURE SAFETY CHECKLIST    Date: 23  Procedure:Left hip bursa injection  Patient Name: Lisset MITCHELL Chi  MRN: [id number]  YOB: 1975    Action: Complete sections as appropriate. Any discrepancy results in a HARD COPY until resolved.     PRE PROCEDURE:  Patient ID verified with 2 identifiers (name and  or MRN): Yes  Procedure and site verified with patient/designee (when able): Yes  Accurate consent documentation in medical record: Yes  H&P (or appropriate assessment) documented in medical record: Yes  H&P must be up to 20 days prior to procedure and updates within 24 hours of procedure as applicable: Yes  Relevant diagnostic and radiology test  results appropriately labeled and displayed as applicable: Yes  Procedure site(s) marked with provider initials: Yes    TIMEOUT:  Time-Out performed immediately prior to starting procedure, including verbal and active participation of all team members addressing the following:Yes  * Correct patient identify  * Confirmed that the correct side and site are marked  * An accurate procedure consent form  * Agreement on the procedure to be done  * Correct patient position  * Relevant images and results are properly labeled and appropriately displayed  * The need to administer antibiotics or fluids for irrigation purposes during the procedure as applicable   * Safety precautions based on patient history or medication use    DURING PROCEDURE: Verification of correct person, site, and procedures any time the responsibility for care of the patient is transferred to another member of the care team.       Prior to injection, verified patient identity using patient's name and date of birth.  Due to injection administration, patient instructed to remain in clinic for 15 minutes  afterwards, and to report any adverse reaction to me immediately.    Bursa injection was performed.      Drug Amount Wasted:  None.  Vial/Syringe: Single dose vial  Expiration Date:  7/1/25      SKY Klein  November 21, 2023

## 2023-11-24 ENCOUNTER — THERAPY VISIT (OUTPATIENT)
Dept: PHYSICAL THERAPY | Facility: CLINIC | Age: 48
End: 2023-11-24
Payer: COMMERCIAL

## 2023-11-24 DIAGNOSIS — N94.10 FEMALE DYSPAREUNIA: Primary | ICD-10-CM

## 2023-11-24 DIAGNOSIS — M62.89 PELVIC FLOOR DYSFUNCTION: ICD-10-CM

## 2023-11-24 PROCEDURE — 97110 THERAPEUTIC EXERCISES: CPT | Mod: GP | Performed by: PHYSICAL THERAPIST

## 2023-11-24 PROCEDURE — 97530 THERAPEUTIC ACTIVITIES: CPT | Mod: GP | Performed by: PHYSICAL THERAPIST

## 2023-12-08 ENCOUNTER — THERAPY VISIT (OUTPATIENT)
Dept: PHYSICAL THERAPY | Facility: CLINIC | Age: 48
End: 2023-12-08
Payer: COMMERCIAL

## 2023-12-08 DIAGNOSIS — M25.561 CHRONIC PAIN OF BOTH KNEES: ICD-10-CM

## 2023-12-08 DIAGNOSIS — G89.29 CHRONIC PAIN OF RIGHT KNEE: Primary | ICD-10-CM

## 2023-12-08 DIAGNOSIS — G89.29 CHRONIC PAIN OF BOTH KNEES: ICD-10-CM

## 2023-12-08 DIAGNOSIS — M25.562 CHRONIC PAIN OF BOTH KNEES: ICD-10-CM

## 2023-12-08 DIAGNOSIS — M25.561 CHRONIC PAIN OF RIGHT KNEE: Primary | ICD-10-CM

## 2023-12-08 PROCEDURE — 97110 THERAPEUTIC EXERCISES: CPT | Mod: GP | Performed by: PHYSICAL THERAPIST

## 2023-12-27 ENCOUNTER — HOSPITAL ENCOUNTER (EMERGENCY)
Facility: CLINIC | Age: 48
Discharge: HOME | End: 2023-12-27
Attending: EMERGENCY MEDICINE | Admitting: EMERGENCY MEDICINE
Payer: COMMERCIAL

## 2023-12-27 ENCOUNTER — APPOINTMENT (OUTPATIENT)
Dept: CT IMAGING | Facility: CLINIC | Age: 48
End: 2023-12-27
Attending: EMERGENCY MEDICINE
Payer: COMMERCIAL

## 2023-12-27 VITALS
HEART RATE: 85 BPM | TEMPERATURE: 98.2 F | RESPIRATION RATE: 18 BRPM | OXYGEN SATURATION: 98 % | DIASTOLIC BLOOD PRESSURE: 85 MMHG | SYSTOLIC BLOOD PRESSURE: 126 MMHG | WEIGHT: 117.8 LBS | BODY MASS INDEX: 21.55 KG/M2

## 2023-12-27 DIAGNOSIS — K52.9 ACUTE GASTROENTERITIS: ICD-10-CM

## 2023-12-27 LAB
ALBUMIN SERPL BCG-MCNC: 4.3 G/DL (ref 3.5–5.2)
ALBUMIN UR-MCNC: 20 MG/DL
ALP SERPL-CCNC: 100 U/L (ref 40–150)
ALT SERPL W P-5'-P-CCNC: 11 U/L (ref 0–50)
ANION GAP SERPL CALCULATED.3IONS-SCNC: 14 MMOL/L (ref 7–15)
APPEARANCE UR: CLEAR
AST SERPL W P-5'-P-CCNC: 13 U/L (ref 0–45)
BASOPHILS # BLD AUTO: 0 10E3/UL (ref 0–0.2)
BASOPHILS NFR BLD AUTO: 0 %
BILIRUB DIRECT SERPL-MCNC: <0.2 MG/DL (ref 0–0.3)
BILIRUB SERPL-MCNC: 0.5 MG/DL
BILIRUB UR QL STRIP: NEGATIVE
BUN SERPL-MCNC: 16.2 MG/DL (ref 6–20)
CALCIUM SERPL-MCNC: 9.2 MG/DL (ref 8.6–10)
CHLORIDE SERPL-SCNC: 100 MMOL/L (ref 98–107)
COLOR UR AUTO: ABNORMAL
CREAT SERPL-MCNC: 0.66 MG/DL (ref 0.51–0.95)
DEPRECATED HCO3 PLAS-SCNC: 24 MMOL/L (ref 22–29)
EGFRCR SERPLBLD CKD-EPI 2021: >90 ML/MIN/1.73M2
EOSINOPHIL # BLD AUTO: 0 10E3/UL (ref 0–0.7)
EOSINOPHIL NFR BLD AUTO: 0 %
ERYTHROCYTE [DISTWIDTH] IN BLOOD BY AUTOMATED COUNT: 13.1 % (ref 10–15)
FLUAV RNA SPEC QL NAA+PROBE: NEGATIVE
FLUBV RNA RESP QL NAA+PROBE: NEGATIVE
GLUCOSE SERPL-MCNC: 123 MG/DL (ref 70–99)
GLUCOSE UR STRIP-MCNC: NEGATIVE MG/DL
HCG UR QL: NEGATIVE
HCT VFR BLD AUTO: 44.8 % (ref 35–47)
HGB BLD-MCNC: 14.8 G/DL (ref 11.7–15.7)
HGB UR QL STRIP: NEGATIVE
IMM GRANULOCYTES # BLD: 0 10E3/UL
IMM GRANULOCYTES NFR BLD: 0 %
KETONES UR STRIP-MCNC: 20 MG/DL
LEUKOCYTE ESTERASE UR QL STRIP: NEGATIVE
LIPASE SERPL-CCNC: 15 U/L (ref 13–60)
LYMPHOCYTES # BLD AUTO: 0.3 10E3/UL (ref 0.8–5.3)
LYMPHOCYTES NFR BLD AUTO: 4 %
MCH RBC QN AUTO: 29.7 PG (ref 26.5–33)
MCHC RBC AUTO-ENTMCNC: 33 G/DL (ref 31.5–36.5)
MCV RBC AUTO: 90 FL (ref 78–100)
MONOCYTES # BLD AUTO: 0.5 10E3/UL (ref 0–1.3)
MONOCYTES NFR BLD AUTO: 6 %
MUCOUS THREADS #/AREA URNS LPF: PRESENT /LPF
NEUTROPHILS # BLD AUTO: 7.2 10E3/UL (ref 1.6–8.3)
NEUTROPHILS NFR BLD AUTO: 90 %
NITRATE UR QL: NEGATIVE
NRBC # BLD AUTO: 0 10E3/UL
NRBC BLD AUTO-RTO: 0 /100
PH UR STRIP: 6.5 [PH] (ref 5–7)
PLATELET # BLD AUTO: 245 10E3/UL (ref 150–450)
POTASSIUM SERPL-SCNC: 3.8 MMOL/L (ref 3.4–5.3)
PROT SERPL-MCNC: 7 G/DL (ref 6.4–8.3)
RBC # BLD AUTO: 4.99 10E6/UL (ref 3.8–5.2)
RBC URINE: 3 /HPF
RSV RNA SPEC NAA+PROBE: NEGATIVE
SARS-COV-2 RNA RESP QL NAA+PROBE: NEGATIVE
SODIUM SERPL-SCNC: 138 MMOL/L (ref 135–145)
SP GR UR STRIP: >1.05 (ref 1–1.03)
SQUAMOUS EPITHELIAL: 1 /HPF
UROBILINOGEN UR STRIP-MCNC: NORMAL MG/DL
WBC # BLD AUTO: 8.1 10E3/UL (ref 4–11)
WBC URINE: 2 /HPF

## 2023-12-27 PROCEDURE — 83690 ASSAY OF LIPASE: CPT | Performed by: EMERGENCY MEDICINE

## 2023-12-27 PROCEDURE — 80053 COMPREHEN METABOLIC PANEL: CPT | Performed by: EMERGENCY MEDICINE

## 2023-12-27 PROCEDURE — 81025 URINE PREGNANCY TEST: CPT | Performed by: EMERGENCY MEDICINE

## 2023-12-27 PROCEDURE — 250N000013 HC RX MED GY IP 250 OP 250 PS 637: Performed by: EMERGENCY MEDICINE

## 2023-12-27 PROCEDURE — 81001 URINALYSIS AUTO W/SCOPE: CPT | Performed by: EMERGENCY MEDICINE

## 2023-12-27 PROCEDURE — 82248 BILIRUBIN DIRECT: CPT | Performed by: EMERGENCY MEDICINE

## 2023-12-27 PROCEDURE — 250N000011 HC RX IP 250 OP 636: Performed by: EMERGENCY MEDICINE

## 2023-12-27 PROCEDURE — 96361 HYDRATE IV INFUSION ADD-ON: CPT | Performed by: EMERGENCY MEDICINE

## 2023-12-27 PROCEDURE — 250N000009 HC RX 250: Performed by: EMERGENCY MEDICINE

## 2023-12-27 PROCEDURE — 85025 COMPLETE CBC W/AUTO DIFF WBC: CPT | Performed by: EMERGENCY MEDICINE

## 2023-12-27 PROCEDURE — 99284 EMERGENCY DEPT VISIT MOD MDM: CPT | Performed by: EMERGENCY MEDICINE

## 2023-12-27 PROCEDURE — 87637 SARSCOV2&INF A&B&RSV AMP PRB: CPT | Performed by: EMERGENCY MEDICINE

## 2023-12-27 PROCEDURE — 99285 EMERGENCY DEPT VISIT HI MDM: CPT | Mod: 25 | Performed by: EMERGENCY MEDICINE

## 2023-12-27 PROCEDURE — 258N000003 HC RX IP 258 OP 636: Performed by: EMERGENCY MEDICINE

## 2023-12-27 PROCEDURE — 96374 THER/PROPH/DIAG INJ IV PUSH: CPT | Mod: 59 | Performed by: EMERGENCY MEDICINE

## 2023-12-27 PROCEDURE — 96375 TX/PRO/DX INJ NEW DRUG ADDON: CPT | Performed by: EMERGENCY MEDICINE

## 2023-12-27 PROCEDURE — 74177 CT ABD & PELVIS W/CONTRAST: CPT

## 2023-12-27 PROCEDURE — 36415 COLL VENOUS BLD VENIPUNCTURE: CPT | Performed by: FAMILY MEDICINE

## 2023-12-27 PROCEDURE — 85025 COMPLETE CBC W/AUTO DIFF WBC: CPT | Performed by: FAMILY MEDICINE

## 2023-12-27 RX ORDER — ONDANSETRON 2 MG/ML
4 INJECTION INTRAMUSCULAR; INTRAVENOUS EVERY 30 MIN PRN
Status: DISCONTINUED | OUTPATIENT
Start: 2023-12-27 | End: 2023-12-28 | Stop reason: HOSPADM

## 2023-12-27 RX ORDER — ONDANSETRON 4 MG/1
4 TABLET, ORALLY DISINTEGRATING ORAL EVERY 8 HOURS PRN
Qty: 10 TABLET | Refills: 0 | Status: SHIPPED | OUTPATIENT
Start: 2023-12-27 | End: 2024-08-20

## 2023-12-27 RX ORDER — HYDROMORPHONE HYDROCHLORIDE 1 MG/ML
0.5 INJECTION, SOLUTION INTRAMUSCULAR; INTRAVENOUS; SUBCUTANEOUS EVERY 30 MIN PRN
Status: DISCONTINUED | OUTPATIENT
Start: 2023-12-27 | End: 2023-12-27

## 2023-12-27 RX ORDER — IOPAMIDOL 755 MG/ML
100 INJECTION, SOLUTION INTRAVASCULAR ONCE
Status: COMPLETED | OUTPATIENT
Start: 2023-12-27 | End: 2023-12-27

## 2023-12-27 RX ORDER — BISMUTH SUBSALICYLATE 262 MG/1
524 TABLET, CHEWABLE ORAL ONCE
Status: COMPLETED | OUTPATIENT
Start: 2023-12-27 | End: 2023-12-27

## 2023-12-27 RX ADMIN — SODIUM CHLORIDE 54 ML: 9 INJECTION, SOLUTION INTRAVENOUS at 20:42

## 2023-12-27 RX ADMIN — BISMUTH SUBSALICYLATE 524 MG: 262 TABLET, CHEWABLE ORAL at 22:16

## 2023-12-27 RX ADMIN — ONDANSETRON 4 MG: 2 INJECTION INTRAMUSCULAR; INTRAVENOUS at 19:24

## 2023-12-27 RX ADMIN — SODIUM CHLORIDE 1000 ML: 9 INJECTION, SOLUTION INTRAVENOUS at 19:24

## 2023-12-27 RX ADMIN — IOPAMIDOL 57 ML: 755 INJECTION, SOLUTION INTRAVENOUS at 20:42

## 2023-12-27 RX ADMIN — HYDROMORPHONE HYDROCHLORIDE 0.5 MG: 1 INJECTION, SOLUTION INTRAMUSCULAR; INTRAVENOUS; SUBCUTANEOUS at 19:24

## 2023-12-27 ASSESSMENT — ACTIVITIES OF DAILY LIVING (ADL)
ADLS_ACUITY_SCORE: 33
ADLS_ACUITY_SCORE: 35

## 2023-12-28 NOTE — ED PROVIDER NOTES
ED Provider Note  [facility]      History     Chief Complaint   Patient presents with    Abdominal Pain     HPI  Lisset MITCHELL Chi is a 48 year old female PMH of IBS  who presents to the ER for evaluation of abdominal pain.    Patient has history of IBS.  Patient states that she had nausea, vomiting, diarrhea which has now resolved.  Patient has abdominal pain.  She states it is at a 5 out of 10 currently but was worse before.  She states she had some abdominal pain last night as well as multiple episodes of vomiting throughout the night.  Patient also had some diarrhea last night.  Today she called in sick, stayed in bed.  She felt nauseous and had a headache and a fever all day.  The Highest temperature she recorded was 101.2.  she States the abdominal pain goes from mid abdomen into her back and into upper abdomen.  She states this pain comes and goes.  She has not eaten all day as she feels nauseous.  She states even drinking water hurts her stomach.  When the abdominal pain is less severe it is a burning pain, when it is more severe it is a sharp pain.  No history of abdominal surgery, gallstones, pancreatitis.  Patient rarely drinks alcohol.  She states her daughter has had nausea and some slight vomiting recently.  Patient has had 2 episodes of diarrhea today, loose stools but not liquid.  No urinary symptoms reported.    Past Medical History  Past Medical History:   Diagnosis Date    Allergic rhinitis due to other allergen     Asthma     Bilateral low back pain with left-sided sciatica 10/17/2022    IBS (irritable bowel syndrome)     Other internal derangement of knee(717.89) 2005    patella dislocates easily    Premature ovarian failure     Raynaud's disease 3/08    Trochanteric bursitis of both hips 11/15/2017     Past Surgical History:   Procedure Laterality Date    ARTHROSCOPY KNEE Right 3/2/2018    Procedure: ARTHROSCOPY KNEE;  Right Knee Arthroscopy, Lateral Compartmnt Debridemen  and  Anterior Chamber;  Surgeon: Rima Sarmiento MD;  Location: [facility] OR     SECTION          COLONOSCOPY N/A 2022    Procedure: COLONOSCOPY, WITH HOT POLYPECTOMY, TATTOO, AND CLIPS;  Surgeon: Johnson Petit MD;  Location: [facility] OR    COLONOSCOPY N/A 2023    Procedure: COLONOSCOPY, WITH POLYPECTOMY AND BIOPSY;  Surgeon: Johnson Petit MD;  Location: [facility] OR    HC DILATION/CURETTAGE DIAG/THER NON OB  2003    D & C for retained placenta one week after the delivery    I&D BARTHOLIN GLAND ABSCESS   and     mcgrath cath     KNEE SURGERY      Rt knee Fx, repair-dislocation problem    KNEE SURGERY      Lt knee reconstructive surgery-dislocation problem    LEEP TX, CERVICAL      LEEP for abnormal pap    MARSUP BARTHOLIN GLAND CYST  08     ondansetron (ZOFRAN ODT) 4 MG ODT tab  albuterol (PROAIR HFA/PROVENTIL HFA/VENTOLIN HFA) 108 (90 Base) MCG/ACT inhaler  Calcium Carbonate-Vitamin D (CALCIUM + D PO)  FEXOFENADINE  MG OR TABS  fluticasone (FLONASE) 50 MCG/ACT nasal spray  ibuprofen (ADVIL/MOTRIN) 600 MG tablet  methocarbamol (ROBAXIN) 500 MG tablet  montelukast (SINGULAIR) 10 MG tablet  Turmeric Curcumin 500 MG CAPS  VITAMIN D, CHOLECALCIFEROL, PO      No Known Allergies  Family History  Family History   Problem Relation Age of Onset    Thyroid Disease Mother     Diabetes Mother         type2    Eye Disorder Mother         cataracts    Gastrointestinal Disease Mother         hep a/has to have her throat stretched (esophageal stricturing)    Respiratory Mother         sleep apnea    Heart Disease Father 60    Thyroid Disease Maternal Grandmother     Gynecology Maternal Grandmother         on bcp to keep period regular, a small uterus    Heart Disease Maternal Grandmother         tachycardia    Diabetes Maternal Grandfather     Cancer Maternal Grandfather     C.A.D. Paternal Grandfather 30        age 30, then again in his 80's    Allergies Daughter          milk    Asthma Daughter      Social History   Social History     Tobacco Use    Smoking status: Never    Smokeless tobacco: Never   Vaping Use    Vaping Use: Never used   Substance Use Topics    Alcohol use: Yes     Comment: social    Drug use: No         A medically appropriate review of systems was performed with pertinent positives and negatives noted in the HPI, and all other systems negative.    Physical Exam   BP: 126/83  Pulse: 78  Temp: 98.2  F (36.8  C)  Resp: 20  Weight: 53.4 kg (117 lb 12.8 oz)  SpO2: 98 %  Physical Exam  Vitals and nursing note reviewed.   Constitutional:       General: She is not in acute distress.     Appearance: She is well-developed. She is not toxic-appearing or diaphoretic.   HENT:      Head: Normocephalic and atraumatic.      Nose: Nose normal.      Mouth/Throat:      Mouth: Mucous membranes are dry.   Eyes:      General: No scleral icterus.     Conjunctiva/sclera: Conjunctivae normal.   Cardiovascular:      Rate and Rhythm: Normal rate.   Pulmonary:      Effort: Pulmonary effort is normal. No respiratory distress.      Breath sounds: No stridor.   Abdominal:      General: Abdomen is flat. There is no distension.      Palpations: Abdomen is soft.      Tenderness: There is abdominal tenderness in the right upper quadrant, epigastric area, periumbilical area and left upper quadrant. There is guarding. There is no rebound. Negative signs include Saleh's sign and McBurney's sign.   Musculoskeletal:         General: No deformity or signs of injury. Normal range of motion.      Cervical back: Normal range of motion and neck supple.   Skin:     General: Skin is warm and dry.      Coloration: Skin is not jaundiced or pale.   Neurological:      General: No focal deficit present.      Mental Status: She is alert and oriented to person, place, and time.   Psychiatric:         Mood and Affect: Mood normal.         Behavior: Behavior normal.           ED Course, Procedures, & Data       Procedures                     Results for orders placed or performed during the hospital encounter of 12/27/23   CT Abdomen Pelvis w Contrast     Status: None    Narrative    EXAM: CT ABDOMEN PELVIS W CONTRAST  LOCATION: [facility]  DATE: 12/27/2023    INDICATION: Abdominal pain, nausea, vomiting, and diarrhea.  COMPARISON: None.  TECHNIQUE: CT scan of the abdomen and pelvis was performed following injection of IV contrast. Multiplanar reformats were obtained. Dose reduction techniques were used.  CONTRAST: 57 mL Isovue 370    FINDINGS:   LOWER CHEST: Unremarkable.    HEPATOBILIARY: Normal.    PANCREAS: Normal.    SPLEEN: Normal.    ADRENAL GLANDS: Normal.    KIDNEYS/BLADDER: Normal.    BOWEL: No obstruction or inflammatory change. Normal appendix. No evidence of acute appendicitis.    LYMPH NODES: No lymphadenopathy.    VASCULATURE: Unremarkable.    PELVIC ORGANS: Unremarkable.    MUSCULOSKELETAL: Unremarkable.      Impression    IMPRESSION:   1.  No acute findings or inflammatory changes in the abdomen or pelvis.   Basic metabolic panel     Status: Abnormal   Result Value Ref Range    Sodium 138 135 - 145 mmol/L    Potassium 3.8 3.4 - 5.3 mmol/L    Chloride 100 98 - 107 mmol/L    Carbon Dioxide (CO2) 24 22 - 29 mmol/L    Anion Gap 14 7 - 15 mmol/L    Urea Nitrogen 16.2 6.0 - 20.0 mg/dL    Creatinine 0.66 0.51 - 0.95 mg/dL    GFR Estimate >90 >60 mL/min/1.73m2    Calcium 9.2 8.6 - 10.0 mg/dL    Glucose 123 (H) 70 - 99 mg/dL   CBC with platelets and differential     Status: Abnormal   Result Value Ref Range    WBC Count 8.1 4.0 - 11.0 10e3/uL    RBC Count 4.99 3.80 - 5.20 10e6/uL    Hemoglobin 14.8 11.7 - 15.7 g/dL    Hematocrit 44.8 35.0 - 47.0 %    MCV 90 78 - 100 fL    MCH 29.7 26.5 - 33.0 pg    MCHC 33.0 31.5 - 36.5 g/dL    RDW 13.1 10.0 - 15.0 %    Platelet Count 245 150 - 450 10e3/uL    % Neutrophils 90 %    % Lymphocytes 4 %    % Monocytes 6 %    % Eosinophils  0 %    % Basophils 0 %    % Immature Granulocytes 0 %    NRBCs per 100 WBC 0 <1 /100    Absolute Neutrophils 7.2 1.6 - 8.3 10e3/uL    Absolute Lymphocytes 0.3 (L) 0.8 - 5.3 10e3/uL    Absolute Monocytes 0.5 0.0 - 1.3 10e3/uL    Absolute Eosinophils 0.0 0.0 - 0.7 10e3/uL    Absolute Basophils 0.0 0.0 - 0.2 10e3/uL    Absolute Immature Granulocytes 0.0 <=0.4 10e3/uL    Absolute NRBCs 0.0 10e3/uL   Hepatic panel     Status: Normal   Result Value Ref Range    Protein Total 7.0 6.4 - 8.3 g/dL    Albumin 4.3 3.5 - 5.2 g/dL    Bilirubin Total 0.5 <=1.2 mg/dL    Alkaline Phosphatase 100 40 - 150 U/L    AST 13 0 - 45 U/L    ALT 11 0 - 50 U/L    Bilirubin Direct <0.20 0.00 - 0.30 mg/dL   Lipase     Status: Normal   Result Value Ref Range    Lipase 15 13 - 60 U/L   HCG qualitative urine (UPT)     Status: Normal   Result Value Ref Range    hCG Urine Qualitative Negative Negative   Symptomatic Influenza A/B, RSV, & SARS-CoV2 PCR (COVID-19) Nasopharyngeal     Status: Normal    Specimen: Nasopharyngeal; Swab   Result Value Ref Range    Influenza A PCR Negative Negative    Influenza B PCR Negative Negative    RSV PCR Negative Negative    SARS CoV2 PCR Negative Negative    Narrative    Testing was performed using the Xpert Xpress CoV2/Flu/RSV Assay on the [employer] GeneXpert Instrument. This test should be ordered for the detection of SARS-CoV-2, influenza, and RSV viruses in individuals who meet clinical and/or epidemiological criteria. Test performance is unknown in asymptomatic patients. This test is for in vitro diagnostic use under the FDA EUA for laboratories certified under CLIA to perform high or moderate complexity testing. This test has not been FDA cleared or approved. A negative result does not rule out the presence of PCR inhibitors in the specimen or target RNA in concentration below the limit of detection for the assay. If only one viral target is positive but coinfection with multiple targets is suspected, the sample  should be re-tested with another FDA cleared, approved, or authorized test, if coinfection would change clinical management. This test was validated by the [facility]. These laboratories are certified under the Clinical Laboratory Improvement Amendments of 1988 (CLIA-88) as qualified to perform high complexity laboratory testing.   UA with Microscopic reflex to Culture     Status: Abnormal    Specimen: Urine, Midstream   Result Value Ref Range    Color Urine Light Yellow Colorless, Straw, Light Yellow, Yellow    Appearance Urine Clear Clear    Glucose Urine Negative Negative mg/dL    Bilirubin Urine Negative Negative    Ketones Urine 20 (A) Negative mg/dL    Specific Gravity Urine >1.050 (H) 1.003 - 1.035    Blood Urine Negative Negative    pH Urine 6.5 5.0 - 7.0    Protein Albumin Urine 20 (A) Negative mg/dL    Urobilinogen Urine Normal Normal, 2.0 mg/dL    Nitrite Urine Negative Negative    Leukocyte Esterase Urine Negative Negative    Mucus Urine Present (A) None Seen /LPF    RBC Urine 3 (H) <=2 /HPF    WBC Urine 2 <=5 /HPF    Squamous Epithelials Urine 1 <=1 /HPF    Narrative    Urine Culture not indicated   CBC with platelets differential     Status: Abnormal    Narrative    The following orders were created for panel order CBC with platelets differential.  Procedure                               Abnormality         Status                     ---------                               -----------         ------                     CBC with platelets and d...[[id number]]  Abnormal            Final result                 Please view results for these tests on the individual orders.     Medications   ondansetron (ZOFRAN) injection 4 mg (4 mg Intravenous $Given 12/27/23 1924)   sodium chloride 0.9% BOLUS 1,000 mL (0 mLs Intravenous Stopped 12/27/23 2135)   iopamidol (ISOVUE-370) solution 100 mL (57 mLs Intravenous $Given 12/27/23 2042)   sodium chloride 0.9 % bag 100mL for CT scan flush use (54 mLs  Intravenous $Given 12/27/23 2042)   bismuth subsalicylate (PEPTO BISMOL) chewable tablet 524 mg (524 mg Oral $Given 12/27/23 2216)     Labs Ordered and Resulted from Time of ED Arrival to Time of ED Departure   BASIC METABOLIC PANEL - Abnormal       Result Value    Sodium 138      Potassium 3.8      Chloride 100      Carbon Dioxide (CO2) 24      Anion Gap 14      Urea Nitrogen 16.2      Creatinine 0.66      GFR Estimate >90      Calcium 9.2      Glucose 123 (*)    CBC WITH PLATELETS AND DIFFERENTIAL - Abnormal    WBC Count 8.1      RBC Count 4.99      Hemoglobin 14.8      Hematocrit 44.8      MCV 90      MCH 29.7      MCHC 33.0      RDW 13.1      Platelet Count 245      % Neutrophils 90      % Lymphocytes 4      % Monocytes 6      % Eosinophils 0      % Basophils 0      % Immature Granulocytes 0      NRBCs per 100 WBC 0      Absolute Neutrophils 7.2      Absolute Lymphocytes 0.3 (*)     Absolute Monocytes 0.5      Absolute Eosinophils 0.0      Absolute Basophils 0.0      Absolute Immature Granulocytes 0.0      Absolute NRBCs 0.0     ROUTINE UA WITH MICROSCOPIC REFLEX TO CULTURE - Abnormal    Color Urine Light Yellow      Appearance Urine Clear      Glucose Urine Negative      Bilirubin Urine Negative      Ketones Urine 20 (*)     Specific Gravity Urine >1.050 (*)     Blood Urine Negative      pH Urine 6.5      Protein Albumin Urine 20 (*)     Urobilinogen Urine Normal      Nitrite Urine Negative      Leukocyte Esterase Urine Negative      Mucus Urine Present (*)     RBC Urine 3 (*)     WBC Urine 2      Squamous Epithelials Urine 1     HEPATIC FUNCTION PANEL - Normal    Protein Total 7.0      Albumin 4.3      Bilirubin Total 0.5      Alkaline Phosphatase 100      AST 13      ALT 11      Bilirubin Direct <0.20     LIPASE - Normal    Lipase 15     HCG QUALITATIVE URINE - Normal    hCG Urine Qualitative Negative     INFLUENZA A/B, RSV, & SARS-COV2 PCR - Normal    Influenza A PCR Negative      Influenza B PCR Negative       RSV PCR Negative      SARS CoV2 PCR Negative       CT Abdomen Pelvis w Contrast   Final Result   IMPRESSION:    1.  No acute findings or inflammatory changes in the abdomen or pelvis.             Critical care was not performed.     Medical Decision Making  The patient's presentation was of moderate complexity (an acute illness with systemic symptoms).    The patient's evaluation involved:  review of 3+ test result(s) ordered prior to this encounter (see separate area of note for details)  ordering and/or review of 3+ test(s) in this encounter (see separate area of note for details)    The patient's management necessitated moderate risk (prescription drug management including medications given in the ED), high risk (a parenteral controlled substance), and high risk (a decision regarding hospitalization).    Assessment & Plan    Lisset MITCHELL Chi is a 48 year old female PMH of IBS  who presents to the ER for evaluation of abdominal pain.    Ddx: Pancreatitis, cholecystitis, gastroenteritis, acute toxin mediated food poisoning, UTI, IBS    Patient afebrile with normal vital signs.  Upper abdomen tender with guarding.  Given Zofran, IV fluids, Dilaudid for pain.  On reassessment, patient reports resolution of pain with pain meds.  Urinalysis with no evidence of infection.  Influenza, COVID, RSV negative.  CMP within normal limits.  Lipase normal.  Pregnancy negative.  CT abdomen pelvis without acute findings or inflammatory changes.  Patient tolerated p.o. intake without recurrent nausea or vomiting or severe pain.  She was prescribed Zofran for home.  Advised clear liquid diet and advance as tolerated with symptomatic management for presumed viral gastroenteritis.  Also advised over-the-counter Pepto-Bismol, as needed to help with GI symptoms.  Return precautions provided.        I have reviewed the nursing notes. I have reviewed the findings, diagnosis, plan and need for follow up with the patient.    New Prescriptions     ONDANSETRON (ZOFRAN ODT) 4 MG ODT TAB    Take 1 tablet (4 mg) by mouth every 8 hours as needed for nausea       Final diagnoses:   Acute gastroenteritis     I, Nabila Goetz, am serving as a trained medical scribe to document services personally performed by Bhavani Presley MD, based on the provider's statements to me.     IBhavani MD, was physically present and have reviewed and verified the accuracy of this note documented by Nabila Goetz.    Bhavani Presley MD  [facility] EMERGENCY DEPARTMENT  12/27/2023     Bhavani Presley MD  12/27/23 8530

## 2023-12-28 NOTE — ED TRIAGE NOTES
abdominal pain since last night,  gotten a lot worse in last hour, feels like it spreading into back diareah and vomiting last night, has not eaten or drank much today fell yesterday on left side  has not taken any pain medication      Triage Assessment (Adult)       Row Name 12/27/23 9268          Triage Assessment    Airway WDL WDL        Respiratory WDL    Respiratory WDL WDL        Skin Circulation/Temperature WDL    Skin Circulation/Temperature WDL WDL        Cardiac WDL    Cardiac WDL WDL        Peripheral/Neurovascular WDL    Peripheral Neurovascular WDL WDL        Cognitive/Neuro/Behavioral WDL    Cognitive/Neuro/Behavioral WDL WDL

## 2023-12-28 NOTE — DISCHARGE INSTRUCTIONS
Please make an appointment to follow up with Your Primary Care Provider in 5-7 days if not improving.    Take over-the-counter Tylenol, ibuprofen for abdominal pain and fever.      Drink plenty of fluids to maintain hydration.  You should drink water and an electrolyte beverage (such as Powerade, Pedialyte, or Gatorade).      Limit food intake to bland, clear liquids (such as chicken or vegetable broth) until your symptoms improve.  You can advance your diet, as tolerated.      You may take over-the-counter Imodium to help with diarrhea symptoms.  However, this can make some bacterial infections worse and you should discontinue if your symptoms progress.    Take Zofran for nausea, as needed.    Seek evaluation in the emergency department if you develop worsening pain, dehydration, blood in your stool or vomit.

## 2023-12-29 ENCOUNTER — ANCILLARY PROCEDURE (OUTPATIENT)
Dept: MAMMOGRAPHY | Facility: CLINIC | Age: 48
End: 2023-12-29
Attending: FAMILY MEDICINE
Payer: COMMERCIAL

## 2023-12-29 ENCOUNTER — THERAPY VISIT (OUTPATIENT)
Dept: PHYSICAL THERAPY | Facility: CLINIC | Age: 48
End: 2023-12-29
Payer: COMMERCIAL

## 2023-12-29 DIAGNOSIS — Z12.31 VISIT FOR SCREENING MAMMOGRAM: ICD-10-CM

## 2023-12-29 DIAGNOSIS — N94.10 FEMALE DYSPAREUNIA: Primary | ICD-10-CM

## 2023-12-29 DIAGNOSIS — M25.561 CHRONIC PAIN OF BOTH KNEES: ICD-10-CM

## 2023-12-29 DIAGNOSIS — M25.562 CHRONIC PAIN OF BOTH KNEES: ICD-10-CM

## 2023-12-29 DIAGNOSIS — M62.89 PELVIC FLOOR DYSFUNCTION: ICD-10-CM

## 2023-12-29 DIAGNOSIS — G89.29 CHRONIC PAIN OF BOTH KNEES: ICD-10-CM

## 2023-12-29 DIAGNOSIS — M25.561 CHRONIC PAIN OF RIGHT KNEE: Primary | ICD-10-CM

## 2023-12-29 DIAGNOSIS — G89.29 CHRONIC PAIN OF RIGHT KNEE: Primary | ICD-10-CM

## 2023-12-29 PROCEDURE — 77067 SCR MAMMO BI INCL CAD: CPT | Mod: TC | Performed by: RADIOLOGY

## 2023-12-29 PROCEDURE — 97530 THERAPEUTIC ACTIVITIES: CPT | Mod: GP | Performed by: PHYSICAL THERAPIST

## 2023-12-29 PROCEDURE — 97110 THERAPEUTIC EXERCISES: CPT | Mod: GP | Performed by: PHYSICAL THERAPIST

## 2023-12-29 PROCEDURE — 77063 BREAST TOMOSYNTHESIS BI: CPT | Mod: TC | Performed by: RADIOLOGY

## 2023-12-29 PROCEDURE — 97140 MANUAL THERAPY 1/> REGIONS: CPT | Mod: GP | Performed by: PHYSICAL THERAPIST

## 2024-01-15 ENCOUNTER — THERAPY VISIT (OUTPATIENT)
Dept: PHYSICAL THERAPY | Facility: CLINIC | Age: 49
End: 2024-01-15
Payer: COMMERCIAL

## 2024-01-15 ENCOUNTER — TELEPHONE (OUTPATIENT)
Dept: ORTHOPEDICS | Facility: CLINIC | Age: 49
End: 2024-01-15

## 2024-01-15 DIAGNOSIS — G89.29 CHRONIC PAIN OF BOTH KNEES: ICD-10-CM

## 2024-01-15 DIAGNOSIS — M25.561 CHRONIC PAIN OF BOTH KNEES: ICD-10-CM

## 2024-01-15 DIAGNOSIS — M25.561 CHRONIC PAIN OF RIGHT KNEE: Primary | ICD-10-CM

## 2024-01-15 DIAGNOSIS — G89.29 CHRONIC PAIN OF RIGHT KNEE: Primary | ICD-10-CM

## 2024-01-15 DIAGNOSIS — M25.562 CHRONIC PAIN OF BOTH KNEES: ICD-10-CM

## 2024-01-15 PROCEDURE — 97110 THERAPEUTIC EXERCISES: CPT | Mod: GP | Performed by: PHYSICAL THERAPIST

## 2024-01-17 ENCOUNTER — TELEPHONE (OUTPATIENT)
Dept: ORTHOPEDICS | Facility: CLINIC | Age: 49
End: 2024-01-17
Payer: COMMERCIAL

## 2024-01-19 ENCOUNTER — OFFICE VISIT (OUTPATIENT)
Dept: FAMILY MEDICINE | Facility: CLINIC | Age: 49
End: 2024-01-19
Payer: COMMERCIAL

## 2024-01-19 ENCOUNTER — MYC MEDICAL ADVICE (OUTPATIENT)
Dept: FAMILY MEDICINE | Facility: CLINIC | Age: 49
End: 2024-01-19

## 2024-01-19 VITALS
RESPIRATION RATE: 16 BRPM | BODY MASS INDEX: 22.08 KG/M2 | DIASTOLIC BLOOD PRESSURE: 67 MMHG | HEART RATE: 63 BPM | SYSTOLIC BLOOD PRESSURE: 117 MMHG | WEIGHT: 120 LBS | TEMPERATURE: 97.6 F | HEIGHT: 62 IN | OXYGEN SATURATION: 100 %

## 2024-01-19 DIAGNOSIS — Z00.00 ANNUAL PHYSICAL EXAM: Primary | ICD-10-CM

## 2024-01-19 DIAGNOSIS — J45.30 MILD PERSISTENT ASTHMA WITHOUT COMPLICATION: ICD-10-CM

## 2024-01-19 DIAGNOSIS — E28.39 PREMATURE OVARIAN FAILURE: ICD-10-CM

## 2024-01-19 DIAGNOSIS — Z78.0 ASYMPTOMATIC POSTMENOPAUSAL STATUS: ICD-10-CM

## 2024-01-19 DIAGNOSIS — M46.96 UNSPECIFIED INFLAMMATORY SPONDYLOPATHY, LUMBAR REGION (H): ICD-10-CM

## 2024-01-19 DIAGNOSIS — F43.21 ADJUSTMENT DISORDER WITH DEPRESSED MOOD: ICD-10-CM

## 2024-01-19 DIAGNOSIS — N95.1 MENOPAUSAL VAGINAL DRYNESS: ICD-10-CM

## 2024-01-19 DIAGNOSIS — M35.7 HYPERMOBILITY SYNDROME: Primary | ICD-10-CM

## 2024-01-19 PROCEDURE — 99213 OFFICE O/P EST LOW 20 MIN: CPT | Mod: 25 | Performed by: FAMILY MEDICINE

## 2024-01-19 PROCEDURE — 99396 PREV VISIT EST AGE 40-64: CPT | Performed by: FAMILY MEDICINE

## 2024-01-19 RX ORDER — ALBUTEROL SULFATE 90 UG/1
2 AEROSOL, METERED RESPIRATORY (INHALATION) EVERY 4 HOURS PRN
Qty: 18 G | Refills: 1 | Status: SHIPPED | OUTPATIENT
Start: 2024-01-19 | End: 2024-10-01

## 2024-01-19 ASSESSMENT — ENCOUNTER SYMPTOMS
NAUSEA: 0
WEAKNESS: 0
DIZZINESS: 0
ARTHRALGIAS: 1
SORE THROAT: 0
HEADACHES: 0
FREQUENCY: 0
PARESTHESIAS: 0
HEMATOCHEZIA: 0
COUGH: 0
FEVER: 0
PALPITATIONS: 0
EYE PAIN: 0
ABDOMINAL PAIN: 0
CHILLS: 0
HEMATURIA: 0
MYALGIAS: 0
CONSTIPATION: 0
BREAST MASS: 0
SHORTNESS OF BREATH: 0
NERVOUS/ANXIOUS: 1
DIARRHEA: 0
HEARTBURN: 0
DYSURIA: 0
JOINT SWELLING: 1

## 2024-01-19 ASSESSMENT — ASTHMA QUESTIONNAIRES
ACT_TOTALSCORE: 25
QUESTION_3 LAST FOUR WEEKS HOW OFTEN DID YOUR ASTHMA SYMPTOMS (WHEEZING, COUGHING, SHORTNESS OF BREATH, CHEST TIGHTNESS OR PAIN) WAKE YOU UP AT NIGHT OR EARLIER THAN USUAL IN THE MORNING: NOT AT ALL
QUESTION_1 LAST FOUR WEEKS HOW MUCH OF THE TIME DID YOUR ASTHMA KEEP YOU FROM GETTING AS MUCH DONE AT WORK, SCHOOL OR AT HOME: NONE OF THE TIME
ACT_TOTALSCORE: 25
QUESTION_2 LAST FOUR WEEKS HOW OFTEN HAVE YOU HAD SHORTNESS OF BREATH: NOT AT ALL
QUESTION_4 LAST FOUR WEEKS HOW OFTEN HAVE YOU USED YOUR RESCUE INHALER OR NEBULIZER MEDICATION (SUCH AS ALBUTEROL): NOT AT ALL
QUESTION_5 LAST FOUR WEEKS HOW WOULD YOU RATE YOUR ASTHMA CONTROL: COMPLETELY CONTROLLED

## 2024-01-19 NOTE — PATIENT INSTRUCTIONS
Previous messages regarding evaluation for Marcus Stanton:   (Your message)  Hello  I was able to get the contact information about the clinic where there is a doctor who specializes in connective tissue issues. She is located in [place] at the [facility]. Her name is Lilian Augustin M.D., and her phone number is [phone].  Please let me know if you can refer me to her. I will also check on my insurance regarding coverage. Thank you! Lisset Gates      (Message from Dr. Myers)  MARIS Darling,     I checked out that clinic's website.  Looks like Dr. Augustin is a family medicine doctor.  So you shouldn't need a referral to go there.  I'd just check with your insurance to make sure her clinic is covered under your plan.       Dr. Myers      Preventive Health Recommendations  Female Ages 40 to 49    Yearly exam:   See your health care provider every year in order to  Review health changes.   Discuss preventive care.    Review your medicines if your doctor prescribed any.    Get a Pap test every three years (unless you have an abnormal result and your provider advises testing more often).    If you get Pap tests with HPV test, you only need to test every 5 years, unless you have an abnormal result. You do not need a Pap test if your uterus was removed (hysterectomy) and you have not had cancer.    You should be tested each year for STDs (sexually transmitted diseases), if you're at risk.   Ask your doctor if you should have a mammogram.    Have a colonoscopy (test for colon cancer) beginning at age 45.  Ask your provider about other options like a yearly FIT test or Cologuard test every 3 years (stool tests)      Have a cholesterol test every 5 years.     Have a diabetes test (fasting glucose) after age 45. If you are at risk for diabetes, you should have this test every 3 years.    Shots: Get a flu shot each year. Get a tetanus shot every 10 years.     Nutrition:   Eat at least 5 servings of fruits and  vegetables each day.  Eat whole-grain bread, whole-wheat pasta and brown rice instead of white grains and rice.  Get adequate Calcium and Vitamin D.      Lifestyle  Exercise at least 150 minutes a week (an average of 30 minutes a day, 5 days a week). This will help you control your weight and prevent disease.  Limit alcohol to one drink per day.  No smoking.   Wear sunscreen to prevent skin cancer.  See your dentist every six months for an exam and cleaning.

## 2024-01-19 NOTE — PROGRESS NOTES
Preventive Care Visit  [facility]  Katia Monsivais DO, Family Medicine  Jan 19, 2024       SUBJECTIVE:   Lisset is a 48 year old, presenting for the following:  Physical (Fasting for labs.) and Vaginal Problem (Vaginal dryness)        1/19/2024     8:01 AM   Additional Questions   Roomed by Tereza WHITLEY CMA   Accompanied by Self         1/19/2024     8:05 AM   Patient Reported Additional Medications   Patient reports taking the following new medications Membrasin     Annual physical exam    Healthy Habits:     Getting at least 3 servings of Calcium per day:  Yes    Bi-annual eye exam:  Yes    Dental care twice a year:  Yes    Sleep apnea or symptoms of sleep apnea:  None    Diet:  Breakfast skipped and Other    Frequency of exercise:  2-3 days/week    Duration of exercise:  15-30 minutes    Taking medications regularly:  Yes    Medication side effects:  None    Additional concerns today:  No  Vaginal Problem   Pertinent negatives include no fever, no abdominal pain, no constipation, no diarrhea, no nausea, no dysuria and no frequency.       Today's PHQ-2 Score:       1/19/2024     8:17 AM   PHQ-2 ( 1999 [employer])   Q1: Little interest or pleasure in doing things 0   Q2: Feeling down, depressed or hopeless 1   PHQ-2 Score 1   Q1: Little interest or pleasure in doing things Not at all   Q2: Feeling down, depressed or hopeless Several days   PHQ-2 Score 1     HEALTH MAINTENANCE:   - covid: last booster in August.    - hep B: did study abroad in [place] in College, vaccinated at that time.      The 10-year ASCVD risk score (Ketan DIA, et al., 2019) is: 0.7%    Values used to calculate the score:      Age: 48 years      Sex: Female      Is Non- : No      Diabetic: No      Tobacco smoker: No      Systolic Blood Pressure: 117 mmHg      Is BP treated: No      HDL Cholesterol: 71 mg/dL      Total Cholesterol: 207 mg/dL     Lab Results   Component Value Date    CHOL 207  2022    CHOL 179 2013     Lab Results   Component Value Date    HDL 71 2022    HDL 76 2013     Lab Results   Component Value Date     2022    LDL 90 2013     Lab Results   Component Value Date    TRIG 80 2022    TRIG 64 2013     Lab Results   Component Value Date    CHOLHDLRATIO 2.4 2013       Vaginal dryness  Has been using membrasin.      Unspecified inflammatory spondylopathy, lumbar region (H24)  Injection in hip seems to be helping with her back. Plans to do another injection prior [place] trip.     Premature ovarian failure  - LMP 2013 @ 38 yo  Doing calcium and vitamin D      Mild persistent asthma without complication      2022     1:09 PM 2022    12:54 PM 2024     8:18 AM   ACT Total Scores   ACT TOTAL SCORE (Goal Greater than or Equal to 20) 25 22 25   In the past 12 months, how many times did you visit the emergency room for your asthma without being admitted to the hospital? 0 0    In the past 12 months, how many times were you hospitalized overnight because of your asthma? 0 0      Singulair, albuterol PRN, Flonase PRN      Adjustment disorder with depressed mood      2020     8:22 AM 6/10/2021    10:41 AM   PHQ   PHQ-9 Total Score 10 8   Q9: Thoughts of better off dead/self-harm past 2 weeks Not at all Not at all         6/10/2021    10:41 AM   MARLENA-7 SCORE   Total Score 17       Social History     Tobacco Use    Smoking status: Never     Passive exposure: Never    Smokeless tobacco: Never   Substance Use Topics    Alcohol use: Yes     Comment: social           2024     8:17 AM   Alcohol Use   Prescreen: >3 drinks/day or >7 drinks/week? No     Reviewed orders with patient.  Reviewed health maintenance and updated orders accordingly - Yes      Breast Cancer Screenin/26/2022    12:47 PM   Breast CA Risk Assessment (FHS-7)   Do you have a family history of breast, colon, or ovarian cancer? No / Unknown  "        Mammogram Screening: Recommended annual mammography  Pertinent mammograms are reviewed under the imaging tab.    History of abnormal Pap smear: NO - age 30-65 PAP every 5 years with negative HPV co-testing recommended      Latest Ref Rng & Units 12/26/2022     1:44 PM 4/28/2017    10:15 AM 4/28/2017    10:11 AM   PAP / HPV   PAP  Negative for Intraepithelial Lesion or Malignancy (NILM)      PAP (Historical)    NIL    HPV 16 DNA Negative Negative  Negative     HPV 18 DNA Negative Negative  Negative     Other HR HPV Negative Negative  Negative       Reviewed and updated as needed this visit by clinical staff   Tobacco  Allergies  Meds              Reviewed and updated as needed this visit by Provider   Tobacco  Allergies  Meds  Problems  Med Hx  Surg Hx  Fam Hx            Review of Systems   Constitutional:  Negative for chills and fever.   HENT:  Negative for congestion, ear pain, hearing loss and sore throat.    Eyes:  Negative for pain and visual disturbance.   Respiratory:  Negative for cough and shortness of breath.    Cardiovascular:  Negative for chest pain and palpitations.   Gastrointestinal:  Negative for abdominal pain, constipation, diarrhea and nausea.   Genitourinary:  Positive for vaginal discharge. Negative for dysuria, frequency, genital sores, hematuria, pelvic pain, urgency and vaginal bleeding.   Musculoskeletal:  Positive for arthralgias and joint swelling. Negative for myalgias.   Skin:  Negative for rash.   Neurological:  Negative for dizziness, weakness and headaches.   Psychiatric/Behavioral:  The patient is nervous/anxious.      See HPI above.     OBJECTIVE:   /67 (BP Location: Left arm, Patient Position: Sitting, Cuff Size: Adult Regular)   Pulse 63   Temp 97.6  F (36.4  C) (Oral)   Resp 16   Ht 1.575 m (5' 2\")   Wt 54.4 kg (120 lb)   LMP 12/25/2013   SpO2 100%   BMI 21.95 kg/m     Estimated body mass index is 21.95 kg/m  as calculated from the following:    " "Height as of this encounter: 1.575 m (5' 2\").    Weight as of this encounter: 54.4 kg (120 lb).  Physical Exam  GENERAL: alert and no distress  EYES: Eyes grossly normal to inspection, PERRL and conjunctivae and sclerae normal  HENT: ear canals and TM's normal, nose and mouth without ulcers or lesions  NECK: no adenopathy, no asymmetry, masses, or scars  RESP: lungs clear to auscultation - no rales, rhonchi or wheezes  CV: regular rate and rhythm, normal S1 S2, no S3 or S4, no murmur, click or rub, no peripheral edema  ABDOMEN: soft, nontender, no hepatosplenomegaly, no masses and bowel sounds normal  MS: no gross musculoskeletal defects noted, no edema  SKIN: no suspicious lesions or rashes  NEURO: Normal strength and tone, mentation intact and speech normal  PSYCH: mentation appears normal, affect normal/bright  LYMPH: no cervical, supraclavicular, axillary, or inguinal adenopathy      ASSESSMENT/PLAN:     1. Annual physical exam  Reviewed health history and health maintenance recommendations.    2. Unspecified inflammatory spondylopathy, lumbar region (H24)  HCC score gap to address today.   MRI lumbar spine from April 2023 does show minimal increased fluid in the bilateral facet joints at L3-4 with periarticular soft tissue edema right greater than left.  This may represent active inflammatory facet joint arthropathy.  Patient finding steroid injections for greater trochanteric bursitis helps with the low back pain.    3. Premature ovarian failure  - LMP March 2013 @ 36 yo  4. Asymptomatic postmenopausal status  - DX Hip/Pelvis/Spine; Future    Premature menopause at 37.  Recommend baseline DEXA scan.  Using calcium and vitamin D supplementation to help support bone health.    5. Menopausal vaginal dryness  Infrequent sexual activity, is using a vulvovaginal moisturizer which seems to be helping symptoms.  Recommend lubricant with sexual activity.  Offered prescription for topical estrogen cream.  Defers at this " time.    6. Mild persistent asthma without complication  - albuterol (PROAIR HFA/PROVENTIL HFA/VENTOLIN HFA) 108 (90 Base) MCG/ACT inhaler; Inhale 2 puffs into the lungs every 4 hours as needed for shortness of breath, wheezing or cough  Dispense: 18 g; Refill: 1    Asthma well-controlled, remains on Singulair.  Sending in refill of albuterol to have on hand.    7. Adjustment disorder with depressed mood  Working with therapist to address underlying mood concerns.         Patient has been advised of split billing requirements and indicates understanding: Yes      Counseling  Reviewed preventive health counseling, as reflected in patient instructions        She reports that she has never smoked. She has never been exposed to tobacco smoke. She has never used smokeless tobacco.          Signed Electronically by: Katia Monsivais DO    Answers submitted by the patient for this visit:  Annual Preventive Visit (Submitted on 1/19/2024)  Chief Complaint: Annual Exam:  Blood in stool: No  heartburn: No  peripheral edema: No  mood changes: No  Skin sensation changes: No  tenderness: No  breast mass: No  breast discharge: No

## 2024-01-26 ENCOUNTER — TELEPHONE (OUTPATIENT)
Dept: CONSULT | Facility: CLINIC | Age: 49
End: 2024-01-26

## 2024-01-26 ENCOUNTER — THERAPY VISIT (OUTPATIENT)
Dept: PHYSICAL THERAPY | Facility: CLINIC | Age: 49
End: 2024-01-26
Payer: COMMERCIAL

## 2024-01-26 DIAGNOSIS — M25.562 CHRONIC PAIN OF BOTH KNEES: ICD-10-CM

## 2024-01-26 DIAGNOSIS — M25.561 CHRONIC PAIN OF RIGHT KNEE: Primary | ICD-10-CM

## 2024-01-26 DIAGNOSIS — G89.29 CHRONIC PAIN OF BOTH KNEES: ICD-10-CM

## 2024-01-26 DIAGNOSIS — G89.29 CHRONIC PAIN OF RIGHT KNEE: Primary | ICD-10-CM

## 2024-01-26 DIAGNOSIS — M25.561 CHRONIC PAIN OF BOTH KNEES: ICD-10-CM

## 2024-01-26 PROCEDURE — 97110 THERAPEUTIC EXERCISES: CPT | Mod: GP | Performed by: PHYSICAL THERAPIST

## 2024-01-26 NOTE — TELEPHONE ENCOUNTER
[facility] Call Center    Phone Message    May a detailed message be left on voicemail: yes     Reason for Call: Other: Patient called to make New Genetics appointment based off referral in chart.  explained that she will be contacted by care coordinator to move forward with scheduling. Patient would like a call back, Please.     Action Taken: Other: PEDS GM    Travel Screening: Not Applicable

## 2024-02-02 ENCOUNTER — THERAPY VISIT (OUTPATIENT)
Dept: PHYSICAL THERAPY | Facility: CLINIC | Age: 49
End: 2024-02-02
Payer: COMMERCIAL

## 2024-02-02 DIAGNOSIS — M25.561 CHRONIC PAIN OF BOTH KNEES: ICD-10-CM

## 2024-02-02 DIAGNOSIS — M25.562 CHRONIC PAIN OF BOTH KNEES: ICD-10-CM

## 2024-02-02 DIAGNOSIS — M25.561 RIGHT KNEE PAIN, UNSPECIFIED CHRONICITY: Primary | ICD-10-CM

## 2024-02-02 DIAGNOSIS — G89.29 CHRONIC PAIN OF BOTH KNEES: ICD-10-CM

## 2024-02-02 PROCEDURE — 97140 MANUAL THERAPY 1/> REGIONS: CPT | Mod: GP | Performed by: PHYSICAL THERAPIST

## 2024-02-02 PROCEDURE — 97110 THERAPEUTIC EXERCISES: CPT | Mod: GP | Performed by: PHYSICAL THERAPIST

## 2024-02-07 DIAGNOSIS — M35.7 HYPERMOBILITY SYNDROME: Primary | ICD-10-CM

## 2024-02-09 ENCOUNTER — ANCILLARY PROCEDURE (OUTPATIENT)
Dept: BONE DENSITY | Facility: CLINIC | Age: 49
End: 2024-02-09
Attending: FAMILY MEDICINE
Payer: COMMERCIAL

## 2024-02-09 ENCOUNTER — THERAPY VISIT (OUTPATIENT)
Dept: PHYSICAL THERAPY | Facility: CLINIC | Age: 49
End: 2024-02-09
Payer: COMMERCIAL

## 2024-02-09 ENCOUNTER — MYC MEDICAL ADVICE (OUTPATIENT)
Dept: FAMILY MEDICINE | Facility: CLINIC | Age: 49
End: 2024-02-09

## 2024-02-09 DIAGNOSIS — M25.561 CHRONIC PAIN OF BOTH KNEES: ICD-10-CM

## 2024-02-09 DIAGNOSIS — M81.8 OTHER OSTEOPOROSIS WITHOUT CURRENT PATHOLOGICAL FRACTURE: ICD-10-CM

## 2024-02-09 DIAGNOSIS — G89.29 CHRONIC PAIN OF BOTH KNEES: ICD-10-CM

## 2024-02-09 DIAGNOSIS — Z78.0 ASYMPTOMATIC POSTMENOPAUSAL STATUS: ICD-10-CM

## 2024-02-09 DIAGNOSIS — M81.0 OSTEOPOROSIS WITHOUT CURRENT PATHOLOGICAL FRACTURE, UNSPECIFIED OSTEOPOROSIS TYPE: ICD-10-CM

## 2024-02-09 DIAGNOSIS — E28.39 PREMATURE OVARIAN FAILURE: Primary | ICD-10-CM

## 2024-02-09 DIAGNOSIS — G89.29 CHRONIC PAIN OF RIGHT KNEE: Primary | ICD-10-CM

## 2024-02-09 DIAGNOSIS — M25.561 CHRONIC PAIN OF RIGHT KNEE: Primary | ICD-10-CM

## 2024-02-09 DIAGNOSIS — E28.39 PREMATURE OVARIAN FAILURE: ICD-10-CM

## 2024-02-09 DIAGNOSIS — M25.562 CHRONIC PAIN OF BOTH KNEES: ICD-10-CM

## 2024-02-09 PROCEDURE — 97140 MANUAL THERAPY 1/> REGIONS: CPT | Mod: GP | Performed by: PHYSICAL THERAPIST

## 2024-02-09 PROCEDURE — 77080 DXA BONE DENSITY AXIAL: CPT | Mod: TC

## 2024-02-09 PROCEDURE — 97110 THERAPEUTIC EXERCISES: CPT | Mod: GP | Performed by: PHYSICAL THERAPIST

## 2024-02-09 NOTE — RESULT ENCOUNTER NOTE
Patient updated by [employer] message with dexa results.       Milla Darling,  Thank you for completing the DEXA scan.  The report does show low bone density in the osteoporosis range.  We should discuss treatment options to help strengthen your bones in addition to calcium and vitamin D supplementation.  If you are agreeable to discussing medication options, please schedule a virtual visit to discuss these results and her options further.  Katia Monsivais, DO

## 2024-02-13 ENCOUNTER — TELEPHONE (OUTPATIENT)
Dept: FAMILY MEDICINE | Facility: CLINIC | Age: 49
End: 2024-02-13
Payer: COMMERCIAL

## 2024-02-13 NOTE — TELEPHONE ENCOUNTER
Patient Quality Outreach    Patient is due for the following:   Asthma  -  ACT needed    Next Steps:   Patient was assigned appropriate questionnaire to complete    Type of outreach:    Chart review performed, no outreach needed.      Questions for provider review:    None           Tereza Woo CMA

## 2024-02-15 ENCOUNTER — VIRTUAL VISIT (OUTPATIENT)
Dept: FAMILY MEDICINE | Facility: CLINIC | Age: 49
End: 2024-02-15
Payer: COMMERCIAL

## 2024-02-15 DIAGNOSIS — J30.2 SEASONAL ALLERGIC RHINITIS, UNSPECIFIED TRIGGER: ICD-10-CM

## 2024-02-15 DIAGNOSIS — M81.8 OTHER OSTEOPOROSIS WITHOUT CURRENT PATHOLOGICAL FRACTURE: Primary | ICD-10-CM

## 2024-02-15 PROCEDURE — 99213 OFFICE O/P EST LOW 20 MIN: CPT | Mod: 95 | Performed by: FAMILY MEDICINE

## 2024-02-15 RX ORDER — AZELASTINE 1 MG/ML
1 SPRAY, METERED NASAL 2 TIMES DAILY
Qty: 30 ML | Refills: 3 | Status: SHIPPED | OUTPATIENT
Start: 2024-02-15 | End: 2024-10-01

## 2024-02-15 NOTE — PROGRESS NOTES
Lisset is a 48 year old who is being evaluated via a billable video visit.      How would you like to obtain your AVS? MyChart  If the video visit is dropped, the invitation should be resent by: Text to cell phone: [phone]  Will anyone else be joining your video visit? No          Assessment & Plan     1. Other osteoporosis without current pathological fracture  - Vitamin D Deficiency; Future  - TSH with free T4 reflex; Future  - Phosphorus; Future  - Calcium timed urine; Future  - Protein timed urine; Future    Encourage 1200mg calcium daily, 800-2000 international unit(s) vitamin D3 daily.   Suspect premature menopause etiology for low bone density. Additional risk factors include , thin. Proceed with labs per above to rule out secondary causes. Recent normal CBC and CMP.   Not ready to start medication management. If labs normal, recommend continuing weight bearing exercise, nutrition per above, and recheck bone density in 1 year.     2. Seasonal allergic rhinitis, unspecified trigger  - azelastine (ASTELIN) 0.1 % nasal spray; Spray 1 spray into both nostrils 2 times daily  Dispense: 30 mL; Refill: 3     Lisset would like to avoid all steroids. Suspect osteoporosis risk from topical nasal spray low, but will trial azelastine as alternative for management of allergic rhinitis.       Subjective   Lisset is a 48 year old, presenting for the following health issues:  Follow Up (Osteoporosis )        2/15/2024     3:44 PM   Additional Questions   Roomed by Corrina COLVIN CMA     HPI     Dexa scan obtained due to premature menopause, shows osteoporosis. Meeting today to review results and plan.     Lisset concerned about risk of side effects of current medications.  Wondering about calcium and vitamin D supplementation, nutrition guidance.   Not interested in medication management at this time due to risk of medications.     ROS: See HPI above.       Objective           Vitals:  No vitals were obtained today due  to virtual visit.    Physical Exam   GENERAL: alert and no distress  EYES: Eyes grossly normal to inspection.  No discharge or erythema, or obvious scleral/conjunctival abnormalities.  RESP: No audible wheeze, cough, or visible cyanosis.    SKIN: Visible skin clear. No significant rash, abnormal pigmentation or lesions.  NEURO: Cranial nerves grossly intact.  Mentation and speech appropriate for age.  PSYCH: Appropriate affect, tone, and pace of words    DEXA SCAN RESULTS  -Lumbar Spine: L1-L4: BMD: 0.854 g/cm2. T-score: -2.7. Z-score: -2.4.  -RIGHT Hip Total: BMD: 0.663 g/cm2. T-score: -2.7. Z-score: -2.3.  -RIGHT Hip Femoral neck: BMD: 0.698 g/cm2. T-score: -2.4. Z-score: -1.7.  -LEFT Hip Total: BMD: 0.678 g/cm2. T-score: -2.6. Z-score: -2.2.  -LEFT Hip Femoral neck: BMD: 0.689 g/cm2. T-score: -2.5. Z-score: -1.8.     RECOMMENDATIONS  The patient's BMD is consistent with osteoporosis, and he/she is at increased fracture risk. If not currently being treated for low BMD, this would merit treatment according to the Bone Health and Osteoporosis Foundation.                                                              IMPRESSION: OSTEOPOROSIS. T score meets the WHO criteria for osteoporosis at one or more measured sites. The risk of osteoporotic fracture increases approximately two-fold for each standard deviation decrease in T-score.        Video-Visit Details    Type of service:  Video Visit     Originating Location (pt. Location): Home    Distant Location (provider location):  On-site  Platform used for Video Visit: [employer]  Signed Electronically by: Katia Monsivais DO

## 2024-02-16 ENCOUNTER — THERAPY VISIT (OUTPATIENT)
Dept: PHYSICAL THERAPY | Facility: CLINIC | Age: 49
End: 2024-02-16
Payer: COMMERCIAL

## 2024-02-16 ENCOUNTER — LAB (OUTPATIENT)
Dept: LAB | Facility: CLINIC | Age: 49
End: 2024-02-16
Payer: COMMERCIAL

## 2024-02-16 DIAGNOSIS — M25.562 PAIN IN BOTH KNEES, UNSPECIFIED CHRONICITY: ICD-10-CM

## 2024-02-16 DIAGNOSIS — M25.561 RIGHT KNEE PAIN, UNSPECIFIED CHRONICITY: Primary | ICD-10-CM

## 2024-02-16 DIAGNOSIS — M81.8 OTHER OSTEOPOROSIS WITHOUT CURRENT PATHOLOGICAL FRACTURE: ICD-10-CM

## 2024-02-16 DIAGNOSIS — M25.561 PAIN IN BOTH KNEES, UNSPECIFIED CHRONICITY: ICD-10-CM

## 2024-02-16 PROCEDURE — 84100 ASSAY OF PHOSPHORUS: CPT

## 2024-02-16 PROCEDURE — 82306 VITAMIN D 25 HYDROXY: CPT

## 2024-02-16 PROCEDURE — 97110 THERAPEUTIC EXERCISES: CPT | Mod: GP | Performed by: PHYSICAL THERAPIST

## 2024-02-16 PROCEDURE — 84443 ASSAY THYROID STIM HORMONE: CPT

## 2024-02-16 PROCEDURE — 36415 COLL VENOUS BLD VENIPUNCTURE: CPT

## 2024-02-16 PROCEDURE — 97112 NEUROMUSCULAR REEDUCATION: CPT | Mod: GP | Performed by: PHYSICAL THERAPIST

## 2024-02-16 NOTE — TELEPHONE ENCOUNTER
Problem: Safety - Adult  Goal: Free from fall injury  Outcome: Adequate for Discharge  Flowsheets (Taken 2/16/2024 1254)  Free From Fall Injury:   Instruct family/caregiver on patient safety   Based on caregiver fall risk screen, instruct family/caregiver to ask for assistance with transferring infant if caregiver noted to have fall risk factors  Note: Free from falls while in [facility] Oncology.     Problem: Discharge Planning  Goal: Discharge to home or other facility with appropriate resources  Flowsheets (Taken 2/16/2024 1254)  Discharge to home or other facility with appropriate resources:   Identify barriers to discharge with patient and caregiver   Arrange for needed discharge resources and transportation as appropriate   Identify discharge learning needs (meds, wound care, etc)  Note: Verbalize understanding of discharge instructions, follow up appointments, and when to call Physician.     Problem: Infection - Adult  Goal: Absence of infection at discharge  Flowsheets (Taken 2/16/2024 1254)  Absence of infection at discharge:   Assess and monitor for signs and symptoms of infection   Monitor lab/diagnostic results   Monitor all insertion sites i.e., indwelling lines, tubes and drains  Note: Mediport site with no redness or warmth. Skin over port site intact with no signs of breakdown noted. Patient verbalizes signs/symptoms of port infection and when to notify the physician.   Care plan reviewed with patient.  Patient  verbalize understanding of the plan of care and contribute to goal setting.       Health Call Center    Phone Message    May a detailed message be left on voicemail: yes     Reason for Call: Appointment Intake    Referring Provider Name: Self  Diagnosis and/or Symptoms: Last seen by Dr. Sarmiento in 2017, scope on R knee and the knee,it's getting worse with pain in the year. Currently the left knee is subluxing.  Pt works with Dr. Larsen and does PT regularly.  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  She is a 10/10 on hypermobility and it runs in the family.   Pt would like to be seen by Dr. Sarmiento to assess her knees and discuss a plan.  Please contact patient to discuss and schedule.     Action Taken: Message routed to:  [facility] Ortho    Travel Screening: Not Applicable

## 2024-02-17 LAB
PHOSPHATE SERPL-MCNC: 4.4 MG/DL (ref 2.5–4.5)
TSH SERPL DL<=0.005 MIU/L-ACNC: 1.63 UIU/ML (ref 0.3–4.2)
VIT D+METAB SERPL-MCNC: 49 NG/ML (ref 20–50)

## 2024-02-18 NOTE — RESULT ENCOUNTER NOTE
Patient updated by [employer] message with lab results.       Vitamin D, thyroid, and phosphorus levels normal.   Continue current vitamin D supplementation.   I will reach out again when the urine tests return.  Katia Monsivais, DO

## 2024-02-19 PROCEDURE — 81050 URINALYSIS VOLUME MEASURE: CPT

## 2024-02-19 PROCEDURE — 82340 ASSAY OF CALCIUM IN URINE: CPT

## 2024-02-19 PROCEDURE — 84156 ASSAY OF PROTEIN URINE: CPT

## 2024-02-20 LAB
ALBUMIN MFR UR ELPH: <6 MG/DL
CALCIUM 24H UR-MRATE: 0.15 G/SPEC (ref 0.1–0.3)
CALCIUM UR-MCNC: 7.3 MG/DL
COLLECT DURATION TIME UR: 24 H
COLLECT DURATION TIME UR: 24 H
PROT 24H UR-MRATE: NORMAL G/(24.H)
SPECIMEN VOL UR: 2025 ML
SPECIMEN VOL UR: 2025 ML

## 2024-02-20 NOTE — RESULT ENCOUNTER NOTE
Patient updated by [employer] message with lab results.      Milla Darling,  Thank you for updating urine testing.  Results normal.  Katia Monsivais, DO

## 2024-02-21 PROBLEM — M81.8 OTHER OSTEOPOROSIS WITHOUT CURRENT PATHOLOGICAL FRACTURE: Status: ACTIVE | Noted: 2024-02-21

## 2024-02-21 NOTE — TELEPHONE ENCOUNTER
1. Premature ovarian failure  - LMP March 2013 @ 38 yo  2. Osteoporosis without current pathological fracture, unspecified osteoporosis type  - Adult Endocrinology  Referral; Future    Katia Monsivais, DO

## 2024-02-23 ENCOUNTER — THERAPY VISIT (OUTPATIENT)
Dept: PHYSICAL THERAPY | Facility: CLINIC | Age: 49
End: 2024-02-23
Payer: COMMERCIAL

## 2024-02-23 DIAGNOSIS — M25.562 PAIN IN BOTH KNEES, UNSPECIFIED CHRONICITY: ICD-10-CM

## 2024-02-23 DIAGNOSIS — M25.561 PAIN IN BOTH KNEES, UNSPECIFIED CHRONICITY: ICD-10-CM

## 2024-02-23 DIAGNOSIS — M25.561 RIGHT KNEE PAIN, UNSPECIFIED CHRONICITY: Primary | ICD-10-CM

## 2024-02-23 PROCEDURE — 97140 MANUAL THERAPY 1/> REGIONS: CPT | Mod: GP | Performed by: PHYSICAL THERAPIST

## 2024-02-23 PROCEDURE — 97110 THERAPEUTIC EXERCISES: CPT | Mod: GP | Performed by: PHYSICAL THERAPIST

## 2024-03-07 ENCOUNTER — THERAPY VISIT (OUTPATIENT)
Dept: PHYSICAL THERAPY | Facility: CLINIC | Age: 49
End: 2024-03-07
Payer: COMMERCIAL

## 2024-03-07 DIAGNOSIS — G89.29 CHRONIC PAIN OF RIGHT KNEE: Primary | ICD-10-CM

## 2024-03-07 DIAGNOSIS — M25.561 CHRONIC PAIN OF RIGHT KNEE: Primary | ICD-10-CM

## 2024-03-07 DIAGNOSIS — M25.562 CHRONIC PAIN OF BOTH KNEES: ICD-10-CM

## 2024-03-07 DIAGNOSIS — M25.561 CHRONIC PAIN OF BOTH KNEES: ICD-10-CM

## 2024-03-07 DIAGNOSIS — G89.29 CHRONIC PAIN OF BOTH KNEES: ICD-10-CM

## 2024-03-07 PROCEDURE — 97110 THERAPEUTIC EXERCISES: CPT | Mod: GP | Performed by: PHYSICAL THERAPIST

## 2024-03-11 ENCOUNTER — OFFICE VISIT (OUTPATIENT)
Dept: ORTHOPEDICS | Facility: CLINIC | Age: 49
End: 2024-03-11
Payer: COMMERCIAL

## 2024-03-11 DIAGNOSIS — M70.62 TROCHANTERIC BURSITIS OF LEFT HIP: Primary | ICD-10-CM

## 2024-03-11 PROCEDURE — 99207 PR DROP WITH A PROCEDURE: CPT | Performed by: PREVENTIVE MEDICINE

## 2024-03-11 PROCEDURE — 20611 DRAIN/INJ JOINT/BURSA W/US: CPT | Mod: LT | Performed by: PREVENTIVE MEDICINE

## 2024-03-11 RX ORDER — TRIAMCINOLONE ACETONIDE 40 MG/ML
40 INJECTION, SUSPENSION INTRA-ARTICULAR; INTRAMUSCULAR
Status: SHIPPED | OUTPATIENT
Start: 2024-03-11

## 2024-03-11 RX ADMIN — TRIAMCINOLONE ACETONIDE 40 MG: 40 INJECTION, SUSPENSION INTRA-ARTICULAR; INTRAMUSCULAR at 10:22

## 2024-03-11 NOTE — LETTER
3/11/2024         RE: Lisset MITCHELL Chi  [address] [facility] [id number]        Dear Colleague,    Thank you for referring your patient, Lisset MITCHELL Chi, to the [facility] [place]. Please see a copy of my visit note below.    HISTORY OF PRESENT ILLNESS  Ms. Gates is a pleasant 48 year old year old female who presents to clinic today with the following:  What problem are you here for? Low back/Left hip pain. Would like a hip injection due to trip to [place] coming up soon.  Having ongoing left hip bursa pain and had over 75% improvement from previous injection in her hip    How long have you had this problem? 2-3 years    Have you had any recent imaging of this problem? Xrays/MRI/CT scans? Yes    Have you had treatments for this problem in the past?  -Medications? Yes  -Physical therapy? Yes  -Injections? Yes  -Surgery? No    How severe is this problem today? 0-10 scale? 2    How severe has this problem been at WORST in the past? 0-10 scale? 8    What do you think caused this problem? N/A    Does this problem or its symptoms cause difficulty for you falling asleep or staying asleep? Yes due to low back    Anything else you want us to know about this problem? Knee recently subluxed on L side.           MEDICAL HISTORY  Patient Active Problem List   Diagnosis     Irritable bowel syndrome     Premature ovarian failure  - LMP March 2013 @ 36 yo     Mild persistent asthma without complication     Adjustment disorder with depressed mood     Seasonal allergic rhinitis due to pollen     Allergic rhinitis due to animals     Allergic rhinitis due to dust mite     Allergic rhinitis due to mold     Allergic conjunctivitis, bilateral     History of colonic polyps     Female dyspareunia     History of loop electrical excision procedure (LEEP)     Right knee pain     Bilateral knee pain     Pelvic floor dysfunction     Unspecified inflammatory spondylopathy, lumbar region (H24)     Other  osteoporosis without current pathological fracture       Current Outpatient Medications   Medication Sig Dispense Refill     albuterol (PROAIR HFA/PROVENTIL HFA/VENTOLIN HFA) 108 (90 Base) MCG/ACT inhaler Inhale 2 puffs into the lungs every 4 hours as needed for shortness of breath, wheezing or cough 18 g 1     azelastine (ASTELIN) 0.1 % nasal spray Spray 1 spray into both nostrils 2 times daily 30 mL 3     Calcium Carbonate-Vitamin D (CALCIUM + D PO) Take 600 mg by mouth.       FEXOFENADINE  MG OR TABS 1 TABLET DAILY        fluticasone (FLONASE) 50 MCG/ACT nasal spray Spray 2 sprays into both nostrils daily       ibuprofen (ADVIL/MOTRIN) 600 MG tablet TAKE 1 TABLET (600 MG) BY MOUTH EVERY 8 HOURS AS NEEDED FOR MODERATE PAIN 60 tablet 1     methocarbamol (ROBAXIN) 500 MG tablet TAKE 1 TABLET (500 MG) BY MOUTH NIGHTLY AS NEEDED FOR MUSCLE SPASMS 30 tablet 0     montelukast (SINGULAIR) 10 MG tablet Take 1 tablet (10 mg) by mouth At Bedtime 90 tablet 3     ondansetron (ZOFRAN ODT) 4 MG ODT tab Take 1 tablet (4 mg) by mouth every 8 hours as needed for nausea 10 tablet 0     Turmeric Curcumin 500 MG CAPS Take 500 mg by mouth daily        VITAMIN D, CHOLECALCIFEROL, PO Take by mouth daily         No Known Allergies    Family History   Problem Relation Age of Onset     Thyroid Disease Mother      Diabetes Mother         type2     Eye Disorder Mother         cataracts     Gastrointestinal Disease Mother         hep a/has to have her throat stretched (esophageal stricturing)     Respiratory Mother         sleep apnea     Heart Disease Father 60     Tremor Father      Thyroid Disease Maternal Grandmother      Gynecology Maternal Grandmother         on bcp to keep period regular, a small uterus     Heart Disease Maternal Grandmother         tachycardia     Diabetes Maternal Grandfather      Cancer Maternal Grandfather      C.A.D. Paternal Grandfather 30        age 30, then again in his 80's     Allergies Daughter          milk     Asthma Daughter      Social History     Socioeconomic History     Marital status:      Number of children: 2   Occupational History     Employer: [employer]   Tobacco Use     Smoking status: Never     Passive exposure: Never     Smokeless tobacco: Never   Vaping Use     Vaping Use: Never used   Substance and Sexual Activity     Alcohol use: Yes     Comment: social     Drug use: No     Sexual activity: Yes     Partners: Male     Birth control/protection: Surgical     Comment:  had vasectomy   Other Topics Concern      Service No     Caffeine Concern No     Occupational Exposure No     Sleep Concern No     Stress Concern Yes     Weight Concern Yes     Comment: gains easily     Special Diet No     Back Care Yes     Exercise Yes     Comment: irregularly     Seat Belt No     Self-Exams No     Parent/sibling w/ CABG, MI or angioplasty before 65F 55M? No   Social History Narrative    Caffeine intake/servings daily - 0-1    Calcium intake/servings daily - 2-3    Exercise 2-3  times weekly - describe aerobics    Sunscreen used - Yes    Seatbelts used - Yes    Guns stored in the home - No    Self Breast Exam - Yes    Pap test up to date -  Yes    Eye exam up to date -  Yes    Dental exam up to date -  Yes    DEXA scan up to date -  Not Applicable    Flex Sig/Colonoscopy up to date -  Not Applicable    Mammography up to date -  Not Applicable    Immunizations reviewed and up to date - Yes    Abuse: Current or Past (Physical, Sexual or Emotional) - None current, past    Do you feel safe in your environment - Yes    Do you cope well with stress - Yes    Do you suffer from insomnia - No    Last updated by: Estefani Moraes MA 5/5/2010             Social Determinants of Health     Interpersonal Safety: High Risk (1/19/2024)    Interpersonal Safety      Do you feel physically and emotionally safe where you currently live?: No      Within the past 12 months, have you been hit, slapped, kicked or  otherwise physically hurt by someone?: No      Within the past 12 months, have you been humiliated or emotionally abused in other ways by your partner or ex-partner?: Yes       Additional medical/Social/Surgical histories reviewed in [facility] and updated as appropriate.     REVIEW OF SYSTEMS (3/11/2024)  10 point ROS of systems including Constitutional, Eyes, Respiratory, Cardiovascular, Gastroenterology, Genitourinary, Integumentary, Musculoskeletal, Psychiatric, Allergic/Immunologic were all negative except for pertinent positives noted in my HPI.     PHYSICAL EXAM  VSS  Vital Signs: LMP 12/25/2013  Patient declined being weighed. There is no height or weight on file to calculate BMI.    General  - normal appearance, in no obvious distress  HEENT  - conjunctivae not injected, moist mucous membranes, normocephalic/atraumatic head, ears normal appearance, no lesions, mouth normal appearance, no scars, normal dentition and teeth present  CV  - normal femoral pulse  Pulm  - normal respiratory pattern, non-labored  Musculoskeletal - left hip  - stance: normal gait without limp,  no obvious leg length discrepancy, normal heel and toe walk, single leg squat displays knee valgus, contralateral hip drop, internal rotation of the hip   - inspection: no swelling or effusion,  normal bone and joint alignment, no obvious deformity  - palpation: tender over the greater trochanter  - ROM: pain with active abduction, normal and painless flexion, extension, IR, ER, adduction  - strength: 5/5 in all planes  - special tests:  (-) EMETERIO  (-) FADIR  no pain with axial femoral load  Neuro  - no sensory or motor deficit, grossly normal coordination, normal muscle tone  Skin  - no ecchymosis, erythema, warmth, or induration, no obvious rash  Psych  - interactive, appropriate, normal mood and affect   ASSESSMENT & PLAN  47 yo female with left hip trochanteric bursitis, worse again    I independently reviewed the following imaging  studies:  Left hip xrays: shows no signfiicant abnormalities  After a 20 minute discussion and examination, we decided to perform a same day injection for diagnostic and therapeutic purposes for  Left hip bursa  Cont. HEP and PT   Followup PRN  Patient HAS  completed physical therapy for 4-6 weeks  Patient has been doing home exercise physical therapy program for this problem      Appropriate PPE was utilized for prevention of spread of Covid-19.  Floyd Larsen MD, CAQSM  Trochanteric left Hip Injection - Ultrasound Guided  The patient was informed of the risks and the benefits of the procedure and a written consent was signed.  The patient s left  lateral hip was prepped with chlorhexidine in sterile fashion.   40 mg of triamcinolone suspension was drawn up into a 5 mL syringe with 4 mL of 1% lidocaine.  Injection was performed using sterile technique.  Under ultrasound guidance a 3.5-inch 22-gauge needle was used to enter the lynne-trochanteric tissue.  Needle placement was visualized and documented with ultrasound which was deemed necessary to ensure medication did not enter the tendon itself, which could potentially cause tendon damage.   Injection performed long axis to the probe with probe in short axis to the greater trochanter.  Expansion of the lynne-trochanteric tissue was visualized under ultrasound upon injection.  Images were permanently stored for the patient's record.  There were no complications. The patient tolerated the procedure well. There was negligible bleeding.    Large Joint Injection/Arthocentesis: L greater trochanteric bursa    Date/Time: 3/11/2024 10:22 AM    Performed by: Floyd Larsen MD  Authorized by: Floyd Larsen MD    Indications:  Pain and diagnostic evaluation  Needle Size:  22 G  Guidance: ultrasound    Approach:  Lateral  Location:  Hip      Site:  L greater trochanteric bursa  Medications:  40 mg triamcinolone 40 MG/ML  Outcome:  Tolerated well, no immediate  complications  Consent Given by:  Patient  Timeout: timeout called immediately prior to procedure    Prep: patient was prepped and draped in usual sterile fashion          Again, thank you for allowing me to participate in the care of your patient.        Sincerely,        Floyd Larsen MD

## 2024-03-11 NOTE — PROGRESS NOTES
HISTORY OF PRESENT ILLNESS  Ms. Gates is a pleasant 48 year old year old female who presents to clinic today with the following:  What problem are you here for? Low back/Left hip pain. Would like a hip injection due to trip to [place] coming up soon.  Having ongoing left hip bursa pain and had over 75% improvement from previous injection in her hip    How long have you had this problem? 2-3 years    Have you had any recent imaging of this problem? Xrays/MRI/CT scans? Yes    Have you had treatments for this problem in the past?  -Medications? Yes  -Physical therapy? Yes  -Injections? Yes  -Surgery? No    How severe is this problem today? 0-10 scale? 2    How severe has this problem been at WORST in the past? 0-10 scale? 8    What do you think caused this problem? N/A    Does this problem or its symptoms cause difficulty for you falling asleep or staying asleep? Yes due to low back    Anything else you want us to know about this problem? Knee recently subluxed on L side.           MEDICAL HISTORY  Patient Active Problem List   Diagnosis    Irritable bowel syndrome    Premature ovarian failure  - LMP March 2013 @ 38 yo    Mild persistent asthma without complication    Adjustment disorder with depressed mood    Seasonal allergic rhinitis due to pollen    Allergic rhinitis due to animals    Allergic rhinitis due to dust mite    Allergic rhinitis due to mold    Allergic conjunctivitis, bilateral    History of colonic polyps    Female dyspareunia    History of loop electrical excision procedure (LEEP)    Right knee pain    Bilateral knee pain    Pelvic floor dysfunction    Unspecified inflammatory spondylopathy, lumbar region (H24)    Other osteoporosis without current pathological fracture       Current Outpatient Medications   Medication Sig Dispense Refill    albuterol (PROAIR HFA/PROVENTIL HFA/VENTOLIN HFA) 108 (90 Base) MCG/ACT inhaler Inhale 2 puffs into the lungs every 4 hours as needed for shortness of breath, wheezing  or cough 18 g 1    azelastine (ASTELIN) 0.1 % nasal spray Spray 1 spray into both nostrils 2 times daily 30 mL 3    Calcium Carbonate-Vitamin D (CALCIUM + D PO) Take 600 mg by mouth.      FEXOFENADINE  MG OR TABS 1 TABLET DAILY       fluticasone (FLONASE) 50 MCG/ACT nasal spray Spray 2 sprays into both nostrils daily      ibuprofen (ADVIL/MOTRIN) 600 MG tablet TAKE 1 TABLET (600 MG) BY MOUTH EVERY 8 HOURS AS NEEDED FOR MODERATE PAIN 60 tablet 1    methocarbamol (ROBAXIN) 500 MG tablet TAKE 1 TABLET (500 MG) BY MOUTH NIGHTLY AS NEEDED FOR MUSCLE SPASMS 30 tablet 0    montelukast (SINGULAIR) 10 MG tablet Take 1 tablet (10 mg) by mouth At Bedtime 90 tablet 3    ondansetron (ZOFRAN ODT) 4 MG ODT tab Take 1 tablet (4 mg) by mouth every 8 hours as needed for nausea 10 tablet 0    Turmeric Curcumin 500 MG CAPS Take 500 mg by mouth daily       VITAMIN D, CHOLECALCIFEROL, PO Take by mouth daily         No Known Allergies    Family History   Problem Relation Age of Onset    Thyroid Disease Mother     Diabetes Mother         type2    Eye Disorder Mother         cataracts    Gastrointestinal Disease Mother         hep a/has to have her throat stretched (esophageal stricturing)    Respiratory Mother         sleep apnea    Heart Disease Father 60    Tremor Father     Thyroid Disease Maternal Grandmother     Gynecology Maternal Grandmother         on bcp to keep period regular, a small uterus    Heart Disease Maternal Grandmother         tachycardia    Diabetes Maternal Grandfather     Cancer Maternal Grandfather     C.A.D. Paternal Grandfather 30        age 30, then again in his 80's    Allergies Daughter         milk    Asthma Daughter      Social History     Socioeconomic History    Marital status:     Number of children: 2   Occupational History     Employer: [employer]   Tobacco Use    Smoking status: Never     Passive exposure: Never    Smokeless tobacco: Never   Vaping Use    Vaping Use: Never  used   Substance and Sexual Activity    Alcohol use: Yes     Comment: social    Drug use: No    Sexual activity: Yes     Partners: Male     Birth control/protection: Surgical     Comment:  had vasectomy   Other Topics Concern     Service No    Caffeine Concern No    Occupational Exposure No    Sleep Concern No    Stress Concern Yes    Weight Concern Yes     Comment: gains easily    Special Diet No    Back Care Yes    Exercise Yes     Comment: irregularly    Seat Belt No    Self-Exams No    Parent/sibling w/ CABG, MI or angioplasty before 65F 55M? No   Social History Narrative    Caffeine intake/servings daily - 0-1    Calcium intake/servings daily - 2-3    Exercise 2-3  times weekly - describe aerobics    Sunscreen used - Yes    Seatbelts used - Yes    Guns stored in the home - No    Self Breast Exam - Yes    Pap test up to date -  Yes    Eye exam up to date -  Yes    Dental exam up to date -  Yes    DEXA scan up to date -  Not Applicable    Flex Sig/Colonoscopy up to date -  Not Applicable    Mammography up to date -  Not Applicable    Immunizations reviewed and up to date - Yes    Abuse: Current or Past (Physical, Sexual or Emotional) - None current, past    Do you feel safe in your environment - Yes    Do you cope well with stress - Yes    Do you suffer from insomnia - No    Last updated by: Estefani Moraes MA 5/5/2010             Social Determinants of Health     Interpersonal Safety: High Risk (1/19/2024)    Interpersonal Safety     Do you feel physically and emotionally safe where you currently live?: No     Within the past 12 months, have you been hit, slapped, kicked or otherwise physically hurt by someone?: No     Within the past 12 months, have you been humiliated or emotionally abused in other ways by your partner or ex-partner?: Yes       Additional medical/Social/Surgical histories reviewed in [facility] and updated as appropriate.     REVIEW OF SYSTEMS (3/11/2024)  10 point ROS of systems including  Constitutional, Eyes, Respiratory, Cardiovascular, Gastroenterology, Genitourinary, Integumentary, Musculoskeletal, Psychiatric, Allergic/Immunologic were all negative except for pertinent positives noted in my HPI.     PHYSICAL EXAM  VSS  Vital Signs: LMP 12/25/2013  Patient declined being weighed. There is no height or weight on file to calculate BMI.    General  - normal appearance, in no obvious distress  HEENT  - conjunctivae not injected, moist mucous membranes, normocephalic/atraumatic head, ears normal appearance, no lesions, mouth normal appearance, no scars, normal dentition and teeth present  CV  - normal femoral pulse  Pulm  - normal respiratory pattern, non-labored  Musculoskeletal - left hip  - stance: normal gait without limp,  no obvious leg length discrepancy, normal heel and toe walk, single leg squat displays knee valgus, contralateral hip drop, internal rotation of the hip   - inspection: no swelling or effusion,  normal bone and joint alignment, no obvious deformity  - palpation: tender over the greater trochanter  - ROM: pain with active abduction, normal and painless flexion, extension, IR, ER, adduction  - strength: 5/5 in all planes  - special tests:  (-) EMETERIO  (-) FADIR  no pain with axial femoral load  Neuro  - no sensory or motor deficit, grossly normal coordination, normal muscle tone  Skin  - no ecchymosis, erythema, warmth, or induration, no obvious rash  Psych  - interactive, appropriate, normal mood and affect   ASSESSMENT & PLAN  49 yo female with left hip trochanteric bursitis, worse again    I independently reviewed the following imaging studies:  Left hip xrays: shows no signfiicant abnormalities  After a 20 minute discussion and examination, we decided to perform a same day injection for diagnostic and therapeutic purposes for  Left hip bursa  Cont. HEP and PT   Followup PRN  Patient HAS  completed physical therapy for 4-6 weeks  Patient has been doing home exercise physical  therapy program for this problem      Appropriate PPE was utilized for prevention of spread of Covid-19.  Floyd Larsen MD, CAQSM  Trochanteric left Hip Injection - Ultrasound Guided  The patient was informed of the risks and the benefits of the procedure and a written consent was signed.  The patient s left  lateral hip was prepped with chlorhexidine in sterile fashion.   40 mg of triamcinolone suspension was drawn up into a 5 mL syringe with 4 mL of 1% lidocaine.  Injection was performed using sterile technique.  Under ultrasound guidance a 3.5-inch 22-gauge needle was used to enter the lynne-trochanteric tissue.  Needle placement was visualized and documented with ultrasound which was deemed necessary to ensure medication did not enter the tendon itself, which could potentially cause tendon damage.   Injection performed long axis to the probe with probe in short axis to the greater trochanter.  Expansion of the lynne-trochanteric tissue was visualized under ultrasound upon injection.  Images were permanently stored for the patient's record.  There were no complications. The patient tolerated the procedure well. There was negligible bleeding.    Large Joint Injection/Arthocentesis: L greater trochanteric bursa    Date/Time: 3/11/2024 10:22 AM    Performed by: Floyd Larsen MD  Authorized by: Floyd Larsen MD    Indications:  Pain and diagnostic evaluation  Needle Size:  22 G  Guidance: ultrasound    Approach:  Lateral  Location:  Hip      Site:  L greater trochanteric bursa  Medications:  40 mg triamcinolone 40 MG/ML  Outcome:  Tolerated well, no immediate complications  Consent Given by:  Patient  Timeout: timeout called immediately prior to procedure    Prep: patient was prepped and draped in usual sterile fashion

## 2024-03-11 NOTE — NURSING NOTE
[facility]   ORTHOPEDICS & SPORTS MEDICINE  [address] [place] [address]  Dept: [phone]  ______________________________________________________________________________    Patient: Lisset MITCHELL Chi   : 1975   MRN: [id number]   2024    INVASIVE PROCEDURE SAFETY CHECKLIST    Date: 3/11/24   Procedure: Left Greater Trochanteric Hip Bursa Injection  Patient Name: Lisset MITCHELL Chi  MRN: [id number]  YOB: 1975    Action: Complete sections as appropriate. Any discrepancy results in a HARD COPY until resolved.     PRE PROCEDURE:  Patient ID verified with 2 identifiers (name and  or MRN): Yes  Procedure and site verified with patient/designee (when able): Yes  Accurate consent documentation in medical record: Yes  H&P (or appropriate assessment) documented in medical record: Yes  H&P must be up to 20 days prior to procedure and updates within 24 hours of procedure as applicable: NA  Relevant diagnostic and radiology test results appropriately labeled and displayed as applicable: NA  Procedure site(s) marked with provider initials: NA    TIMEOUT:  Time-Out performed immediately prior to starting procedure, including verbal and active participation of all team members addressing the following:Yes  * Correct patient identify  * Confirmed that the correct side and site are marked  * An accurate procedure consent form  * Agreement on the procedure to be done  * Correct patient position  * Relevant images and results are properly labeled and appropriately displayed  * The need to administer antibiotics or fluids for irrigation purposes during the procedure as applicable   * Safety precautions based on patient history or medication use    DURING PROCEDURE: Verification of correct person, site, and procedures any time the responsibility for care of the patient is transferred to another member of the care team.       Prior to injection, verified patient identity using patient's name  and date of birth.  Due to injection administration, patient instructed to remain in clinic for 15 minutes  afterwards, and to report any adverse reaction to me immediately.    Bursa injection was performed.      Drug Amount Wasted:  None.  Vial/Syringe: Single dose vial  Expiration Date:  11/2025      Lenin Seaman ATC  March 11, 2024

## 2024-03-22 ENCOUNTER — THERAPY VISIT (OUTPATIENT)
Dept: PHYSICAL THERAPY | Facility: CLINIC | Age: 49
End: 2024-03-22
Payer: COMMERCIAL

## 2024-03-22 DIAGNOSIS — M25.562 BILATERAL KNEE PAIN: ICD-10-CM

## 2024-03-22 DIAGNOSIS — M25.561 BILATERAL KNEE PAIN: ICD-10-CM

## 2024-03-22 DIAGNOSIS — M25.561 RIGHT KNEE PAIN: Primary | ICD-10-CM

## 2024-03-22 PROCEDURE — 97110 THERAPEUTIC EXERCISES: CPT | Mod: GP | Performed by: PHYSICAL THERAPIST

## 2024-03-22 PROCEDURE — 97530 THERAPEUTIC ACTIVITIES: CPT | Mod: GP | Performed by: PHYSICAL THERAPIST

## 2024-04-17 DIAGNOSIS — M25.562 PAIN IN BOTH KNEES, UNSPECIFIED CHRONICITY: Primary | ICD-10-CM

## 2024-04-17 DIAGNOSIS — M25.561 PAIN IN BOTH KNEES, UNSPECIFIED CHRONICITY: Primary | ICD-10-CM

## 2024-04-21 NOTE — TELEPHONE ENCOUNTER
DIAGNOSIS: BILATERAL KNEE   APPOINTMENT DATE: 04/23/2024   NOTES STATUS DETAILS   OFFICE NOTE from referring provider SELF 01/15/2024 - Telephone Encounter    Last Seen:  06/12/2018 - Rima Sarmiento MD - [facility] Ortho   OFFICE NOTE from other specialist Internal PHYSICAL THERAPY   OPERATIVE REPORT Internal 03/02/2018 - Right Knee Arthroscopy, Lateral Compartmnt Debridemen and  Anterior Chamber.   (IMAGES & REPORTS) Internal

## 2024-04-22 ASSESSMENT — KOOS JR
STRAIGHTENING KNEE FULLY: EXTREME
BENDING TO THE FLOOR TO PICK UP OBJECT: MODERATE
KOOS JR SCORING: 42.28
STANDING UPRIGHT: SEVERE
TWISING OR PIVOTING ON KNEE: MODERATE
RISING FROM SITTING: MODERATE
HOW SEVERE IS YOUR KNEE STIFFNESS AFTER FIRST WAKING IN MORNING: MODERATE
GOING UP OR DOWN STAIRS: SEVERE

## 2024-04-23 ENCOUNTER — ANCILLARY PROCEDURE (OUTPATIENT)
Dept: GENERAL RADIOLOGY | Facility: CLINIC | Age: 49
End: 2024-04-23
Attending: ORTHOPAEDIC SURGERY
Payer: COMMERCIAL

## 2024-04-23 ENCOUNTER — PRE VISIT (OUTPATIENT)
Dept: ORTHOPEDICS | Facility: CLINIC | Age: 49
End: 2024-04-23

## 2024-04-23 ENCOUNTER — OFFICE VISIT (OUTPATIENT)
Dept: ORTHOPEDICS | Facility: CLINIC | Age: 49
End: 2024-04-23
Payer: COMMERCIAL

## 2024-04-23 VITALS — HEIGHT: 63 IN | BODY MASS INDEX: 21.44 KG/M2 | WEIGHT: 121 LBS

## 2024-04-23 DIAGNOSIS — M25.562 LEFT KNEE PAIN, UNSPECIFIED CHRONICITY: Primary | ICD-10-CM

## 2024-04-23 DIAGNOSIS — M25.562 PAIN IN BOTH KNEES, UNSPECIFIED CHRONICITY: ICD-10-CM

## 2024-04-23 DIAGNOSIS — M25.561 PAIN IN BOTH KNEES, UNSPECIFIED CHRONICITY: ICD-10-CM

## 2024-04-23 DIAGNOSIS — M17.11 PRIMARY OSTEOARTHRITIS OF RIGHT KNEE: ICD-10-CM

## 2024-04-23 PROCEDURE — 99203 OFFICE O/P NEW LOW 30 MIN: CPT | Performed by: ORTHOPAEDIC SURGERY

## 2024-04-23 PROCEDURE — 77073 BONE LENGTH STUDIES: CPT | Performed by: STUDENT IN AN ORGANIZED HEALTH CARE EDUCATION/TRAINING PROGRAM

## 2024-04-23 PROCEDURE — 73560 X-RAY EXAM OF KNEE 1 OR 2: CPT | Mod: GC | Performed by: RADIOLOGY

## 2024-04-23 NOTE — LETTER
4/23/2024         RE: Lisset MITCHELL Chi  [address] [address]        Dear Colleague,    Thank you for referring your patient, Lisset MITCHELL Chi, to the [facility] [place]. Please see a copy of my visit note below.    Patient is a 48-year-old female who I have known from past visits.  I mainly treated her for her right knee.  In 2018 I scoped her right knee.  At that time we found grade 2 and partial areas of grade 3 changes in the lateral compartment.  Her patellofemoral cartilage compartment showed mild chondromalacia.  She had a history of an osteochondritis dissecans lesion which was present on imaging.  A specific loose body was not found but her clicking and deep flexion did resolve with that scope/debridement.    At this time she reports that she has different kinds of clicks in her right knee which can be bothersome but that is not her primary issue at this point in time.    Her primary complaint at this time is with her left knee.  She has a history of recurrent patellar dislocations as a child.  She had a type of patella tendon surgery when she was age 12 and she had no further dislocations but she continued to have subluxations.  I believe that she is referring to a J sign which has persisted all of her life on that side.    At this point in time she reports that her problem is 1 of pain.  She is here just trying to understand her left knee in regards to her low back and hip issue.  She has been treated for low back pain which has subsequently resolved.  She also has left greater trochanteric pain which have improved with injections into the bursa by Flyod Larsen.  She also has a very strong family history of Marcus-Danlos syndrome and has been told she is hypermobile in the past.  She has several joints in her hand in particular that are hypermobile including fingers that prevent her from successfully continuing to play piano.    Her problem with her left knee is  1 of clicking which sometimes gives her lack of knee confidence.  She has not had a dustin dislocation for some time.  She is here for a general discussion.    She has a history of early menopause at age 40.  She has had a recent DEXA which has shown that she has osteoporosis.  This is being followed by her primary care physician.  She is on vitamin D and calcium.  The plan is to repeat the DEXA scan in a year before discussion of potential further intervention.    Physical exam reveals a lean female, BMI 21.5  Examination of patient's right knee reveals no hyperextension to 140 degrees of flexion.  Kneecap tracks well through an active arc of motion.  No crepitus.  Stable knee exam stable patellofemoral exam    Examination of patient's left knee reveals a very dramatic J sign, which relocates in the groove at 45 degrees of flexion.  This is with mild crepitus.    Examination of her hips shows right side internal rotation to 70 degrees external rotation to 40, similar to the opposite side.    Hypermobility index: Positive wrist positive fingers positive spine, negative elbow negative knees, Beighton score of 5.    Imaging: Long-leg alignment views reveal neutral alignment with satisfactory joint space maintained.  Suggestion of version on these 2 plain films  Axial alignment shows located patella with satisfactory joint space maintained.  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  Right side lateral compartment chondrosis without coronal plane malalignment.  I would recommend a Synvisc injection in the left side.  This may help her click and her mild discomfort on that side.  From her arthroscopic pictures of 2018 we know that she has grade partial-thickness cartilage loss in the posterior lateral femoral condyle    2.   Left side J sign with questionable cartilage wear.    Plan: 1.  We will ask prior Auth for Synvisc injection into her Right knee.  If we get authorization I would suggest Dr. Larsen performed this injection.  2.  MRI on the left knee.  Follow-up at that time to consider next steps.  This will somewhat be dependent on the status of her cartilage wear in the patellofemoral joint    Rima Sarmiento MD  Professor Orthopedic Surgery  [facility]

## 2024-04-23 NOTE — PROGRESS NOTES
Patient is a 48-year-old female who I have known from past visits.  I mainly treated her for her right knee.  In 2018 I scoped her right knee.  At that time we found grade 2 and partial areas of grade 3 changes in the lateral compartment.  Her patellofemoral cartilage compartment showed mild chondromalacia.  She had a history of an osteochondritis dissecans lesion which was present on imaging.  A specific loose body was not found but her clicking and deep flexion did resolve with that scope/debridement.    At this time she reports that she has different kinds of clicks in her right knee which can be bothersome but that is not her primary issue at this point in time.    Her primary complaint at this time is with her left knee.  She has a history of recurrent patellar dislocations as a child.  She had a type of patella tendon surgery when she was age 12 and she had no further dislocations but she continued to have subluxations.  I believe that she is referring to a J sign which has persisted all of her life on that side.    At this point in time she reports that her problem is 1 of pain.  She is here just trying to understand her left knee in regards to her low back and hip issue.  She has been treated for low back pain which has subsequently resolved.  She also has left greater trochanteric pain which have improved with injections into the bursa by Floyd Larsen.  She also has a very strong family history of Marcus-Danlos syndrome and has been told she is hypermobile in the past.  She has several joints in her hand in particular that are hypermobile including fingers that prevent her from successfully continuing to play piano.    Her problem with her left knee is 1 of clicking which sometimes gives her lack of knee confidence.  She has not had a dustin dislocation for some time.  She is here for a general discussion.    She has a history of early menopause at age 40.  She has had a recent DEXA which has shown that she  has osteoporosis.  This is being followed by her primary care physician.  She is on vitamin D and calcium.  The plan is to repeat the DEXA scan in a year before discussion of potential further intervention.    Physical exam reveals a lean female, BMI 21.5  Examination of patient's right knee reveals no hyperextension to 140 degrees of flexion.  Kneecap tracks well through an active arc of motion.  No crepitus.  Stable knee exam stable patellofemoral exam    Examination of patient's left knee reveals a very dramatic J sign, which relocates in the groove at 45 degrees of flexion.  This is with mild crepitus.    Examination of her hips shows right side internal rotation to 70 degrees external rotation to 40, similar to the opposite side.    Hypermobility index: Positive wrist positive fingers positive spine, negative elbow negative knees, Beighton score of 5.    Imaging: Long-leg alignment views reveal neutral alignment with satisfactory joint space maintained.  Suggestion of version on these 2 plain films  Axial alignment shows located patella with satisfactory joint space maintained.  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  Right side lateral compartment chondrosis without coronal plane malalignment.  I would recommend a Synvisc injection in the left side.  This may help her click and her mild discomfort on that side.  From her arthroscopic pictures of 2018 we know that she has grade partial-thickness cartilage loss in the posterior lateral femoral condyle    2.  Left side J sign with questionable cartilage wear.    Plan: 1.  We will ask prior Auth for Synvisc injection into her Right knee.  If we get authorization I would suggest Dr. Larsen performed this injection.  2.  MRI on the left knee.  Follow-up at that time to  consider next steps.  This will somewhat be dependent on the status of her cartilage wear in the patellofemoral joint    Rima Sarmiento MD  Professor Orthopedic Surgery  [facility]

## 2024-04-23 NOTE — NURSING NOTE
Reason For Visit:   Chief Complaint   Patient presents with    Consult     bilateral knee pain       Primary MD: Katia Monsivais  Ref. MD: MABEL    ?  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    Date of injury: 1- 2 years ago  Type of injury: Left knee dislocation and subluxed    Date of surgery: 3/2/2018  Type of surgery: scope and lateral compartment, ant chamber debridement.     Smoker: No  Request smoking cessation information: No    LMP 12/25/2013        Maribel Almonte LPN

## 2024-04-26 ENCOUNTER — MYC MEDICAL ADVICE (OUTPATIENT)
Dept: ORTHOPEDICS | Facility: CLINIC | Age: 49
End: 2024-04-26

## 2024-04-26 ENCOUNTER — THERAPY VISIT (OUTPATIENT)
Dept: PHYSICAL THERAPY | Facility: CLINIC | Age: 49
End: 2024-04-26
Payer: COMMERCIAL

## 2024-04-26 DIAGNOSIS — M25.561 BILATERAL KNEE PAIN: ICD-10-CM

## 2024-04-26 DIAGNOSIS — M24.9 KNEE JOINT HYPERMOBILITY: Primary | ICD-10-CM

## 2024-04-26 DIAGNOSIS — M25.561 CHRONIC PAIN OF RIGHT KNEE: Primary | ICD-10-CM

## 2024-04-26 DIAGNOSIS — G89.29 CHRONIC PAIN OF RIGHT KNEE: Primary | ICD-10-CM

## 2024-04-26 DIAGNOSIS — M25.562 BILATERAL KNEE PAIN: ICD-10-CM

## 2024-04-26 PROCEDURE — 97530 THERAPEUTIC ACTIVITIES: CPT | Mod: GP | Performed by: PHYSICAL THERAPIST

## 2024-04-26 PROCEDURE — 97110 THERAPEUTIC EXERCISES: CPT | Mod: GP | Performed by: PHYSICAL THERAPIST

## 2024-05-13 ENCOUNTER — VIRTUAL VISIT (OUTPATIENT)
Dept: ENDOCRINOLOGY | Facility: CLINIC | Age: 49
End: 2024-05-13
Attending: FAMILY MEDICINE
Payer: COMMERCIAL

## 2024-05-13 VITALS — BODY MASS INDEX: 21.4 KG/M2 | WEIGHT: 120 LBS

## 2024-05-13 DIAGNOSIS — M81.0 OSTEOPOROSIS WITHOUT CURRENT PATHOLOGICAL FRACTURE, UNSPECIFIED OSTEOPOROSIS TYPE: ICD-10-CM

## 2024-05-13 DIAGNOSIS — E28.39 PREMATURE OVARIAN FAILURE: ICD-10-CM

## 2024-05-13 PROCEDURE — 99204 OFFICE O/P NEW MOD 45 MIN: CPT | Mod: 95 | Performed by: INTERNAL MEDICINE

## 2024-05-13 PROCEDURE — G2211 COMPLEX E/M VISIT ADD ON: HCPCS | Mod: 95 | Performed by: INTERNAL MEDICINE

## 2024-05-13 NOTE — LETTER
"    5/13/2024         RE: Lisset MITCHELL Chi  [address] [address]        Dear Colleague,    Thank you for referring your patient, Lisset MITCHELL Chi, to the [facility]. Please see a copy of my visit note below.    THIS IS A VIDEO VISIT:    Phone call visit/virtual visit encounter:    Name of patient: Lisset Gates    Date of encounter: 5/13/2024    Time of start of video visit: 10:00    Video started: 10:11    Video ended: 10:36    Provider location: working from home/ [facility]    Patient location: patients home.    Mode of transmission: [employer] video/ [employer]    Verbal consent: obtained before starting visit. Pt is agreeable.      The patient has been notified of following:      \"This VIDEO visit will be conducted via a call between you and your physician/provider. We have found that certain health care needs can be provided without the need for a physical exam.  This service lets us provide the care you need with a short phone conversation.  If a prescription is necessary we can send it directly to your pharmacy.  If lab work is needed we can place an order for that and you can then stop by our lab to have the test done at a later time.     With new updates with corona virus patient might be billed as clinic visit.     If during the course of the call the physician/provider feels a telephone visit is not appropriate, you will not be charged for this service.\"      Past medical history, social history, family history, allergy and medications were reviewed and updated as appropriate.  Reviewed pertinent labs, notes, imaging studies personally.   Name: Lisset MITCHELL Chi  Date: 5/13/2024  Seen in consultation with Katia Monsivais for osteoporosis.     HPI:  Lisset MITCHELL Chi is a 48 year old female who presents for the evaluation of osteoporosis.   has a past medical history of Allergic rhinitis due to other allergen, Asthma, Bilateral low back pain with left-sided sciatica (10/17/2022), IBS " (irritable bowel syndrome), Other internal derangement of knee(717.89) (2005), Premature ovarian failure, Raynaud's disease (3/08), and Trochanteric bursitis of both hips (11/15/2017).    H/o premature ovarian failure at age 37.    She is followed by sports medicine for low back/hip pain.  DEXA 2/2024: The patient's BMD is consistent with osteoporosis, and he/she is at increased fracture risk.   Labs showed normal vit D, calcium    She was on Tegretol 6-16 for epilepsy.   Did not receive HRT for premature menopause (she opted not to take medication)    GERD: not currently. H/o EDS syndrome in family. She has + hypermobility but no diagnosis of EDS.    Dental health: OK. No major upcoming dental procedure.  Has regular follow-up with dentistry.     Kidney function: within normal limits.     Previous treatment for osteopenia/osteoporosis: none    Current treatment for Osteopenia/Osteoporosis: none    Smoke:No  Family History:Yes:   Menstrual history/Birthing: s/p menopause  HRT after menopause: never been on HRT  Fractures:toe fracture  Kidney stones: No  GI Surgery:No  Duration of therapy:  as noted above  Exercise:does PT exercises. In summer she is more active.   Diet: she is milk intolerant. She takes almond milk + coconut milk yogurt.  Ca/Vitamin D: 600 mg of calcium/day, 4400 international unit(s) of vit D/day  Alcohol:  social  Eating Disorder: No  Steroid Use: she was on albuterol inhaler. Had some intra articular steroid injections.  PMH/PSH:  Past Medical History:   Diagnosis Date     Allergic rhinitis due to other allergen      Asthma      Bilateral low back pain with left-sided sciatica 10/17/2022     IBS (irritable bowel syndrome)      Other internal derangement of knee(717.89) 2005    patella dislocates easily     Premature ovarian failure      Raynaud's disease 3/08     Trochanteric bursitis of both hips 11/15/2017     Past Surgical History:   Procedure Laterality Date     ARTHROSCOPY KNEE Right  3/2/2018    Procedure: ARTHROSCOPY KNEE;  Right Knee Arthroscopy, Lateral Compartmnt Debridemen and  Anterior Chamber;  Surgeon: Rima Sarmiento MD;  Location: [facility] OR      SECTION           COLONOSCOPY N/A 2022    Procedure: COLONOSCOPY, WITH HOT POLYPECTOMY, TATTOO, AND CLIPS;  Surgeon: Johnson Petit MD;  Location: [facility] OR     COLONOSCOPY N/A 2023    Procedure: COLONOSCOPY, WITH POLYPECTOMY AND BIOPSY;  Surgeon: Johnson Petit MD;  Location: [facility] OR     HC DILATION/CURETTAGE DIAG/THER NON OB  2003    D & C for retained placenta one week after the delivery     I&D BARTHOLIN GLAND ABSCESS   and     mcgrath cath      KNEE SURGERY      Rt knee Fx, repair-dislocation problem     KNEE SURGERY      Lt knee reconstructive surgery-dislocation problem     LEEP TX, CERVICAL      LEEP for abnormal pap     MARSUP BARTHOLIN GLAND CYST  08     Family Hx:  Family History   Problem Relation Age of Onset     Thyroid Disease Mother      Diabetes Mother         type2     Eye Disorder Mother         cataracts     Gastrointestinal Disease Mother         hep a/has to have her throat stretched (esophageal stricturing)     Respiratory Mother         sleep apnea     Heart Disease Father 60     Tremor Father      Thyroid Disease Maternal Grandmother      Gynecology Maternal Grandmother         on bcp to keep period regular, a small uterus     Heart Disease Maternal Grandmother         tachycardia     Diabetes Maternal Grandfather      Cancer Maternal Grandfather      C.A.D. Paternal Grandfather 30        age 30, then again in his 80's     Allergies Daughter         milk     Asthma Daughter               Social Hx:  Social History     Socioeconomic History     Marital status:      Spouse name: Not on file     Number of children: 2     Years of education: Not on file     Highest education level: Not on file   Occupational History     Employer: [employer]    Tobacco Use     Smoking status: Never     Passive exposure: Never     Smokeless tobacco: Never   Vaping Use     Vaping status: Never Used   Substance and Sexual Activity     Alcohol use: Yes     Comment: social     Drug use: No     Sexual activity: Yes     Partners: Male     Birth control/protection: Surgical     Comment:  had vasectomy   Other Topics Concern      Service No     Blood Transfusions Not Asked     Caffeine Concern No     Occupational Exposure No     Hobby Hazards Not Asked     Sleep Concern No     Stress Concern Yes     Weight Concern Yes     Comment: gains easily     Special Diet No     Back Care Yes     Exercise Yes     Comment: irregularly     Bike Helmet Not Asked     Comment: doesn't bike     Seat Belt No     Self-Exams No     Parent/sibling w/ CABG, MI or angioplasty before 65F 55M? No   Social History Narrative    Caffeine intake/servings daily - 0-1    Calcium intake/servings daily - 2-3    Exercise 2-3  times weekly - describe aerobics    Sunscreen used - Yes    Seatbelts used - Yes    Guns stored in the home - No    Self Breast Exam - Yes    Pap test up to date -  Yes    Eye exam up to date -  Yes    Dental exam up to date -  Yes    DEXA scan up to date -  Not Applicable    Flex Sig/Colonoscopy up to date -  Not Applicable    Mammography up to date -  Not Applicable    Immunizations reviewed and up to date - Yes    Abuse: Current or Past (Physical, Sexual or Emotional) - None current, past    Do you feel safe in your environment - Yes    Do you cope well with stress - Yes    Do you suffer from insomnia - No    Last updated by: Estefani Moraes MA 5/5/2010             Social Determinants of Health     Financial Resource Strain: Not on file   Food Insecurity: Not on file   Transportation Needs: Not on file   Physical Activity: Not on file   Stress: Not on file   Social Connections: Not on file   Interpersonal Safety: High Risk (1/19/2024)    Interpersonal Safety      Do you feel  physically and emotionally safe where you currently live?: No      Within the past 12 months, have you been hit, slapped, kicked or otherwise physically hurt by someone?: No      Within the past 12 months, have you been humiliated or emotionally abused in other ways by your partner or ex-partner?: Yes   Housing Stability: Not on file          MEDICATIONS:  has a current medication list which includes the following prescription(s): albuterol, azelastine, calcium citrate-vitamin d, fexofenadine, ibuprofen, methocarbamol, montelukast, turmeric curcumin, cholecalciferol, fluticasone, and ondansetron, and the following Facility-Administered Medications: triamcinolone.    ROS     ROS: 10 point ROS neg other than the symptoms noted above in the HPI.    Physical Exam   VS: Wt 54.4 kg (120 lb)   LMP 12/25/2013   BMI 21.40 kg/m    GENERAL: healthy, alert and no distress  EYES: Eyes grossly normal to inspection, conjunctivae and sclerae normal  ENT: no nose swelling, nasal discharge.  Thyroid: no apparent thyroid nodules  RESP: no audible wheeze, cough, or visible cyanosis.  No visible retractions or increased work of breathing.  Able to speak fully in complete sentences.  ABDO: not evaluated.  EXTREMITIES: no hand tremors.  NEURO: Cranial nerves grossly intact, mentation intact and speech normal  SKIN: No apparent skin lesions, rash or edema seen   PSYCH: mentation appears normal, affect normal/bright, judgement and insight intact, normal speech and appearance well-groomed    LABS:  Calcium:      Creatinine:  Creatinine   Date Value Ref Range Status   12/27/2023 0.66 0.51 - 0.95 mg/dL Final   06/09/2021 0.70 0.52 - 1.04 mg/dL Final       TFTs:  Lab Results   Component Value Date    TSH 1.63 02/16/2024    TSH 1.64 01/04/2021       PTH:    Vitamin D:  Vitamin D Deficiency Screening Results:  Lab Results   Component Value Date    VITDT 49 02/16/2024    VITDT 43 01/19/2022       DEXA:  DEXA 2/2024: The patient's BMD is  consistent with osteoporosis, and he/she is at increased fracture risk.     All pertinent notes, labs, and images personally reviewed by me.     A/P  Ms.Lisset MITCHELL Chi is a 48 year old here for the evaluation of:      Osteoporosis:  Comment: Risk for low bone density includes premature ovarian failure (no HRT following that), epilepsy medication use  DEXA 2/2024: The patient's BMD is consistent with osteoporosis, and he/she is at increased fracture risk.   Labs showed normal vit D, calcium.  Plan:   Discussed diagnosis, pathophysiology, management and treatment options of condition with pt.  Labs needed to r/o secondary causes..  Discussed DEXA 2/2024 results consistent with osteoporosis and based on that she will benefit from medication in addition to calcium and vitamin D supplement. At this time she is more interested in lifestyle intervention. Only one DEXA scan (2/2024) available and a new diagnosis of osteoporosis based on that.  She reports that she is more consistent with calcium vitamin D intake and at this time she is interested to continue to work on lifestyle changes.  Plan to continue monitor, optimize calcium vitamin D intake and exercise routine.  DEXA in 2/2025 or 2/2026 through PCP.  Follow up after that.    Plan: Parathyroid Hormone Intact, Calcium          Risk factors for low bone density include personal history of fracture or family history, , advanced age, female, dementia, and poor health.  Also smoking, low BMI, Estrogen deficiency, low Ca intake, and alcoholism.  A prior history of vertebral fracture greatly increases the risk of subsequent fractures. A history of other medical diseases impacting on bone may also affect bone health.      The pt was advised to  Maintain an adequate calcium and vitamin D intake and to supplement vitamin D if needed to maintain serum levels of 25 hydroxy D (25 OH D) between 30-60 ng/ml.  Limit alcohol intake to no more than 2 servings per day.  Limit  caffeine intake.  Maintain an active lifestyle including weight-bearing exercises for at least 30 mins daily.  Take measures to reduce the risk of falling.   Discussed indications, risks and benefits of all medications prescribed, and answered questions to patient's satisfaction.   The longitudinal plan of care for the diagnosis(es)/condition(s) as documented were addressed during this visit. Due to the added complexity in care, I will continue to support Lisset in the subsequent management and with ongoing continuity of care.  All questions were answered.  The patient indicates understanding of the above issues and agrees with the plan set forth.      Follow-up:  As noted in AVS    Nidia Dumont MD  Endocrinology  [facility]  CC: Katia Monsivais      Again, thank you for allowing me to participate in the care of your patient.        Sincerely,        Nidia Dumont MD
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
## 2024-05-13 NOTE — NURSING NOTE
Is the patient currently in the state of [place]? YES    Visit mode:VIDEO    If the visit is dropped, the patient can be reconnected by: VIDEO VISIT: Text to cell phone:   Telephone Information:   Mobile [phone]       Will anyone else be joining the visit? NO  (If patient encounters technical issues they should call [phone] :[phone])    How would you like to obtain your AVS? MyChart    Are changes needed to the allergy or medication list? No    Are refills needed on medications prescribed by this physician? NO    Reason for visit: Video Visit and Consult    Devika BARNES

## 2024-05-13 NOTE — PATIENT INSTRUCTIONS
-[facility]  Dr Dumont, Endocrinology Department    [facility]   [address] [facility]  [place] [address]  Appointment [phone]  Fax: [phone]  [place]: Monday - Thursday      Labs needed.  DEXA in 2026 through PCP.  Follow up after that.    The pt was advised to  Maintain an adequate calcium and vitamin D intake and to supplement vitamin D if needed to maintain serum levels of 25 hydroxy D (25 OH D) between 30-60 ng/ml.  Limit alcohol intake to no more than 2 servings per day.  Limit caffeine intake.  Maintain an active lifestyle including weight-bearing exercises for at least 30 mins daily.  Take measures to reduce the risk of falling.     You should get 1000- 1200 mg/day calcium in divided doses of no more than 500 mg/dose.  This INCLUDES what is in your food as well as what is in any supplements you may be taking.    Vit D about 800-1000 IU/day ( unless you have vit D deficiency- in that case higher dose)  Dietary sources of calcium:: These also contain vitamin D  Milk                            8 oz            300 mg calcium  Yogurt                          1 cup           400 mg calcium   Hard cheese                     1.5 oz          300 mg  Cottage cheese                  2 cup           300 mg  Orange juice with Calcium       8 oz            300 mg  Low fat dairy sources are recommended        You should get 30 minutes of moderate weight bearing exercise on most days of the week .  Weight bearing exercise includes such things as walking, jogging, hiking, dancing.  You should also get Strength training 2 or more times/week in addition to other weight -being exercise. Strength training uses weight or resistance beyond that seen in everyday activities -(pilates, weight training with free weights, weight machines or resistance bands)     Living with Osteoporosis: Preventing Fractures  If you have osteoporosis, you can do a lot to reduce its  effect on your life. Knowing how to prevent fractures and spinal curvature can help you live more comfortably and safely with this disease.  Reducing your risk for fractures  The most common fracture sites in people with osteoporosis are the wrist, spine, and hip. These fractures are often caused by accidents and falls. All fractures are painful and may limit what you can do. But hip fractures are very serious. They often need surgery, and it can take months to recover. To reduce your risk for fractures:  Get regular exercise. Try walking, swimming, or weight training.  Eat foods that are rich in calcium, or take calcium supplements.  Make your home safe to help avoid accidents.  Take extra precautions not to fall in risky areas, such as icy sidewalks.  Understanding spinal fractures  Your spine is made up of many bones called vertebrae. Osteoporosis can cause the vertebrae in your spine to collapse. As a result, your upper back may arch forward, creating a curvature. Spine fractures may also result from back strain and bad posture. You will also lose height. Your lower spine must then adjust to keep your body balanced. This can cause back pain. To prevent or lessen these spinal changes:  Practice good posture.  Use proper techniques if you need to lift heavy objects.  Do back exercises to help your posture.  Lie on your back when you have pain.  Ask your healthcare provider about these and other ways to help your spine.  [employer] last reviewed this educational content on 5/1/2018 2000-2021 [employer]. All rights reserved. This information is not intended as a substitute for professional medical care. Always follow your healthcare professional's instructions.          Living with Osteoporosis: Regular Exercise  If you have osteoporosis, exercise is vital for your health. It can prevent bone fractures and spine changes. It will slow bone loss. Exercise will strengthen your body. It can also be fun.  A variety of exercises is best. See below for exercises that can help you. But before you start, talk with your healthcare provider to be sure these exercises are right for you.   Resistance exercises. These build muscle strength and maintain bone mass. They also make you less prone to injury. Exercises include lifting small weights, doing push-ups and sit-ups, using elastic exercise bands, and using weight machines.   Weight-bearing activities. These help your whole body. They also help you maintain bone mass. Activities include walking, dancing, and housework.   Non-weight-bearing exercises. These help prevent back strain and pain. They do this by building the trunk and leg muscles. Exercises that help with flexibility can prevent falls. Examples include swimming, water exercise, and stretching.   Staying safe  Here are tips to stay safe:   Always check with your healthcare provider before starting any new exercise program.  Use weights only as instructed.  Stop any exercise that causes pain.  [employer] last reviewed this educational content on 5/1/2018 2000-2021 [employer]. All rights reserved. This information is not intended as a substitute for professional medical care. Always follow your healthcare professional's instructions.          Preventing Osteoporosis: Avoiding Bone Loss  Certain factors can speed up bone loss or decrease bone growth. For example, alcohol, cigarettes, and certain medicines reduce bone mass. Some foods make it hard for your body to absorb calcium.  Things to avoid  Here are things to avoid to help prevent osteoporosis:  Alcohol. This is toxic to bones. It is a major cause of bone loss. Heavy drinking can cause osteoporosis even if you have no other risk factors.  Smoking. This reduces bone mass. Smoking may also interfere with estrogen levels and cause early menopause.  Inactivity. Not being active makes your bones lose strength and become thinner. Over time, thin  bones may break. Women who aren't active are at a high risk for osteoporosis.  Certain medicines. Some medicines, such as cortisone, increase bone loss. They also decrease bone growth. Ask your healthcare provider about any side effects of your medicines, and how to prevent them.  Protein-rich or salty foods. Eaten in large amounts, these foods may deplete calcium.  Caffeine. This increases calcium loss. People who drink a lot of coffee, tea, or soda lose more calcium than those who don't.  [employer] last reviewed this educational content on 5/1/2018 2000-2021 [employer]. All rights reserved. This information is not intended as a substitute for professional medical care. Always follow your healthcare professional's instructions.          Preventing Osteoporosis: Meeting Your Calcium Needs  Your body needs calcium to build and repair bones. But it can't make calcium on its own. That's why it's important to eat calcium-rich foods. Some foods are naturally rich in calcium. Others have calcium added (fortified). It's best to get calcium from the foods you eat. But if you can't get enough, you may want to take calcium supplements. To meet your daily calcium needs, try the foods listed below.                          Dairy Fish & beans Other sources   Source   Calcium (mg) per serving   Source   Calcium (mg) per serving   Source   Calcium (mg) per serving   Low-fat yogurt, plain   415 mg/8 oz.   Sardines, Atlantic, canned, with bones   351 mg/3 oz.   Oatmeal, instant, fortified   215 mg/1 cup   Nonfat milk   302 mg/1 cup   [place], sockeye, canned, with bones   239 mg/3 oz.   Tofu made with calcium sulfate   204 mg/3 oz.   Low-fat milk   297 mg/1 cup   Soybeans, fresh, boiled   131 mg/1/2 cup   Collards   179 mg/1/2 cup   Swiss cheese   272 mg/1 oz.   White beans, cooked   81 mg/1/2 cup   English muffin, whole wheat   175 mg/1 muffin   Cheddar cheese   205 mg/1 oz.   Navy beans, cooked   79 mg/1/2 cup    Kale   90 mg/1/2 cup   Ice cream strawberry   79 mg/1/2 cup           Orange, navel   56 mg/1 medium   Note: Calcium levels may vary depending on brand and size.  Daily calcium needs  14 to 18 years old: 1,300 mg  19 to 30 years old: 1,000 mg  31 to 50 years old: 1,000 mg  51 to 70 years old, women: 1,200 mg  51 to 70 years old, men: 1,000 mg  Pregnant or nursin to 18 years old: 1,300 mg, 19 to 50 years old: 1,000 mg  Older than 70 (women and men): 1,200 mg   [employer] last reviewed this educational content on 2018-2021 [employer]. All rights reserved. This information is not intended as a substitute for professional medical care. Always follow your healthcare professional's instructions.

## 2024-05-14 ENCOUNTER — THERAPY VISIT (OUTPATIENT)
Dept: PHYSICAL THERAPY | Facility: CLINIC | Age: 49
End: 2024-05-14
Payer: COMMERCIAL

## 2024-05-14 DIAGNOSIS — M25.561 CHRONIC PAIN OF RIGHT KNEE: Primary | ICD-10-CM

## 2024-05-14 DIAGNOSIS — G89.29 CHRONIC PAIN OF RIGHT KNEE: Primary | ICD-10-CM

## 2024-05-14 DIAGNOSIS — M25.562 BILATERAL KNEE PAIN: ICD-10-CM

## 2024-05-14 DIAGNOSIS — M25.561 BILATERAL KNEE PAIN: ICD-10-CM

## 2024-05-14 PROCEDURE — 97110 THERAPEUTIC EXERCISES: CPT | Mod: GP | Performed by: PHYSICAL THERAPIST

## 2024-05-23 ENCOUNTER — TELEPHONE (OUTPATIENT)
Dept: ORTHOPEDICS | Facility: CLINIC | Age: 49
End: 2024-05-23
Payer: COMMERCIAL

## 2024-05-23 NOTE — TELEPHONE ENCOUNTER
ATC left voicemail for patient that her Synvisc One injection was denied by her insurance. Patient was informed that she could still come in tomorrow for a cortisone injection. Sports number left for call back.

## 2024-05-24 ENCOUNTER — OFFICE VISIT (OUTPATIENT)
Dept: ORTHOPEDICS | Facility: CLINIC | Age: 49
End: 2024-05-24
Payer: COMMERCIAL

## 2024-05-24 ENCOUNTER — ANCILLARY PROCEDURE (OUTPATIENT)
Dept: MRI IMAGING | Facility: CLINIC | Age: 49
End: 2024-05-24
Attending: ORTHOPAEDIC SURGERY
Payer: COMMERCIAL

## 2024-05-24 DIAGNOSIS — M25.562 LEFT KNEE PAIN, UNSPECIFIED CHRONICITY: ICD-10-CM

## 2024-05-24 DIAGNOSIS — M23.51 RECURRENT RIGHT KNEE INSTABILITY: Primary | ICD-10-CM

## 2024-05-24 DIAGNOSIS — M17.11 PATELLOFEMORAL ARTHRITIS OF RIGHT KNEE: ICD-10-CM

## 2024-05-24 PROCEDURE — 73721 MRI JNT OF LWR EXTRE W/O DYE: CPT | Mod: LT | Performed by: RADIOLOGY

## 2024-05-24 PROCEDURE — 99207 PR DROP WITH A PROCEDURE: CPT | Performed by: PREVENTIVE MEDICINE

## 2024-05-24 PROCEDURE — 20610 DRAIN/INJ JOINT/BURSA W/O US: CPT | Mod: RT | Performed by: PREVENTIVE MEDICINE

## 2024-05-24 RX ORDER — LIDOCAINE HYDROCHLORIDE 10 MG/ML
3 INJECTION, SOLUTION EPIDURAL; INFILTRATION; INTRACAUDAL; PERINEURAL
Status: SHIPPED | OUTPATIENT
Start: 2024-05-24

## 2024-05-24 RX ADMIN — LIDOCAINE HYDROCHLORIDE 3 ML: 10 INJECTION, SOLUTION EPIDURAL; INFILTRATION; INTRACAUDAL; PERINEURAL at 13:50

## 2024-05-24 NOTE — LETTER
5/24/2024      RE: Lisset MITCHELL Chi  [address] [facility] [address] [address]     Dear Colleague,    Thank you for referring your patient, Lisset MITCHELL Chi, to the [facility] [place]. Please see a copy of my visit note below.    PROCEDURE:          right  Knee joint injection with synvisc one    The patient was apprised of the risks and the benefits of the procedure and consented and a written consent was signed.   The patient s  KNEE was sterilely prepped with chloraprep.     The patient was injected with synvisc one syringe into the        right      knee with a 1.5-inch 22-gauge needle at the_superiorlateral aspect of their knee joint    There were no complications. The patient tolerated the procedure well. There was minimal bleeding.   The patient was instructed to ice the knee upon leaving clinic and refrain from overuse over the next 3 days.   The patient was instructed to go to the emergency room with any usual pain, swelling, or redness occurred in the injected area.   The patient was given a followup appointment to evaluate response to the injection to their increased range of motion and reduction of pain.  Follow up for euflexxa  Dr Floyd Larsen       Large Joint Injection/Arthocentesis: R knee joint    Date/Time: 5/24/2024 1:50 PM    Performed by: Floyd Larsen MD  Authorized by: Floyd Larsen MD    Indications:  Osteoarthritis and pain  Needle Size:  22 G  Guidance: landmark guided    Approach:  Lateral  Location:  Knee      Medications:  48 mg hylan 48 MG/6ML; 3 mL lidocaine (PF) 1 %  Outcome:  Tolerated well, no immediate complications  Procedure discussed: discussed risks, benefits, and alternatives    Consent Given by:  Patient  Timeout: timeout called immediately prior to procedure    Prep: patient was prepped and draped in usual sterile fashion          Again, thank you for allowing me to participate in the care of your patient.      Sincerely,    Folyd  Lowell Larsen MD

## 2024-05-24 NOTE — NURSING NOTE
[address] [address]  Dept: [phone]  ______________________________________________________________________________    Patient: Lisset MITCHELL Chi   : 1975   MRN: [id number]   May 24, 2024    INVASIVE PROCEDURE SAFETY CHECKLIST    Date: May 24, 2024   Procedure:R knee Synvisc one with lido   Patient Name: Lisset MITCHELL Chi  MRN: [id number]  YOB: 1975    Action: Complete sections as appropriate. Any discrepancy results in a HARD COPY until resolved.     PRE PROCEDURE:  Patient ID verified with 2 identifiers (name and  or MRN): Yes  Procedure and site verified with patient/designee (when able): Yes  Accurate consent documentation in medical record: Yes  H&P (or appropriate assessment) documented in medical record: Yes  H&P must be up to 20 days prior to procedure and updates within 24 hours of procedure as applicable: Yes  Relevant diagnostic and radiology test results appropriately labeled and displayed as applicable: Yes  Procedure site(s) marked with provider initials: NA    TIMEOUT:  Time-Out performed immediately prior to starting procedure, including verbal and active participation of all team members addressing the following:Yes  * Correct patient identify  * Confirmed that the correct side and site are marked  * An accurate procedure consent form  * Agreement on the procedure to be done  * Correct patient position  * Relevant images and results are properly labeled and appropriately displayed  * The need to administer antibiotics or fluids for irrigation purposes during the procedure as applicable   * Safety precautions based on patient history or medication use    DURING PROCEDURE: Verification of correct person, site, and procedures any time the responsibility for care of the patient is transferred to another member of the care team.       Prior to injection, verified patient identity using patient's name and date of  birth.  Due to injection administration, patient instructed to remain in clinic for 15 minutes  afterwards, and to report any adverse reaction to me immediately.    Joint injection was performed.      Drug Amount Wasted:  None.  Vial/Syringe: Syringe  Expiration Date:  10/1/26      Fabiana Zheng, [facility]  May 24, 2024

## 2024-05-24 NOTE — PROGRESS NOTES
PROCEDURE:          right  Knee joint injection with synvisc one    The patient was apprised of the risks and the benefits of the procedure and consented and a written consent was signed.   The patient s  KNEE was sterilely prepped with chloraprep.     The patient was injected with synvisc one syringe into the        right      knee with a 1.5-inch 22-gauge needle at the_superiorlateral aspect of their knee joint    There were no complications. The patient tolerated the procedure well. There was minimal bleeding.   The patient was instructed to ice the knee upon leaving clinic and refrain from overuse over the next 3 days.   The patient was instructed to go to the emergency room with any usual pain, swelling, or redness occurred in the injected area.   The patient was given a followup appointment to evaluate response to the injection to their increased range of motion and reduction of pain.  Follow up for euflexxa  Dr Floyd Larsen       Large Joint Injection/Arthocentesis: R knee joint    Date/Time: 5/24/2024 1:50 PM    Performed by: Floyd Larsen MD  Authorized by: Floyd Larsen MD    Indications:  Osteoarthritis and pain  Needle Size:  22 G  Guidance: landmark guided    Approach:  Lateral  Location:  Knee      Medications:  48 mg hylan 48 MG/6ML; 3 mL lidocaine (PF) 1 %  Outcome:  Tolerated well, no immediate complications  Procedure discussed: discussed risks, benefits, and alternatives    Consent Given by:  Patient  Timeout: timeout called immediately prior to procedure    Prep: patient was prepped and draped in usual sterile fashion

## 2024-05-31 ENCOUNTER — THERAPY VISIT (OUTPATIENT)
Dept: PHYSICAL THERAPY | Facility: CLINIC | Age: 49
End: 2024-05-31
Payer: COMMERCIAL

## 2024-05-31 DIAGNOSIS — G89.29 CHRONIC PAIN OF BOTH KNEES: Primary | ICD-10-CM

## 2024-05-31 DIAGNOSIS — M25.562 CHRONIC PAIN OF BOTH KNEES: Primary | ICD-10-CM

## 2024-05-31 DIAGNOSIS — M25.561 CHRONIC PAIN OF BOTH KNEES: Primary | ICD-10-CM

## 2024-05-31 PROCEDURE — 97110 THERAPEUTIC EXERCISES: CPT | Mod: GP | Performed by: PHYSICAL THERAPIST

## 2024-05-31 PROCEDURE — 97530 THERAPEUTIC ACTIVITIES: CPT | Mod: GP | Performed by: PHYSICAL THERAPIST

## 2024-06-06 NOTE — TELEPHONE ENCOUNTER
Chronic, following oncology, currently taking Xtinda and dexamethasone daily. Follows with oncology, continue pain control with tramadol and tylenol   Patient returned call.     Pre assessment questions completed for upcoming colonoscopy procedure scheduled on 12/2/22    COVID policy reviewed. Patient to complete rapid antigen test one to two days before their scheduled procedure. Patient to bring photo of the results when they come in for their procedure.    Reviewed procedural arrival time 1025 and facility location [facility].    Designated  policy reviewed. Instructed to have someone stay 6 hours post procedure.     Reviewed Golytely extended prep instructions. Low fiber diet today 11/25/22. Spent over 20 minutes with patient going food choices. Advised patient to utilized Internet if needed.      Patient verbalized understanding and had no questions or concerns at this time.    Vianney Liu RN

## 2024-06-14 ENCOUNTER — THERAPY VISIT (OUTPATIENT)
Dept: PHYSICAL THERAPY | Facility: CLINIC | Age: 49
End: 2024-06-14
Payer: COMMERCIAL

## 2024-06-14 DIAGNOSIS — G89.29 CHRONIC PAIN OF BOTH KNEES: Primary | ICD-10-CM

## 2024-06-14 DIAGNOSIS — M25.562 CHRONIC PAIN OF BOTH KNEES: Primary | ICD-10-CM

## 2024-06-14 DIAGNOSIS — M25.561 CHRONIC PAIN OF BOTH KNEES: Primary | ICD-10-CM

## 2024-06-14 PROCEDURE — 97110 THERAPEUTIC EXERCISES: CPT | Mod: GP | Performed by: PHYSICAL THERAPIST

## 2024-06-14 PROCEDURE — 97140 MANUAL THERAPY 1/> REGIONS: CPT | Mod: GP | Performed by: PHYSICAL THERAPIST

## 2024-06-28 ENCOUNTER — THERAPY VISIT (OUTPATIENT)
Dept: PHYSICAL THERAPY | Facility: CLINIC | Age: 49
End: 2024-06-28
Payer: COMMERCIAL

## 2024-06-28 DIAGNOSIS — G89.29 CHRONIC PAIN OF BOTH KNEES: Primary | ICD-10-CM

## 2024-06-28 DIAGNOSIS — M25.561 CHRONIC PAIN OF BOTH KNEES: Primary | ICD-10-CM

## 2024-06-28 DIAGNOSIS — M25.562 CHRONIC PAIN OF BOTH KNEES: Primary | ICD-10-CM

## 2024-06-28 PROCEDURE — 97112 NEUROMUSCULAR REEDUCATION: CPT | Mod: GP | Performed by: PHYSICAL THERAPIST

## 2024-06-28 PROCEDURE — 97110 THERAPEUTIC EXERCISES: CPT | Mod: GP | Performed by: PHYSICAL THERAPIST

## 2024-07-02 ENCOUNTER — OFFICE VISIT (OUTPATIENT)
Dept: ORTHOPEDICS | Facility: CLINIC | Age: 49
End: 2024-07-02
Payer: COMMERCIAL

## 2024-07-02 ENCOUNTER — LAB (OUTPATIENT)
Dept: LAB | Facility: CLINIC | Age: 49
End: 2024-07-02
Payer: COMMERCIAL

## 2024-07-02 VITALS — HEIGHT: 62 IN | WEIGHT: 128 LBS | BODY MASS INDEX: 23.55 KG/M2

## 2024-07-02 DIAGNOSIS — M25.562 PATELLOFEMORAL INSTABILITY OF LEFT KNEE WITH PAIN: ICD-10-CM

## 2024-07-02 DIAGNOSIS — M25.362 PATELLOFEMORAL INSTABILITY OF LEFT KNEE WITH PAIN: ICD-10-CM

## 2024-07-02 DIAGNOSIS — M23.41 BODY, LOOSE, KNEE, RIGHT: Primary | ICD-10-CM

## 2024-07-02 DIAGNOSIS — M81.0 OSTEOPOROSIS WITHOUT CURRENT PATHOLOGICAL FRACTURE, UNSPECIFIED OSTEOPOROSIS TYPE: ICD-10-CM

## 2024-07-02 LAB
CALCIUM SERPL-MCNC: 9.6 MG/DL (ref 8.6–10)
PTH-INTACT SERPL-MCNC: 28 PG/ML (ref 15–65)

## 2024-07-02 PROCEDURE — 83970 ASSAY OF PARATHORMONE: CPT | Performed by: PATHOLOGY

## 2024-07-02 PROCEDURE — 99214 OFFICE O/P EST MOD 30 MIN: CPT | Performed by: ORTHOPAEDIC SURGERY

## 2024-07-02 PROCEDURE — 36415 COLL VENOUS BLD VENIPUNCTURE: CPT | Performed by: PATHOLOGY

## 2024-07-02 PROCEDURE — 82310 ASSAY OF CALCIUM: CPT | Performed by: PATHOLOGY

## 2024-07-02 NOTE — PROGRESS NOTES
Patient returns today with 2 distinct issues.    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  She was scoped by me in March 2018 and she has not had problems until recently.  When I saw her last visit she was describing more of a catching sensation and I suggested that she try Synvisc.  She had previously tried cortisone without too much effect.    She now reports that she is convinced that something is catching.  It catches both anterior lateral and sometimes anterior medial.  She sometimes has to move her kneecap so that her knee does not catch and then it seems to be better.  She is convinced it is a loose body.    Indeed on x-rays we do see a very well-formed bony fragment with smooth edges in the superior patella pouch.  This was not present on previous films taken on her right knee but these were 2018.    The main reason for returning to me today was also to review her MRI results on her left knee.  She has had a longstanding J sign on that side.  She had a patella tendon medialization treated with staples in 1989 when she was 14 years old.  She felt that this helped her for a while to reduce her patella dislocations.  Her J signs continued but they were manageable.  However as of late she has experienced falls as she cannot always catch herself when her kneecap goes out.  The reason for obtaining the MRI was to visualize the cartilage surfaces of the of the patellofemoral joint.    MRI findings are as follows:  1. Findings consistent with patellofemoral tracking.  * Elevated TT:TG (30 mm).  * Lateral patellar subluxation.  * Dysplastic trochlea.  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  2. Approximately 2.1 cm mediolateral dimension  multiseptated/multiloculated cysts superificial to the medial meniscus  anterior horn with questionable subtle anterior horn/root ligament  tear.    Her high TT-TG  is in part led by the medialization of her distal patella tendon.  On standing alignment views her view suggest version but on physical exam she has external rotation greater than internal rotation on her hips, and she does not have the clinical appearance of the external tibial torsion on her tibias.  Therefore I do not believe that her limb version is contributing significantly to her J sign, and of course any kind of derotation osteotomy would be significantly more surgery in this 48-year-old female who is tolerated these knees to date fairly well.    I believe the best surgical approach that would help to improve her patella instability would be to do a trochlear plasty plus an MPFL reconstruction.  Although she is just at the limits of the surgical threshold for patella alto I believe that addressing her tibial tubercle in light of her medialized patella tendon might be tricky.  However I do believe that we need to be prepared to perform something at the tibial tubercle level once it is examined at surgery.    I went through both approaches to her left and right knee individually as the length of recovery is significantly different.  I do not believe we should do them together as she took a fairly long time to recover from her scope debridement in 2018.    She will think about timing of surgery and contact us accordingly.    Rima Sarmiento MD  Professor Orthopedic Surgery  [facility]

## 2024-07-02 NOTE — LETTER
7/2/2024      Lisset MITCHELL Chi  [address] [address]      Dear Colleague,    Thank you for referring your patient, Lisset MITCHELL Chi, to the [facility] [place]. Please see a copy of my visit note below.    Patient returns today with 2 distinct issues.    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  She was scoped by me in March 2018 and she has not had problems until recently.  When I saw her last visit she was describing more of a catching sensation and I suggested that she try Synvisc.  She had previously tried cortisone without too much effect.  She just got her yes I am almost WhatsApp yeah I just got my last 1 I am dictating little bit what he wants okay yeah I will be home shortly do not which is not my message do want me okay okay okay did you get my message I text you want me to stop by more thousand put out the garbage okay so okay I did not get it okay I will be home shortly by    She now reports that she is convinced that something is catching.  It catches both anterior lateral and sometimes anterior medial.  She sometimes has to move her kneecap so that her knee does not catch and then it seems to be better.  She is convinced it is a loose body.    Indeed on x-rays we do see a very well-formed bony fragment with smooth edges in the superior patella pouch.  This was not present on previous films taken on her right knee but these were 2018.    The main reason for returning to me today was also to review her MRI results on her left knee.  She has had a longstanding J sign on that side.  She had a patella tendon medialization treated with staples in 1989 when she was 14 years old.  She felt that this helped her for a while to reduce her patella dislocations.  Her J signs continued but they were manageable.  However as of late she has experienced falls as she cannot always catch herself when her kneecap goes out.  The reason  for obtaining the MRI was to visualize the cartilage surfaces of the of the patellofemoral joint.    MRI findings are as follows:  1. Findings consistent with patellofemoral tracking.  * Elevated TT:TG (30 mm).  * Lateral patellar subluxation.  * Dysplastic trochlea.  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  2. Approximately 2.1 cm mediolateral dimension  multiseptated/multiloculated cysts superificial to the medial meniscus  anterior horn with questionable subtle anterior horn/root ligament  tear.    Her high TT-TG is in part led by the medialization of her distal patella tendon.  On standing alignment views her view suggest version but on physical exam she has external rotation greater than internal rotation on her hips, and she does not have the clinical appearance of the external tibial torsion on her tibias.  Therefore I do not believe that her limb version is contributing significantly to her J sign, and of course any kind of derotation osteotomy would be significantly more surgery in this 48-year-old female who is tolerated these knees to date fairly well.    I believe the best surgical approach that would help to improve her patella instability would be to do a trochlear plasty plus an MPFL reconstruction.  Although she is just at the limits of the surgical threshold for patella alto I believe that addressing her tibial tubercle in light of her medialized patella tendon might be tricky.  However I do believe that we need to be prepared to perform something at the tibial tubercle level once it is examined at surgery.    I went through both approaches to her left and right knee individually as the length of recovery is significantly different.  I do not believe we should do them together as she took a fairly long time to recover from her scope debridement in 2018.    She will think about timing of surgery and contact us  accordingly.    Rima Sarmiento MD  Professor Orthopedic Surgery  [facility]

## 2024-07-02 NOTE — NURSING NOTE
Pre-Op Teaching was done in person at the clinic.    Teaching Flowsheet   Relevant Diagnosis: Pre-Op Teaching  Teaching Topic:      Person(s) involved in teaching:   Patient     Motivation Level:  Asks Questions: Yes  Eager to Learn: Yes  Cooperative: Yes  Receptive (willing/able to accept information): Yes  Any cultural factors/Confucianism beliefs that may influence understanding or compliance? No     Patient demonstrates understanding of the following:  Reason for the appointment, diagnosis and treatment plan: Yes  Knowledge of proper use of medications and conditions for which they are ordered (with special attention to potential side effects or drug interactions): Yes  Which situations necessitate calling provider and whom to contact: Yes- discussed the stoplight tool to help assist with this.      Teaching Concerns Addressed:      Proper use of surgical scrub explain: Yes    Nutritional needs and diet plan: Yes  Pain management techniques: Yes  Wound Care: Yes  How and/when to access community resources: Yes     Instructional Materials Used/Given:  a bottle of chlorhexidine soap and a surgery packet given to patient in clinic.      - Important contact info/ phone numbers: emphasizing clinic number and after hours number  - Map/ location of surgery  - Medications to avoid  - Showering instructions  - Stop light tool    Additionally the following was discussed with patient:  - Family member will be driving the patient to surgery and staying with them for 24 hours.       -Next step: Schedule a surgery date and schedule a Pre-Op appointment with PCP     Time spent with patient: 15 minutes.

## 2024-07-02 NOTE — NURSING NOTE
"Reason For Visit:   Chief Complaint   Patient presents with    RECHECK     bilateral knee pain,  MRI L knee results, right knee injection completed in May       Primary MD: Katia Monsivais  Ref. MD: EST    ?  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     Date of injury: 1- 2 years ago  Type of injury: Left knee dislocation and subluxed     Date of surgery: 3/2/2018  Type of surgery: scope and lateral compartment, ant chamber debridement.      Smoker: No  Request smoking cessation information: No    Ht 1.576 m (5' 2.05\")   Wt 58.1 kg (128 lb)   LMP 12/25/2013   BMI 23.38 kg/m      Pain Assessment  Patient Currently in Pain: Yes  0-10 Pain Scale: 8 (with certain movements)  Primary Pain Location: Knee          Maribel Formato, LPN   "

## 2024-07-05 ENCOUNTER — THERAPY VISIT (OUTPATIENT)
Dept: PHYSICAL THERAPY | Facility: CLINIC | Age: 49
End: 2024-07-05
Payer: COMMERCIAL

## 2024-07-05 DIAGNOSIS — G89.29 CHRONIC PAIN OF BOTH KNEES: Primary | ICD-10-CM

## 2024-07-05 DIAGNOSIS — M25.562 CHRONIC PAIN OF BOTH KNEES: Primary | ICD-10-CM

## 2024-07-05 DIAGNOSIS — M25.561 CHRONIC PAIN OF BOTH KNEES: Primary | ICD-10-CM

## 2024-07-05 PROCEDURE — 97110 THERAPEUTIC EXERCISES: CPT | Mod: GP | Performed by: PHYSICAL THERAPIST

## 2024-07-05 PROCEDURE — 97530 THERAPEUTIC ACTIVITIES: CPT | Mod: GP | Performed by: PHYSICAL THERAPIST

## 2024-07-23 ENCOUNTER — TELEPHONE (OUTPATIENT)
Dept: ORTHOPEDICS | Facility: CLINIC | Age: 49
End: 2024-07-23
Payer: COMMERCIAL

## 2024-07-23 DIAGNOSIS — M25.562 LEFT KNEE PAIN, UNSPECIFIED CHRONICITY: Primary | ICD-10-CM

## 2024-07-23 PROBLEM — M23.41: Status: ACTIVE | Noted: 2024-07-02

## 2024-07-23 PROBLEM — M25.362 PATELLOFEMORAL INSTABILITY OF LEFT KNEE WITH PAIN: Status: ACTIVE | Noted: 2024-07-02

## 2024-07-23 NOTE — TELEPHONE ENCOUNTER
Patient is scheduled for surgery with Dr. Sarmiento    Spoke with: Lisset    Date of Surgery: 9/19/24 & 12/19/24    Location: [facility]    Informed patient they will need an adult  Yes    Pre op with Provider PCP, patient will schedule for both surgeries or c/b to [facility] w/PAC.     Additional imaging/appointments: PO Made    Surgery packet: Received     Additional comments: Requesting c/b from care team regarding recovery time frame for first surgery as she will be presenting in November.     Will route to care team.         Randee Moreno on 7/23/2024 at 9:20 AM

## 2024-07-23 NOTE — TELEPHONE ENCOUNTER
- A call was placed to the patient.     - Answered questions best I could, but let her know Dr. Sarmiento will have better answers at her VV.     - Patient verbalized understanding of plan and all questions were answered. Call back number to clinic was given and patient was told to call if they had an further questions.

## 2024-07-26 ENCOUNTER — THERAPY VISIT (OUTPATIENT)
Dept: PHYSICAL THERAPY | Facility: CLINIC | Age: 49
End: 2024-07-26
Payer: COMMERCIAL

## 2024-07-26 DIAGNOSIS — M25.561 CHRONIC PAIN OF BOTH KNEES: Primary | ICD-10-CM

## 2024-07-26 DIAGNOSIS — G89.29 CHRONIC PAIN OF BOTH KNEES: Primary | ICD-10-CM

## 2024-07-26 DIAGNOSIS — M25.562 CHRONIC PAIN OF BOTH KNEES: Primary | ICD-10-CM

## 2024-07-26 PROCEDURE — 97530 THERAPEUTIC ACTIVITIES: CPT | Mod: GP | Performed by: PHYSICAL THERAPIST

## 2024-07-26 PROCEDURE — 97110 THERAPEUTIC EXERCISES: CPT | Mod: GP | Performed by: PHYSICAL THERAPIST

## 2024-07-29 NOTE — TELEPHONE ENCOUNTER
FUTURE VISIT INFORMATION      SURGERY INFORMATION:  Date: 9/19/24  Location:  or  Surgeon:  Rima Sarmiento MD   Anesthesia Type:  general  Procedure: right knee arthroscopy, removal of loose body   Consult: ov 7/2/24    RECORDS REQUESTED FROM:       Primary Care Provider:   Katia Monsivais, [facility]

## 2024-08-08 DIAGNOSIS — M25.562 PATELLOFEMORAL INSTABILITY OF LEFT KNEE WITH PAIN: ICD-10-CM

## 2024-08-08 DIAGNOSIS — M23.41 BODY, LOOSE, KNEE, RIGHT: Primary | ICD-10-CM

## 2024-08-08 DIAGNOSIS — M25.362 PATELLOFEMORAL INSTABILITY OF LEFT KNEE WITH PAIN: ICD-10-CM

## 2024-08-09 ENCOUNTER — THERAPY VISIT (OUTPATIENT)
Dept: PHYSICAL THERAPY | Facility: CLINIC | Age: 49
End: 2024-08-09
Payer: COMMERCIAL

## 2024-08-09 DIAGNOSIS — M25.561 CHRONIC PAIN OF BOTH KNEES: Primary | ICD-10-CM

## 2024-08-09 DIAGNOSIS — G89.29 CHRONIC PAIN OF BOTH KNEES: Primary | ICD-10-CM

## 2024-08-09 DIAGNOSIS — M25.562 CHRONIC PAIN OF BOTH KNEES: Primary | ICD-10-CM

## 2024-08-09 PROCEDURE — 97530 THERAPEUTIC ACTIVITIES: CPT | Mod: GP | Performed by: PHYSICAL THERAPIST

## 2024-08-09 PROCEDURE — 97110 THERAPEUTIC EXERCISES: CPT | Mod: GP | Performed by: PHYSICAL THERAPIST

## 2024-08-20 ENCOUNTER — VIRTUAL VISIT (OUTPATIENT)
Dept: SURGERY | Facility: CLINIC | Age: 49
End: 2024-08-20
Payer: COMMERCIAL

## 2024-08-20 ENCOUNTER — ANESTHESIA EVENT (OUTPATIENT)
Dept: SURGERY | Facility: AMBULATORY SURGERY CENTER | Age: 49
End: 2024-08-20
Payer: COMMERCIAL

## 2024-08-20 ENCOUNTER — VIRTUAL VISIT (OUTPATIENT)
Dept: ORTHOPEDICS | Facility: CLINIC | Age: 49
End: 2024-08-20
Payer: COMMERCIAL

## 2024-08-20 ENCOUNTER — PRE VISIT (OUTPATIENT)
Dept: SURGERY | Facility: CLINIC | Age: 49
End: 2024-08-20

## 2024-08-20 VITALS — HEIGHT: 63 IN | BODY MASS INDEX: 22.32 KG/M2 | WEIGHT: 126 LBS

## 2024-08-20 DIAGNOSIS — Z01.818 PREOP EXAMINATION: Primary | ICD-10-CM

## 2024-08-20 DIAGNOSIS — M23.41 BODY, LOOSE, KNEE, RIGHT: ICD-10-CM

## 2024-08-20 DIAGNOSIS — M25.362 PATELLOFEMORAL INSTABILITY OF LEFT KNEE WITH PAIN: Primary | ICD-10-CM

## 2024-08-20 DIAGNOSIS — M25.562 PATELLOFEMORAL INSTABILITY OF LEFT KNEE WITH PAIN: Primary | ICD-10-CM

## 2024-08-20 DIAGNOSIS — M25.361 PATELLAR INSTABILITY OF RIGHT KNEE: ICD-10-CM

## 2024-08-20 PROCEDURE — 99203 OFFICE O/P NEW LOW 30 MIN: CPT | Mod: 95 | Performed by: PHYSICIAN ASSISTANT

## 2024-08-20 PROCEDURE — 99214 OFFICE O/P EST MOD 30 MIN: CPT | Mod: 95 | Performed by: ORTHOPAEDIC SURGERY

## 2024-08-20 ASSESSMENT — PAIN SCALES - GENERAL: PAINLEVEL: MILD PAIN (3)

## 2024-08-20 ASSESSMENT — ENCOUNTER SYMPTOMS: SEIZURES: 1

## 2024-08-20 NOTE — PATIENT INSTRUCTIONS
Preparing for Your Surgery      Name:  Lisset MITCHELL Chi   MRN:  [id number]   :  1975   Today's Date:  2024         Arriving for surgery:  Surgery date:  24  Arrival time:  11:00 am  Surgery time: 12:30 pm    Restrictions due to COVID 19:    Please maintain social distance.  Masking is optional        parking is available for anyone with mobility limitations or disabilities. (Monday- Friday 7 am- 5 pm)    Please come to:    [facility]  [address] [place] [id number]    Check in on the 5th floor, Ambulatory Surgery Center.    What can I eat or drink?    -  You may eat and drink normally until 8 hours before arrival time  (Until 3:00 am on 24)  -  You may have clear liquids until 2 hours before arrival time  (Until 9:00 am on 24)    Examples of clear liquids:  Water  Clear broth  Juices (apple, white grape, white cranberry  and cider) without pulp  Noncarbonated, powder based beverages  (lemonade and Lexa-Aid)  Sodas (Sprite, 7-Up, ginger ale and seltzer)  Coffee or tea (without milk or cream)  Gatorade    --No alcohol or cannabis products for at least 24 hours before surgery    Which medicines can I take?    Hold Supplements for 7 days before surgery. Turmeric    Hold Ibuprofen (Advil, Motrin) for 3 day before surgery--unless otherwise directed by surgeon.  Hold Naproxen (Aleve) for 4 days before surgery.    -  DO NOT take the following medications the day of surgery:  Calcium-vitamin D  Vitamin D    -  PLEASE TAKE the following medications the day of surgery   Albuterol inhaler as needed  Nasal spray as needed  Fexofenadine  Methocarbamol (Robaxin) as needed        How do I prepare myself?  - Please take 2 showers (one the night prior to surgery and one the morning of surgery) using Scrubcare or Hibiclens soap.    Use this soap only from the neck to your toes.     Leave the soap on your skin for one minute--then rinse thoroughly.      You may use  your own shampoo and conditioner; no other hair products.   - Please remove all jewelry and body piercings.  - No lotions, deodorants or fragrance.  - No makeup or fingernail polish.   - Bring your ID and insurance card.    -If you have a Deep Brain Stimulator, a Spinal Cord Stimulator or any implanted Neuro device you must bring the remote to the Surgery Center          ALL PATIENTS ARE REQUIRED TO HAVE A RESPONSIBLE ADULT TO DRIVE AND BE IN ATTENDANCE WITH THEM FOR 24 HOURS FOLLOWING SURGERY       Covid testing policy as of 12/06/2022  Your surgeon will notify and schedule you for a COVID test if one is needed before surgery--please direct any questions or COVID symptoms to your surgeon      Questions or Concerns:    -For questions regarding the day of surgery please contact the Ambulatory Surgery Center at [phone].    -If you have health changes between today and your surgery please contact your surgeon.     For questions after surgery please call your surgeons office.

## 2024-08-20 NOTE — PROGRESS NOTES
In addition you need to be more efficient actually can be on my could be what you have to do is get somebody at at [facility] to do something to it and then you take that and you bring it over and remember what that is but I did it was on my template I just cannot remember you right they do not send it right    There is a way to cut-and-paste into something and then say is and I am told I think that the postop visit should be relatively short but it should contain information was almost like a man crutches do not think like that he spent 10 more minutes reason For Visit:   Chief Complaint   Patient presents with    RECHECK     RTN viisit per care team // DOS: 9/19/24 right knee arthroscopy, removal of loose body /& DOS: 12/19/24 Knee Arthroscopy, Medial Patello-femoral Ligament Reconstruction with Allograft, trochleoplasty, possible Distal Tibial Tubercle Osteotomy, (Left)              Primary MD: Katia Monsivais  Ref. MD: EST     ?  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     Date of injury: 1- 2 years ago  Type of injury: Left knee dislocation and subluxed     Date of surgery: 3/2/2018  Type of surgery: scope and lateral compartment, ant chamber debridement.      Smoker: No  Request smoking cessation information: No    LMP 12/25/2013     Pain Assessment  Patient Currently in Pain: Yes  0-10 Pain Scale: 2  Primary Pain Location: Knee (bilateral)    Lisset MITCHELL Chi is a 48 year old who is being evaluated via a billable video visit.      How would you like to obtain your AVS? [email or web address]      Video-Visit Details    Type of service:  Video Visit   Video Start Time: 1:36 PM  Video End Time:1:54 PM    Originating Location (pt. Location): Home    Distant Location (provider location):  On-site  Platform used for Video Visit: Jorgito    Patient is a 48-year-old female who is scheduled to undergo a patella stabilizing procedure on her right knee, (DOS 9/19/2024).  This will be  a bilateral procedure, as her left knee has a large mobile loose body which has been catching.  Please see previous notes for full details.    She had multiple questions about her upcoming surgery.    The patient can work remotely.   She is planning to take 1 to 2 weeks off in September.  If we proceed with the opposite side patella stabilization which is planned for December, she will take off 3 weeks.    Based on her imaging and physical exam, I believe the patella stabilization is the preferred operation despite her age.  However if we scope the knee and find too much patellofemoral wear we will convert to a patellofemoral arthroplasty.    The patient has had some tailbone issues from sleeping a sofa for period of time.  She is concerned that there may be some pain if we do not padded well when she is on the table.  Will be sure to do this.    She also desired a handicap parking pass to be provided to her in and around the time of surgery.  We can accommodate this.    All questions were answered.    Rima Sarmiento MD  Professor Orthopedic Surgery  [facility]
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
## 2024-08-20 NOTE — PROGRESS NOTES
Lisset is a 48 year old who is being evaluated via a billable video visit.    How would you like to obtain your AVS? MyChart  If the video visit is dropped, the invitation should be resent by: Text to cell phone: [phone]    Subjective   Lisset is a 48 year old, presenting for the following health issues:  Pre-Op Exam    HPI         Physical Exam

## 2024-08-20 NOTE — LETTER
8/20/2024      Lisset MITCHELL Chi  [address] [id number]      Dear Colleague,    Thank you for referring your patient, Lisset MITCHELL Chi, to the [facility] [place]. Please see a copy of my visit note below.    In addition you need to be more efficient actually can be on my could be what you have to do is get somebody at at [facility] to do something to it and then you take that and you bring it over and remember what that is but I did it was on my template I just cannot remember you right they do not send it right    There is a way to cut-and-paste into something and then say is and I am told I think that the postop visit should be relatively short but it should contain information was almost like a man crutches do not think like that he spent 10 more minutes reason For Visit:   Chief Complaint   Patient presents with     RECHECK     RTN viisit per care team // DOS: 9/19/24 right knee arthroscopy, removal of loose body /& DOS: 12/19/24 Knee Arthroscopy, Medial Patello-femoral Ligament Reconstruction with Allograft, trochleoplasty, possible Distal Tibial Tubercle Osteotomy, (Left)              Primary MD: Katia Monsivais  Ref. MD: EST     ?  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     Date of injury: 1- 2 years ago  Type of injury: Left knee dislocation and subluxed     Date of surgery: 3/2/2018  Type of surgery: scope and lateral compartment, ant chamber debridement.      Smoker: No  Request smoking cessation information: No    LMP 12/25/2013     Pain Assessment  Patient Currently in Pain: Yes  0-10 Pain Scale: 2  Primary Pain Location: Knee (bilateral)    Lisset MITCHELL Chi is a 48 year old who is being evaluated via a billable video visit.      How would you like to obtain your AVS? ParentingInformerhart      Video-Visit Details    Type of service:  Video Visit   Video Start Time: 1:36 PM  Video End Time:1:54 PM    Originating Location (pt.  Location): Home    Distant Location (provider location):  On-site  Platform used for Video Visit: Jorgito    Patient is a 48-year-old female who is scheduled to undergo a patella stabilizing procedure on her right knee, (DOS 9/19/2024).  This will be a bilateral procedure, as her left knee has a large mobile loose body which has been catching.  Please see previous notes for full details.    She had multiple questions about her upcoming surgery.    The patient can work remotely.   She is planning to take 1 to 2 weeks off in September.  If we proceed with the opposite side patella stabilization which is planned for December, she will take off 3 weeks.    Based on her imaging and physical exam, I believe the patella stabilization is the preferred operation despite her age.  However if we scope the knee and find too much patellofemoral wear we will convert to a patellofemoral arthroplasty.    The patient has had some tailbone issues from sleeping a sofa for period of time.  She is concerned that there may be some pain if we do not padded well when she is on the table.  Will be sure to do this.    She also desired a handicap parking pass to be provided to her in and around the time of surgery.  We can accommodate this.    All questions were answered.    Rima Sarmiento MD  Professor Orthopedic Surgery  [facility]             Again, thank you for allowing me to participate in the care of your patient.        Sincerely,        Rima Sarmiento MD

## 2024-08-23 ENCOUNTER — THERAPY VISIT (OUTPATIENT)
Dept: PHYSICAL THERAPY | Facility: CLINIC | Age: 49
End: 2024-08-23
Payer: COMMERCIAL

## 2024-08-23 DIAGNOSIS — M25.561 CHRONIC PAIN OF BOTH KNEES: Primary | ICD-10-CM

## 2024-08-23 DIAGNOSIS — M25.562 CHRONIC PAIN OF BOTH KNEES: Primary | ICD-10-CM

## 2024-08-23 DIAGNOSIS — G89.29 CHRONIC PAIN OF BOTH KNEES: Primary | ICD-10-CM

## 2024-08-23 PROCEDURE — 97110 THERAPEUTIC EXERCISES: CPT | Mod: GP | Performed by: PHYSICAL THERAPIST

## 2024-08-23 PROCEDURE — 97112 NEUROMUSCULAR REEDUCATION: CPT | Mod: GP | Performed by: PHYSICAL THERAPIST

## 2024-08-27 DIAGNOSIS — M70.62 TROCHANTERIC BURSITIS OF LEFT HIP: ICD-10-CM

## 2024-08-27 DIAGNOSIS — M51.369 DDD (DEGENERATIVE DISC DISEASE), LUMBAR: ICD-10-CM

## 2024-08-27 DIAGNOSIS — M51.16 LUMBAR DISC HERNIATION WITH RADICULOPATHY: Primary | ICD-10-CM

## 2024-08-28 NOTE — TELEPHONE ENCOUNTER
FUTURE VISIT INFORMATION      SURGERY INFORMATION:  Date: 12/19/24  Location:  or  Surgeon:  Rima Sarmiento MD   Anesthesia Type:  choice with block  Procedure: Left Knee Arthroscopy, Medial Patello-femoral Ligament Reconstruction with Allograft, trochleoplasty, possible Distal Tibial Tubercle Osteotomy    RECORDS REQUESTED FROM:       Primary Care Provider: Katia Monsivais,  - [facility]

## 2024-09-10 DIAGNOSIS — J30.89 ALLERGIC RHINITIS DUE TO MOLD: ICD-10-CM

## 2024-09-10 DIAGNOSIS — J45.30 MILD PERSISTENT ASTHMA WITHOUT COMPLICATION: ICD-10-CM

## 2024-09-10 DIAGNOSIS — J30.1 SEASONAL ALLERGIC RHINITIS DUE TO POLLEN: ICD-10-CM

## 2024-09-10 DIAGNOSIS — J30.89 ALLERGIC RHINITIS DUE TO DUST MITE: ICD-10-CM

## 2024-09-10 DIAGNOSIS — J30.81 ALLERGIC RHINITIS DUE TO ANIMALS: ICD-10-CM

## 2024-09-10 RX ORDER — MONTELUKAST SODIUM 10 MG/1
10 TABLET ORAL AT BEDTIME
Qty: 90 TABLET | Refills: 0 | Status: SHIPPED | OUTPATIENT
Start: 2024-09-10 | End: 2024-10-01

## 2024-09-10 NOTE — TELEPHONE ENCOUNTER
Pending Prescriptions:                       Disp   Refills    montelukast (SINGULAIR) 10 MG tablet      90 tab*0            Sig: Take 1 tablet (10 mg) by mouth at bedtime.    Routing refill request to provider for review/approval because:  Patient needs to be seen because it has been more than 1 year since last office visit.  ACT completed greater than 6 months ago.    Appointment scheduled: 10/1/24    MICHELLE CummingsN, RN, PHN

## 2024-09-16 ENCOUNTER — MYC MEDICAL ADVICE (OUTPATIENT)
Dept: ALLERGY | Facility: CLINIC | Age: 49
End: 2024-09-16
Payer: COMMERCIAL

## 2024-09-17 NOTE — TELEPHONE ENCOUNTER
OK to change to virtual due to current circumstances. Next visit should be in-person with spirometry.

## 2024-09-17 NOTE — TELEPHONE ENCOUNTER
Routing to provider to advise. Patient has not been seen in person since 2022.    Cynthia BRIZUELA MA

## 2024-09-18 ASSESSMENT — ENCOUNTER SYMPTOMS: SEIZURES: 1

## 2024-09-18 NOTE — ANESTHESIA PREPROCEDURE EVALUATION
Anesthesia Pre-Procedure Evaluation    Patient: Lisset MITCHELL Chi   MRN: [id number] : 1975        Procedure : Procedure(s):  right knee arthroscopy, removal of loose body          Past Medical History:   Diagnosis Date    Allergic rhinitis due to other allergen     Asthma     Bilateral low back pain with left-sided sciatica 10/17/2022    IBS (irritable bowel syndrome)     Other internal derangement of knee(717.89)     patella dislocates easily    Premature ovarian failure     Raynaud's disease 3/08    Trochanteric bursitis of both hips 11/15/2017      Past Surgical History:   Procedure Laterality Date    ARTHROSCOPY KNEE Right 3/2/2018    Procedure: ARTHROSCOPY KNEE;  Right Knee Arthroscopy, Lateral Compartmnt Debridemen and  Anterior Chamber;  Surgeon: Rima Sarmiento MD;  [facility]     SECTION          COLONOSCOPY N/A 2022    Procedure: COLONOSCOPY, WITH HOT POLYPECTOMY, TATTOO, AND CLIPS;  Surgeon: Johnson Petit MD;  [facility]    COLONOSCOPY N/A 2023    Procedure: COLONOSCOPY, WITH POLYPECTOMY AND BIOPSY;  Surgeon: Johnson Petit MD;  Location: [facility] OR    HC DILATION/CURETTAGE DIAG/THER NON OB  2003    D & C for retained placenta one week after the delivery    I&D BARTHOLIN GLAND ABSCESS   and     mcgrath cath     KNEE SURGERY      Rt knee Fx, repair-dislocation problem    KNEE SURGERY      Lt knee reconstructive surgery-dislocation problem    LEEP TX, CERVICAL      LEEP for abnormal pap    MARSUP BARTHOLIN GLAND CYST  08      No Known Allergies   Social History     Tobacco Use    Smoking status: Never     Passive exposure: Never    Smokeless tobacco: Never   Substance Use Topics    Alcohol use: Not Currently     Comment: social      Wt Readings from Last 1 Encounters:   24 57.2 kg (126 lb)        Anesthesia Evaluation   Pt has had prior anesthetic. Type: General.    No history of anesthetic complications       ROS/MED  "HX  ENT/Pulmonary:     (+)           allergic rhinitis,          Intermittent, asthma  Treatment: Inhaler prn,                 Neurologic:     (+)       seizures, last seizure: 10 years old,                        Cardiovascular:  - neg cardiovascular ROS     METS/Exercise Tolerance: >4 METS    Hematologic:  - neg hematologic  ROS     Musculoskeletal: Comment: Recent tailbone pain with some radiation and numbness down into her right leg. Saw Dr. Larsen and PT      GI/Hepatic: Comment: IBS      Renal/Genitourinary:  - neg Renal ROS     Endo:  - neg endo ROS     Psychiatric/Substance Use:     (+) psychiatric history anxiety       Infectious Disease:  - neg infectious disease ROS     Malignancy:  - neg malignancy ROS     Other:  - neg other ROS          Physical Exam    Airway  airway exam normal      Mallampati: II   TM distance: > 3 FB   Neck ROM: full   Mouth opening: > 3 cm    Respiratory Devices and Support         Dental       (+) Completely normal teeth      Cardiovascular   cardiovascular exam normal       Rhythm and rate: regular and normal     Pulmonary   pulmonary exam normal        breath sounds clear to auscultation           OUTSIDE LABS:  CBC:   Lab Results   Component Value Date    WBC 8.1 12/27/2023    WBC 4.7 01/19/2022    HGB 14.8 12/27/2023    HGB 12.8 01/19/2022    HCT 44.8 12/27/2023    HCT 40.0 01/19/2022     12/27/2023     01/19/2022     BMP:   Lab Results   Component Value Date     12/27/2023     01/19/2022    POTASSIUM 3.8 12/27/2023    POTASSIUM 4.1 01/19/2022    CHLORIDE 100 12/27/2023    CHLORIDE 107 01/19/2022    CO2 24 12/27/2023    CO2 28 01/19/2022    BUN 16.2 12/27/2023    BUN 10 01/19/2022    CR 0.66 12/27/2023    CR 0.68 01/19/2022     (H) 12/27/2023    GLC 86 01/19/2022     COAGS: No results found for: \"PTT\", \"INR\", \"FIBR\"  POC:   Lab Results   Component Value Date    HCG Negative 12/27/2023    HCGS Negative 01/04/2021     HEPATIC:   Lab Results "   Component Value Date    ALBUMIN 4.3 12/27/2023    PROTTOTAL 7.0 12/27/2023    ALT 11 12/27/2023    AST 13 12/27/2023    ALKPHOS 100 12/27/2023    BILITOTAL 0.5 12/27/2023     OTHER:   Lab Results   Component Value Date    A1C 5.4 01/19/2022    LEANDRO 9.6 07/02/2024    PHOS 4.4 02/16/2024    MAG 2.4 (H) 01/04/2021    LIPASE 15 12/27/2023    TSH 1.63 02/16/2024    T4 0.78 02/17/2010    CRP <2.9 01/04/2021    SED 7 01/04/2021       Anesthesia Plan    ASA Status:  2    NPO Status:  NPO Appropriate    Anesthesia Type: General.     - Airway: LMA   Induction: Intravenous, Propofol.   Maintenance: Balanced.        Consents    Anesthesia Plan(s) and associated risks, benefits, and realistic alternatives discussed. Questions answered and patient/representative(s) expressed understanding.     - Discussed: Risks, Benefits and Alternatives for the PROCEDURE were discussed     - Discussed with:  Patient            Postoperative Care    Pain management: IV analgesics, Oral pain medications, Multi-modal analgesia.   PONV prophylaxis: Ondansetron (or other 5HT-3), Dexamethasone or Solumedrol     Comments:               Kris Carrasquillo MD    I have reviewed the pertinent notes and labs in the chart from the past 30 days and (re)examined the patient.  Any updates or changes from those notes are reflected in this note.

## 2024-09-19 ENCOUNTER — HOSPITAL ENCOUNTER (OUTPATIENT)
Facility: AMBULATORY SURGERY CENTER | Age: 49
Discharge: HOME | End: 2024-09-19
Attending: ORTHOPAEDIC SURGERY
Payer: COMMERCIAL

## 2024-09-19 ENCOUNTER — ANESTHESIA (OUTPATIENT)
Dept: SURGERY | Facility: AMBULATORY SURGERY CENTER | Age: 49
End: 2024-09-19
Payer: COMMERCIAL

## 2024-09-19 VITALS
SYSTOLIC BLOOD PRESSURE: 103 MMHG | HEART RATE: 79 BPM | DIASTOLIC BLOOD PRESSURE: 65 MMHG | TEMPERATURE: 97.8 F | OXYGEN SATURATION: 99 % | HEIGHT: 62 IN | BODY MASS INDEX: 23.19 KG/M2 | RESPIRATION RATE: 16 BRPM | WEIGHT: 126 LBS

## 2024-09-19 DIAGNOSIS — Z98.890 S/P RIGHT KNEE ARTHROSCOPY: Primary | ICD-10-CM

## 2024-09-19 PROCEDURE — 27330 BIOPSY KNEE JOINT LINING: CPT | Mod: RT

## 2024-09-19 PROCEDURE — 27330 BIOPSY KNEE JOINT LINING: CPT | Performed by: INTERNAL MEDICINE

## 2024-09-19 PROCEDURE — 27330 BIOPSY KNEE JOINT LINING: CPT | Performed by: NURSE ANESTHETIST, CERTIFIED REGISTERED

## 2024-09-19 RX ORDER — HYDROMORPHONE HYDROCHLORIDE 1 MG/ML
0.2 INJECTION, SOLUTION INTRAMUSCULAR; INTRAVENOUS; SUBCUTANEOUS EVERY 5 MIN PRN
Status: DISCONTINUED | OUTPATIENT
Start: 2024-09-19 | End: 2024-09-19 | Stop reason: HOSPADM

## 2024-09-19 RX ORDER — ACETAMINOPHEN 325 MG/1
650 TABLET ORAL
Status: DISCONTINUED | OUTPATIENT
Start: 2024-09-19 | End: 2024-09-20 | Stop reason: HOSPADM

## 2024-09-19 RX ORDER — SODIUM CHLORIDE, SODIUM LACTATE, POTASSIUM CHLORIDE, CALCIUM CHLORIDE 600; 310; 30; 20 MG/100ML; MG/100ML; MG/100ML; MG/100ML
INJECTION, SOLUTION INTRAVENOUS CONTINUOUS
Status: DISCONTINUED | OUTPATIENT
Start: 2024-09-19 | End: 2024-09-19 | Stop reason: HOSPADM

## 2024-09-19 RX ORDER — NALOXONE HYDROCHLORIDE 0.4 MG/ML
0.1 INJECTION, SOLUTION INTRAMUSCULAR; INTRAVENOUS; SUBCUTANEOUS
Status: DISCONTINUED | OUTPATIENT
Start: 2024-09-19 | End: 2024-09-19 | Stop reason: HOSPADM

## 2024-09-19 RX ORDER — DEXAMETHASONE SODIUM PHOSPHATE 10 MG/ML
4 INJECTION, SOLUTION INTRAMUSCULAR; INTRAVENOUS
Status: DISCONTINUED | OUTPATIENT
Start: 2024-09-19 | End: 2024-09-19 | Stop reason: HOSPADM

## 2024-09-19 RX ORDER — ONDANSETRON 4 MG/1
4 TABLET, ORALLY DISINTEGRATING ORAL EVERY 8 HOURS PRN
Qty: 4 TABLET | Refills: 0 | Status: SHIPPED | OUTPATIENT
Start: 2024-09-19 | End: 2024-10-01

## 2024-09-19 RX ORDER — ONDANSETRON 4 MG/1
4 TABLET, ORALLY DISINTEGRATING ORAL
Status: DISCONTINUED | OUTPATIENT
Start: 2024-09-19 | End: 2024-09-20 | Stop reason: HOSPADM

## 2024-09-19 RX ORDER — ONDANSETRON 2 MG/ML
4 INJECTION INTRAMUSCULAR; INTRAVENOUS EVERY 30 MIN PRN
Status: DISCONTINUED | OUTPATIENT
Start: 2024-09-19 | End: 2024-09-20 | Stop reason: HOSPADM

## 2024-09-19 RX ORDER — FENTANYL CITRATE 50 UG/ML
25 INJECTION, SOLUTION INTRAMUSCULAR; INTRAVENOUS EVERY 5 MIN PRN
Status: DISCONTINUED | OUTPATIENT
Start: 2024-09-19 | End: 2024-09-19 | Stop reason: HOSPADM

## 2024-09-19 RX ORDER — NALOXONE HYDROCHLORIDE 0.4 MG/ML
0.1 INJECTION, SOLUTION INTRAMUSCULAR; INTRAVENOUS; SUBCUTANEOUS
Status: DISCONTINUED | OUTPATIENT
Start: 2024-09-19 | End: 2024-09-20 | Stop reason: HOSPADM

## 2024-09-19 RX ORDER — DEXAMETHASONE SODIUM PHOSPHATE 4 MG/ML
INJECTION, SOLUTION INTRA-ARTICULAR; INTRALESIONAL; INTRAMUSCULAR; INTRAVENOUS; SOFT TISSUE PRN
Status: DISCONTINUED | OUTPATIENT
Start: 2024-09-19 | End: 2024-09-19

## 2024-09-19 RX ORDER — PROPOFOL 10 MG/ML
INJECTION, EMULSION INTRAVENOUS CONTINUOUS PRN
Status: DISCONTINUED | OUTPATIENT
Start: 2024-09-19 | End: 2024-09-19

## 2024-09-19 RX ORDER — DEXAMETHASONE SODIUM PHOSPHATE 10 MG/ML
4 INJECTION, SOLUTION INTRAMUSCULAR; INTRAVENOUS
Status: DISCONTINUED | OUTPATIENT
Start: 2024-09-19 | End: 2024-09-20 | Stop reason: HOSPADM

## 2024-09-19 RX ORDER — ALBUTEROL SULFATE 90 UG/1
2 AEROSOL, METERED RESPIRATORY (INHALATION) EVERY 6 HOURS PRN
Status: DISCONTINUED | OUTPATIENT
Start: 2024-09-19 | End: 2024-09-20 | Stop reason: HOSPADM

## 2024-09-19 RX ORDER — KETOROLAC TROMETHAMINE 30 MG/ML
INJECTION, SOLUTION INTRAMUSCULAR; INTRAVENOUS PRN
Status: DISCONTINUED | OUTPATIENT
Start: 2024-09-19 | End: 2024-09-19

## 2024-09-19 RX ORDER — GLYCOPYRROLATE 0.2 MG/ML
INJECTION, SOLUTION INTRAMUSCULAR; INTRAVENOUS PRN
Status: DISCONTINUED | OUTPATIENT
Start: 2024-09-19 | End: 2024-09-19

## 2024-09-19 RX ORDER — ONDANSETRON 4 MG/1
4 TABLET, ORALLY DISINTEGRATING ORAL EVERY 30 MIN PRN
Status: DISCONTINUED | OUTPATIENT
Start: 2024-09-19 | End: 2024-09-19 | Stop reason: HOSPADM

## 2024-09-19 RX ORDER — ONDANSETRON 2 MG/ML
4 INJECTION INTRAMUSCULAR; INTRAVENOUS EVERY 30 MIN PRN
Status: DISCONTINUED | OUTPATIENT
Start: 2024-09-19 | End: 2024-09-19 | Stop reason: HOSPADM

## 2024-09-19 RX ORDER — ACETAMINOPHEN 325 MG/1
650 TABLET ORAL EVERY 4 HOURS PRN
Qty: 50 TABLET | Refills: 0 | Status: SHIPPED | OUTPATIENT
Start: 2024-09-19

## 2024-09-19 RX ORDER — HYDROMORPHONE HYDROCHLORIDE 1 MG/ML
0.4 INJECTION, SOLUTION INTRAMUSCULAR; INTRAVENOUS; SUBCUTANEOUS EVERY 5 MIN PRN
Status: DISCONTINUED | OUTPATIENT
Start: 2024-09-19 | End: 2024-09-19 | Stop reason: HOSPADM

## 2024-09-19 RX ORDER — HYDROCODONE BITARTRATE AND ACETAMINOPHEN 5; 325 MG/1; MG/1
1 TABLET ORAL EVERY 4 HOURS PRN
Qty: 5 TABLET | Refills: 0 | Status: SHIPPED | OUTPATIENT
Start: 2024-09-19 | End: 2024-10-01

## 2024-09-19 RX ORDER — CEFAZOLIN SODIUM 2 G/50ML
2 SOLUTION INTRAVENOUS SEE ADMIN INSTRUCTIONS
Status: DISCONTINUED | OUTPATIENT
Start: 2024-09-19 | End: 2024-09-19 | Stop reason: HOSPADM

## 2024-09-19 RX ORDER — ONDANSETRON 2 MG/ML
INJECTION INTRAMUSCULAR; INTRAVENOUS PRN
Status: DISCONTINUED | OUTPATIENT
Start: 2024-09-19 | End: 2024-09-19

## 2024-09-19 RX ORDER — CEFAZOLIN SODIUM 2 G/50ML
2 SOLUTION INTRAVENOUS
Status: COMPLETED | OUTPATIENT
Start: 2024-09-19 | End: 2024-09-19

## 2024-09-19 RX ORDER — FENTANYL CITRATE 50 UG/ML
50 INJECTION, SOLUTION INTRAMUSCULAR; INTRAVENOUS EVERY 5 MIN PRN
Status: DISCONTINUED | OUTPATIENT
Start: 2024-09-19 | End: 2024-09-19 | Stop reason: HOSPADM

## 2024-09-19 RX ORDER — ONDANSETRON 4 MG/1
4 TABLET, ORALLY DISINTEGRATING ORAL EVERY 30 MIN PRN
Status: DISCONTINUED | OUTPATIENT
Start: 2024-09-19 | End: 2024-09-20 | Stop reason: HOSPADM

## 2024-09-19 RX ORDER — OXYCODONE HYDROCHLORIDE 5 MG/1
5 TABLET ORAL
Status: COMPLETED | OUTPATIENT
Start: 2024-09-19 | End: 2024-09-19

## 2024-09-19 RX ORDER — ACETAMINOPHEN 325 MG/1
975 TABLET ORAL ONCE
Status: COMPLETED | OUTPATIENT
Start: 2024-09-19 | End: 2024-09-19

## 2024-09-19 RX ORDER — LIDOCAINE HYDROCHLORIDE 20 MG/ML
INJECTION, SOLUTION INFILTRATION; PERINEURAL PRN
Status: DISCONTINUED | OUTPATIENT
Start: 2024-09-19 | End: 2024-09-19

## 2024-09-19 RX ORDER — OXYCODONE HYDROCHLORIDE 5 MG/1
10 TABLET ORAL
Status: DISCONTINUED | OUTPATIENT
Start: 2024-09-19 | End: 2024-09-20 | Stop reason: HOSPADM

## 2024-09-19 RX ORDER — BUPIVACAINE HYDROCHLORIDE 2.5 MG/ML
INJECTION, SOLUTION INFILTRATION; PERINEURAL PRN
Status: DISCONTINUED | OUTPATIENT
Start: 2024-09-19 | End: 2024-09-19 | Stop reason: HOSPADM

## 2024-09-19 RX ORDER — PROPOFOL 10 MG/ML
INJECTION, EMULSION INTRAVENOUS PRN
Status: DISCONTINUED | OUTPATIENT
Start: 2024-09-19 | End: 2024-09-19

## 2024-09-19 RX ORDER — AMOXICILLIN 250 MG
1-2 CAPSULE ORAL 2 TIMES DAILY PRN
Qty: 10 TABLET | Refills: 0 | Status: SHIPPED | OUTPATIENT
Start: 2024-09-19 | End: 2024-10-01

## 2024-09-19 RX ORDER — HYDROCODONE BITARTRATE AND ACETAMINOPHEN 5; 325 MG/1; MG/1
1 TABLET ORAL
Status: DISCONTINUED | OUTPATIENT
Start: 2024-09-19 | End: 2024-09-20 | Stop reason: HOSPADM

## 2024-09-19 RX ORDER — LIDOCAINE 40 MG/G
CREAM TOPICAL
Status: DISCONTINUED | OUTPATIENT
Start: 2024-09-19 | End: 2024-09-19 | Stop reason: HOSPADM

## 2024-09-19 RX ORDER — FENTANYL CITRATE 50 UG/ML
INJECTION, SOLUTION INTRAMUSCULAR; INTRAVENOUS PRN
Status: DISCONTINUED | OUTPATIENT
Start: 2024-09-19 | End: 2024-09-19

## 2024-09-19 RX ADMIN — GLYCOPYRROLATE 0.2 MG: 0.2 INJECTION, SOLUTION INTRAMUSCULAR; INTRAVENOUS at 09:37

## 2024-09-19 RX ADMIN — OXYCODONE HYDROCHLORIDE 5 MG: 5 TABLET ORAL at 12:18

## 2024-09-19 RX ADMIN — LIDOCAINE HYDROCHLORIDE 60 MG: 20 INJECTION, SOLUTION INFILTRATION; PERINEURAL at 09:41

## 2024-09-19 RX ADMIN — FENTANYL CITRATE 50 MCG: 50 INJECTION, SOLUTION INTRAMUSCULAR; INTRAVENOUS at 10:09

## 2024-09-19 RX ADMIN — SODIUM CHLORIDE, SODIUM LACTATE, POTASSIUM CHLORIDE, CALCIUM CHLORIDE: 600; 310; 30; 20 INJECTION, SOLUTION INTRAVENOUS at 07:49

## 2024-09-19 RX ADMIN — PROPOFOL 200 MCG/KG/MIN: 10 INJECTION, EMULSION INTRAVENOUS at 09:42

## 2024-09-19 RX ADMIN — ONDANSETRON 4 MG: 2 INJECTION INTRAMUSCULAR; INTRAVENOUS at 09:47

## 2024-09-19 RX ADMIN — FENTANYL CITRATE 25 MCG: 50 INJECTION, SOLUTION INTRAMUSCULAR; INTRAVENOUS at 11:11

## 2024-09-19 RX ADMIN — ACETAMINOPHEN 975 MG: 325 TABLET ORAL at 07:49

## 2024-09-19 RX ADMIN — DEXAMETHASONE SODIUM PHOSPHATE 4 MG: 4 INJECTION, SOLUTION INTRA-ARTICULAR; INTRALESIONAL; INTRAMUSCULAR; INTRAVENOUS; SOFT TISSUE at 09:41

## 2024-09-19 RX ADMIN — FENTANYL CITRATE 50 MCG: 50 INJECTION, SOLUTION INTRAMUSCULAR; INTRAVENOUS at 09:41

## 2024-09-19 RX ADMIN — CEFAZOLIN SODIUM 2 G: 2 SOLUTION INTRAVENOUS at 09:38

## 2024-09-19 RX ADMIN — ALBUTEROL SULFATE 2 PUFF: 90 INHALANT RESPIRATORY (INHALATION) at 08:29

## 2024-09-19 RX ADMIN — FENTANYL CITRATE 25 MCG: 50 INJECTION, SOLUTION INTRAMUSCULAR; INTRAVENOUS at 11:20

## 2024-09-19 RX ADMIN — PROPOFOL 150 MG: 10 INJECTION, EMULSION INTRAVENOUS at 09:41

## 2024-09-19 RX ADMIN — Medication 100 MCG: at 09:48

## 2024-09-19 RX ADMIN — KETOROLAC TROMETHAMINE 30 MG: 30 INJECTION, SOLUTION INTRAMUSCULAR; INTRAVENOUS at 10:41

## 2024-09-19 RX ADMIN — Medication 100 MCG: at 09:57

## 2024-09-19 RX ADMIN — PROPOFOL 100 MCG/KG/MIN: 10 INJECTION, EMULSION INTRAVENOUS at 10:44

## 2024-09-19 NOTE — ANESTHESIA CARE TRANSFER NOTE
Patient: Lisset MITCHELL Chi    Procedure: Procedure(s):  right knee arthroscopy, with open exploration of loose body       Diagnosis: Body, loose, knee, right [M23.41]  Diagnosis Additional Information: No value filed.    Anesthesia Type:   General     Note:    Oropharynx: oropharynx clear of all foreign objects and spontaneously breathing  Level of Consciousness: awake  Oxygen Supplementation: room air    Independent Airway: airway patency satisfactory and stable  Dentition: dentition unchanged  Vital Signs Stable: post-procedure vital signs reviewed and stable  Report to RN Given: handoff report given  Patient transferred to: PACU  Comments: VSS and WNL, comfortable, no PONV, report to Darleen UP  Handoff Report: Identifed the Patient, Identified the Reponsible Provider, Reviewed the pertinent medical history, Discussed the surgical course, Reviewed Intra-OP anesthesia mangement and issues during anesthesia, Set expectations for post-procedure period and Allowed opportunity for questions and acknowledgement of understanding      Vitals:  Vitals Value Taken Time   /68 09/19/24 1115   Temp 36.8  C (98.3  F) 09/19/24 1115   Pulse 71 09/19/24 1126   Resp 10 09/19/24 1126   SpO2 98 % 09/19/24 1126   Vitals shown include unfiled device data.    Electronically Signed By: JOSE Robles CRNA  September 19, 2024  11:27 AM

## 2024-09-19 NOTE — DISCHARGE INSTRUCTIONS
"    Safety Tips for Using Crutches    Crutch Fit:  Assume good standing posture with shoulders relaxed and crutch tips 6-8 inches out from the side of the foot.  The underarm pad should fall 2-3 fingers width below the armpit.  The handgrip is positioned level with the wrist to allow 30  flexion at the elbow.    Safety Tips:  Bear weight on your hands, not on your armpits.  Do not add extra padding to the underarm pad. This will, in effect, lengthen the crutches and increase risk of nerve injury.  Wear flat, properly fitting shoes. Do not walk in stocking feet, high heels or slippers.  Household hazards:  --Throw rugs should be removed from floors.  --Stairs should be cleared of obstacles.  --Use extra caution on slippery, highly polished, littered or uneven floor surfaces.  --Check for electric cords.  Check crutch tips for excessive wear and keep wing nuts tight.  While walking, look forward with  head up  and  eyes open.  Take equal length steps.  Use BOTH crutches.    Stairs Sequence:  UP: \"Good\" leg first, followed by  bad  leg, then crutches.  DOWN: Crutches, followed by  bad  leg, \"good\" leg.     Walking with Crutches:  Move both crutches forward at the same time.  Non-Weight Bearing (NWB):  Hold the involved leg up and swing through the crutches with the involved leg. The involved leg does not touch the floor.  Toe Touch Weight Bearing (TTWB): Move the involved leg forward. Rest it lightly on the floor for balance only. Step through the crutches with the uninvolved leg.  Partial Weight Bearing (PWB): Move the involved leg forward. Step down the weight of the leg only.  Step through the crutches with the uninvolved leg.  Weight Bearing As Tolerated (WBAT): Move the involved leg forward. Put as much pressure through the involved leg as you can tolerate comfortably. Then step through the crutches with the uninvolved leg.   Tylenol 975 mg given at 7:50  am.   Ok to take more after 1:50  pm.  Toradol 30 mg given " "at  10:40  am.  Do not take any NSAIDs for 6 hours.  OK after 4:40  am.  (Ibuprofen, Advil, Motrin, Naproxen, Aleve).         [facility]  Home Care Following Anesthesia  For 24 hours after surgery:  Get plenty of rest.  A responsible adult must stay with you for at least 24 hours after you leave the surgery center.  Do not drive or use heavy equipment.  If you have weakness or tingling, don't drive or use heavy equipment until this feeling goes away.   Do not drink alcohol.   Avoid strenuous or risky activities.  Ask for help when climbing stairs.  You may feel lightheaded.  IF so, sit for a few minutes before standing.  Have someone help you get up.   If you have nausea (feel sick to your stomach): Drink only clear liquids such as apple juice, ginger ale, broth or 7-Up.  Rest may also help.  Be sure to drink enough fluids.  Move to a regular diet as you feel able.   You may have a slight fever.  Call the doctor if your fever is over 100 F (37.7 C) (taken under the tongue) or lasts longer than 24 hours.  You may have a dry mouth, a sore throat, muscle aches or trouble sleeping. These should go away after 24 hours.  Do not make important or legal decisions.   It is recommended to avoid smoking.        Today you received a Marcaine or bupivacaine block to numb the nerves near your surgery site.  This is a block using local anesthetic or \"numbing\" medication injected around the nerves to anesthetize or \"numb\" the area supplied by those nerves.  This block is injected into the muscle layer near your surgical site.  The medication may numb the location where you had surgery for 6-18 hours, but may last up to 24 hours.  If your surgical site is an arm or leg you should be careful with your affected limb, since it is possible to injure your limb without being aware of it due to the numbing.  Until full feeling returns, you should guard against bumping or hitting your limb, and avoid " extreme hot or cold temperatures on the skin.  As the block wears off, the feeling will return as a tingling or prickly sensation near your surgical site.  You will experience more discomfort from your incision as the feeling returns.  You may want to take a pain pill (a narcotic or Tylenol if this was prescribed by your surgeon) when you start to experience mild pain before the pain beccomes more severe.  If your pain medications do not control your pain you should notifiy your surgeon.    Tips for taking pain medications  To get the best pain relief possible, remember these points:  Take pain medications as directed, before pain becomes severe.  Pain medication can upset your stomach: taking it with food may help.  Constipation is a common side effect of pain medication. Drink plenty of  fluids.  Eat foods high in fiber. Take a stool softener if recommended by your doctor or pharmacist.  Do not drink alcohol, drive or operate machinery while taking pain medications.  Ask about other ways to control pain, such as with heat, ice or relaxation.    Tylenol/Acetaminophen Consumption    If you feel your pain relief is insufficient, you may take Tylenol/Acetaminophen in addition to your narcotic pain medication.   Be careful not to exceed 4,000 mg of Tylenol/Acetaminophen in a 24 hour period from all sources.  If you are taking extra strength Tylenol/acetaminophen (500 mg), the maximum dose is 8 tablets in 24 hours.  If you are taking regular strength acetaminophen (325 mg), the maximum dose is 12 tablets in 24 hours.    Call a doctor for any of the following:  Signs of infection (fever, growing tenderness at the surgery site, a large amount of drainage or bleeding, severe pain, foul-smelling drainage, redness, swelling).  It has been over 8 to 10 hours since surgery and you are still not able to urinate (pass water).  Headache for over 24 hours.  Numbness, tingling or weakness the day after surgery (if you had spinal  anesthesia).  Signs of Covid-19 infection (temperature over 100 degrees, shortness of breath, cough, loss of taste/smell, generalized body aches, persistent headache, chills, sore throat, nausea/vomiting/diarrhea)  Your doctor is:  Dr. Rima Sarmiento, Orthopaedics: [phone]                    Or dial [phone] and ask for the resident on call for:  Orthopaedics  For emergency care, call the:  [facility] Emergency Department: [phone] (TTY for hearing impaired: [phone])

## 2024-09-19 NOTE — OP NOTE
PREOPERATIVE DIAGNOSIS:    Right knee lateral patella translation  2.   Remote history of patellar dislocation  3.   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 Right knee lateral patella translation  2. Remote history of patellar dislocation  3.  Intra-tendonous oseeous mass distal quad tendon    OPERATIONS:   1.  Right knee exam under anesthesia.   2.  Diagnostic arthroscopy.   3.  Open exploration of loose body     OPERATORS:  Rima Sarmiento MD; Greg Pineda MD; Olga Falcon MD     ANESTHESIA: General endotracheal anesthesia supplemented      Exam under anesthesia of Right  knee revealed range of   motion -/2/139.      Passive patellar mobility revealed 4 quadrants lateral mobility, 3   quadrants medial mobility, positive medial patellar tilt test.      Arthroscopic findings revealed  fluid upon entry into the joint.      The patient's patellofemoral compartment:  Medial Patella Facet:no cartilage wear   Median Ridge no cartilage wear   Lateral  Patella Facet grade 2 inferior cartilage wear   Medial Trochlea: no cartilage wear   Lateral Trochlea: no cartilage wear   Sulcus:showed no cartilage wear      ]Lateral patellar maltracking as viewed arthroscopically.      The patient's medial and lateral compartment showed grade 2-3 cartilage in mid femoral condyle lateral,  normal menisci.      DESCRIPTION OF PROCEDURE:  Under General endotracheal anesthesia anesthesia, the patient was  positioned supine on the operating room table.  The patient's leg was prepped and draped in the usual sterile fashion.  A pause was performed identifying the correct lead, the inclusion of a lead apron  over the patient as a shield from the flouroscopic unit, and the administration of antibiotics.      A diagnostic arthroscopy was performed using anterolateral portals for instrumentation and visualization respectively.  Diagnostic arthroscopy findings as stated above.     We looked for  loose bodies in the gutters of the knee with no visulization of any loose fragments, as well as in the supra-patellar pouch.    At the end of this portion of the procedure, excess fluid was removed from the knee.     We made a small (3cm) incision on the lateral side of the distal femur, dissected down to ITB, and then made a incision into the ITB to enter the joint.  Using manual palpation, we did not identify any mobile fragments, but there was a palpable fragment in the region the patient identified as the 'mass', which was about 2 o'clock position from the kneecap.  We then made a smaller incision in the skin over this mass, in line with a universal parapatellar incision, and carried it down to quad tendon and VMO.  We then were able to identify that the fragment was contained within the quad tendon, and decided to leave it intact..    The ITB was closed with #1 vicryl.  All  incisions were closed with 2-0 Vicryl on the subcutaneous and a running 3-0 Monocryl.  Exofin, a sterile dressing and a Tubigrip stockinette was put in place.      Patient can be WBAT.    PAMELLA SHEIKH MD       September 19, 2024

## 2024-09-19 NOTE — ANESTHESIA POSTPROCEDURE EVALUATION
Patient: Lisset MITCHELL Chi    Procedure: Procedure(s):  right knee arthroscopy, with open exploration of loose body       Anesthesia Type:  General    Note:  Disposition: Outpatient   Postop Pain Control: Uneventful            Sign Out: Well controlled pain   PONV: No   Neuro/Psych: Uneventful            Sign Out: Acceptable/Baseline neuro status   Airway/Respiratory: Uneventful            Sign Out: Acceptable/Baseline resp. status   CV/Hemodynamics: Uneventful            Sign Out: Acceptable CV status; No obvious hypovolemia; No obvious fluid overload   Other NRE: NONE   DID A NON-ROUTINE EVENT OCCUR? No           Last vitals:  Vitals Value Taken Time   /70 09/19/24 1130   Temp 36.8  C (98.3  F) 09/19/24 1115   Pulse 52 09/19/24 1136   Resp 12 09/19/24 1136   SpO2 99 % 09/19/24 1138   Vitals shown include unfiled device data.    Electronically Signed By: Mckenzie Anand MD  September 19, 2024  11:43 AM

## 2024-09-19 NOTE — BRIEF OP NOTE
[facility]    Brief Operative Note    Pre-operative diagnosis: Body, loose, knee, right [M23.41]  Post-operative diagnosis Same as pre-operative diagnosis    Procedure: right knee arthroscopy, with open exploration of loose body, Right - Knee    Surgeon: Surgeons and Role:     * Rima Sarmiento MD - Primary     * Greg Pineda MD - Assisting     * Olga Falcon MD - Assisting  Anesthesia: General   Estimated Blood Loss: 10 mL from 9/19/2024  9:36 AM to 9/19/2024 11:00 AM      Drains: None  Specimens: * No specimens in log *  Findings: None.  Complications: None.  Implants: * No implants in log *    Plan:  WBAT  No brace needed  ADAT  Pain control with orals prn  Bowel prophylaxis with senna-docusate  DVT prophylaxis: mechanical only  Follow-up in 2 weeks for incision check.        Olga Falcon MD  Orthopaedic Surgery, PGY3

## 2024-09-19 NOTE — ANESTHESIA PROCEDURE NOTES
Airway       Patient location during procedure: OR  Staff -        Performed By: CRNAIndications and Patient Condition       Indications for airway management: lynne-procedural       Induction type:intravenous       Mask difficulty assessment: 1 - vent by mask    Final Airway Details       Final airway type: supraglottic airway    Supraglottic Airway Details        Type: LMA       Brand: LMA Unique       LMA size: 4    Post intubation assessment        Placement verified by: capnometry        Number of attempts at approach: 1       Number of other approaches attempted: 0       Secured with: tape       Ease of procedure: easy       Dentition: Intact and Unchanged

## 2024-09-23 ENCOUNTER — THERAPY VISIT (OUTPATIENT)
Dept: PHYSICAL THERAPY | Facility: CLINIC | Age: 49
End: 2024-09-23
Payer: COMMERCIAL

## 2024-09-23 DIAGNOSIS — M23.41 BODY, LOOSE, KNEE, RIGHT: ICD-10-CM

## 2024-09-23 DIAGNOSIS — M25.561 ACUTE PAIN OF RIGHT KNEE: Primary | ICD-10-CM

## 2024-09-23 PROCEDURE — 97161 PT EVAL LOW COMPLEX 20 MIN: CPT | Mod: GP | Performed by: PHYSICAL THERAPIST

## 2024-09-23 PROCEDURE — 97110 THERAPEUTIC EXERCISES: CPT | Mod: GP | Performed by: PHYSICAL THERAPIST

## 2024-09-23 PROCEDURE — 97530 THERAPEUTIC ACTIVITIES: CPT | Mod: GP | Performed by: PHYSICAL THERAPIST

## 2024-09-23 ASSESSMENT — ACTIVITIES OF DAILY LIVING (ADL)
RAW_SCORE: 22
KNEEL ON THE FRONT OF YOUR KNEE: I AM UNABLE TO DO THE ACTIVITY
SQUAT: I AM UNABLE TO DO THE ACTIVITY
HOW_WOULD_YOU_RATE_THE_OVERALL_FUNCTION_OF_YOUR_KNEE_DURING_YOUR_USUAL_DAILY_ACTIVITIES?: SEVERELY ABNORMAL
GO UP STAIRS: ACTIVITY IS FAIRLY DIFFICULT
HOW_WOULD_YOU_RATE_THE_CURRENT_FUNCTION_OF_YOUR_KNEE_DURING_YOUR_USUAL_DAILY_ACTIVITIES_ON_A_SCALE_FROM_0_TO_100_WITH_100_BEING_YOUR_LEVEL_OF_KNEE_FUNCTION_PRIOR_TO_YOUR_INJURY_AND_0_BEING_THE_INABILITY_TO_PERFORM_ANY_OF_YOUR_USUAL_DAILY_ACTIVITIES?: 20
WALK: ACTIVITY IS FAIRLY DIFFICULT
SWELLING: THE SYMPTOM AFFECTS MY ACTIVITY SEVERELY
SIT WITH YOUR KNEE BENT: ACTIVITY IS VERY DIFFICULT
PAIN: THE SYMPTOM AFFECTS MY ACTIVITY SEVERELY
GO DOWN STAIRS: ACTIVITY IS FAIRLY DIFFICULT
LIMPING: THE SYMPTOM AFFECTS MY ACTIVITY SEVERELY
STAND: ACTIVITY IS MINIMALLY DIFFICULT
KNEE_ACTIVITY_OF_DAILY_LIVING_SUM: 22
WEAKNESS: I DO NOT HAVE THE SYMPTOM
PLEASE_INDICATE_YOR_PRIMARY_REASON_FOR_REFERRAL_TO_THERAPY:: KNEE
GIVING WAY, BUCKLING OR SHIFTING OF KNEE: THE SYMPTOM AFFECTS MY ACTIVITY SEVERELY
RISE FROM A CHAIR: ACTIVITY IS VERY DIFFICULT
KNEE_ACTIVITY_OF_DAILY_LIVING_SUM: 3
KNEE_ACTIVITY_OF_DAILY_LIVING_SCORE: 31.43
AS_A_RESULT_OF_YOUR_KNEE_INJURY,_HOW_WOULD_YOU_RATE_YOUR_CURRENT_LEVEL_OF_DAILY_ACTIVITY?: SEVERELY ABNORMAL
STIFFNESS: THE SYMPTOM AFFECTS MY ACTIVITY SEVERELY

## 2024-09-23 NOTE — PROGRESS NOTES
PHYSICAL THERAPY EVALUATION  Type of Visit: Evaluation              Subjective   Pt seen by this provider previously for chronic bilateral knee pain. Pt is now s/p R knee arthroscopy with open exploration of loose body that was found to be contained within the quad tendon and was left in tact.   POSTOPERATIVE DIAGNOSIS:    1. Right knee lateral patella translation  2. Remote history of patellar dislocation  3.  Intra-tendonous oseeous mass distal quad tendon        Presenting condition or subjective complaint: knee arthroscopy  Date of onset: 09/19/24    Relevant medical history:     Dates & types of surgery: broken knee repair 1988 and arthroscopy 2018    Prior diagnostic imaging/testing results: MRI; X-ray; Other arthroscopy   Prior therapy history for the same diagnosis, illness or injury: Yes          Living Environment  Social support: With a significant other or spouse   Type of home: House; 2-story; Basement   Stairs to enter the home: Yes       Ramp: No   Stairs inside the home: Yes 35 Is there a railing: Yes     Help at home: Self Cares (home health aide/personal care attendant, family, etc)  Equipment owned: Crutches     Employment: Yes   Hobbies/Interests: gardening hiking dancing singing cooking    Patient goals for therapy: walk normally and exercise    Pain assessment: See objective evaluation for additional pain details     Objective   KNEE EVALUATION  PAIN: Pain Level at Rest: 2/10  Pain Level with Use: 4/10  Pain Location: knee  Pain Quality: Aching  Pain Frequency: intermittent  Pain is Worst: no pattern  Pain is Exacerbated By: sitting, standing, walking, stairs, bending  Pain is Relieved By: cold, NSAIDs, and rest  Pain Progression: Improved  GAIT:  Weightbearing Status: WBAT  Assistive Device(s): Crutch (one)  Gait Deviations: Stance time decreased  Stride length decreased  Knee extension decreased R, Knee flexion decreased R  BALANCE/PROPRIOCEPTION:  NT  today  WEIGHTBEARING ALIGNMENT: WNL  NON-WEIGHTBEARING ALIGNMENT: WNL  ROM:  Right knee: 0-45 degrees AROM, 0-50 degrees AAROM. Hip AROM WNL bilat     STRENGTH:  good quad set bilat, able to do SLR without lag  FLEXIBILITY:  hypermobile    Assessment & Plan   CLINICAL IMPRESSIONS  Medical Diagnosis: s/p R knee arthroscopy    Treatment Diagnosis: s/p R knee arthroscopy   Impression/Assessment: Patient is a 48 year old female with R knee complaints.  The following significant findings have been identified: Pain, Decreased ROM/flexibility, Decreased joint mobility, Decreased strength, Impaired balance, and Impaired gait. These impairments interfere with their ability to perform self care tasks, work tasks, recreational activities, household chores, driving , household mobility, and community mobility as compared to previous level of function.     Clinical Decision Making (Complexity):  Clinical Presentation: Stable/Uncomplicated  Clinical Presentation Rationale: based on medical and personal factors listed in PT evaluation  Clinical Decision Making (Complexity): Low complexity    PLAN OF CARE  Treatment Interventions:  Interventions: Gait Training, Manual Therapy, Neuromuscular Re-education, Therapeutic Activity, Therapeutic Exercise    Long Term Goals     PT Goal 1  Goal Identifier: Squat  Goal Description: Pt will be able to squat with 0/10 knee pain  Rationale: to maximize safety and independence with performance of ADLs and functional tasks;to maximize safety and independence within the home;to maximize safety and independence within the community  Goal Progress: Pt unable to squat/has not tried  Target Date: 11/18/24      Frequency of Treatment: 1x/week  Duration of Treatment: 8 weeks    Recommended Referrals to Other Professionals:   Education Assessment:   Learner/Method: No Barriers to Learning    Risks and benefits of evaluation/treatment have been explained.   Patient/Family/caregiver agrees with Plan of  Care.     Evaluation Time:     PT Pb, Low Complexity Minutes (76829): 10       Signing Clinician: Imelda Olson PT

## 2024-09-27 ENCOUNTER — THERAPY VISIT (OUTPATIENT)
Dept: PHYSICAL THERAPY | Facility: CLINIC | Age: 49
End: 2024-09-27
Payer: COMMERCIAL

## 2024-09-27 DIAGNOSIS — M25.561 ACUTE PAIN OF RIGHT KNEE: Primary | ICD-10-CM

## 2024-09-27 PROCEDURE — 97140 MANUAL THERAPY 1/> REGIONS: CPT | Mod: GP | Performed by: PHYSICAL THERAPIST

## 2024-09-27 PROCEDURE — 97110 THERAPEUTIC EXERCISES: CPT | Mod: GP | Performed by: PHYSICAL THERAPIST

## 2024-09-30 ENCOUNTER — THERAPY VISIT (OUTPATIENT)
Dept: PHYSICAL THERAPY | Facility: CLINIC | Age: 49
End: 2024-09-30
Payer: COMMERCIAL

## 2024-09-30 DIAGNOSIS — M25.561 ACUTE PAIN OF RIGHT KNEE: Primary | ICD-10-CM

## 2024-09-30 PROCEDURE — 97140 MANUAL THERAPY 1/> REGIONS: CPT | Mod: GP | Performed by: PHYSICAL THERAPIST

## 2024-09-30 PROCEDURE — 97110 THERAPEUTIC EXERCISES: CPT | Mod: GP | Performed by: PHYSICAL THERAPIST

## 2024-10-01 ENCOUNTER — VIRTUAL VISIT (OUTPATIENT)
Dept: ALLERGY | Facility: CLINIC | Age: 49
End: 2024-10-01
Attending: ALLERGY & IMMUNOLOGY
Payer: COMMERCIAL

## 2024-10-01 ENCOUNTER — MYC MEDICAL ADVICE (OUTPATIENT)
Dept: ALLERGY | Facility: CLINIC | Age: 49
End: 2024-10-01

## 2024-10-01 DIAGNOSIS — J30.89 ALLERGIC RHINITIS DUE TO DUST MITE: ICD-10-CM

## 2024-10-01 DIAGNOSIS — J45.30 MILD PERSISTENT ASTHMA WITHOUT COMPLICATION: ICD-10-CM

## 2024-10-01 DIAGNOSIS — J30.89 ALLERGIC RHINITIS DUE TO MOLD: ICD-10-CM

## 2024-10-01 DIAGNOSIS — J30.81 ALLERGIC RHINITIS DUE TO ANIMALS: ICD-10-CM

## 2024-10-01 DIAGNOSIS — J30.1 SEASONAL ALLERGIC RHINITIS DUE TO POLLEN: ICD-10-CM

## 2024-10-01 PROCEDURE — 99214 OFFICE O/P EST MOD 30 MIN: CPT | Mod: 95 | Performed by: ALLERGY & IMMUNOLOGY

## 2024-10-01 PROCEDURE — G2211 COMPLEX E/M VISIT ADD ON: HCPCS | Mod: 95 | Performed by: ALLERGY & IMMUNOLOGY

## 2024-10-01 RX ORDER — ALBUTEROL SULFATE 90 UG/1
2 AEROSOL, METERED RESPIRATORY (INHALATION) EVERY 4 HOURS PRN
Qty: 18 G | Refills: 1 | Status: SHIPPED | OUTPATIENT
Start: 2024-10-01

## 2024-10-01 RX ORDER — MONTELUKAST SODIUM 10 MG/1
10 TABLET ORAL AT BEDTIME
Qty: 90 TABLET | Refills: 3 | Status: SHIPPED | OUTPATIENT
Start: 2024-10-01

## 2024-10-01 RX ORDER — AZELASTINE 1 MG/ML
1 SPRAY, METERED NASAL 2 TIMES DAILY
Qty: 30 ML | Refills: 3 | Status: SHIPPED | OUTPATIENT
Start: 2024-10-01

## 2024-10-01 NOTE — LETTER
10/1/2024      Lisset MITCHELL Chi  [address] [facility] [address]      Dear Colleague,    Thank you for referring your patient, Lisset MITCHELL Chi, to the [facility]. Please see a copy of my visit note below.    Lisset MITCHELL Chi is a 48 year old who is being evaluated today via a billable video visit    How would you like to obtain your AVS? MyChart  If the video visit is dropped, the invitation should be resent by: Text to cell phone: [phone]  Will anyone else be joining your video visit? No      Video Start Time: 4:29 PM    Video-Visit Details    Type of service:  Video Visit    Video End Time:4:37 PM    Originating Location (pt. Location): Home    Distant Location (provider location):  [facility]    Platform used for Video Visit: Jorgito    Lisset MITCHELL Chi was seen in the Allergy Clinic at [facility].      Lisset MITCHELL Chi is a 48 year old Not  or  female who is seen today for a follow-up visit.    Doing well, reports her asthma has been controlled. Used her albuterol inhaler the day she had surgery - attributes this to the weather and stress. No exacerbations or oral steroid use in the past year. Continues to take montelukast daily. Reports no side effects from the medication.    Continues to take fexofenadine daily. Also switched to azelastine and has been able to use this as needed. No longer using fluticasone. Finds the current combination of medications to be helpful and nasal congestion has improved.    Past Medical History:   Diagnosis Date     Allergic rhinitis due to other allergen      Asthma      Bilateral low back pain with left-sided sciatica 10/17/2022     IBS (irritable bowel syndrome)      Other internal derangement of knee(717.89) 2005    patella dislocates easily     Premature ovarian failure      Raynaud's disease 3/08     Trochanteric bursitis of both hips 11/15/2017     Family History   Problem Relation Age of Onset      Thyroid Disease Mother      Diabetes Mother         type2     Eye Disorder Mother         cataracts     Gastrointestinal Disease Mother         hep a/has to have her throat stretched (esophageal stricturing)     Respiratory Mother         sleep apnea     Heart Disease Father 60     Tremor Father      Thyroid Disease Maternal Grandmother      Gynecology Maternal Grandmother         on bcp to keep period regular, a small uterus     Heart Disease Maternal Grandmother         tachycardia     Diabetes Maternal Grandfather      Cancer Maternal Grandfather      C.A.D. Paternal Grandfather 30        age 30, then again in his 80's     Allergies Daughter         milk     Asthma Daughter      Social History     Tobacco Use     Smoking status: Never     Passive exposure: Never     Smokeless tobacco: Never   Vaping Use     Vaping status: Never Used   Substance Use Topics     Alcohol use: Not Currently     Comment: social     Drug use: No     Social History     Social History Narrative    Caffeine intake/servings daily - 0-1    Calcium intake/servings daily - 2-3    Exercise 2-3  times weekly - describe aerobics    Sunscreen used - Yes    Seatbelts used - Yes    Guns stored in the home - No    Self Breast Exam - Yes    Pap test up to date -  Yes    Eye exam up to date -  Yes    Dental exam up to date -  Yes    DEXA scan up to date -  Not Applicable    Flex Sig/Colonoscopy up to date -  Not Applicable    Mammography up to date -  Not Applicable    Immunizations reviewed and up to date - Yes    Abuse: Current or Past (Physical, Sexual or Emotional) - None current, past    Do you feel safe in your environment - Yes    Do you cope well with stress - Yes    Do you suffer from insomnia - No    Last updated by: Estefani Moraes MA 5/5/2010               Past medical, family, and social history were reviewed.      Current Outpatient Medications:      acetaminophen (TYLENOL) 325 MG tablet, Take 2 tablets (650 mg) by mouth every 4 hours as needed  for mild pain., Disp: 50 tablet, Rfl: 0     albuterol (PROAIR HFA/PROVENTIL HFA/VENTOLIN HFA) 108 (90 Base) MCG/ACT inhaler, Inhale 2 puffs into the lungs every 4 hours as needed for shortness of breath, wheezing or cough, Disp: 18 g, Rfl: 1     azelastine (ASTELIN) 0.1 % nasal spray, Spray 1 spray into both nostrils 2 times daily, Disp: 30 mL, Rfl: 3     Calcium Carbonate-Vitamin D (CALCIUM + D PO), Take 600 mg by mouth daily (with lunch), Disp: , Rfl:      FEXOFENADINE  MG OR TABS, Take 180 mg by mouth every morning, Disp: , Rfl:      HYDROcodone-acetaminophen (NORCO) 5-325 MG tablet, Take 1 tablet by mouth every 4 hours as needed for moderate to severe pain., Disp: 5 tablet, Rfl: 0     ibuprofen (ADVIL/MOTRIN) 600 MG tablet, TAKE 1 TABLET (600 MG) BY MOUTH EVERY 8 HOURS AS NEEDED FOR MODERATE PAIN, Disp: 60 tablet, Rfl: 1     methocarbamol (ROBAXIN) 500 MG tablet, TAKE 1 TABLET (500 MG) BY MOUTH NIGHTLY AS NEEDED FOR MUSCLE SPASMS, Disp: 30 tablet, Rfl: 0     montelukast (SINGULAIR) 10 MG tablet, Take 1 tablet (10 mg) by mouth at bedtime. Needs appointment for additional refills, Disp: 90 tablet, Rfl: 0     ondansetron (ZOFRAN ODT) 4 MG ODT tab, Take 1 tablet (4 mg) by mouth every 8 hours as needed for nausea., Disp: 4 tablet, Rfl: 0     senna-docusate (SENOKOT-S/PERICOLACE) 8.6-50 MG tablet, Take 1-2 tablets by mouth 2 times daily as needed for constipation., Disp: 10 tablet, Rfl: 0     Turmeric Curcumin 500 MG CAPS, Take 500 mg by mouth daily (with lunch), Disp: , Rfl:      VITAMIN D, CHOLECALCIFEROL, PO, Take 1,000 Units by mouth daily (with lunch), Disp: , Rfl:     Current Facility-Administered Medications:      hylan (SYNVISC ONE) injection 48 mg, 48 mg, , , , 48 mg at 05/24/24 1350     lidocaine (PF) (XYLOCAINE) 1 % injection 3 mL, 3 mL, , , , 3 mL at 05/24/24 1350     triamcinolone (KENALOG-40) injection 40 mg, 40 mg, , , Floyd Larsen MD, 40 mg at 03/11/24 1022  No Known Allergies    EXAM:    [facility] 12/25/2013   Physical Exam  Constitutional:       Appearance: Normal appearance.   HENT:      Right Ear: External ear normal.      Left Ear: External ear normal.      Nose: No rhinorrhea.   Pulmonary:      Effort: Pulmonary effort is normal. No respiratory distress.   Neurological:      General: No focal deficit present.      Mental Status: She is alert.   Psychiatric:         Mood and Affect: Mood and affect normal.           WORKUP:  None    ASSESSMENT/PLAN:  Lisset MITCHELL Chi is a 48 year old female seen for a follow-up visit.    1. Mild persistent asthma without complication - Controlled, no exacerbations or oral steroid use in the past year.    - Adult Allergy Clinic Follow-Up Order  - montelukast (SINGULAIR) 10 MG tablet; Take 1 tablet (10 mg) by mouth at bedtime.  Dispense: 90 tablet; Refill: 3  - albuterol (PROAIR HFA/PROVENTIL HFA/VENTOLIN HFA) 108 (90 Base) MCG/ACT inhaler; Inhale 2 puffs into the lungs every 4 hours as needed for shortness of breath, wheezing or cough.  Dispense: 18 g; Refill: 1  - Adult Allergy Clinic Follow-Up Order; Future    2. Seasonal allergic rhinitis due to pollen - Symptoms have improved with the addition of azelastine. She is using this intermittently when needed in addition to daily fexofenadine.    - Adult Allergy Clinic Follow-Up Order  - montelukast (SINGULAIR) 10 MG tablet; Take 1 tablet (10 mg) by mouth at bedtime.  Dispense: 90 tablet; Refill: 3  - azelastine (ASTELIN) 0.1 % nasal spray; Spray 1 spray into both nostrils 2 times daily.  Dispense: 30 mL; Refill: 3  - Adult Allergy Clinic Follow-Up Order; Future    3. Allergic rhinitis due to dust mite    - Adult Allergy Clinic Follow-Up Order  - montelukast (SINGULAIR) 10 MG tablet; Take 1 tablet (10 mg) by mouth at bedtime.  Dispense: 90 tablet; Refill: 3  - azelastine (ASTELIN) 0.1 % nasal spray; Spray 1 spray into both nostrils 2 times daily.  Dispense: 30 mL; Refill: 3  - Adult Allergy Clinic Follow-Up Order;  Future    4. Allergic rhinitis due to animals    - Adult Allergy Clinic Follow-Up Order  - montelukast (SINGULAIR) 10 MG tablet; Take 1 tablet (10 mg) by mouth at bedtime.  Dispense: 90 tablet; Refill: 3  - azelastine (ASTELIN) 0.1 % nasal spray; Spray 1 spray into both nostrils 2 times daily.  Dispense: 30 mL; Refill: 3  - Adult Allergy Clinic Follow-Up Order; Future    5. Allergic rhinitis due to mold    - Adult Allergy Clinic Follow-Up Order  - montelukast (SINGULAIR) 10 MG tablet; Take 1 tablet (10 mg) by mouth at bedtime.  Dispense: 90 tablet; Refill: 3  - azelastine (ASTELIN) 0.1 % nasal spray; Spray 1 spray into both nostrils 2 times daily.  Dispense: 30 mL; Refill: 3  - Adult Allergy Clinic Follow-Up Order; Future      Follow-up in 1 year, sooner if needed      Thank you for allowing me to participate in the care of Lisset MITCHELL Chi.      Flower Gaytan MD, [facility]  Allergy/Immunology  [facility]      Chart documentation done in part with Dragon Voice Recognition Software. Although reviewed after completion, some word and grammatical errors may remain.      Again, thank you for allowing me to participate in the care of your patient.        Sincerely,        Flower Gaytan MD

## 2024-10-01 NOTE — PROGRESS NOTES
Lisset MITCHELL Chi is a 48 year old who is being evaluated today via a billable video visit    How would you like to obtain your AVS? MyChart  If the video visit is dropped, the invitation should be resent by: Text to cell phone: [phone]  Will anyone else be joining your video visit? No      Video Start Time: 4:29 PM    Video-Visit Details    Type of service:  Video Visit    Video End Time:4:37 PM    Originating Location (pt. Location): Home    Distant Location (provider location):  [facility]    Platform used for Video Visit: Jorgito    Lisset MITCHELL Chi was seen in the Allergy Clinic at [facility].      Lisset MITCHELL Chi is a 48 year old Not  or  female who is seen today for a follow-up visit.    Doing well, reports her asthma has been controlled. Used her albuterol inhaler the day she had surgery - attributes this to the weather and stress. No exacerbations or oral steroid use in the past year. Continues to take montelukast daily. Reports no side effects from the medication.    Continues to take fexofenadine daily. Also switched to azelastine and has been able to use this as needed. No longer using fluticasone. Finds the current combination of medications to be helpful and nasal congestion has improved.    Past Medical History:   Diagnosis Date    Allergic rhinitis due to other allergen     Asthma     Bilateral low back pain with left-sided sciatica 10/17/2022    IBS (irritable bowel syndrome)     Other internal derangement of knee(717.89) 2005    patella dislocates easily    Premature ovarian failure     Raynaud's disease 3/08    Trochanteric bursitis of both hips 11/15/2017     Family History   Problem Relation Age of Onset    Thyroid Disease Mother     Diabetes Mother         type2    Eye Disorder Mother         cataracts    Gastrointestinal Disease Mother         hep a/has to have her throat stretched (esophageal stricturing)    Respiratory Mother         sleep  apnea    Heart Disease Father 60    Tremor Father     Thyroid Disease Maternal Grandmother     Gynecology Maternal Grandmother         on bcp to keep period regular, a small uterus    Heart Disease Maternal Grandmother         tachycardia    Diabetes Maternal Grandfather     Cancer Maternal Grandfather     C.A.D. Paternal Grandfather 30        age 30, then again in his 80's    Allergies Daughter         milk    Asthma Daughter      Social History     Tobacco Use    Smoking status: Never     Passive exposure: Never    Smokeless tobacco: Never   Vaping Use    Vaping status: Never Used   Substance Use Topics    Alcohol use: Not Currently     Comment: social    Drug use: No     Social History     Social History Narrative    Caffeine intake/servings daily - 0-1    Calcium intake/servings daily - 2-3    Exercise 2-3  times weekly - describe aerobics    Sunscreen used - Yes    Seatbelts used - Yes    Guns stored in the home - No    Self Breast Exam - Yes    Pap test up to date -  Yes    Eye exam up to date -  Yes    Dental exam up to date -  Yes    DEXA scan up to date -  Not Applicable    Flex Sig/Colonoscopy up to date -  Not Applicable    Mammography up to date -  Not Applicable    Immunizations reviewed and up to date - Yes    Abuse: Current or Past (Physical, Sexual or Emotional) - None current, past    Do you feel safe in your environment - Yes    Do you cope well with stress - Yes    Do you suffer from insomnia - No    Last updated by: Estefani Moraes MA 5/5/2010               Past medical, family, and social history were reviewed.      Current Outpatient Medications:     acetaminophen (TYLENOL) 325 MG tablet, Take 2 tablets (650 mg) by mouth every 4 hours as needed for mild pain., Disp: 50 tablet, Rfl: 0    albuterol (PROAIR HFA/PROVENTIL HFA/VENTOLIN HFA) 108 (90 Base) MCG/ACT inhaler, Inhale 2 puffs into the lungs every 4 hours as needed for shortness of breath, wheezing or cough, Disp: 18 g, Rfl: 1    azelastine  (ASTELIN) 0.1 % nasal spray, Spray 1 spray into both nostrils 2 times daily, Disp: 30 mL, Rfl: 3    Calcium Carbonate-Vitamin D (CALCIUM + D PO), Take 600 mg by mouth daily (with lunch), Disp: , Rfl:     FEXOFENADINE  MG OR TABS, Take 180 mg by mouth every morning, Disp: , Rfl:     HYDROcodone-acetaminophen (NORCO) 5-325 MG tablet, Take 1 tablet by mouth every 4 hours as needed for moderate to severe pain., Disp: 5 tablet, Rfl: 0    ibuprofen (ADVIL/MOTRIN) 600 MG tablet, TAKE 1 TABLET (600 MG) BY MOUTH EVERY 8 HOURS AS NEEDED FOR MODERATE PAIN, Disp: 60 tablet, Rfl: 1    methocarbamol (ROBAXIN) 500 MG tablet, TAKE 1 TABLET (500 MG) BY MOUTH NIGHTLY AS NEEDED FOR MUSCLE SPASMS, Disp: 30 tablet, Rfl: 0    montelukast (SINGULAIR) 10 MG tablet, Take 1 tablet (10 mg) by mouth at bedtime. Needs appointment for additional refills, Disp: 90 tablet, Rfl: 0    ondansetron (ZOFRAN ODT) 4 MG ODT tab, Take 1 tablet (4 mg) by mouth every 8 hours as needed for nausea., Disp: 4 tablet, Rfl: 0    senna-docusate (SENOKOT-S/PERICOLACE) 8.6-50 MG tablet, Take 1-2 tablets by mouth 2 times daily as needed for constipation., Disp: 10 tablet, Rfl: 0    Turmeric Curcumin 500 MG CAPS, Take 500 mg by mouth daily (with lunch), Disp: , Rfl:     VITAMIN D, CHOLECALCIFEROL, PO, Take 1,000 Units by mouth daily (with lunch), Disp: , Rfl:     Current Facility-Administered Medications:     hylan (SYNVISC ONE) injection 48 mg, 48 mg, , , , 48 mg at 05/24/24 1350    lidocaine (PF) (XYLOCAINE) 1 % injection 3 mL, 3 mL, , , , 3 mL at 05/24/24 1350    triamcinolone (KENALOG-40) injection 40 mg, 40 mg, , , Floyd Larsen MD, 40 mg at 03/11/24 1022  No Known Allergies    EXAM:   [facility] 12/25/2013   Physical Exam  Constitutional:       Appearance: Normal appearance.   HENT:      Right Ear: External ear normal.      Left Ear: External ear normal.      Nose: No rhinorrhea.   Pulmonary:      Effort: Pulmonary effort is normal. No respiratory  distress.   Neurological:      General: No focal deficit present.      Mental Status: She is alert.   Psychiatric:         Mood and Affect: Mood and affect normal.           WORKUP:  None    ASSESSMENT/PLAN:  Lisset MITCHELL Chi is a 48 year old female seen for a follow-up visit.    1. Mild persistent asthma without complication - Controlled, no exacerbations or oral steroid use in the past year.    - Adult Allergy Clinic Follow-Up Order  - montelukast (SINGULAIR) 10 MG tablet; Take 1 tablet (10 mg) by mouth at bedtime.  Dispense: 90 tablet; Refill: 3  - albuterol (PROAIR HFA/PROVENTIL HFA/VENTOLIN HFA) 108 (90 Base) MCG/ACT inhaler; Inhale 2 puffs into the lungs every 4 hours as needed for shortness of breath, wheezing or cough.  Dispense: 18 g; Refill: 1  - Adult Allergy Clinic Follow-Up Order; Future    2. Seasonal allergic rhinitis due to pollen - Symptoms have improved with the addition of azelastine. She is using this intermittently when needed in addition to daily fexofenadine.    - Adult Allergy Clinic Follow-Up Order  - montelukast (SINGULAIR) 10 MG tablet; Take 1 tablet (10 mg) by mouth at bedtime.  Dispense: 90 tablet; Refill: 3  - azelastine (ASTELIN) 0.1 % nasal spray; Spray 1 spray into both nostrils 2 times daily.  Dispense: 30 mL; Refill: 3  - Adult Allergy Clinic Follow-Up Order; Future    3. Allergic rhinitis due to dust mite    - Adult Allergy Clinic Follow-Up Order  - montelukast (SINGULAIR) 10 MG tablet; Take 1 tablet (10 mg) by mouth at bedtime.  Dispense: 90 tablet; Refill: 3  - azelastine (ASTELIN) 0.1 % nasal spray; Spray 1 spray into both nostrils 2 times daily.  Dispense: 30 mL; Refill: 3  - Adult Allergy Clinic Follow-Up Order; Future    4. Allergic rhinitis due to animals    - Adult Allergy Clinic Follow-Up Order  - montelukast (SINGULAIR) 10 MG tablet; Take 1 tablet (10 mg) by mouth at bedtime.  Dispense: 90 tablet; Refill: 3  - azelastine (ASTELIN) 0.1 % nasal spray; Spray 1 spray into both  nostrils 2 times daily.  Dispense: 30 mL; Refill: 3  - Adult Allergy Clinic Follow-Up Order; Future    5. Allergic rhinitis due to mold    - Adult Allergy Clinic Follow-Up Order  - montelukast (SINGULAIR) 10 MG tablet; Take 1 tablet (10 mg) by mouth at bedtime.  Dispense: 90 tablet; Refill: 3  - azelastine (ASTELIN) 0.1 % nasal spray; Spray 1 spray into both nostrils 2 times daily.  Dispense: 30 mL; Refill: 3  - Adult Allergy Clinic Follow-Up Order; Future      Follow-up in 1 year, sooner if needed      Thank you for allowing me to participate in the care of Lisset MITCHELL Chi.      Flower Gaytan MD, FAAAAI  Allergy/Immunology  [facility]      Chart documentation done in part with Dragon Voice Recognition Software. Although reviewed after completion, some word and grammatical errors may remain.

## 2024-10-02 ASSESSMENT — ASTHMA QUESTIONNAIRES: ACT_TOTALSCORE: 23

## 2024-10-03 NOTE — PROGRESS NOTES
Chief Complaint:   Follow up, DOS 9/19/24 with Dr. Sarmiento    Procedures:  1.  Right knee exam under anesthesia.   2.  Diagnostic arthroscopy.   3.  Open exploration of loose body    History:  Lisset MITCHELL Chi is a 48 year old who returns for incision check and evaluation approximately 2 weeks following surgery. She has been working with PT as well as HEP. The knee feels 'really good'. She is still going slowly downstairs but is able to ascend stairs with good strength. She did note some increased swelling in both legs following surgery but this is now resolved. She still experiences a 'pressing sensation' over the superior lateral knee at times.     Denies fevers, chills or sweats. Denies calf pain or tenderness, chest pain or shortness of breath.     Attending PT at [facility] [employer] [facility] in [place].     She is also scheduled for Left knee arthroscopy, MPFL reconstruction with allograft, trochleoplasty and possible distal tibial tubercle osteotomy in December for patellar instability.     Exam:    General: Awake, Alert, and oriented. Articulates and communicates with a normal affect     Right Lower Extremity:  Incisions well healed without evidence of infection, no erythema, drainage, or wound dehiscence   Normal post-operative effusion and ecchymosis  Range of motion: supple, 0-115 with smooth tracking of the patella.   TA/Gsc/EHL/FHL with 5/5 strength  Distal pulses palpable, toes are warm and well perfused   Gait: ambulating independently    Imaging:  No new imaging    Medications:     Current Outpatient Medications:     acetaminophen (TYLENOL) 325 MG tablet, Take 2 tablets (650 mg) by mouth every 4 hours as needed for mild pain., Disp: 50 tablet, Rfl: 0    albuterol (PROAIR HFA/PROVENTIL HFA/VENTOLIN HFA) 108 (90 Base) MCG/ACT inhaler, Inhale 2 puffs into the lungs every 4 hours as needed for shortness of breath, wheezing or cough., Disp: 18 g, Rfl: 1    azelastine (ASTELIN) 0.1 % nasal spray, Spray 1 spray into both  nostrils 2 times daily., Disp: 30 mL, Rfl: 3    Calcium Carbonate-Vitamin D (CALCIUM + D PO), Take 600 mg by mouth daily (with lunch), Disp: , Rfl:     FEXOFENADINE  MG OR TABS, Take 180 mg by mouth every morning, Disp: , Rfl:     ibuprofen (ADVIL/MOTRIN) 600 MG tablet, TAKE 1 TABLET (600 MG) BY MOUTH EVERY 8 HOURS AS NEEDED FOR MODERATE PAIN, Disp: 60 tablet, Rfl: 1    methocarbamol (ROBAXIN) 500 MG tablet, TAKE 1 TABLET (500 MG) BY MOUTH NIGHTLY AS NEEDED FOR MUSCLE SPASMS, Disp: 30 tablet, Rfl: 0    montelukast (SINGULAIR) 10 MG tablet, Take 1 tablet (10 mg) by mouth at bedtime., Disp: 90 tablet, Rfl: 3    Turmeric Curcumin 500 MG CAPS, Take 500 mg by mouth daily (with lunch), Disp: , Rfl:     VITAMIN D, CHOLECALCIFEROL, PO, Take 1,000 Units by mouth daily (with lunch), Disp: , Rfl:     Current Facility-Administered Medications:     hylan (SYNVISC ONE) injection 48 mg, 48 mg, , , , 48 mg at 05/24/24 1350    lidocaine (PF) (XYLOCAINE) 1 % injection 3 mL, 3 mL, , , , 3 mL at 05/24/24 1350    triamcinolone (KENALOG-40) injection 40 mg, 40 mg, , , Floyd Larsen MD, 40 mg at 03/11/24 1022    Assessment:  Doing well 2 weeks s/p right knee arthroscopy, open exploration of loose body on 9/19/24    Plan:   - Nylon sutures removed. She was directed to refrain from submerging in a tub or a pool until incisions are well-healed scar. No lotions or ointments on incision while healing. Directed to call with any drainage or redness about the incision area.   - Encouraged continued work with PT on right knee as well as left knee for quad strengthening, core and hip abductor strengthening for recovery from this surgery and to prepare for upcoming surgery on the left.   -Reviewed signs and symptoms of DVT/PE including calf pain or tenderness, chest pain or shortness of breath and directed them to report to call in to clinic or report to ED with any concerns or if they symptoms occur.   -DVT prophylaxis: mechanical  only  -Follow up: as scheduled in one month with Dr. Sarmiento for recheck and to discuss left knee surgery.     Radha Rodriguez PA-C   Orthopedic Surgery     Yes - the patient is able to be screened

## 2024-10-04 ENCOUNTER — THERAPY VISIT (OUTPATIENT)
Dept: PHYSICAL THERAPY | Facility: CLINIC | Age: 49
End: 2024-10-04
Payer: COMMERCIAL

## 2024-10-04 ENCOUNTER — MEDICAL CORRESPONDENCE (OUTPATIENT)
Dept: HEALTH INFORMATION MANAGEMENT | Facility: CLINIC | Age: 49
End: 2024-10-04

## 2024-10-04 ENCOUNTER — OFFICE VISIT (OUTPATIENT)
Dept: ORTHOPEDICS | Facility: CLINIC | Age: 49
End: 2024-10-04
Payer: COMMERCIAL

## 2024-10-04 DIAGNOSIS — M25.561 ACUTE PAIN OF RIGHT KNEE: Primary | ICD-10-CM

## 2024-10-04 DIAGNOSIS — Z98.890 S/P KNEE SURGERY: Primary | ICD-10-CM

## 2024-10-04 PROCEDURE — 97110 THERAPEUTIC EXERCISES: CPT | Mod: GP | Performed by: PHYSICAL THERAPIST

## 2024-10-04 PROCEDURE — 99024 POSTOP FOLLOW-UP VISIT: CPT | Performed by: PHYSICIAN ASSISTANT

## 2024-10-04 NOTE — NURSING NOTE
Reason For Visit:   Chief Complaint   Patient presents with    Surgical Followup     DOS: 9/19/24 right knee arthroscopy, removal of loose body with Dr. Sarmiento       [facility] 12/25/2013     Pain Assessment  Patient Currently in Pain: Yes  0-10 Pain Scale: 4  Primary Pain Location: Knee (Right)    [facility]

## 2024-10-04 NOTE — LETTER
10/4/2024      Lisset MITCHELL Chi  [address] [address] [address]      Dear Colleague,    Thank you for referring your patient, Lisset MITCHELL Chi, to the [facility] [place]. Please see a copy of my visit note below.    Chief Complaint:   Follow up, DOS 9/19/24 with Dr. Sarmiento    Procedures:  1.  Right knee exam under anesthesia.   2.  Diagnostic arthroscopy.   3.  Open exploration of loose body    History:  Lisset MITCHELL Chi is a 48 year old who returns for incision check and evaluation approximately 2 weeks following surgery. She has been working with PT as well as HEP. The knee feels 'really good'. She is still going slowly downstairs but is able to ascend stairs with good strength. She did note some increased swelling in both legs following surgery but this is now resolved. She still experiences a 'pressing sensation' over the superior lateral knee at times.     Denies fevers, chills or sweats. Denies calf pain or tenderness, chest pain or shortness of breath.     Attending PT at [employer] in [place].     She is also scheduled for Left knee arthroscopy, MPFL reconstruction with allograft, trochleoplasty and possible distal tibial tubercle osteotomy in December for patellar instability.     Exam:    General: Awake, Alert, and oriented. Articulates and communicates with a normal affect     Right Lower Extremity:  Incisions well healed without evidence of infection, no erythema, drainage, or wound dehiscence   Normal post-operative effusion and ecchymosis  Range of motion: supple, 0-115 with smooth tracking of the patella.   TA/Gsc/EHL/FHL with 5/5 strength  Distal pulses palpable, toes are warm and well perfused   Gait: ambulating independently    Imaging:  No new imaging    Medications:     Current Outpatient Medications:      acetaminophen (TYLENOL) 325 MG tablet, Take 2 tablets (650 mg) by mouth every 4 hours as needed for mild pain., Disp: 50 tablet, Rfl: 0     albuterol (PROAIR  HFA/PROVENTIL HFA/VENTOLIN HFA) 108 (90 Base) MCG/ACT inhaler, Inhale 2 puffs into the lungs every 4 hours as needed for shortness of breath, wheezing or cough., Disp: 18 g, Rfl: 1     azelastine (ASTELIN) 0.1 % nasal spray, Spray 1 spray into both nostrils 2 times daily., Disp: 30 mL, Rfl: 3     Calcium Carbonate-Vitamin D (CALCIUM + D PO), Take 600 mg by mouth daily (with lunch), Disp: , Rfl:      FEXOFENADINE  MG OR TABS, Take 180 mg by mouth every morning, Disp: , Rfl:      ibuprofen (ADVIL/MOTRIN) 600 MG tablet, TAKE 1 TABLET (600 MG) BY MOUTH EVERY 8 HOURS AS NEEDED FOR MODERATE PAIN, Disp: 60 tablet, Rfl: 1     methocarbamol (ROBAXIN) 500 MG tablet, TAKE 1 TABLET (500 MG) BY MOUTH NIGHTLY AS NEEDED FOR MUSCLE SPASMS, Disp: 30 tablet, Rfl: 0     montelukast (SINGULAIR) 10 MG tablet, Take 1 tablet (10 mg) by mouth at bedtime., Disp: 90 tablet, Rfl: 3     Turmeric Curcumin 500 MG CAPS, Take 500 mg by mouth daily (with lunch), Disp: , Rfl:      VITAMIN D, CHOLECALCIFEROL, PO, Take 1,000 Units by mouth daily (with lunch), Disp: , Rfl:     Current Facility-Administered Medications:      hylan (SYNVISC ONE) injection 48 mg, 48 mg, , , , 48 mg at 05/24/24 1350     lidocaine (PF) (XYLOCAINE) 1 % injection 3 mL, 3 mL, , , , 3 mL at 05/24/24 1350     triamcinolone (KENALOG-40) injection 40 mg, 40 mg, , , Floyd Larsen MD, 40 mg at 03/11/24 1022    Assessment:  Doing well 2 weeks s/p right knee arthroscopy, open exploration of loose body on 9/19/24    Plan:   - Nylon sutures removed. She was directed to refrain from submerging in a tub or a pool until incisions are well-healed scar. No lotions or ointments on incision while healing. Directed to call with any drainage or redness about the incision area.   - Encouraged continued work with PT on right knee as well as left knee for quad strengthening, core and hip abductor strengthening for recovery from this surgery and to prepare for upcoming surgery on the  left.   -Reviewed signs and symptoms of DVT/PE including calf pain or tenderness, chest pain or shortness of breath and directed them to report to call in to clinic or report to ED with any concerns or if they symptoms occur.   -DVT prophylaxis: mechanical only  -Follow up: as scheduled in one month with Dr. Sarmiento for recheck and to discuss left knee surgery.     Radha Rodriguez PA-C   Orthopedic Surgery      Again, thank you for allowing me to participate in the care of your patient.        Sincerely,        Radha Rodriguez PA-C

## 2024-10-11 ENCOUNTER — THERAPY VISIT (OUTPATIENT)
Dept: PHYSICAL THERAPY | Facility: CLINIC | Age: 49
End: 2024-10-11
Payer: COMMERCIAL

## 2024-10-11 DIAGNOSIS — M25.561 ACUTE PAIN OF RIGHT KNEE: Primary | ICD-10-CM

## 2024-10-11 PROCEDURE — 97110 THERAPEUTIC EXERCISES: CPT | Mod: GP | Performed by: PHYSICAL THERAPIST

## 2024-10-11 PROCEDURE — 97140 MANUAL THERAPY 1/> REGIONS: CPT | Mod: GP | Performed by: PHYSICAL THERAPIST

## 2024-10-29 ENCOUNTER — THERAPY VISIT (OUTPATIENT)
Dept: PHYSICAL THERAPY | Facility: CLINIC | Age: 49
End: 2024-10-29
Payer: COMMERCIAL

## 2024-10-29 DIAGNOSIS — J30.81 ALLERGIC RHINITIS DUE TO ANIMALS: ICD-10-CM

## 2024-10-29 DIAGNOSIS — J30.89 ALLERGIC RHINITIS DUE TO DUST MITE: ICD-10-CM

## 2024-10-29 DIAGNOSIS — M25.561 ACUTE PAIN OF RIGHT KNEE: Primary | ICD-10-CM

## 2024-10-29 DIAGNOSIS — J30.1 SEASONAL ALLERGIC RHINITIS DUE TO POLLEN: ICD-10-CM

## 2024-10-29 DIAGNOSIS — J30.89 ALLERGIC RHINITIS DUE TO MOLD: ICD-10-CM

## 2024-10-29 PROCEDURE — 97112 NEUROMUSCULAR REEDUCATION: CPT | Mod: GP | Performed by: PHYSICAL THERAPIST

## 2024-10-29 PROCEDURE — 97110 THERAPEUTIC EXERCISES: CPT | Mod: GP | Performed by: PHYSICAL THERAPIST

## 2024-10-29 RX ORDER — AZELASTINE 1 MG/ML
1 SPRAY, METERED NASAL 2 TIMES DAILY
Qty: 90 ML | Refills: 1 | Status: SHIPPED | OUTPATIENT
Start: 2024-10-29

## 2024-11-08 ENCOUNTER — THERAPY VISIT (OUTPATIENT)
Dept: PHYSICAL THERAPY | Facility: CLINIC | Age: 49
End: 2024-11-08
Payer: COMMERCIAL

## 2024-11-08 DIAGNOSIS — M25.561 ACUTE PAIN OF RIGHT KNEE: Primary | ICD-10-CM

## 2024-11-08 PROCEDURE — 97110 THERAPEUTIC EXERCISES: CPT | Mod: GP | Performed by: PHYSICAL THERAPIST

## 2024-11-08 PROCEDURE — 97140 MANUAL THERAPY 1/> REGIONS: CPT | Mod: GP | Performed by: PHYSICAL THERAPIST

## 2024-11-12 ENCOUNTER — OFFICE VISIT (OUTPATIENT)
Dept: ORTHOPEDICS | Facility: CLINIC | Age: 49
End: 2024-11-12
Payer: COMMERCIAL

## 2024-11-12 VITALS — BODY MASS INDEX: 23.55 KG/M2 | HEIGHT: 62 IN | WEIGHT: 128 LBS

## 2024-11-12 DIAGNOSIS — M25.362 PATELLOFEMORAL INSTABILITY OF LEFT KNEE WITH PAIN: Primary | ICD-10-CM

## 2024-11-12 DIAGNOSIS — M25.562 PATELLOFEMORAL INSTABILITY OF LEFT KNEE WITH PAIN: Primary | ICD-10-CM

## 2024-11-12 PROCEDURE — 99213 OFFICE O/P EST LOW 20 MIN: CPT | Mod: 24 | Performed by: ORTHOPAEDIC SURGERY

## 2024-11-12 RX ORDER — MULTIVITAMIN WITH IRON
1 TABLET ORAL DAILY
COMMUNITY

## 2024-11-12 NOTE — PROGRESS NOTES
Orthopedic Clinic Follow-up Visit  November 12, 2024    Chief Complaint:   Follow up, DOS 9/19/24 with Dr. Sarmiento     Procedures:   Right knee exam under anesthesia.   Diagnostic arthroscopy.   Open exploration of loose body    Planned procedure: Left knee arthroscopy, MPFL reconstruction, trochleoplasty, and possible tibial tubercle osteotomy with Dr. Sarmiento on 12/19/24     HPI: Lisset is a 49-year-old female who is following up nearly 2 months status post the above procedure.  She states in terms of the right knee she is doing well and making progress.  She notes that she has had an occasional clicking sensation in the right knee when she is in the swing phase of gait.  But overall it seems that this is improved from before the procedure.  She notes that she still has persistent swelling in the right knee.  Denies any issues with the incisions healing up.  She is working with physical therapy.  She had some tightness in the back of her knee that is improving.  She has had improved ability to both a send and descend stairs.  She notes with descending stairs she still needs to be near a railing but this is improving.    In terms of the left knee she notes that 3 weeks ago she had a patellar instability episode when she had her weight on her right leg and was moving her left knee where she felt that her patella dislocated and spontaneously reduced.  She did have some swelling in her knee but it only lasted for several days and then improved.  She has not had any further instability episodes.  Denies any significant pain or discomfort in the left knee.  She notes that she still has issues with knee confidence in that leg.     Physical Exam:   On physical examination the patient appears the stated age, is in no acute distress, affect is appropriate, and breathing is non-labored.    LLE:  No deformity, skin intact.    Prior surgical incision: There is a previous diagonal surgical scar from her previous patellar  medialization that is in the area of the tibial tubercle.  There is also a far lateral smaller well-healed surgical scar  Overall limb alignment is: Neutral  Effusion or swelling of the knee: None  Tenderness to palpation: No significant tenderness to palpation  ROM: 3 degrees of hyperextension to 142 degrees of flexion  Pain with knee ROM: No  Crepitance with knee ROM: Mild crepitus over the patellofemoral joint  Extensor lag: No lag  MCL stability: Stable  Lateral Stability: Stable  Lachman: 1A  Posterior stability: stable  Pain with passive full hip range of motion:   Patellar translation: 2 quadrant medial, 3 quadrants lateral   Apprehension: No significant apprehension  Medial patella tilt: 10  J-sign: hard J sign is present     Motor intact distally TA/GSC/EHL/FHL with 5/5 strength. SILT sp/dp/tibial/saph/sural nerves. DP/PT pulses palpable, 2+, toes warm and well perfused.     RLE:     No deformity, skin intact.  Surgical scars over the anterior, lateral and her arthroscopic portals are well-healed  Overall limb alignment is: Neutral  Effusion or swelling of the knee: Mild  Tenderness to palpation: No significant tenderness palpation  ROM: 0 to 138 degrees  Pain with knee ROM: No  Crepitance with knee ROM: No  Extensor lag: No  MCL stability: Stable  Lateral Stability: Stable  Lachman: 1A  Posterior stability: stable  Pain with passive full hip range of motion: none  Patellar translation: 2 quadrant medial, 2 quadrants lateral  Apprehension: Non  J-sign: Soft J sign     Motor intact distally TA/GSC/EHL/FHL with 5/5 strength. SILT sp/dp/tibial/saph/sural nerves. DP/PT pulses palpable, 2+, toes warm and well perfused.     Imaging:   No new radiographs are obtained today.  Review of her previous long length alignment films demonstrate about 1 degree of varus alignment bilaterally.  On her left knee there are her previous staples from a previous tibial tubercle medialization.  Her canton Echo ratio was 1.06.   Her 3 knee rotation is 18.6 degrees.  On her long-leg alignment films there is some suggestion of some anteversion that is present.  A supratrochlear spur is present.     Impression:   -Greater than 6 weeks status post right knee arthroscopy and exploration of a loose body that appeared to be contained within the quadriceps tendon it was left in place -she is progressing well for this.  -Left knee patellar instability with a hard J sign with a history of a previous patellar medialization     Plan:   In terms of the right knee appears that the patient is progressing well.  We discussion again with the patient that the thought of loose bodies seem to be contained within the quadriceps tendon.  Due to this we elected not to remove it.  It seems that the episodes of the snapping and popping of the knee are somewhat improved since the procedure.  She is making good progress with physical therapy working on her range of motion as well as her quad strengthening.  She does note that she has some persistent swelling and noted that this could be related to some lateral compartment wear as well as lat compartment wear.  We discussed that she could wear her lateral  brace as needed for her strenuous activities.  If she continues to have issues with this knee she can follow-up in the future and we can further discuss potential treatment options.  She does not have any persistent patellar instability in this knee.    In terms of the left knee we had a discussion with the patient regarding the plan for her procedure on December 19.  We discussed again MPFL reconstruction, trochleoplasty, as well as a possible medial tibial tubercle osteotomy.  We discussed the risks and benefits of this procedure.    It is important to note that the patient had a type of Teressa-Goldwaithe procedure on the left leg that continues to have staples placed medially where the lateral aspect of the patella tendon was moved medially.  She has a  oblique incision right over the tibial tubercle region which will need to be taken in consideration in regards to her surgical incision.  We will be careful to try to avoid any direct incision over the patella tendon if possible, and utilize the incision that is already there again if possible.  We discussed that we would not know the need for the tibial tubercle osteotomy until we are doing the surgery.  We also discussed the rehabilitation process as well as the weightbearing and bracing that would be necessary.  We will place a physical therapy referral so she can start to schedule for postoperative visits for this.    We also gave her a note for work to expect to be out of work or have limited work for 4 weeks after the surgery.    All the patient's questions regarding the procedure were answered and addressed prior to the conclusion of this visit.    Patient seen, evaluated and discussed with Dr. Sarmiento who is in agreement with the above assessment and plan.    Dangelo Hillmatthewholli  11/12/20248:36 AM    I have personally examined this patient and have reviewed the clinical presentation and progress note with the resident.  I agree with the treatment plan as outlined.  The plan was formulated with the resident on the day of the resident dictation.    Rima Sarmiento MD

## 2024-11-12 NOTE — LETTER
/  2024     Seen today: Yes    Patient:  Lisset MITCHELL Chi  :   1975  MRN:     [id number]  Physician: PAMELLA SHEIKH    Lisset MITCHELL Chi may not return to work or have reduced work for 4 weeks after her left knee surgery on 24.      Please call the clinic with any questions,      Electronically signed by Pamella Sheikh MD

## 2024-11-12 NOTE — LETTER
11/12/2024      Lisset MITCHELL Chi  [address] [address]      Dear Colleague,    Thank you for referring your patient, Lisset MITCHELL Chi, to the [facility] [place]. Please see a copy of my visit note below.    Orthopedic Clinic Follow-up Visit  November 12, 2024    Chief Complaint:   Follow up, DOS 9/19/24 with Dr. Sarmiento     Procedures:   Right knee exam under anesthesia.   Diagnostic arthroscopy.   Open exploration of loose body    Planned procedure: Left knee arthroscopy, MPFL reconstruction, trochleoplasty, and possible tibial tubercle osteotomy with Dr. Sarmiento on 12/19/24     HPI: Lisset is a 49-year-old female who is following up nearly 2 months status post the above procedure.  She states in terms of the right knee she is doing well and making progress.  She notes that she has had an occasional clicking sensation in the right knee when she is in the swing phase of gait.  But overall it seems that this is improved from before the procedure.  She notes that she still has persistent swelling in the right knee.  Denies any issues with the incisions healing up.  She is working with physical therapy.  She had some tightness in the back of her knee that is improving.  She has had improved ability to both a send and descend stairs.  She notes with descending stairs she still needs to be near a railing but this is improving.    In terms of the left knee she notes that 3 weeks ago she had a patellar instability episode when she had her weight on her right leg and was moving her left knee where she felt that her patella dislocated and spontaneously reduced.  She did have some swelling in her knee but it only lasted for several days and then improved.  She has not had any further instability episodes.  Denies any significant pain or discomfort in the left knee.  She notes that she still has issues with knee confidence in that leg.     Physical Exam:   On physical examination the patient  appears the stated age, is in no acute distress, affect is appropriate, and breathing is non-labored.    LLE:  No deformity, skin intact.    Prior surgical incision: There is a previous diagonal surgical scar from her previous patellar medialization that is in the area of the tibial tubercle.  There is also a far lateral smaller well-healed surgical scar  Overall limb alignment is: Neutral  Effusion or swelling of the knee: None  Tenderness to palpation: No significant tenderness to palpation  ROM: 3 degrees of hyperextension to 142 degrees of flexion  Pain with knee ROM: No  Crepitance with knee ROM: Mild crepitus over the patellofemoral joint  Extensor lag: No lag  MCL stability: Stable  Lateral Stability: Stable  Lachman: 1A  Posterior stability: stable  Pain with passive full hip range of motion:   Patellar translation: 2 quadrant medial, 3 quadrants lateral   Apprehension: No significant apprehension  Medial patella tilt: 10  J-sign: hard J sign is present     Motor intact distally TA/GSC/EHL/FHL with 5/5 strength. SILT sp/dp/tibial/saph/sural nerves. DP/PT pulses palpable, 2+, toes warm and well perfused.     RLE:     No deformity, skin intact.  Surgical scars over the anterior, lateral and her arthroscopic portals are well-healed  Overall limb alignment is: Neutral  Effusion or swelling of the knee: Mild  Tenderness to palpation: No significant tenderness palpation  ROM: 0 to 138 degrees  Pain with knee ROM: No  Crepitance with knee ROM: No  Extensor lag: No  MCL stability: Stable  Lateral Stability: Stable  Lachman: 1A  Posterior stability: stable  Pain with passive full hip range of motion: none  Patellar translation: 2 quadrant medial, 2 quadrants lateral  Apprehension: Non  J-sign: Soft J sign     Motor intact distally TA/GSC/EHL/FHL with 5/5 strength. SILT sp/dp/tibial/saph/sural nerves. DP/PT pulses palpable, 2+, toes warm and well perfused.     Imaging:   No new radiographs are obtained today.  Review  of her previous long length alignment films demonstrate about 1 degree of varus alignment bilaterally.  On her left knee there are her previous staples from a previous tibial tubercle medialization.  Her canton Echo ratio was 1.06.  Her 3 knee rotation is 18.6 degrees.  On her long-leg alignment films there is some suggestion of some anteversion that is present.  A supratrochlear spur is present.     Impression:   -Greater than 6 weeks status post right knee arthroscopy and exploration of a loose body that appeared to be contained within the quadriceps tendon it was left in place -she is progressing well for this.  -Left knee patellar instability with a hard J sign with a history of a previous patellar medialization     Plan:   In terms of the right knee appears that the patient is progressing well.  We discussion again with the patient that the thought of loose bodies seem to be contained within the quadriceps tendon.  Due to this we elected not to remove it.  It seems that the episodes of the snapping and popping of the knee are somewhat improved since the procedure.  She is making good progress with physical therapy working on her range of motion as well as her quad strengthening.  She does note that she has some persistent swelling and noted that this could be related to some lateral compartment wear as well as lat compartment wear.  We discussed that she could wear her lateral  brace as needed for her strenuous activities.  If she continues to have issues with this knee she can follow-up in the future and we can further discuss potential treatment options.  She does not have any persistent patellar instability in this knee.    In terms of the left knee we had a discussion with the patient regarding the plan for her procedure on December 19.  We discussed again MPFL reconstruction, trochleoplasty, as well as a possible medial tibial tubercle osteotomy.  We discussed the risks and benefits of this  procedure.    It is important to note that the patient had a type of Teressa-Goldwaithe procedure on the left leg that continues to have staples placed medially where the lateral aspect of the patella tendon was moved medially.  She has a oblique incision right over the tibial tubercle region which will need to be taken in consideration in regards to her surgical incision.  We will be careful to try to avoid any direct incision over the patella tendon if possible, and utilize the incision that is already there again if possible.  We discussed that we would not know the need for the tibial tubercle osteotomy until we are doing the surgery.  We also discussed the rehabilitation process as well as the weightbearing and bracing that would be necessary.  We will place a physical therapy referral so she can start to schedule for postoperative visits for this.    We also gave her a note for work to expect to be out of work or have limited work for 4 weeks after the surgery.    All the patient's questions regarding the procedure were answered and addressed prior to the conclusion of this visit.    Patient seen, evaluated and discussed with Dr. Sarmiento who is in agreement with the above assessment and plan.    Dangelo Mcpherson  11/12/20248:36 AM    I have personally examined this patient and have reviewed the clinical presentation and progress note with the resident.  I agree with the treatment plan as outlined.  The plan was formulated with the resident on the day of the resident dictation.    Rima Sarmiento MD       Again, thank you for allowing me to participate in the care of your patient.        Sincerely,        Rima Sarmiento MD

## 2024-11-12 NOTE — NURSING NOTE
"Reason For Visit:   Chief Complaint   Patient presents with    RECHECK     DOS: 9/19/24 right knee arthroscopy, removal of loose body       Primary MD: Katia Monsivais  Ref. MD: EST    ?  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     Date of injury: 1- 2 years ago  Type of injury: Left knee dislocation and subluxed     Date of surgery: 3/2/2018  Type of surgery: scope and lateral compartment, ant chamber debridement.      Smoker: No  Request smoking cessation information: No    Ht 1.585 m (5' 2.4\")   Wt 58.1 kg (128 lb)   LMP 12/25/2013   BMI 23.11 kg/m      Pain Assessment  Patient Currently in Pain: Denies          Maribel Almonte LPN   "

## 2024-11-15 ENCOUNTER — THERAPY VISIT (OUTPATIENT)
Dept: PHYSICAL THERAPY | Facility: CLINIC | Age: 49
End: 2024-11-15
Payer: COMMERCIAL

## 2024-11-15 DIAGNOSIS — M25.561 ACUTE PAIN OF RIGHT KNEE: Primary | ICD-10-CM

## 2024-11-15 PROCEDURE — 97110 THERAPEUTIC EXERCISES: CPT | Mod: GP | Performed by: PHYSICAL THERAPIST

## 2024-11-15 PROCEDURE — 97140 MANUAL THERAPY 1/> REGIONS: CPT | Mod: GP | Performed by: PHYSICAL THERAPIST

## 2024-11-26 ENCOUNTER — VIRTUAL VISIT (OUTPATIENT)
Dept: SURGERY | Facility: CLINIC | Age: 49
End: 2024-11-26
Payer: COMMERCIAL

## 2024-11-26 ENCOUNTER — ANESTHESIA EVENT (OUTPATIENT)
Dept: SURGERY | Facility: AMBULATORY SURGERY CENTER | Age: 49
End: 2024-11-26
Payer: COMMERCIAL

## 2024-11-26 ENCOUNTER — PRE VISIT (OUTPATIENT)
Dept: SURGERY | Facility: CLINIC | Age: 49
End: 2024-11-26

## 2024-11-26 VITALS — HEIGHT: 62 IN | BODY MASS INDEX: 23.55 KG/M2 | WEIGHT: 128 LBS

## 2024-11-26 DIAGNOSIS — M22.12 PATELLOFEMORAL INSTABILITY, LEFT: ICD-10-CM

## 2024-11-26 DIAGNOSIS — Z01.818 PREOP EXAMINATION: Primary | ICD-10-CM

## 2024-11-26 ASSESSMENT — ENCOUNTER SYMPTOMS
SEIZURES: 1
ORTHOPNEA: 0

## 2024-11-26 NOTE — PROGRESS NOTES
Lisset is a 49 year old who is being evaluated via a billable video visit.      How would you like to obtain your AVS? MyChart          Subjective   Lisset is a 49 year old, presenting for the following health issues:  Pre-Op Exam        TRENT Woo LPN

## 2024-11-26 NOTE — H&P
Pre-Operative H & P     CC:  Preoperative exam to assess for increased cardiopulmonary risk while undergoing surgery and anesthesia.    Date of Encounter: 11/26/2024  Primary Care Physician:  Katia Monsivais     Reason for visit:   Encounter Diagnoses   Name Primary?    Preop examination Yes    Patellofemoral instability, left        HPI  Lisset MITCHELL Chi is a 49 year old female who presents for pre-operative H & P in preparation for  Procedure Information       Case: [id number] Date/Time: 12/19/24 1025    Procedure: Left Knee Arthroscopy, Medial Patello-femoral Ligament Reconstruction with Allograft, trochleoplasty, possible Distal Tibial Tubercle Osteotomy, (Left: Knee)    Anesthesia type: Choice with Block    Diagnosis: Patellofemoral instability of left knee with pain [M25.362, M25.562]    Pre-op diagnosis: Patellofemoral instability of left knee with pain [M25.362, M25.562]    Location: [place] [address] / [facility]    Providers: Rima Sarmiento MD            Lisset MITCHELL Chi is a 49 year old female with asthma, allergic rhinitis, depression and IBS that has patellofemoral instability of the left knee with pain.  Of note, she has been following with Dr. Sarmiento for bilateral knee issues and is s/p right knee arthroplasty on 9/19/24.  She had a left knee MRI in May 2024 which showed multiple patellar abnormalities.  Approximately 5 weeks ago she had an episode in which she felt that her left patella dislocated and spontaneously reduced.  There was some swelling present in the knee for several days afterwards, which resolved on its own.  There hasn't been any further issues, but she wasn't feeling confident with that knee so she consulted with Dr. Sarmiento.  The above listed procedure has now been recommended for treatment.     History is obtained from the patient and chart review    Hx of abnormal bleeding or anti-platelet use: none    Menstrual history: Patient's last  menstrual period was 2013.:      Past Medical History  Past Medical History:   Diagnosis Date    Allergic rhinitis due to other allergen     Asthma     Bilateral low back pain with left-sided sciatica 10/17/2022    IBS (irritable bowel syndrome)     Other internal derangement of knee(717.89)     patella dislocates easily    Premature ovarian failure     Raynaud's disease 3/08    Trochanteric bursitis of both hips 11/15/2017       Past Surgical History  Past Surgical History:   Procedure Laterality Date    ARTHROSCOPY KNEE Right 3/2/2018    Procedure: ARTHROSCOPY KNEE;  Right Knee Arthroscopy, Lateral Compartmnt Debridemen and  Anterior Chamber;  Surgeon: Rima Sarmiento MD;  [facility]: [facility]    ARTHROSCOPY KNEE Right 2024    Procedure: right knee arthroscopy, with open exploration of loose body;  Surgeon: Rima Sarmiento MD;  Location: [facility] OR     SECTION          COLONOSCOPY N/A 2022    Procedure: COLONOSCOPY, WITH HOT POLYPECTOMY, TATTOO, AND CLIPS;  Surgeon: Johnson Petit MD;  Location: [facility] OR    COLONOSCOPY N/A 2023    Procedure: COLONOSCOPY, WITH POLYPECTOMY AND BIOPSY;  Surgeon: Johnson Petit MD;  Location: [facility] OR    HC DILATION/CURETTAGE DIAG/THER NON OB  2003    D & C for retained placenta one week after the delivery    I&D BARTHOLIN GLAND ABSCESS   and     mcgrath cath     KNEE SURGERY      Rt knee Fx, repair-dislocation problem    KNEE SURGERY      Lt knee reconstructive surgery-dislocation problem    LEEP TX, CERVICAL      LEEP for abnormal pap    MARSUP BARTHOLIN GLAND CYST  08       Prior to Admission Medications  Current Outpatient Medications   Medication Sig Dispense Refill    acetaminophen (TYLENOL) 325 MG tablet Take 2 tablets (650 mg) by mouth every 4 hours as needed for mild pain. 50 tablet 0    albuterol (PROAIR HFA/PROVENTIL HFA/VENTOLIN HFA) 108 (90 Base) MCG/ACT inhaler Inhale 2 puffs into the lungs every  4 hours as needed for shortness of breath, wheezing or cough. 18 g 1    azelastine (ASTELIN) 0.1 % nasal spray Spray 1 spray into both nostrils 2 times daily. (Patient taking differently: Spray 1 spray into both nostrils every morning.) 90 mL 1    Calcium Carbonate-Vitamin D (CALCIUM + D PO) Take 600 mg by mouth daily (with lunch)      FEXOFENADINE  MG OR TABS Take 180 mg by mouth every morning      ibuprofen (ADVIL/MOTRIN) 600 MG tablet TAKE 1 TABLET (600 MG) BY MOUTH EVERY 8 HOURS AS NEEDED FOR MODERATE PAIN 60 tablet 1    magnesium 250 MG tablet Take 1 tablet by mouth daily (with lunch).      methocarbamol (ROBAXIN) 500 MG tablet TAKE 1 TABLET (500 MG) BY MOUTH NIGHTLY AS NEEDED FOR MUSCLE SPASMS 30 tablet 0    montelukast (SINGULAIR) 10 MG tablet Take 1 tablet (10 mg) by mouth at bedtime. 90 tablet 3    Turmeric Curcumin 500 MG CAPS Take 500 mg by mouth daily (with lunch)      VITAMIN D, CHOLECALCIFEROL, PO Take 2,000 Units by mouth daily (with lunch).         Allergies  Allergies   Allergen Reactions    Milk (Cow) GI Disturbance       Social History  Social History     Socioeconomic History    Marital status:      Spouse name: Not on file    Number of children: 2    Years of education: Not on file    Highest education level: Not on file   Occupational History    Occupation:      Employer: [employer]   Tobacco Use    Smoking status: Never     Passive exposure: Never    Smokeless tobacco: Never   Vaping Use    Vaping status: Never Used   Substance and Sexual Activity    Alcohol use: Yes     Comment: once a month 1-2 drinks    Drug use: No    Sexual activity: Yes     Partners: Male     Birth control/protection: Surgical     Comment:  had vasectomy   Other Topics Concern     Service No    Blood Transfusions Not Asked    Caffeine Concern No    Occupational Exposure No    Hobby Hazards Not Asked    Sleep Concern No    Stress Concern Yes    Weight Concern Yes      Comment: gains easily    Special Diet No    Back Care Yes    Exercise Yes     Comment: irregularly    Bike Helmet Not Asked     Comment: doesn't bike    Seat Belt No    Self-Exams No    Parent/sibling w/ CABG, MI or angioplasty before 65F 55M? No   Social History Narrative    Caffeine intake/servings daily - 0-1    Calcium intake/servings daily - 2-3    Exercise 2-3  times weekly - describe aerobics    Sunscreen used - Yes    Seatbelts used - Yes    Guns stored in the home - No    Self Breast Exam - Yes    Pap test up to date -  Yes    Eye exam up to date -  Yes    Dental exam up to date -  Yes    DEXA scan up to date -  Not Applicable    Flex Sig/Colonoscopy up to date -  Not Applicable    Mammography up to date -  Not Applicable    Immunizations reviewed and up to date - Yes    Abuse: Current or Past (Physical, Sexual or Emotional) - None current, past    Do you feel safe in your environment - Yes    Do you cope well with stress - Yes    Do you suffer from insomnia - No    Last updated by: Estefani Moraes MA 5/5/2010             Social Drivers of Health     Financial Resource Strain: Not on file   Food Insecurity: Not on file   Transportation Needs: Not on file   Physical Activity: Not on file   Stress: Not on file   Social Connections: Not on file   Interpersonal Safety: Low Risk  (9/19/2024)    Interpersonal Safety     Do you feel physically and emotionally safe where you currently live?: Yes     Within the past 12 months, have you been hit, slapped, kicked or otherwise physically hurt by someone?: No     Within the past 12 months, have you been humiliated or emotionally abused in other ways by your partner or ex-partner?: No   Housing Stability: Not on file       Family History  Family History   Problem Relation Age of Onset    Thyroid Disease Mother     Diabetes Mother         type2    Eye Disorder Mother         cataracts    Gastrointestinal Disease Mother         hep a/has to have her throat stretched  (esophageal stricturing)    Respiratory Mother         sleep apnea    Heart Disease Father 60    Tremor Father     Thyroid Disease Maternal Grandmother     Gynecology Maternal Grandmother         on bcp to keep period regular, a small uterus    Heart Disease Maternal Grandmother         tachycardia    Diabetes Maternal Grandfather     Cancer Maternal Grandfather     STEPHEN.A.D. Paternal Grandfather 30        age 30, then again in his 80's    Allergies Daughter         milk    Asthma Daughter     Anesthesia Reaction No family hx of     Thrombosis No family hx of        Review of Systems  The complete review of systems is negative other than noted in the HPI or here.   Anesthesia Evaluation   Pt has had prior anesthetic.     No history of anesthetic complications       ROS/MED HX  ENT/Pulmonary:     (+)           allergic rhinitis,          Intermittent, asthma  Treatment: Inhaler prn,       recent URI, resolved, common cold 1 week ago.  all symptoms resolved.:        Neurologic:     (+)       seizures, last seizure: 10 years old -none since then,                        Cardiovascular:     (+) Dyslipidemia - -   -  - -                                 Previous cardiac testing   Echo: Date: Results:    Stress Test:  Date: Results:    ECG Reviewed:  Date: 2016 Results:  EKG  2016  Sinus  Bradycardia   -Old anterior infarct.     ABNORMAL   Cath:  Date: Results:   (-) TODD and orthopnea/PND   METS/Exercise Tolerance: >4 METS Comment: Rides a stationary bike for exercise.  Also goes for occasional walks and goes to PT   Hematologic:  - neg hematologic  ROS     Musculoskeletal: Comment: \left knee patellofemoral instability    Hypermobile joints          GI/Hepatic: Comment: IBS- constipation prone      Renal/Genitourinary:  - neg Renal ROS     Endo:  - neg endo ROS     Psychiatric/Substance Use:     (+) psychiatric history anxiety       Infectious Disease:  - neg infectious disease ROS     Malignancy:  - neg malignancy ROS      Other:  - neg other ROS          Virtual visit -  No vitals were obtained    Physical Exam  Constitutional: Awake, alert, cooperative, no apparent distress, and appears stated age.  Eyes: Pupils equal  HENT: Normocephalic  Respiratory: non labored breathing   Neurologic: Awake, alert, oriented to name, place and time.   Neuropsychiatric: Calm, cooperative. Normal affect.      Prior Labs/Diagnostic Studies   All labs and imaging personally reviewed     EKG/ stress test - if available please see in ROS above       Component      Latest Ref Rng 12/27/2023  6:41 PM   WBC      4.0 - 11.0 10e3/uL 8.1    RBC Count      3.80 - 5.20 10e6/uL 4.99    Hemoglobin      11.7 - 15.7 g/dL 14.8    Hematocrit      35.0 - 47.0 % 44.8    MCV      78 - 100 fL 90    MCH      26.5 - 33.0 pg 29.7    MCHC      31.5 - 36.5 g/dL 33.0    RDW      10.0 - 15.0 % 13.1    Platelet Count      150 - 450 10e3/uL 245    % Neutrophils      % 90    % Lymphocytes      % 4    % Monocytes      % 6    % Eosinophils      % 0    % Basophils      % 0    % Immature Granulocytes      % 0    NRBCs per 100 WBC      <1 /100 0    Absolute Neutrophils      1.6 - 8.3 10e3/uL 7.2    Absolute Lymphocytes      0.8 - 5.3 10e3/uL 0.3 (L)    Absolute Monocytes      0.0 - 1.3 10e3/uL 0.5    Absolute Eosinophils      0.0 - 0.7 10e3/uL 0.0    Absolute Basophils      0.0 - 0.2 10e3/uL 0.0    Absolute Immature Granulocytes      <=0.4 10e3/uL 0.0    Absolute NRBCs      10e3/uL 0.0    Sodium      135 - 145 mmol/L 138    Potassium      3.4 - 5.3 mmol/L 3.8    Chloride      98 - 107 mmol/L 100    Carbon Dioxide (CO2)      22 - 29 mmol/L 24    Anion Gap      7 - 15 mmol/L 14    Urea Nitrogen      6.0 - 20.0 mg/dL 16.2    Creatinine      0.51 - 0.95 mg/dL 0.66    GFR Estimate      >60 mL/min/1.73m2 >90    Calcium      8.6 - 10.0 mg/dL 9.2    Glucose      70 - 99 mg/dL 123 (H)       Legend:  (L) Low  (H) High      The patient's records and results personally reviewed by this provider.  "    Outside records reviewed from: Care Everywhere      Assessment    Lisset MITCHELL Chi is a 49 year old female seen as a PAC referral for risk assessment and optimization for anesthesia.    Plan/Recommendations  Pt will be optimized for the proposed procedure.  See below for details on the assessment, risk, and preoperative recommendations    NEUROLOGY  - No history of TIA, CVA or seizure    -Post Op delirium risk factors:  No risk identified    ENT  - No current airway concerns.  Will need to be reassessed day of surgery.  Mallampati: Unable to assess  TM: Unable to assess    CARDIAC  - No history of CAD, Hypertension, and Afib  - METS (Metabolic Equivalents)  Patient performs 4 or more METS exercise without symptoms             Total Score: 0      RCRI-Very low risk: Class 1 0.4% complication rate             Total Score: 0        PULMONARY    JUAN Low Risk             Total Score: 1    JUAN: Snores loudly      - Asthma  Well controlled  - Tobacco History    History   Smoking Status    Never   Smokeless Tobacco    Never       GI  - denies GERD  PONV High Risk  Total Score: 3           1 AN PONV: Pt is Female    1 AN PONV: Patient is not a current smoker    1 AN PONV: Intended Post Op Opioids            ENDOCRINE    - BMI: Estimated body mass index is 23.11 kg/m  as calculated from the following:    Height as of this encounter: 1.585 m (5' 2.4\").    Weight as of this encounter: 58.1 kg (128 lb).  Healthy Weight (BMI 18.5-24.9)  - No history of Diabetes Mellitus    HEME  VTE Low Risk 0.26%             Total Score: 0      - No history of abnormal bleeding or antiplatelet use.      MSK  Patient is NOT Frail             Total Score: 0      - eft knee patellofemoral instability of the left knee.  Procedure planned as above.     PSYCH  - anxiety - no meds. Managing with therapy.    Different anesthesia methods/types have been discussed with the patient, but they are aware that the final plan will be decided by the assigned " anesthesia provider on the date of service.      The patient is optimized for their procedure. AVS with information on surgery time/arrival time, meds and NPO status given by nursing staff. No further diagnostic testing indicated.    Please refer to the physical examination documented by the anesthesiologist in the anesthesia record on the day of surgery.    Video-Visit Details    Type of service:  Video Visit    Provider received verbal consent for a Video Visit from the patient? Yes     Originating Location (pt. Location): Home    Distant Location (provider location):  Off-site  Mode of Communication:  Video Conference via [employer]  On the day of service:     Prep time: 5 minutes  Visit time: 18 minutes  Documentation time: 8 minutes  ------------------------------------------  Total time: 31 minutes      JOSE Gorman CNP  Preoperative Assessment Center  [facility]  Phone: [phone]  Fax: [phone]

## 2024-11-26 NOTE — PATIENT INSTRUCTIONS
Preparing for Your Surgery      Name:  Lisset MITCHELL Chi   MRN:  [id number]   :  1975   Today's Date:  2024         Arriving for surgery:  Surgery date:  24  Arrival time:  8:55 am  Surgery time: 10:25 am    Restrictions due to COVID 19:    Please maintain social distance.  Masking is optional        parking is available for anyone with mobility limitations or disabilities. (Monday- Friday 7 am- 5 pm)    Please come to:    [facility]  [address] [place] [id number]    Check in on the 5th floor, Ambulatory Surgery Center.    What can I eat or drink?    -  You may eat and drink normally until 8 hours before arrival time  (Until 12:55 am)  -  You may have clear liquids until 2 hours before arrival time  (Until 6:50 am)    Examples of clear liquids:  Water  Clear broth  Juices (apple, white grape, white cranberry  and cider) without pulp  Noncarbonated, powder based beverages  (lemonade and Lexa-Aid)  Sodas (Sprite, 7-Up, ginger ale and seltzer)  Coffee or tea (without milk or cream)  Gatorade    --No alcohol or cannabis products for at least 24 hours before surgery    Which medicines can I take?    Hold Aspirin for 7 days before surgery.   Hold Multivitamins for 7 days before surgery.  Hold Supplements for 7 days before surgery. This includes Turmeric.  Hold Ibuprofen (Advil, Motrin) for 1 day before surgery--unless otherwise directed by surgeon.  Hold Naproxen (Aleve) for 4 days before surgery.    -  DO NOT take the following medications the day of surgery:  Calcium-Vitamin D  Magnesium  Vitamin D    -  PLEASE TAKE the following medications the day of surgery or per your usual routine:  Tylenol if needed  Albuterol inhaler if needed and bring this with you day of surgery  Astelin nasal spray  Fexofenadine (Allegra)  Methocarbamol (Robaxin) if needed  Montelukast (Singulair)    How do I prepare myself?  - Please take 2 showers (one the night prior to surgery  and one the morning of surgery) using Scrubcare or Hibiclens soap.    Use this soap only from the neck to your toes. Avoid genital area      Leave the soap on your skin for one minute--then rinse thoroughly.      You may use your own shampoo and conditioner; no other hair products.   - Please remove all jewelry and body piercings.  - No lotions, deodorants or fragrance.  - No makeup or fingernail polish.   - Bring your ID and insurance card.    -If you have a Deep Brain Stimulator, a Spinal Cord Stimulator or any implanted Neuro device you must bring the remote to the Surgery Center          ALL PATIENTS ARE REQUIRED TO HAVE A RESPONSIBLE ADULT TO DRIVE AND BE IN ATTENDANCE WITH THEM FOR 24 HOURS FOLLOWING SURGERY       Covid testing policy as of 12/06/2022  Your surgeon will notify and schedule you for a COVID test if one is needed before surgery--please direct any questions or COVID symptoms to your surgeon      Questions or Concerns:    -For questions regarding the day of surgery please contact the Ambulatory Surgery Center at [phone].    -If you have health changes between today and your surgery please contact your surgeon.     For questions after surgery please call your surgeons office.

## 2024-12-04 ENCOUNTER — MYC MEDICAL ADVICE (OUTPATIENT)
Dept: ORTHOPEDICS | Facility: CLINIC | Age: 49
End: 2024-12-04
Payer: COMMERCIAL

## 2024-12-04 NOTE — TELEPHONE ENCOUNTER
Left Voicemail (1st Attempt) and Sent Mychart (1st Attempt) for the patient to call back and schedule the following:    Appointment type: post op [facility]sk  Provider: Dr. Rima Sarmiento  Visit mode: In Person  Return date: Approx. 1/21 - okay to schedule  Specialty phone number: [phone]    Additional Notes:

## 2024-12-06 ENCOUNTER — THERAPY VISIT (OUTPATIENT)
Dept: PHYSICAL THERAPY | Facility: CLINIC | Age: 49
End: 2024-12-06
Payer: COMMERCIAL

## 2024-12-06 DIAGNOSIS — M25.561 ACUTE PAIN OF RIGHT KNEE: Primary | ICD-10-CM

## 2024-12-06 PROCEDURE — 97110 THERAPEUTIC EXERCISES: CPT | Mod: GP | Performed by: PHYSICAL THERAPIST

## 2024-12-06 PROCEDURE — 97140 MANUAL THERAPY 1/> REGIONS: CPT | Mod: GP | Performed by: PHYSICAL THERAPIST

## 2024-12-16 ENCOUNTER — THERAPY VISIT (OUTPATIENT)
Dept: PHYSICAL THERAPY | Facility: CLINIC | Age: 49
End: 2024-12-16
Payer: COMMERCIAL

## 2024-12-16 DIAGNOSIS — M25.561 ACUTE PAIN OF RIGHT KNEE: Primary | ICD-10-CM

## 2024-12-16 PROCEDURE — 97110 THERAPEUTIC EXERCISES: CPT | Mod: GP | Performed by: PHYSICAL THERAPIST

## 2024-12-16 PROCEDURE — 97140 MANUAL THERAPY 1/> REGIONS: CPT | Mod: GP | Performed by: PHYSICAL THERAPIST

## 2024-12-17 ENCOUNTER — DOCUMENTATION ONLY (OUTPATIENT)
Dept: ORTHOPEDICS | Facility: CLINIC | Age: 49
End: 2024-12-17
Payer: COMMERCIAL

## 2024-12-17 NOTE — PROGRESS NOTES
Received Completed forms Yes   Faxed Forms Faxed To: [place] [employer]  Fax Number: [phone]   Sent to HIM (Date) 12/17/24

## 2024-12-18 ENCOUNTER — ANCILLARY PROCEDURE (OUTPATIENT)
Dept: ULTRASOUND IMAGING | Facility: CLINIC | Age: 49
End: 2024-12-18
Payer: COMMERCIAL

## 2024-12-18 ASSESSMENT — ENCOUNTER SYMPTOMS
SEIZURES: 1
ORTHOPNEA: 0

## 2024-12-18 NOTE — ANESTHESIA PREPROCEDURE EVALUATION
Anesthesia Pre-Procedure Evaluation    Patient: Lisset MITCHELL Chi   MRN: [id number] : 1975        Procedure : Procedure(s):  Left Knee Arthroscopy, Medial Patello-femoral Ligament Reconstruction with Allograft, possible Distal Tibial Tubercle Osteotomy,  Arthroscopy, Knee, With Trochleoplasty          Past Medical History:   Diagnosis Date     Allergic rhinitis due to other allergen      Asthma      Bilateral low back pain with left-sided sciatica 10/17/2022     IBS (irritable bowel syndrome)      Other internal derangement of knee(717.89)     patella dislocates easily     Premature ovarian failure      Raynaud's disease 3/08     Trochanteric bursitis of both hips 11/15/2017      Past Surgical History:   Procedure Laterality Date     ARTHROSCOPY KNEE Right 3/2/2018    Procedure: ARTHROSCOPY KNEE;  Right Knee Arthroscopy, Lateral Compartmnt Debridemen and  Anterior Chamber;  Surgeon: Rima Sarmiento MD;  [facility]: [facility]     ARTHROSCOPY KNEE Right 2024    Procedure: right knee arthroscopy, with open exploration of loose body;  Surgeon: Rima Sarmiento MD;  Location: [facility] OR      SECTION           COLONOSCOPY N/A 2022    Procedure: COLONOSCOPY, WITH HOT POLYPECTOMY, TATTOO, AND CLIPS;  Surgeon: Johnson Petit MD;  Location: [facility] OR     COLONOSCOPY N/A 2023    Procedure: COLONOSCOPY, WITH POLYPECTOMY AND BIOPSY;  Surgeon: Johnson Petit MD;  Location: [facility] OR     HC DILATION/CURETTAGE DIAG/THER NON OB  2003    D & C for retained placenta one week after the delivery     I&D BARTHOLIN GLAND ABSCESS   and     mcgrath cath      KNEE SURGERY      Rt knee Fx, repair-dislocation problem     KNEE SURGERY      Lt knee reconstructive surgery-dislocation problem     LEEP TX, CERVICAL      LEEP for abnormal pap     MARSUP BARTHOLIN GLAND CYST  08      Allergies   Allergen Reactions     Milk (Cow) GI Disturbance      Social History     Tobacco Use      Smoking status: Never     Passive exposure: Never     Smokeless tobacco: Never   Substance Use Topics     Alcohol use: Yes     Comment: once a month 1-2 drinks      Wt Readings from Last 1 Encounters:   11/26/24 58.1 kg (128 lb)        Anesthesia Evaluation   Pt has had prior anesthetic. Type: General and MAC.    No history of anesthetic complications       ROS/MED HX  ENT/Pulmonary:     (+)           allergic rhinitis,          Intermittent, asthma  Treatment: Inhaler prn,       recent URI, resolved, common cold 1 week ago.  all symptoms resolved.:        Neurologic:     (+)       seizures, last seizure: 10 years old -none since then,                        Cardiovascular:     (+) Dyslipidemia - -   -  - -                                 Previous cardiac testing   Echo: Date: Results:    Stress Test:  Date: Results:    ECG Reviewed:  Date: 2016 Results:  EKG  2016  Sinus  Bradycardia   -Old anterior infarct.     ABNORMAL   Cath:  Date: Results:   (-) TODD and orthopnea/PND   METS/Exercise Tolerance: >4 METS Comment: Rides a stationary bike for exercise.  Also goes for occasional walks and goes to PT   Hematologic:  - neg hematologic  ROS     Musculoskeletal: Comment: \left knee patellofemoral instability    Hypermobile joints          GI/Hepatic: Comment: IBS- constipation prone      Renal/Genitourinary:  - neg Renal ROS     Endo:  - neg endo ROS     Psychiatric/Substance Use:     (+) psychiatric history anxiety       Infectious Disease:  - neg infectious disease ROS     Malignancy:  - neg malignancy ROS     Other:  - neg other ROS          Physical Exam    Airway  airway exam normal      Mallampati: II   TM distance: > 3 FB   Neck ROM: full   Mouth opening: > 3 cm    Respiratory Devices and Support         Dental       (+) Completely normal teeth      Cardiovascular   cardiovascular exam normal       Rhythm and rate: regular and normal     Pulmonary   pulmonary exam normal        breath sounds clear to auscultation  "      OUTSIDE LABS:  CBC:   Lab Results   Component Value Date    WBC 8.1 12/27/2023    WBC 4.7 01/19/2022    HGB 14.8 12/27/2023    HGB 12.8 01/19/2022    HCT 44.8 12/27/2023    HCT 40.0 01/19/2022     12/27/2023     01/19/2022     BMP:   Lab Results   Component Value Date     12/27/2023     01/19/2022    POTASSIUM 3.8 12/27/2023    POTASSIUM 4.1 01/19/2022    CHLORIDE 100 12/27/2023    CHLORIDE 107 01/19/2022    CO2 24 12/27/2023    CO2 28 01/19/2022    BUN 16.2 12/27/2023    BUN 10 01/19/2022    CR 0.66 12/27/2023    CR 0.68 01/19/2022     (H) 12/27/2023    GLC 86 01/19/2022     COAGS: No results found for: \"PTT\", \"INR\", \"FIBR\"  POC:   Lab Results   Component Value Date    HCG Negative 12/27/2023    HCGS Negative 01/04/2021     HEPATIC:   Lab Results   Component Value Date    ALBUMIN 4.3 12/27/2023    PROTTOTAL 7.0 12/27/2023    ALT 11 12/27/2023    AST 13 12/27/2023    ALKPHOS 100 12/27/2023    BILITOTAL 0.5 12/27/2023     OTHER:   Lab Results   Component Value Date    A1C 5.4 01/19/2022    LEANDRO 9.6 07/02/2024    PHOS 4.4 02/16/2024    MAG 2.4 (H) 01/04/2021    LIPASE 15 12/27/2023    TSH 1.63 02/16/2024    T4 0.78 02/17/2010    CRP <2.9 01/04/2021    SED 7 01/04/2021       Anesthesia Plan    ASA Status:  2    NPO Status:  NPO Appropriate    Anesthesia Type: General.     - Airway: LMA   Induction: Intravenous.   Maintenance: TIVA.        Consents    Anesthesia Plan(s) and associated risks, benefits, and realistic alternatives discussed. Questions answered and patient/representative(s) expressed understanding.     - Discussed:     - Discussed with:  Patient      - Extended Intubation/Ventilatory Support Discussed: No.      - Patient is DNR/DNI Status: No     Use of blood products discussed: No .     Postoperative Care    Pain management: Multi-modal analgesia.   PONV prophylaxis: Ondansetron (or other 5HT-3), Background Propofol Infusion, Dexamethasone or Solumedrol "     Comments:               Selina Mejia MD    I have reviewed the pertinent notes and labs in the chart from the past 30 days and (re)examined the patient.  Any updates or changes from those notes are reflected in this note.                             # Asthma: noted on problem list

## 2024-12-19 ENCOUNTER — HOSPITAL ENCOUNTER (OUTPATIENT)
Facility: AMBULATORY SURGERY CENTER | Age: 49
Discharge: HOME | End: 2024-12-19
Attending: ORTHOPAEDIC SURGERY
Payer: COMMERCIAL

## 2024-12-19 ENCOUNTER — ANCILLARY PROCEDURE (OUTPATIENT)
Dept: RADIOLOGY | Facility: AMBULATORY SURGERY CENTER | Age: 49
End: 2024-12-19
Attending: ORTHOPAEDIC SURGERY
Payer: COMMERCIAL

## 2024-12-19 ENCOUNTER — ANESTHESIA (OUTPATIENT)
Dept: SURGERY | Facility: AMBULATORY SURGERY CENTER | Age: 49
End: 2024-12-19
Payer: COMMERCIAL

## 2024-12-19 VITALS
TEMPERATURE: 97 F | WEIGHT: 126 LBS | DIASTOLIC BLOOD PRESSURE: 80 MMHG | SYSTOLIC BLOOD PRESSURE: 122 MMHG | BODY MASS INDEX: 22.32 KG/M2 | HEIGHT: 63 IN | RESPIRATION RATE: 16 BRPM | OXYGEN SATURATION: 99 % | HEART RATE: 67 BPM

## 2024-12-19 DIAGNOSIS — Z98.890 S/P KNEE SURGERY: Primary | ICD-10-CM

## 2024-12-19 DIAGNOSIS — M25.362 PATELLOFEMORAL INSTABILITY OF LEFT KNEE WITH PAIN: ICD-10-CM

## 2024-12-19 DIAGNOSIS — M25.562 PATELLOFEMORAL INSTABILITY OF LEFT KNEE WITH PAIN: ICD-10-CM

## 2024-12-19 LAB — VIT D+METAB SERPL-MCNC: 41 NG/ML (ref 20–50)

## 2024-12-19 PROCEDURE — 82306 VITAMIN D 25 HYDROXY: CPT | Performed by: PHYSICIAN ASSISTANT

## 2024-12-19 PROCEDURE — 99000 SPECIMEN HANDLING OFFICE-LAB: CPT | Performed by: PATHOLOGY

## 2024-12-19 RX ORDER — OXYCODONE HYDROCHLORIDE 5 MG/1
5 TABLET ORAL
Status: ACTIVE | OUTPATIENT
Start: 2024-12-19

## 2024-12-19 RX ORDER — BUPIVACAINE HYDROCHLORIDE 2.5 MG/ML
INJECTION, SOLUTION EPIDURAL; INFILTRATION; INTRACAUDAL
Status: COMPLETED | OUTPATIENT
Start: 2024-12-19 | End: 2024-12-19

## 2024-12-19 RX ORDER — LIDOCAINE HYDROCHLORIDE 20 MG/ML
INJECTION, SOLUTION INFILTRATION; PERINEURAL PRN
Status: DISCONTINUED | OUTPATIENT
Start: 2024-12-19 | End: 2024-12-19

## 2024-12-19 RX ORDER — ASPIRIN 81 MG/1
81 TABLET ORAL 2 TIMES DAILY
Qty: 60 TABLET | Refills: 0 | Status: SHIPPED | OUTPATIENT
Start: 2024-12-19

## 2024-12-19 RX ORDER — FENTANYL CITRATE 50 UG/ML
50 INJECTION, SOLUTION INTRAMUSCULAR; INTRAVENOUS EVERY 5 MIN PRN
Status: DISCONTINUED | OUTPATIENT
Start: 2024-12-19 | End: 2024-12-19 | Stop reason: HOSPADM

## 2024-12-19 RX ORDER — OXYCODONE HYDROCHLORIDE 5 MG/1
10 TABLET ORAL
Status: ACTIVE | OUTPATIENT
Start: 2024-12-19

## 2024-12-19 RX ORDER — ACETAMINOPHEN 325 MG/1
975 TABLET ORAL ONCE
Status: COMPLETED | OUTPATIENT
Start: 2024-12-19 | End: 2024-12-19

## 2024-12-19 RX ORDER — ACETAMINOPHEN 325 MG/1
650 TABLET ORAL
Status: ACTIVE | OUTPATIENT
Start: 2024-12-19

## 2024-12-19 RX ORDER — CEFAZOLIN SODIUM 2 G/50ML
2 SOLUTION INTRAVENOUS SEE ADMIN INSTRUCTIONS
Status: DISCONTINUED | OUTPATIENT
Start: 2024-12-19 | End: 2024-12-19 | Stop reason: HOSPADM

## 2024-12-19 RX ORDER — CEFAZOLIN SODIUM 2 G/50ML
2 SOLUTION INTRAVENOUS
Status: COMPLETED | OUTPATIENT
Start: 2024-12-19 | End: 2024-12-19

## 2024-12-19 RX ORDER — KETOROLAC TROMETHAMINE 30 MG/ML
INJECTION, SOLUTION INTRAMUSCULAR; INTRAVENOUS PRN
Status: DISCONTINUED | OUTPATIENT
Start: 2024-12-19 | End: 2024-12-19

## 2024-12-19 RX ORDER — NALOXONE HYDROCHLORIDE 0.4 MG/ML
0.1 INJECTION, SOLUTION INTRAMUSCULAR; INTRAVENOUS; SUBCUTANEOUS
Status: ACTIVE | OUTPATIENT
Start: 2024-12-19

## 2024-12-19 RX ORDER — ONDANSETRON 2 MG/ML
4 INJECTION INTRAMUSCULAR; INTRAVENOUS EVERY 30 MIN PRN
Status: ACTIVE | OUTPATIENT
Start: 2024-12-19

## 2024-12-19 RX ORDER — HYDROMORPHONE HYDROCHLORIDE 1 MG/ML
0.2 INJECTION, SOLUTION INTRAMUSCULAR; INTRAVENOUS; SUBCUTANEOUS EVERY 5 MIN PRN
Status: DISCONTINUED | OUTPATIENT
Start: 2024-12-19 | End: 2024-12-19 | Stop reason: HOSPADM

## 2024-12-19 RX ORDER — NALOXONE HYDROCHLORIDE 0.4 MG/ML
0.4 INJECTION, SOLUTION INTRAMUSCULAR; INTRAVENOUS; SUBCUTANEOUS
Status: DISCONTINUED | OUTPATIENT
Start: 2024-12-19 | End: 2024-12-19 | Stop reason: HOSPADM

## 2024-12-19 RX ORDER — SODIUM CHLORIDE, SODIUM LACTATE, POTASSIUM CHLORIDE, CALCIUM CHLORIDE 600; 310; 30; 20 MG/100ML; MG/100ML; MG/100ML; MG/100ML
INJECTION, SOLUTION INTRAVENOUS CONTINUOUS PRN
Status: DISCONTINUED | OUTPATIENT
Start: 2024-12-19 | End: 2024-12-19

## 2024-12-19 RX ORDER — DEXAMETHASONE SODIUM PHOSPHATE 10 MG/ML
4 INJECTION, SOLUTION INTRAMUSCULAR; INTRAVENOUS
Status: ACTIVE | OUTPATIENT
Start: 2024-12-19

## 2024-12-19 RX ORDER — LIDOCAINE 40 MG/G
CREAM TOPICAL
Status: DISCONTINUED | OUTPATIENT
Start: 2024-12-19 | End: 2024-12-19 | Stop reason: HOSPADM

## 2024-12-19 RX ORDER — HYDROMORPHONE HYDROCHLORIDE 1 MG/ML
0.4 INJECTION, SOLUTION INTRAMUSCULAR; INTRAVENOUS; SUBCUTANEOUS EVERY 5 MIN PRN
Status: DISCONTINUED | OUTPATIENT
Start: 2024-12-19 | End: 2024-12-19 | Stop reason: HOSPADM

## 2024-12-19 RX ORDER — ONDANSETRON 4 MG/1
4 TABLET, ORALLY DISINTEGRATING ORAL EVERY 30 MIN PRN
Status: ACTIVE | OUTPATIENT
Start: 2024-12-19

## 2024-12-19 RX ORDER — FENTANYL CITRATE 50 UG/ML
INJECTION, SOLUTION INTRAMUSCULAR; INTRAVENOUS PRN
Status: DISCONTINUED | OUTPATIENT
Start: 2024-12-19 | End: 2024-12-19

## 2024-12-19 RX ORDER — PROPOFOL 10 MG/ML
INJECTION, EMULSION INTRAVENOUS PRN
Status: DISCONTINUED | OUTPATIENT
Start: 2024-12-19 | End: 2024-12-19

## 2024-12-19 RX ORDER — ONDANSETRON 4 MG/1
4 TABLET, ORALLY DISINTEGRATING ORAL EVERY 30 MIN PRN
Status: DISCONTINUED | OUTPATIENT
Start: 2024-12-19 | End: 2024-12-19 | Stop reason: HOSPADM

## 2024-12-19 RX ORDER — AMOXICILLIN 250 MG
1-2 CAPSULE ORAL 2 TIMES DAILY
Qty: 30 TABLET | Refills: 0 | Status: SHIPPED | OUTPATIENT
Start: 2024-12-19

## 2024-12-19 RX ORDER — NALOXONE HYDROCHLORIDE 0.4 MG/ML
0.2 INJECTION, SOLUTION INTRAMUSCULAR; INTRAVENOUS; SUBCUTANEOUS
Status: DISCONTINUED | OUTPATIENT
Start: 2024-12-19 | End: 2024-12-19 | Stop reason: HOSPADM

## 2024-12-19 RX ORDER — NALOXONE HYDROCHLORIDE 0.4 MG/ML
0.1 INJECTION, SOLUTION INTRAMUSCULAR; INTRAVENOUS; SUBCUTANEOUS
Status: DISCONTINUED | OUTPATIENT
Start: 2024-12-19 | End: 2024-12-19 | Stop reason: HOSPADM

## 2024-12-19 RX ORDER — OXYCODONE HYDROCHLORIDE 5 MG/1
5-10 TABLET ORAL EVERY 4 HOURS PRN
Qty: 26 TABLET | Refills: 0 | Status: SHIPPED | OUTPATIENT
Start: 2024-12-19

## 2024-12-19 RX ORDER — FENTANYL CITRATE 50 UG/ML
25-50 INJECTION, SOLUTION INTRAMUSCULAR; INTRAVENOUS
Status: DISCONTINUED | OUTPATIENT
Start: 2024-12-19 | End: 2024-12-19 | Stop reason: HOSPADM

## 2024-12-19 RX ORDER — ONDANSETRON 4 MG/1
4 TABLET, ORALLY DISINTEGRATING ORAL EVERY 8 HOURS PRN
Qty: 4 TABLET | Refills: 0 | Status: SHIPPED | OUTPATIENT
Start: 2024-12-19

## 2024-12-19 RX ORDER — PROPOFOL 10 MG/ML
INJECTION, EMULSION INTRAVENOUS CONTINUOUS PRN
Status: DISCONTINUED | OUTPATIENT
Start: 2024-12-19 | End: 2024-12-19

## 2024-12-19 RX ORDER — DEXAMETHASONE SODIUM PHOSPHATE 10 MG/ML
4 INJECTION, SOLUTION INTRAMUSCULAR; INTRAVENOUS
Status: DISCONTINUED | OUTPATIENT
Start: 2024-12-19 | End: 2024-12-19 | Stop reason: HOSPADM

## 2024-12-19 RX ORDER — ONDANSETRON 2 MG/ML
4 INJECTION INTRAMUSCULAR; INTRAVENOUS EVERY 30 MIN PRN
Status: DISCONTINUED | OUTPATIENT
Start: 2024-12-19 | End: 2024-12-19 | Stop reason: HOSPADM

## 2024-12-19 RX ORDER — ACETAMINOPHEN 325 MG/1
975 TABLET ORAL ONCE
Status: DISCONTINUED | OUTPATIENT
Start: 2024-12-19 | End: 2024-12-19 | Stop reason: HOSPADM

## 2024-12-19 RX ORDER — DEXAMETHASONE SODIUM PHOSPHATE 4 MG/ML
INJECTION, SOLUTION INTRA-ARTICULAR; INTRALESIONAL; INTRAMUSCULAR; INTRAVENOUS; SOFT TISSUE PRN
Status: DISCONTINUED | OUTPATIENT
Start: 2024-12-19 | End: 2024-12-19

## 2024-12-19 RX ORDER — ACETAMINOPHEN 325 MG/1
650 TABLET ORAL EVERY 4 HOURS PRN
Qty: 50 TABLET | Refills: 0 | Status: SHIPPED | OUTPATIENT
Start: 2024-12-19

## 2024-12-19 RX ORDER — FENTANYL CITRATE 50 UG/ML
25 INJECTION, SOLUTION INTRAMUSCULAR; INTRAVENOUS EVERY 5 MIN PRN
Status: DISCONTINUED | OUTPATIENT
Start: 2024-12-19 | End: 2024-12-19 | Stop reason: HOSPADM

## 2024-12-19 RX ORDER — MINERAL OIL
OIL (ML) MISCELLANEOUS PRN
Status: DISCONTINUED | OUTPATIENT
Start: 2024-12-19 | End: 2024-12-19 | Stop reason: HOSPADM

## 2024-12-19 RX ORDER — ONDANSETRON 2 MG/ML
INJECTION INTRAMUSCULAR; INTRAVENOUS PRN
Status: DISCONTINUED | OUTPATIENT
Start: 2024-12-19 | End: 2024-12-19

## 2024-12-19 RX ORDER — FLUMAZENIL 0.1 MG/ML
0.2 INJECTION, SOLUTION INTRAVENOUS
Status: DISCONTINUED | OUTPATIENT
Start: 2024-12-19 | End: 2024-12-19 | Stop reason: HOSPADM

## 2024-12-19 RX ORDER — SODIUM CHLORIDE, SODIUM LACTATE, POTASSIUM CHLORIDE, CALCIUM CHLORIDE 600; 310; 30; 20 MG/100ML; MG/100ML; MG/100ML; MG/100ML
INJECTION, SOLUTION INTRAVENOUS CONTINUOUS
Status: DISCONTINUED | OUTPATIENT
Start: 2024-12-19 | End: 2024-12-19 | Stop reason: HOSPADM

## 2024-12-19 RX ORDER — BUPIVACAINE HYDROCHLORIDE 2.5 MG/ML
INJECTION, SOLUTION INFILTRATION; PERINEURAL PRN
Status: DISCONTINUED | OUTPATIENT
Start: 2024-12-19 | End: 2024-12-19 | Stop reason: HOSPADM

## 2024-12-19 RX ORDER — TRANEXAMIC ACID 650 MG/1
1950 TABLET ORAL ONCE
Status: DISCONTINUED | OUTPATIENT
Start: 2024-12-19 | End: 2024-12-19 | Stop reason: HOSPADM

## 2024-12-19 RX ORDER — ONDANSETRON 4 MG/1
4 TABLET, ORALLY DISINTEGRATING ORAL
Status: ACTIVE | OUTPATIENT
Start: 2024-12-19

## 2024-12-19 RX ADMIN — FENTANYL CITRATE 50 MCG: 50 INJECTION, SOLUTION INTRAMUSCULAR; INTRAVENOUS at 10:09

## 2024-12-19 RX ADMIN — Medication 0.5 MG: at 12:50

## 2024-12-19 RX ADMIN — Medication 0.5 MG: at 13:47

## 2024-12-19 RX ADMIN — Medication 50 MCG: at 11:16

## 2024-12-19 RX ADMIN — SODIUM CHLORIDE, SODIUM LACTATE, POTASSIUM CHLORIDE, CALCIUM CHLORIDE: 600; 310; 30; 20 INJECTION, SOLUTION INTRAVENOUS at 09:45

## 2024-12-19 RX ADMIN — KETOROLAC TROMETHAMINE 30 MG: 30 INJECTION, SOLUTION INTRAMUSCULAR; INTRAVENOUS at 13:54

## 2024-12-19 RX ADMIN — CEFAZOLIN SODIUM 2 G: 2 SOLUTION INTRAVENOUS at 11:00

## 2024-12-19 RX ADMIN — Medication 100 MCG: at 11:45

## 2024-12-19 RX ADMIN — BUPIVACAINE HYDROCHLORIDE 10 ML: 2.5 INJECTION, SOLUTION EPIDURAL; INFILTRATION; INTRACAUDAL at 10:05

## 2024-12-19 RX ADMIN — Medication 100 MCG: at 11:34

## 2024-12-19 RX ADMIN — LIDOCAINE HYDROCHLORIDE 100 MG: 20 INJECTION, SOLUTION INFILTRATION; PERINEURAL at 11:12

## 2024-12-19 RX ADMIN — PROPOFOL 100 MCG/KG/MIN: 10 INJECTION, EMULSION INTRAVENOUS at 12:25

## 2024-12-19 RX ADMIN — FENTANYL CITRATE 50 MCG: 50 INJECTION, SOLUTION INTRAMUSCULAR; INTRAVENOUS at 11:09

## 2024-12-19 RX ADMIN — ACETAMINOPHEN 975 MG: 325 TABLET ORAL at 09:42

## 2024-12-19 RX ADMIN — PROPOFOL 200 MG: 10 INJECTION, EMULSION INTRAVENOUS at 11:12

## 2024-12-19 RX ADMIN — FENTANYL CITRATE 50 MCG: 50 INJECTION, SOLUTION INTRAMUSCULAR; INTRAVENOUS at 11:25

## 2024-12-19 RX ADMIN — Medication 50 MCG: at 11:26

## 2024-12-19 RX ADMIN — DEXAMETHASONE SODIUM PHOSPHATE 4 MG: 4 INJECTION, SOLUTION INTRA-ARTICULAR; INTRALESIONAL; INTRAMUSCULAR; INTRAVENOUS; SOFT TISSUE at 11:12

## 2024-12-19 RX ADMIN — ONDANSETRON 4 MG: 4 TABLET, ORALLY DISINTEGRATING ORAL at 15:54

## 2024-12-19 RX ADMIN — PROPOFOL 150 MCG/KG/MIN: 10 INJECTION, EMULSION INTRAVENOUS at 11:12

## 2024-12-19 RX ADMIN — ONDANSETRON 4 MG: 2 INJECTION INTRAMUSCULAR; INTRAVENOUS at 11:10

## 2024-12-19 NOTE — OP NOTE
PREOPERATIVE DIAGNOSES:   1. Left knee acute on chronic patellar instability.   2. 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 Hard J sign which persists when asleep  4. No patella enrico.   5. Moderate tilt with lateral tightness.   6. S/P previous Teressa Goldthwaith procedure as a adolescent  7. Assess cartilage integrity.    POSTOPERATIVE DIAGNOSES:   1. Left knee acute on chronic patellar instability.   2. Patella w/Grade 2 wear at medial ridge inferior  3. Trochlear groove w/ satisfactory cartilage surfaces.  4. No significant cartilage or meniscus pathology, tibiofemoral joint.     OPERATIONS:   1. Left knee exam under anesthesia.   2. Diagnostic arthroscopy.   3. Trochleoplasty.   4. Lateral retinacular lengthening (18mm).   6. Medial patellofemoral ligament reconstruction using gracilis  allograft.    OPERATORS: Rima Sarmiento. MD  ASSISTANTS: Radha Rodriguez PA-C  ANESTHESIA:  General anesthesia supplemented w/ an adductor block medially and a lynne-articular injection of  25 ml of 1/4% Robivicaine laterally  FINDINGS: Exam under anesthesia revealed range of motion 0/0/140 degrees   While awake, the patient  had Hard (+) J tracking; under anesthesia the patient  had hard J tracking persisting to PROM  I could dislocate the patella when asleeep.  Arthroscopic findings revealed no fluid upon entry into the joint.   The patient's patellofemoral compartment:    Medial Patella Facet: no cartilage wear   Median Ridge grade 2 cartilage wear   Lateral Patella Facet grade 2 inferior cartilage wear   Medial Trochlea: no cartilage wear   Lateral Trochlea: no cartilage wear   Sulcus:showed no cartilage wear     Lateral patellar maltracking as viewed arthroscopically.   The patient's medial and lateral compartment showed pristine cartilage surfaces when visualized and probed and normal menisci.     DESCRIPTION OF PROCEDURE: Under general anesthesia, the patient was positioned supine on the operating  room table. The patient's leg was prepped and draped in the usual sterile fashion. A pause was performed identifying the correct leg, the administration of antibiotic,s and appropriate coverage with a lead apron for anticipated use of the fluoroscopy unit.   A diagnostic arthroscopy was performed using an anterolateral portals for visualization, respectively. Diagnostic arthroscopy findings as stated above.   At the end of the arthroscopy, excess fluid was removed from the knee. We then elevated the tourniquet to 250 mmHg after Esmarch exsanguination of the leg.   We made a midline incision to enter the skin, a vastus splitting incision to enter the joint.  We confirmed the need for a trochleoplasty by the shape of the trochlear.    We began with lengthening the lateral soft tissue structures.  We divided the vastus lateralis from the ITB, lifting layer one from its insertion on the patella.  We then cut the second layer deep as it inserted into the lateral IM septum, and saw a generous release of the tissue.  We retracted the patella medially and felt we had good visualization of the trochlea from the lateral approach.  We then turned attention back to the trochlea.  We marked the borders of the trochlear cuts on the medial and lateral side of the trochlea.  We began the trochleoplasty proper by making two cortical cuts circumferentially around marked area of the trochlea, creating a window to serve as entry into the undersurface of the trochlea cartilage surface. We elsie out our anticipated trochea plasty, defining our native trochlea and what we anticipated would be our new trochlea groove. This was done with a marking pen on the cartilage surfaces.   Using a combination of  small sharp osteotomes and the [employer] trochleaoplasty dana of 5mm thickness, we then undercut the trochlear bone. We undermined the cartilage flap to just superior to the inter condyler notch.  Cartilage surfaces were kept moist during this  time, generously irrigating this with cool water.  We then checked the flexibility of our osteotomy flap.   We felt that the osteotomy flap was flexible enough that we did not need to do a central osteotomy cut.  We then used a dana to create a new trochlear groove with a very mild deepening aspect.   We then used the dana to remove any bone in the region superior to the trochlear of the supra-trochlear spur, down to the level of the anterior femoral diaphyseal bone.  Once we had satisfactory morphology to our trochlear cut and we had good apposition between the cartilage surfaces and the distal bone cut, we prepared for our fixation.   Using a 3.5 swivel lock device, we placed a swivel lock device in the deep knee just above the intercondylar notch.  This swivel lock device was threaded with three #1 PDS sutures     Once we had satisfactory fixation, we placed a second and third swivel lock device, one on the midportion of the medial osteotomy flap and one on the midportion of the lateral osteotomy flap. We then tensioned the device by using the appropriate swivel lock device to create apposition of the superior aspect of the cartilage flap to the femoral shaft.  Once we had satisfactory fixation, we placed 3 swivel lock devices superiorly to the superior extent of the trochlear cartilage standing central, medial, and lateral.   We then bone grafted this with cancellous bone taken previously from the underneath side of the trochlea, placed any remaining bone graft underneath the osteotomy to secure adequate bone coverage underneath.   We then used the PDS suture placed in the superior swivel lock device is to bring the synovium down to the superior cartilage border.   We then prepared to do our MPFL. We brought the patella back to the new groove. There remained lateral tightness. We then performed a lengthening. We then did our lateral retinacular lengthening by removing the iliotibial band from its attachment on  to the patella. We were able to find a 2-layer construct and were able to lengthen it approximately 20 mm. This was done extra-articular. We cut the first layer of the lateral retinacular structures close to the bone and the second layer as it inserted into the deep structures close to the lateral intermuscular septum. We then bent the knee to 60 degrees and sewed the near end of layer 1 to the far end of layer 2.   We then brought the C-arm in the room. We found the approximate location of the MPFL on the patella and placed a K-wire medial to lateral across the patella. This position was confirmed with C-arm. We then created a 10 mm length, 4 mm wide tunnel or docking station on the medial patella. We then placed a leader suture medial to lateral across the patella.   Through a second incision, we made a 1-1/2 inch incision over the adductor tubercle and identified the adductor nader insertion onto the adductor tubercle. We then placed a leader suture around this tendon.   During this time a hamstring allograft was brought into the room. We tubularized this to fit a 4 mm tunnel. We then put leader sutures on both ends of the graft.   We then brought the graft up onto the table and using the leader sutures as a guide, we threaded the graft into the medial border of the patella. We secured the graft in 2 ways. The sutures exiting the lateral border of the patella were tied into the soft tissues. As the graft exited the medial border of the patella, we sewed the soft tissue into the graft with a box stitch of #1 Vicryl.   We then brought the graft underneath the medial retinaculum counterclockwise around the adductor nader tendon, back underneath the medial retinaculum to exit at the mid medial portion of the patella.   We placed the knee at 40 degrees of flexion. With the 2 arms of the graft crossed over one another just distal to the adductor nader tendon, we placed a suture of #1 Ethibond in place. This was  after we had secured the patella in the groove at 40 degrees of flexion and pulled the graft snug but not over-taut.   We then brought the knee through a full range of motion and felt we had a good construct with full motion on the table and 2-quadrant passive lateral mobility with a firm endpoint.   We then placed the knee at 20 degrees of flexion and secured the second or distal arm of the graft into the mid medial border of the patella with soft tissue technique. We then placed the knee through a full range of motion. The tourniquet was let down at this point in time.   Turning attention back to the lateral side, we closed the lateral retinaculum from the previous lengthening procedure securing the far end of layer 1 to the near end of layer 2 with #1 Vicryl sutures.  We we try to secure as much capsular closure as we could by using surrounding fat to close any remaining gap  The adductor fascia was closed with figure-of-eight sutures of #1 Vicryl. The medial arthrotomy was then closed with a running #1 Vicryl stitch in the subvastus and superficial vastus fascia. The extensor mechanism arthrotomy itself was closed with figure-of-eight sutures of #1 Vicryl. The adductor fascia was closed with a running #1 Vicryl. Subcutaneous tissue of both incisions was closed with 2-0 Vicryl. The skin was closed with running Monocryl. Steri-Strips, Betadine, Adaptic, a sterile dressing and a Tubigrip stockinette was put in place.  Tourniquet was let down after securing the the flaps.  Total tourniquet time was 110 min.   The patient's knee was then placed in a locked hinge brace locked at 10 degrees of flexion. The patient will be maintained partial weightbearing on crutches.  Advised to use postop.  The trochlear plasty protocol will be followed.  CPM will be used through a comfortable range of motion starting at 10 degrees and ending at 90 degrees.   The patient was taken to the recovery in satisfactory condition.    Radha  ROBERT Rodriguez was a necessary part of the case to help make the MPFL graft, to help with tissue retraction and limb stabilization during the case.  PA was an essential part of the case to help manage the limb in the OR, help with tissue retraction to ensure maximum exposure and maximum surgical efficiency, both which promotes best practice and best surgical outcomes.  There was no resdient learner available for the case.     Rima Sarmiento MD  Professor Orthopedic Surgery  [facility]

## 2024-12-19 NOTE — ANESTHESIA PROCEDURE NOTES
Airway       Patient location during procedure: OR       Procedure Start/Stop Times: 12/19/2024 11:13 AM  Staff -        CRNA: Yohana Abernathy APRN CRNA       Performed By: CRNAIndications and Patient Condition       Indications for airway management: lynne-procedural       Induction type:intravenous       Mask difficulty assessment: 0 - not attempted    Final Airway Details       Final airway type: supraglottic airway    Supraglottic Airway Details        Type: LMA       Brand: LMA Unique       LMA size: 4    Post intubation assessment        Placement verified by: capnometry, equal breath sounds and chest rise        Number of attempts at approach: 1       Number of other approaches attempted: 0       Secured with: tape       Ease of procedure: easy       Dentition: Intact and Unchanged    Medication(s) Administered   Medication Administration Time: 12/19/2024 11:13 AM

## 2024-12-19 NOTE — ANESTHESIA PROCEDURE NOTES
"Adductor canal Procedure Note    Pre-Procedure   Staff -        Anesthesiologist:  Selina Mejia MD       Resident/Fellow: Nishant Baumann MD       Performed By: resident       Location: pre-op       Procedure Start/Stop Times: 12/19/2024 10:00 AM and 12/19/2024 10:10 AM       Pre-Anesthestic Checklist: patient identified, IV checked, site marked, risks and benefits discussed, informed consent, monitors and equipment checked, pre-op evaluation, at physician/surgeon's request and post-op pain management  Timeout:       Correct Patient: Yes        Correct Procedure: Yes        Correct Site: Yes        Correct Position: Yes        Correct Laterality: Yes   Procedure Documentation  Procedure: Adductor canal       Diagnosis: POST OP PAIN       Laterality: left       Patient Position: supine       Skin prep: Chloraprep       Needle Type: short bevel       Needle Gauge: 21.        Needle Length (millimeters): 110        Ultrasound guided       1. Ultrasound was used to identify targeted nerve, plexus, vascular marker, or fascial plane and place a needle adjacent to it in real-time.       2. Ultrasound was used to visualize the spread of anesthetic in close proximity to the above referenced structure.    Assessment/Narrative         The placement was negative for: blood aspirated, painful injection and site bleeding       Paresthesias: No.       Bolus given via needle. no blood aspirated via catheter.        Secured via.        Insertion/Infusion Method: Single Shot       Complications: none    Medication(s) Administered   Bupivacaine 0.25% PF (Infiltration) - Infiltration   10 mL - 12/19/2024 10:05:00 AM  Bupivacaine liposome (Exparel) 1.3% LA inj susp (Infiltration) - Infiltration   10 mL - 12/19/2024 10:05:00 AM  Medication Administration Time: 12/19/2024 10:00 AM      FOR [facility] ([facility]) ONLY:   Pain Team Contact information: please page the Pain Team Via [facility]. Search \"Pain\". During daytime hours, please page " the attending first. At night please page the resident first.

## 2024-12-19 NOTE — OR NURSING
Patient received left side Adductor nerve block  with Exparel.  Fentanyl 50mcg and Versed 2mg given. Tolerated procedure well.     Ericka Winter RN

## 2024-12-19 NOTE — DISCHARGE INSTRUCTIONS
[facility]  Home Care Following Anesthesia  For 24 hours after surgery:  Get plenty of rest.  A responsible adult must stay with you for at least 24 hours after you leave the surgery center.  Do not drive or use heavy equipment.  If you have weakness or tingling, don't drive or use heavy equipment until this feeling goes away.   Do not drink alcohol.   Avoid strenuous or risky activities.  Ask for help when climbing stairs.  You may feel lightheaded.  IF so, sit for a few minutes before standing.  Have someone help you get up.   If you have nausea (feel sick to your stomach): Drink only clear liquids such as apple juice, ginger ale, broth or 7-Up.  Rest may also help.  Be sure to drink enough fluids.  Move to a regular diet as you feel able.   You may have a slight fever.  Call the doctor if your fever is over 100 F (37.7 C) (taken under the tongue) or lasts longer than 24 hours.  You may have a dry mouth, a sore throat, muscle aches or trouble sleeping. These should go away after 24 hours.  Do not make important or legal decisions.   It is recommended to avoid smoking.               Tips for taking pain medications  To get the best pain relief possible, remember these points:  Take pain medications as directed, before pain becomes severe.  Pain medication can upset your stomach: taking it with food may help.  Constipation is a common side effect of pain medication. Drink plenty of  fluids.  Eat foods high in fiber. Take a stool softener if recommended by your doctor or pharmacist.  Do not drink alcohol, drive or operate machinery while taking pain medications.  Ask about other ways to control pain, such as with heat, ice or relaxation.    Tylenol/Acetaminophen Consumption    If you feel your pain relief is insufficient, you may take Tylenol/Acetaminophen in addition to your narcotic pain medication.   Be careful not to exceed 4,000 mg of Tylenol/Acetaminophen in a 24 hour  period from all sources.  If you are taking extra strength Tylenol/acetaminophen (500 mg), the maximum dose is 8 tablets in 24 hours.  If you are taking regular strength acetaminophen (325 mg), the maximum dose is 12 tablets in 24 hours.    Call a doctor for any of the following:  Signs of infection (fever, growing tenderness at the surgery site, a large amount of drainage or bleeding, severe pain, foul-smelling drainage, redness, swelling).  It has been over 8 to 10 hours since surgery and you are still not able to urinate (pass water).  Headache for over 24 hours.  Numbness, tingling or weakness the day after surgery (if you had spinal anesthesia).  Signs of Covid-19 infection (temperature over 100 degrees, shortness of breath, cough, loss of taste/smell, generalized body aches, persistent headache, chills, sore throat, nausea/vomiting/diarrhea)  Your doctor is:  Dr. Rima Sarmiento, Orthopaedics: [phone]                    Or dial [phone] and ask for the resident on call for:  Orthopaedics  For emergency care, call the:  [facility] [place] [facility]: [phone] (TTY for hearing impaired: [phone])  Post Operative Instructions: Regional Anesthetic for Lower Extremity with Liposomal Bupivacaine  General Information:   Regional anesthesia is when local anesthetic or  numbing  medication is injected around the nerves to anesthetize or  numb  the area supplied by that set of nerves. It is a type of analgesia used to control pain and decreases the need for narcotics following surgery.    Types of Regional Blocks:  Femoral: A block injected into the groin area of the operative leg of a patient having thigh or knee surgery.  Adductor Canal: A block injected into the mid thigh of the operative leg of a patient having knee or ankle surgery.  Popliteal or Distal Sciatic: A block injected into the back of the knee of the operative leg of patients having foot or ankle surgery.   Ankle:  An anesthetic  "medication is injected into the ankle of the operative leg of a patient having foot or toe surgery.     Procedure:   The type of anesthesia your doctor used to numb your leg will usually not wear off for 24-48 hours, but may last as long as 72 hours. You should be careful during that period, since it is possible to injure your leg without being aware of the injury. While your leg is numb you should:  Use crutches (minimal weight bearing until your motor and strength is completely back to normal)  Avoid striking or bumping your leg  Avoid extreme hot or cold    Discomfort:  You will have a tingling and prickly sensation in your leg as the feeling begins to return; you can also expect some discomfort. The amount of discomfort is unpredictable, but if you have more pain than can be controlled with pain medication, you should notify your physician.     Pain Medicine:   Begin taking your oral pain pills before bedtime and during the night to avoid a sudden onset of pain as part of the block wears off. Do not engage in drinking, driving, or hazardous occupations while taking pain medication.     Information about liposomal bupivacaine (Exparel)    What is Liposomal Bupivacaine?    Liposomal Bupivacaine is a numbing medication that can help you manage your pain after surgery.  This medication is similar to \"novacaine,\" which is often used by the dentist.  Liposomal bupivacaine is released slowly and can help control pain for up to 72 hours.    What is the purpose of Liposomal Bupivacaine?  To manage your pain after surgery  To help you sleep better, take deep breaths, walk more comfortable, and feel up to visiting with others    How is the procedure done?  Liposomal bupivacaine is a medication given by an injection.  It is usually given right before your surgery.  If this is the case, you will be awake or sedated, but you should experience minimal pain during the procedure.  For some people, the injection may be given at " "the very end of your surgery.  It all depends on the type of surgery and your situation.  The procedure usually takes about 5-15 minutes.  An ultrasound machine will help the anesthesiologist insert it in the right place or the surgeon will inject it under direct vision.   A needle is used to place the numbing medication under your skin.  It provides pain relief by numbing the tissue in the area where your surgeon will make the incision.    What can I expect?  You may experience numbness, tingling, or a feeling of heaviness around the area that was injected.  If you experience any of the follow symptoms IMMEDIATELY CALL THE REGIONAL ANESTHESIA PAIN SERVICE:  Numbness or tingling occurs in areas other than around the injection site  Blurry vision  Ringing in your ears  A metallic taste in your mouth    CALL the Regional Anesthesia Pain Service at:  [phone].  Press \"4\" for the  and let the hospital  know that you are having a problem with a nerve block and that you would like to page the \"adult care inpatient pain management/[facility] team\".  From 7 am - 5 pm, page the \"staff\" physician  From 5 pm - 7 am, page the \"resident\" physician (if no response from \"resident\" physician, call the  back and the \"staff\" physician).    You should not receive any other type of numbing medication within 4 days after receiving liposomal bupivacaine unless your anesthesiologist approves.                        Physical Therapy Post-Operative Guidelines  Trochleoplasty      Phase I: 0-4 Weeks   Precautions: Brace locked at 10?; NO OKC at any point; wound healing   Weight Bearing Brace ROM    WBAT with brace locked in 10? KF   Use of crutches for symptom control and reduce stress at trochlea    On & locked at 10? KF at all times except w/CPM or P/AAROM exercises   Discontinue brace for sleep at 4 weeks per comfort level (unless otherwise instructed by MD)   Open when seated  Emphasize full extension   " Progress flexion to 90?KF multiple times per day   CPM 4-6 hours per day   Patellar and peripatellar joint and soft tissue mobilizations permitted   Do NOT force into painful flexion (maintain pain control of 3/10 or less with ROM)   Therapeutic Exercise and Activity    Establish high quality quad set         *Superior translation of the patella         *Avoid co-contraction with hamstrings and proximal gluteal musculature         *Utilize NMES as needed    SLR x 4         *Flexion: begin in standing à reclined standing à supine                -Progress per quad control, no extensor lag         *Abduction, Adduction, Extension   Beginner mat exercises for abdominal/lumbopelvic control and proximal hip strength   Calf raises    Standing TKE with resistance band   Isometric leg press (at intervals 40-90? KF) at comfortable level of intensity (not moving the load isotonically)   Goals:   Quad set WNL; Emphasize full knee hyperextension; SLR without lag; full hyperextension-90  ROM; resolve joint effusion     ** For Lerma Grooveplasty - functional return to activity (faster progression likely)  Phase II: 4-8 Weeks   Precautions: Weight bearing progression subject to MD discretion, concomitant procedure precautions, and patient symptom response to activity; Do not progress through increased joint swelling or crepitus - not to be tolerated with protocol progressions   Weight Bearing Brace ROM    PWB à FWB with brace on locked in extension   No stair climbing with surgical limb    Gradually open brace per quad control   Fully open for ROM exercises   Discontinue brace with sleeping at 4 weeks unless otherwise instructed by MD    Full knee hyperextension   Progress towards full knee flexion ROM (do not force end range knee flexion)     Therapeutic Exercise and Activity (Phase II continued)    Initiate bridging with legs over exercise ball/bolster (no plank poses yet)   Increase reps w/proximal hip/abdom exercises     Initiate basic 2 limb CKC strength drills     *Shallow (0-45?) KF angles for  PFJ stress   Calf raises   Initiate 2 limb L/E proprio/balance    Marching with balance moment   Emphasize terminal knee extension control in CKC   Goals: Effusion resolving; Full extension ROM; Flexion ROM >=120?; Multi-planar L/E hip strength = MMT grade 5/5      Phase III: 8-12 Weeks   Precautions: Proper alignment for squatting activities, no running or impact activities   Weight Bearing Brace ROM    FWB, unless instructed differently by MD         *Normalize gait pattern,            avoiding knee hyperextension            in early stance  Open per quad control   Protective use when out of home: environmental hazards, crowds  Full, symmetrical ROM   Therapeutic Exercise and Activity    Progress core activities - side plank from knees, bridging w/ or w/o ball, basic 2 legged prone plank and hip strength   Initiate basic low impact cardio with bike, elliptical, walking (15-20 minutes, minimal intensity, steady pace)   Progress CKC drills - step, lunge, leg press         *Deeper KF angles (>45?) with 2 legged support         *Early KF angles (0-45?) with 1 legged support per control/tolerance   Progress L/E proprio/balance drills: single limb per control/tolerance   Goals: Effusion resolved; ROM WNL; Progressing toward normal gait pattern in FWB; Able to perform >=30 reps prior to fatigue with leg lifting; Normal LE kinematics w/2 legged CKC activities     Phase IV: 12-16 Weeks*   Precautions: Observe/instruct proper L/E alignment w/CKC drills (avoid functional valgus); Avoid faulty mechanics that lead to PF issues   Weight Bearing Brace ROM    FWB  No brace per MD  Full ROM   Cardiovascular Proprioception/Balance Core Stability    Progress low impact cardio per symptoms - increase one variable at a time (intensity level, intervals, duration)         *15-20 min minimal            intensity, steady pace to            begin  Progress  drills: Add surface challenge/perturbation on DL   Single limb activities on level surface   Directional reaching and stepping drills  Advance progression of core stabilization and bridging as tolerated   Strengthening    Increase workload with CKC drills:         *Add resistance with 2 legged squatting          *Progress depth with single limb (step, lunge, leg press)         *Initiate large muscle group weight training (HS curls, leg press, calf raises, dead lift, etc.)   Goals: Gradual progression back to full non pounding activity based on symptoms response and demonstrated control and strength       *Timeframes in later phases of rehabilitation are estimates only.  Patient may be progressed faster/slower based on their ability to attain goals for each phase.    Phase V (16 weeks+): Gradual progression back to full activity based on symptom response and demonstrated control and strength.  Recreational running and unlimited plyometrics permitted after 4 months when cleared by MD pending radiographic findings. Please reference Return to Sport or High Physical Demand Occupation Protocol for return to further advanced activities for appropriate patients    NOTE: Most patients who undertake this operation have limited sport goals and have not routinely engaged in jumping and pounding sports.  Define patient goals and expectations up front and modify protocol accordingly.      Patient to return to [facility] for physical performance testing at 24 weeks post operation  TROCHLEOPLASTY POST OPERATIVE DISCHARGE INSTRUCTIONS    FOLLOW UP APPOINTMENT  You are scheduled for a post operative wound check with Dr. Sarmiento's clinic approximately two weeks after surgery. At approximately eight weeks after surgery, you will see Dr. Sarmiento in clinic.     Your follow up appointments will be at the location that you regularly see Dr. Sarmiento:    [facility]  [address] [place] [id number]  [phone]    Physical therapy:   A referral for physical therapy will be made at discharge. You will also receive a physical therapy protocol in your after visit summary. This document must be taken to your first therapy visit.      ACTIVITY  Weight bearing status:   You are allowed to weight bear as tolerated on your operative leg using assistive devices (crutches) as needed. The hinged knee brace should be on and locked at 20 degrees.    Continuous passive motion (CPM) machine:   Perform CPM exercises for six to eight hours per day for the first four weeks after surgery. The CPM should be set at 20 degrees to flexion tolerance with goal of 90 degrees. Advance the CPM settings aggressively in increments of 5 degrees every 30 minutes until desired goal is achieved. After four weeks, the CPM machine can be returned.    Hinged knee brace:  The brace should be set at 20 degrees to 90 degrees. It is to be locked at 20 degrees at all times except with CPM or ROM exercises. The brace is to be worn for three to six weeks following surgery. Sleep with the brace on until directed.    Exercises:   Perform the following exercises at least three times per day for the first four weeks after surgery to prevent complications, such as blood clots in your legs:  1) Point and flex your feet  2) Move your ankle around in big circles  3) Wiggle your toes   Also, perform thigh muscle tightening exercises for 10 to 15 minutes at least three times per day for the first four weeks after surgery.    Athletic Activities:  Activities such as swimming, bicycling, jogging, running, and stop-and-go sports should be avoided until permitted by your provider.    Driving:  Driving is not permitted until directed by your provider. Typically, driving is restricted for three to four weeks after right knee surgery and three weeks after left knee surgery. Under no circumstance are you  permitted to drive while using narcotic pain medications.    Return to Work:  You may return to work when directed by your provider. Typically, patients with desk/sitting jobs can return to work within two weeks while patients with heavy labor jobs can return to work around three months after surgery.      COMFORT AND PAIN MANAGEMENT  Elevation:   During times of inactivity throughout the first two weeks after surgery, make an effort to decrease swelling by elevating your operative extremity. This is most effectively done by lying down and placing several pillows lengthwise under your thigh and calf to raise your toes above the level of your nose. To ensure that your knee remains in full extension, do not place pillows directly under your knee.     Icing:  An ice pack will be provided to control swelling and discomfort after surgery. Place a thin towel on your skin and apply the ice pack overtop. You may apply ice for 20 minutes as often as two times per hour.    Pain Medications:  You will be discharged with acetaminophen (Tylenol) and a narcotic medication for pain management after surgery. Acetaminophen is most effective when it is taken per the schedule outlined by your provider (every four, six, or eight hours as prescribed). You may safely use acetaminophen as prescribed for the first four weeks after surgery provided you do not exceed the maximum daily dose prescribed by your provider (usually 3000 mg - 4000 mg). The narcotic pain medication should only be taken on an as-needed basis when necessary and should be reserved for severe pain that is not controlled with scheduled acetaminophen. In the first three days following surgery, your symptoms may warrant use of the narcotic pain medication every three, four, or six hours as prescribed. After three days, focus your efforts on decreasing (tapering) use of narcotic medications.   The most successful tapering strategy is to first, decrease the dose (number of  tablets) and second, increase the interval (time in between doses). For example, if you begin taking two tablets every four hours after surgery, start your taper by decreasing one of these doses to one tablet. Every one to two days, decrease another dose to one tablet until you are eventually taking one tablet every four hours. Once this is achieved, focus on increasing the number of hours between doses, moving from one tablet every four hours to one tablet every six hours. As tolerated, continue to increase the interval to eight and twelve hours. Eventually, taper to one dose every evening and discontinue when no longer needed.      ANTICOAGULATION  Depending on your risk factors, your provider may prescribe aspirin to prevent blood clots. If prescribed, take aspirin daily for the first four weeks after surgery.      WOUND CARE AND SHOWERING  Wound care:  Remove compression dressing (tubigrip sleeve) twice daily for 10 minutes (to prevent skin breakdown).   Remove the compressive device for showering (see showering instructions below).   Discontinue Compression dressing two days after surgery  A sterile dressing was placed over your surgical incision. This dressing should be kept in place for the first seven days after surgery.   After seven days, remove the dressing and leave the incision open to air, covering only for showering (see showering instructions below).   Your surgical incision was closed with Exofin (a surgical adhesive that is directly on the incision areas). The Exofin should be left on until it falls off or is removed at your first office visit.   DO NOT pick or scratch your incision.   Please contact Dr. Sarmiento's office if you notice the following:   Significant cloudy, bloody, or malodorous drainage from the incision  Excessive warmth and redness around the incision.    Showering:  You may shower beginning two days after surgery, however, the surgical incision must remain dry until your first  office visit.   Always remove the ACE wrap or tubigrip compression sleeve for showering.   During the first seven days after surgery, your surgical dressing will prevent the incision from becoming wet.   Once the surgical dressing is removed, cover the incision with saran wrap (or any other non-permeable covering) to keep the incision dry while showering.  You are strictly prohibited from soaking or submerging the surgical wound underwater.     Tub Bathing:  Tub bathing, swimming, or any other activities that cause your incision to be submerged should be avoided until allowed by your provider. Typically, patients are allowed to return to these activities six weeks after surgery.      CONTACTING YOUR PHYSICIAN:  You may experience symptoms that require follow-up before your scheduled two week appointment. Please contact Dr. Sarmienot's office if you experience:  1) Pain in your knee that persists or worsens in the first few days after surgery  2) Excessive redness or drainage of cloudy or bloody material from the wounds (clear red tinted fluid and some mild drainage should be expected) or drainage of any kind five days after surgery  3) A temperature elevation greater than 101.5 F   4) Pain, swelling or redness in your calf  5) Numbness or weakness in your leg or foot      Regular business hours (Monday - Friday, 8am - 5pm):  [facility]: [phone]    After hours and weekends:  [facility] on call Orthopedic resident: [phone]

## 2024-12-19 NOTE — ANESTHESIA CARE TRANSFER NOTE
Patient: Lisset MITCHELL Chi    Procedure: Procedure(s):  Left Knee Arthroscopy, Medial Patello-femoral Ligament Reconstruction with Allograft,  Arthroscopy, Knee, With Trochleoplasty       Diagnosis: Patellofemoral instability of left knee with pain [M25.362, M25.562]  Diagnosis Additional Information: No value filed.    Anesthesia Type:   General     Note:    Oropharynx: oropharynx clear of all foreign objects and spontaneously breathing  Level of Consciousness: drowsy  Oxygen Supplementation: face mask  Level of Supplemental Oxygen (L/min / FiO2): 6  Independent Airway: airway patency satisfactory and stable  Dentition: dentition unchanged  Vital Signs Stable: post-procedure vital signs reviewed and stable  Report to RN Given: handoff report given  Patient transferred to: PACU  Comments: Uneventful transport   Report to RN - Kristen MCLEAN  Exchanging well; color natl  Pt comfortable  Pt responds appropriately to command  IV patent  Lips/teeth/dentition as preop status  Questions answered      Handoff Report: Identifed the Patient, Identified the Reponsible Provider, Reviewed the pertinent medical history, Discussed the surgical course, Reviewed Intra-OP anesthesia mangement and issues during anesthesia, Set expectations for post-procedure period and Allowed opportunity for questions and acknowledgement of understanding      Vitals:  Vitals Value Taken Time   /71 12/19/24 1423   Temp 36  C (96.8  F) 12/19/24 1423   Pulse 82 12/19/24 1427   Resp 10 12/19/24 1427   SpO2 100 % 12/19/24 1427   Vitals shown include unfiled device data.    Electronically Signed By: JOSE MUÑOZ CRNA  December 19, 2024  2:27 PM

## 2024-12-19 NOTE — ANESTHESIA POSTPROCEDURE EVALUATION
Patient: Lisset MITCHELL Chi    Procedure: Procedure(s):  Left Knee Arthroscopy, Medial Patello-femoral Ligament Reconstruction with Allograft,  Arthroscopy, Knee, With Trochleoplasty       Anesthesia Type:  General    Note:  Disposition: Outpatient   Postop Pain Control: Uneventful            Sign Out: Well controlled pain   PONV: No   Neuro/Psych: Uneventful            Sign Out: Acceptable/Baseline neuro status   Airway/Respiratory: Uneventful            Sign Out: Acceptable/Baseline resp. status   CV/Hemodynamics: Uneventful            Sign Out: Acceptable CV status; No obvious hypovolemia; No obvious fluid overload   Other NRE: NONE   DID A NON-ROUTINE EVENT OCCUR? No       Last vitals:  Vitals Value Taken Time   /74 12/19/24 1430   Temp 36.1  C (97  F) 12/19/24 1430   Pulse 72 12/19/24 1439   Resp 6 12/19/24 1439   SpO2 100 % 12/19/24 1439   Vitals shown include unfiled device data.    Electronically Signed By: Selina Mejia MD  December 19, 2024  3:08 PM

## 2024-12-19 NOTE — PROGRESS NOTES
PHYSICAL THERAPY EVALUATION  Type of Visit: Evaluation              Subjective     Date of Surgery: 12/19/24  Start of Care date: 12/23/24  Surgical Procedure/Limb: L knee - arthroscopic with tibial tuberosity transfer and patellar realignment   Surgeon Name: Guillermina Hernandez  Precautions/Restrictions: partial WB--> WBAT in hinged knee brace locked to 10 deg         Average Daily Pain Levels: 3-4/10  Other Symptoms: tingling in knee, shin numbness, achy quad and hip   Symptom Mgmt Strategies: taking 1 oxy occasionally      Prior orthopaedic history/procedures: R knee arthroscopy   Next MD Appt Date: 1/3/25                 Presenting condition or subjective complaint: (Patient-Rptd) left knee surgery  Date of onset: 12/19/24    Relevant medical history:     Dates & types of surgery: (Patient-Rptd) 1989 reconstruction surgery    Prior diagnostic imaging/testing results: (Patient-Rptd) MRI; X-ray     Prior therapy history for the same diagnosis, illness or injury: (Patient-Rptd) Yes      Prior Level of Function  Transfers:   Ambulation:   ADL:   IADL:     Living Environment  Social support: (Patient-Rptd) With a significant other or spouse   Type of home: (Patient-Rptd) House   Stairs to enter the home:         Ramp: (Patient-Rptd) No   Stairs inside the home: (Patient-Rptd) Yes (Patient-Rptd) 25 Is there a railing: (Patient-Rptd) Yes     Help at home: (Patient-Rptd) Self Cares (home health aide/personal care attendant, family, etc)  Equipment owned: (Patient-Rptd) Crutches     Employment:      Hobbies/Interests:      Patient goals for therapy: (Patient-Rptd) walk dance hike    Pain assessment: See objective evaluation for additional pain details     Objective       KNEE EVALUATION    INTEGUMENTARY (edema, incisions):  normal swelling, bandage still in place, no significant concerns       GAIT:  Weightbearing Status: WBAT  Assistive Device(s): Crutches (bilateral)  Gait Deviations: Antalgic  Annika decreased      Knee ROM:    (Degrees) Right AROM Right PROM  Left AROM Left PROM   Knee Flexion    65   Knee Extension    Lacking 2       Functional Strength:   Quad Set: good control   Supine SLR: unable to complete in supine without extensor lag, better in standing.   SLLR: good control, no pain   Prone hip extension: good control, no pain  SL adduction: VC needed to keep knee straight, overall good control, no pain         Assessment & Plan   CLINICAL IMPRESSIONS  Medical Diagnosis: S/P knee surgery    Treatment Diagnosis: L knee pain with strength and mobility defiicits   Impression/Assessment: Patient is a 49 year old female with L knee complaints.  The following significant findings have been identified: Pain, Decreased ROM/flexibility, Decreased strength, Decreased proprioception, Impaired sensation, Inflammation, Edema, Impaired gait, Impaired muscle performance, Decreased activity tolerance, and Instability. These impairments interfere with their ability to perform self care tasks, work tasks, recreational activities, household chores, driving , and community mobility as compared to previous level of function.     Clinical Decision Making (Complexity):  Clinical Presentation: Stable/Uncomplicated  Clinical Presentation Rationale: based on medical and personal factors listed in PT evaluation  Clinical Decision Making (Complexity): Low complexity    PLAN OF CARE  Treatment Interventions:  Modalities: E-stim  Interventions: Gait Training, Manual Therapy, Neuromuscular Re-education, Therapeutic Activity, Therapeutic Exercise, Self-Care/Home Management    Long Term Goals     PT Goal 1  Goal Identifier: HEP  Goal Description: Pt will demonstrate mastery of HEP, completing at least 5x/week, without need for additional cuing in order to increase independence with self cares and self management of the condition.  Target Date: 03/17/25  PT Goal 2  Goal Identifier: Ambulation  Goal Description: Pt will be able to ambulate without brace,  without AD, and without pain for >30 min in order to return to PLOF post op  Target Date: 03/17/25      Frequency of Treatment: 1x/week  Duration of Treatment: 12 weeks    Recommended Referrals to Other Professionals:   Education Assessment:   Learner/Method: Patient  Education Comments: Eager to participate in therapy    Risks and benefits of evaluation/treatment have been explained.   Patient/Family/caregiver agrees with Plan of Care.     Evaluation Time:     PT Eval, Low Complexity Minutes (00667): 25       Signing Clinician: Nabila Carranza PT

## 2024-12-23 ENCOUNTER — DOCUMENTATION ONLY (OUTPATIENT)
Dept: ORTHOPEDICS | Facility: CLINIC | Age: 49
End: 2024-12-23

## 2024-12-23 ENCOUNTER — THERAPY VISIT (OUTPATIENT)
Dept: PHYSICAL THERAPY | Facility: CLINIC | Age: 49
End: 2024-12-23
Payer: COMMERCIAL

## 2024-12-23 DIAGNOSIS — Z98.890 S/P KNEE SURGERY: ICD-10-CM

## 2024-12-23 PROCEDURE — 97161 PT EVAL LOW COMPLEX 20 MIN: CPT | Mod: GP | Performed by: PHYSICAL THERAPIST

## 2024-12-23 PROCEDURE — 97110 THERAPEUTIC EXERCISES: CPT | Mod: GP | Performed by: PHYSICAL THERAPIST

## 2024-12-23 ASSESSMENT — ACTIVITIES OF DAILY LIVING (ADL)
HOW_WOULD_YOU_RATE_THE_OVERALL_FUNCTION_OF_YOUR_KNEE_DURING_YOUR_USUAL_DAILY_ACTIVITIES?: SEVERELY ABNORMAL
PAIN: THE SYMPTOM AFFECTS MY ACTIVITY MODERATELY
GO UP STAIRS: ACTIVITY IS VERY DIFFICULT
GO DOWN STAIRS: ACTIVITY IS VERY DIFFICULT
PLEASE_INDICATE_YOR_PRIMARY_REASON_FOR_REFERRAL_TO_THERAPY:: KNEE
KNEEL ON THE FRONT OF YOUR KNEE: I AM UNABLE TO DO THE ACTIVITY
RAW_SCORE: 17
HOW_WOULD_YOU_RATE_THE_OVERALL_FUNCTION_OF_YOUR_KNEE_DURING_YOUR_USUAL_DAILY_ACTIVITIES?: SEVERELY ABNORMAL
WEAKNESS: THE SYMPTOM AFFECTS MY ACTIVITY SEVERELY
RISE FROM A CHAIR: I AM UNABLE TO DO THE ACTIVITY
RISE FROM A CHAIR: I AM UNABLE TO DO THE ACTIVITY
GO DOWN STAIRS: ACTIVITY IS VERY DIFFICULT
HOW_WOULD_YOU_RATE_THE_CURRENT_FUNCTION_OF_YOUR_KNEE_DURING_YOUR_USUAL_DAILY_ACTIVITIES_ON_A_SCALE_FROM_0_TO_100_WITH_100_BEING_YOUR_LEVEL_OF_KNEE_FUNCTION_PRIOR_TO_YOUR_INJURY_AND_0_BEING_THE_INABILITY_TO_PERFORM_ANY_OF_YOUR_USUAL_DAILY_ACTIVITIES?: 10
KNEE_ACTIVITY_OF_DAILY_LIVING_SCORE: 24.29
PAIN: THE SYMPTOM AFFECTS MY ACTIVITY MODERATELY
KNEE_ACTIVITY_OF_DAILY_LIVING_SUM: 17
WEAKNESS: THE SYMPTOM AFFECTS MY ACTIVITY SEVERELY
LIMPING: THE SYMPTOM AFFECTS MY ACTIVITY SEVERELY
WALK: ACTIVITY IS VERY DIFFICULT
STAND: ACTIVITY IS FAIRLY DIFFICULT
LIMPING: THE SYMPTOM AFFECTS MY ACTIVITY SEVERELY
SWELLING: THE SYMPTOM AFFECTS MY ACTIVITY SEVERELY
SIT WITH YOUR KNEE BENT: I AM UNABLE TO DO THE ACTIVITY
AS_A_RESULT_OF_YOUR_KNEE_INJURY,_HOW_WOULD_YOU_RATE_YOUR_CURRENT_LEVEL_OF_DAILY_ACTIVITY?: SEVERELY ABNORMAL
STIFFNESS: THE SYMPTOM AFFECTS MY ACTIVITY MODERATELY
SWELLING: THE SYMPTOM AFFECTS MY ACTIVITY SEVERELY
HOW_WOULD_YOU_RATE_THE_CURRENT_FUNCTION_OF_YOUR_KNEE_DURING_YOUR_USUAL_DAILY_ACTIVITIES_ON_A_SCALE_FROM_0_TO_100_WITH_100_BEING_YOUR_LEVEL_OF_KNEE_FUNCTION_PRIOR_TO_YOUR_INJURY_AND_0_BEING_THE_INABILITY_TO_PERFORM_ANY_OF_YOUR_USUAL_DAILY_ACTIVITIES?: 10
SQUAT: I AM UNABLE TO DO THE ACTIVITY
WALK: ACTIVITY IS VERY DIFFICULT
STAND: ACTIVITY IS FAIRLY DIFFICULT
SIT WITH YOUR KNEE BENT: I AM UNABLE TO DO THE ACTIVITY
STIFFNESS: THE SYMPTOM AFFECTS MY ACTIVITY MODERATELY
GO UP STAIRS: ACTIVITY IS VERY DIFFICULT
AS_A_RESULT_OF_YOUR_KNEE_INJURY,_HOW_WOULD_YOU_RATE_YOUR_CURRENT_LEVEL_OF_DAILY_ACTIVITY?: SEVERELY ABNORMAL
KNEEL ON THE FRONT OF YOUR KNEE: I AM UNABLE TO DO THE ACTIVITY
GIVING WAY, BUCKLING OR SHIFTING OF KNEE: I DO NOT HAVE THE SYMPTOM
SQUAT: I AM UNABLE TO DO THE ACTIVITY
GIVING WAY, BUCKLING OR SHIFTING OF KNEE: I DO NOT HAVE THE SYMPTOM

## 2024-12-23 NOTE — PROGRESS NOTES
Received Completed forms Yes   Faxed Forms Faxed To: -  Fax Number: -   Sent to HIM (Date) 12/23/24

## 2024-12-30 ENCOUNTER — THERAPY VISIT (OUTPATIENT)
Dept: PHYSICAL THERAPY | Facility: CLINIC | Age: 49
End: 2024-12-30
Payer: COMMERCIAL

## 2024-12-30 DIAGNOSIS — Z98.890 S/P KNEE SURGERY: Primary | ICD-10-CM

## 2024-12-30 PROCEDURE — 97110 THERAPEUTIC EXERCISES: CPT | Mod: GP | Performed by: PHYSICAL THERAPIST

## 2024-12-30 PROCEDURE — 97140 MANUAL THERAPY 1/> REGIONS: CPT | Mod: GP | Performed by: PHYSICAL THERAPIST

## 2025-01-03 ENCOUNTER — OFFICE VISIT (OUTPATIENT)
Dept: ORTHOPEDICS | Facility: CLINIC | Age: 50
End: 2025-01-03
Payer: COMMERCIAL

## 2025-01-03 DIAGNOSIS — Z98.890 S/P KNEE SURGERY: Primary | ICD-10-CM

## 2025-01-03 PROCEDURE — 99024 POSTOP FOLLOW-UP VISIT: CPT | Performed by: PHYSICIAN ASSISTANT

## 2025-01-03 NOTE — PROGRESS NOTES
Chief Complaint:   Follow up, DOS 12/19/2024 with Dr. Sarmiento    Procedures:  1. Left knee exam under anesthesia.   2. Diagnostic arthroscopy.   3. Trochleoplasty.   4. Lateral retinacular lengthening (18mm).   6. Medial patellofemoral ligament reconstruction using gracilis  allograft.    History:  Lisset MITCEHLL Chi is a 49 year old who presents to clinic for routine postoperative evaluation of her left knee.  She is now approximately 2 weeks from surgery.  She has been diligently using her CPM machine and reports she is up to 85 degrees.  She often starts with it in the 40 degree range and incrementally increases throughout the day.  She has been using it about 6 to 8 hours a day.  She is working with physical therapy twice a week.  She is working on quad exercises.  She has been compliant with wearing her brace on and locked for ambulation.    Attending PT at [facility]. Pain being managed with alternating acetaminophen and ibuprofen, oxycodone mainly for bedtime.  She does not request a refill today.    Denies fevers, chills or sweats. Denies calf pain or tenderness, chest pain or shortness of breath.  She notes on the nonsurgical leg some tightness in the calf that improved with stretching.  She has had no swelling in the right leg.    Exam:    General: Awake, Alert, and oriented. Articulates and communicates with a normal affect     Left lower Extremity:  Incisions well approximated and healing without evidence of infection, no erythema, drainage, or wound dehiscence.  Sutures are trimmed.  Mild to moderate swelling about the knee with mild knee effusion.  Resolving ecchymosis throughout the anterior knee.  Range of motion: 0-80 approximately with smooth tracking of the patella  Calf soft and nontender.  No lower extremity edema  TA/Gsc/EHL/FHL with 5/5 strength  Distal pulses palpable, toes are warm and well perfused   Gait: Ambulating with crutches and her hinged knee brace      Medications:      Current Outpatient Medications:     acetaminophen (TYLENOL) 325 MG tablet, Take 2 tablets (650 mg) by mouth every 4 hours as needed for mild pain., Disp: 50 tablet, Rfl: 0    albuterol (PROAIR HFA/PROVENTIL HFA/VENTOLIN HFA) 108 (90 Base) MCG/ACT inhaler, Inhale 2 puffs into the lungs every 4 hours as needed for shortness of breath, wheezing or cough., Disp: 18 g, Rfl: 1    aspirin 81 MG EC tablet, Take 1 tablet (81 mg) by mouth 2 times daily., Disp: 60 tablet, Rfl: 0    azelastine (ASTELIN) 0.1 % nasal spray, Spray 1 spray into both nostrils 2 times daily. (Patient taking differently: Spray 1 spray into both nostrils every morning.), Disp: 90 mL, Rfl: 1    Calcium Carbonate-Vitamin D (CALCIUM + D PO), Take 600 mg by mouth daily (with lunch), Disp: , Rfl:     FEXOFENADINE  MG OR TABS, Take 180 mg by mouth every morning, Disp: , Rfl:     ibuprofen (ADVIL/MOTRIN) 600 MG tablet, TAKE 1 TABLET (600 MG) BY MOUTH EVERY 8 HOURS AS NEEDED FOR MODERATE PAIN, Disp: 60 tablet, Rfl: 1    magnesium 250 MG tablet, Take 1 tablet by mouth daily (with lunch)., Disp: , Rfl:     methocarbamol (ROBAXIN) 500 MG tablet, TAKE 1 TABLET (500 MG) BY MOUTH NIGHTLY AS NEEDED FOR MUSCLE SPASMS, Disp: 30 tablet, Rfl: 0    montelukast (SINGULAIR) 10 MG tablet, Take 1 tablet (10 mg) by mouth at bedtime., Disp: 90 tablet, Rfl: 3    oxyCODONE (ROXICODONE) 5 MG tablet, Take 1-2 tablets (5-10 mg) by mouth every 4 hours as needed for moderate to severe pain., Disp: 26 tablet, Rfl: 0    senna-docusate (SENOKOT-S/PERICOLACE) 8.6-50 MG tablet, Take 1-2 tablets by mouth 2 times daily., Disp: 30 tablet, Rfl: 0    Turmeric Curcumin 500 MG CAPS, Take 500 mg by mouth daily (with lunch), Disp: , Rfl:     VITAMIN D, CHOLECALCIFEROL, PO, Take 2,000 Units by mouth daily (with lunch)., Disp: , Rfl:     ondansetron (ZOFRAN ODT) 4 MG ODT tab, Take 1 tablet (4 mg) by mouth every 8 hours as needed for nausea. (Patient not taking: Reported on 1/3/2025), Disp:  4 tablet, Rfl: 0    Assessment:  Doing well 2 weeks s/p left knee MPFL reconstruction with allograft, trochleoplasty    Plan:   -Incision care reviewed.  She may shower and pat dry over the incisions.  No lotions or ointments on incisions.  She was directed to refrain from submerging in a tub or pool.   -Continue PT following trochleoplasty protocol  -reviewed signs to monitory for DVT including calf pain or tenderness, chest pain or shortness of breath and directed herto report to call in to clinic or report to ED with any concerns or if they symptoms occur.   -DVT prophylaxis: ASA 162mg daily x 4 weeks  -Follow up: as scheduled in 2-3 weeks with Dr. Sarmiento, will obtain new Xray of the knee at that time.     Radha Rodriguez PA-C   1/3/2025 12:40 PM  Orthopedic Surgery k

## 2025-01-03 NOTE — NURSING NOTE
Reason For Visit:   Chief Complaint   Patient presents with    Surgical Followup     DOS: 12/19/24 Knee Arthroscopy, Medial Patello-femoral Ligament Reconstruction with Allograft, trochleoplasty, possible Distal Tibial Tubercle Osteotomy, (Left)           LMP 12/25/2013     Pain Assessment  Patient Currently in Pain: Yes  0-10 Pain Scale: 4  Primary Pain Location: Knee (Left)    Clair Ch, ATC

## 2025-01-03 NOTE — LETTER
1/3/2025      Lisset MITCHELL Chi  [address] [address]      Dear Colleague,    Thank you for referring your patient, Lisset MITCHELL Chi, to the [facility] [place]. Please see a copy of my visit note below.    Chief Complaint:   Follow up, DOS 12/19/2024 with Dr. Sarmiento    Procedures:  1. Left knee exam under anesthesia.   2. Diagnostic arthroscopy.   3. Trochleoplasty.   4. Lateral retinacular lengthening (18mm).   6. Medial patellofemoral ligament reconstruction using gracilis  allograft.    History:  Lisset MITCHELL Chi is a 49 year old who presents to clinic for routine postoperative evaluation of her left knee.  She is now approximately 2 weeks from surgery.  She has been diligently using her CPM machine and reports she is up to 85 degrees.  She often starts with it in the 40 degree range and incrementally increases throughout the day.  She has been using it about 6 to 8 hours a day.  She is working with physical therapy twice a week.  She is working on quad exercises.  She has been compliant with wearing her brace on and locked for ambulation.    Attending PT at [facility]. Pain being managed with alternating acetaminophen and ibuprofen, oxycodone mainly for bedtime.  She does not request a refill today.    Denies fevers, chills or sweats. Denies calf pain or tenderness, chest pain or shortness of breath.  She notes on the nonsurgical leg some tightness in the calf that improved with stretching.  She has had no swelling in the right leg.    Exam:    General: Awake, Alert, and oriented. Articulates and communicates with a normal affect     Left lower Extremity:  Incisions well approximated and healing without evidence of infection, no erythema, drainage, or wound dehiscence.  Sutures are trimmed.  Mild to moderate swelling about the knee with mild knee effusion.  Resolving ecchymosis throughout the anterior knee.  Range of motion: 0-80 approximately with smooth  tracking of the patella  Calf soft and nontender.  No lower extremity edema  TA/Gsc/EHL/FHL with 5/5 strength  Distal pulses palpable, toes are warm and well perfused   Gait: Ambulating with crutches and her hinged knee brace      Medications:     Current Outpatient Medications:      acetaminophen (TYLENOL) 325 MG tablet, Take 2 tablets (650 mg) by mouth every 4 hours as needed for mild pain., Disp: 50 tablet, Rfl: 0     albuterol (PROAIR HFA/PROVENTIL HFA/VENTOLIN HFA) 108 (90 Base) MCG/ACT inhaler, Inhale 2 puffs into the lungs every 4 hours as needed for shortness of breath, wheezing or cough., Disp: 18 g, Rfl: 1     aspirin 81 MG EC tablet, Take 1 tablet (81 mg) by mouth 2 times daily., Disp: 60 tablet, Rfl: 0     azelastine (ASTELIN) 0.1 % nasal spray, Spray 1 spray into both nostrils 2 times daily. (Patient taking differently: Spray 1 spray into both nostrils every morning.), Disp: 90 mL, Rfl: 1     Calcium Carbonate-Vitamin D (CALCIUM + D PO), Take 600 mg by mouth daily (with lunch), Disp: , Rfl:      FEXOFENADINE  MG OR TABS, Take 180 mg by mouth every morning, Disp: , Rfl:      ibuprofen (ADVIL/MOTRIN) 600 MG tablet, TAKE 1 TABLET (600 MG) BY MOUTH EVERY 8 HOURS AS NEEDED FOR MODERATE PAIN, Disp: 60 tablet, Rfl: 1     magnesium 250 MG tablet, Take 1 tablet by mouth daily (with lunch)., Disp: , Rfl:      methocarbamol (ROBAXIN) 500 MG tablet, TAKE 1 TABLET (500 MG) BY MOUTH NIGHTLY AS NEEDED FOR MUSCLE SPASMS, Disp: 30 tablet, Rfl: 0     montelukast (SINGULAIR) 10 MG tablet, Take 1 tablet (10 mg) by mouth at bedtime., Disp: 90 tablet, Rfl: 3     oxyCODONE (ROXICODONE) 5 MG tablet, Take 1-2 tablets (5-10 mg) by mouth every 4 hours as needed for moderate to severe pain., Disp: 26 tablet, Rfl: 0     senna-docusate (SENOKOT-S/PERICOLACE) 8.6-50 MG tablet, Take 1-2 tablets by mouth 2 times daily., Disp: 30 tablet, Rfl: 0     Turmeric Curcumin 500 MG CAPS, Take 500 mg by mouth daily (with lunch), Disp: ,  Rfl:      VITAMIN D, CHOLECALCIFEROL, PO, Take 2,000 Units by mouth daily (with lunch)., Disp: , Rfl:      ondansetron (ZOFRAN ODT) 4 MG ODT tab, Take 1 tablet (4 mg) by mouth every 8 hours as needed for nausea. (Patient not taking: Reported on 1/3/2025), Disp: 4 tablet, Rfl: 0    Assessment:  Doing well 2 weeks s/p left knee MPFL reconstruction with allograft, trochleoplasty    Plan:   -Incision care reviewed.  She may shower and pat dry over the incisions.  No lotions or ointments on incisions.  She was directed to refrain from submerging in a tub or pool.   -Continue PT following trochleoplasty protocol  -reviewed signs to monitory for DVT including calf pain or tenderness, chest pain or shortness of breath and directed herto report to call in to clinic or report to ED with any concerns or if they symptoms occur.   -DVT prophylaxis: ASA 162mg daily x 4 weeks  -Follow up: as scheduled in 2-3 weeks with Dr. Sarmiento, will obtain new Xray of the knee at that time.     Radha Rodriguez PA-C   1/3/2025 12:40 PM  Orthopedic Surgery k      Again, thank you for allowing me to participate in the care of your patient.        Sincerely,        Radha Rodriguez PA-C    Electronically signed

## 2025-01-08 DIAGNOSIS — Z98.890 S/P KNEE SURGERY: Primary | ICD-10-CM

## 2025-01-14 ENCOUNTER — THERAPY VISIT (OUTPATIENT)
Dept: PHYSICAL THERAPY | Facility: CLINIC | Age: 50
End: 2025-01-14
Payer: COMMERCIAL

## 2025-01-14 DIAGNOSIS — Z98.890 S/P KNEE SURGERY: Primary | ICD-10-CM

## 2025-01-14 PROCEDURE — 97110 THERAPEUTIC EXERCISES: CPT | Mod: GP | Performed by: PHYSICAL THERAPIST

## 2025-01-17 ENCOUNTER — THERAPY VISIT (OUTPATIENT)
Dept: PHYSICAL THERAPY | Facility: CLINIC | Age: 50
End: 2025-01-17
Payer: COMMERCIAL

## 2025-01-17 DIAGNOSIS — Z98.890 S/P KNEE SURGERY: Primary | ICD-10-CM

## 2025-01-17 PROCEDURE — 97140 MANUAL THERAPY 1/> REGIONS: CPT | Mod: GP | Performed by: PHYSICAL THERAPIST

## 2025-01-17 PROCEDURE — 97110 THERAPEUTIC EXERCISES: CPT | Mod: GP | Performed by: PHYSICAL THERAPIST

## 2025-01-20 ENCOUNTER — THERAPY VISIT (OUTPATIENT)
Dept: PHYSICAL THERAPY | Facility: CLINIC | Age: 50
End: 2025-01-20
Payer: COMMERCIAL

## 2025-01-20 DIAGNOSIS — Z98.890 S/P KNEE SURGERY: Primary | ICD-10-CM

## 2025-01-20 PROCEDURE — 97110 THERAPEUTIC EXERCISES: CPT | Mod: GP | Performed by: PHYSICAL THERAPIST

## 2025-01-20 PROCEDURE — 97530 THERAPEUTIC ACTIVITIES: CPT | Mod: GP | Performed by: PHYSICAL THERAPIST

## 2025-01-20 NOTE — PROGRESS NOTES
01/20/25 0500   Appointment Info   Signing clinician's name / credentials Imelda Olson, PT, DPT, OCS   Total/Authorized Visits [employer]   Visits Used 7   Medical Diagnosis S/P knee surgery   PT Tx Diagnosis L knee pain with strength and mobility defiicits   Other pertinent information Protocol Trochleoplasty: WBAT in hinged knee brace locked in 10 deg   Progress Note/Certification   Onset of illness/injury or Date of Surgery 12/19/24   Therapy Frequency 1x/week   Predicted Duration 12 weeks   Progress Note Completed Date 12/23/24   GOALS   PT Goals 2   PT Goal 1   Goal Identifier HEP   Goal Description Pt will demonstrate mastery of HEP, completing at least 5x/week, without need for additional cuing in order to increase independence with self cares and self management of the condition.   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 CPM was picked up today. Is getting some pain with standing SLR flexion with brace on.    Objective Measures   Objective Measures Objective Measure 1;Objective Measure 2;Objective Measure 3;Objective Measure 4   Objective Measure 1   Objective Measure AAROM (with min OP, 3/10 pain or less)   Details 4-100 flexion   Objective Measure 2   Objective Measure Patellar mobility   Details good in all directions   Objective Measure 3   Objective Measure Quad   Details Quad set good bilat. Min lag with SLR in standing today with brace donned   Objective Measure 4   Objective Measure Good incisional mobility   Treatment Interventions (PT)   Interventions Therapeutic Procedure/Exercise;Therapeutic Activity   Therapeutic  Procedure/Exercise   Therapeutic Procedures: strength, endurance, ROM, flexibility minutes (60818) 30   Ther Proc 2 bridge over bolster   Ther Proc 2 - Details x 15. DC - feels it's very easy - not doing anything   PTRx Ther Proc 1 Isometric Quad   PTRx Ther Proc 1 - Details x 15 reps. Good quad set, requires no cues to avoid co-glute activation   PTRx Ther Proc 2 Standing Hip Flexion Straight Leg Raise   PTRx Ther Proc 2 - Details hold for now - pt with lag and medial anterior knee pain   PTRx Ther Proc 3 Hip Abduction Straight Leg Raise   PTRx Ther Proc 3 - Details x15   PTRx Ther Proc 4 Hip Adduction Straight Leg Raise   PTRx Ther Proc 4 - Details x15   PTRx Ther Proc 5 Hip Extension Straight Leg Raise   PTRx Ther Proc 5 - Details x15   PTRx Ther Proc 6 Toe Raises   PTRx Ther Proc 6 - Details x15   PTRx Ther Proc 7 TKE ball on wall x 10 - anterior knee pain. Not for HEP   PTRx Ther Proc 10 towel under leg x60 sec hold   Skilled Intervention exercise modification/progression based on patient response; education/explanation of rationale for exercises   Patient Response/Progress tolerated well   Therapeutic Activity   Therapeutic Activities: dynamic activities to improve functional performance minutes (18870) 8   Ther Act 1 Reviewed precautions/importance of acceptable pain levels, not forcing into end range flexion, exercises to be done with brace donned   Ther Act 1 - Details Reviewed desensitization techniques/rationale   Education   Learner/Method Patient   Plan   Home program See PTRX   Plan for next session progress per Trochleoplasty protocol, pt to F/U with MD on 1/21/25   Total Session Time   Timed Code Treatment Minutes 38   Total Treatment Time (sum of timed and untimed services) 38

## 2025-01-21 ENCOUNTER — ANCILLARY PROCEDURE (OUTPATIENT)
Dept: GENERAL RADIOLOGY | Facility: CLINIC | Age: 50
End: 2025-01-21
Attending: ORTHOPAEDIC SURGERY
Payer: COMMERCIAL

## 2025-01-21 ENCOUNTER — OFFICE VISIT (OUTPATIENT)
Dept: ORTHOPEDICS | Facility: CLINIC | Age: 50
End: 2025-01-21
Payer: COMMERCIAL

## 2025-01-21 DIAGNOSIS — Z98.890 S/P KNEE SURGERY: Primary | ICD-10-CM

## 2025-01-21 DIAGNOSIS — Z98.890 S/P KNEE SURGERY: ICD-10-CM

## 2025-01-21 PROCEDURE — 99024 POSTOP FOLLOW-UP VISIT: CPT | Performed by: ORTHOPAEDIC SURGERY

## 2025-01-21 PROCEDURE — 73560 X-RAY EXAM OF KNEE 1 OR 2: CPT | Mod: LT | Performed by: RADIOLOGY

## 2025-01-21 NOTE — LETTER
2025     Seen today: Yes    Patient:  Lisset MITCHELL Chi  :   1975  MRN:     [id number]  Physician: PAMELLA SHEIKH    Lisset MITCHELL Chi may return to work with the following restrictions:  Please allow her to work 4-6 hours a day as tolerated and 5 days a week.  Avoid prolonged standing or sitting without being able to ice and elevate as needed.  No travel, only working remotely from home.       The next clinic appointment is scheduled for (date/time) 3/11/25    Please call the clinic with any questions,      Electronically signed by Pamella Sheikh MD

## 2025-01-21 NOTE — NURSING NOTE
Reason For Visit:   Chief Complaint   Patient presents with    RECHECK     DOS: 12/19/24 Knee Arthroscopy, Medial Patello-femoral Ligament Reconstruction with Allograft, trochleoplasty, possible Distal Tibial Tubercle Osteotomy, (Left)       Primary MD: Katia Monsivais  Ref. MD: EST    ?  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     Date of injury: 1- 2 years ago  Type of injury: Left knee dislocation and subluxed     Date of surgery: 3/2/2018  Type of surgery: scope and lateral compartment, ant chamber debridement.     9/19/24 right knee arthroscopy, with open exploration of loose body     12/19/24 Left Knee Arthroscopy, Medial Patello-femoral Ligament Reconstruction with Allograft, Arthroscopy, Knee, With Trochleoplasty       Smoker: No  Request smoking cessation information: No    LMP 12/25/2013     Pain Assessment  Patient Currently in Pain: Yes  0-10 Pain Scale: 2  Primary Pain Location: Knee       Maribel Formato, LPN

## 2025-01-21 NOTE — LETTER
1/21/2025      Lisset MITCHELL Chi  [address] [address]      Dear Colleague,    Thank you for referring your patient, Lisset MITCHELL Chi, to the [facility] [place]. Please see a copy of my visit note below.    Patient is a 49-year-old female who is 5 weeks status post left knee MPFL reconstruction with trochlear plasty.    She overall is doing quite well.  She is not taking any narcotics for pain on a regular basis.  She alternates between ibuprofen and Tylenol.  She has maxed out on her CPM but her knee only goes to about 95 despite the CPM going to 120 she does have a stationary bike at home.  She is seeing physical therapy on a regular basis.    Physical exam of patient's left knee reveals benign-appearing incision.  Fair straight leg raising effort with a slight lag.  Passive range of motion to 90 degrees.    In the supine position the patient has a hard time doing an extension to flexion activity in an open chain position.  However when hanging her leg off the table she is able to do active range of motion with satisfactory tracking of the kneecap without any J sign present.    Passive patella mobility 1+ quadrant lateral mobility with a firm endpoint.    X-rays were taken.  They show satisfactory position of the patella on axial view, and elimination of the trochlear bump on sagittal view    Assessment: Excellent early postoperative results to date    Plan: I discussed with the patient that despite the fact that I do not like to emphasize her age, this is a surgical procedure that is typically done in younger people.  I think she is doing great postop and is sitting all the marks that I would expect a younger person to have.    Nonetheless we need to continue to work on both strength and motion over time.    Plan: continue physical therapy.  Consideration for e-stim during therapy should be considered  Patient has a stationary bike at home and I discussed with her that this  would be an excellent home tool to use for range of motion.    Follow-up in 8 weeks time or sooner should there be concerns concerning motion or other    Rima Sarmiento MD  Professor Orthopedic Surgery  [facility]     Again, thank you for allowing me to participate in the care of your patient.        Sincerely,        Rima Sarmiento MD    Electronically signed

## 2025-01-22 NOTE — PROGRESS NOTES
Patient is a 49-year-old female who is 5 weeks status post left knee MPFL reconstruction with trochlear plasty.    She overall is doing quite well.  She is not taking any narcotics for pain on a regular basis.  She alternates between ibuprofen and Tylenol.  She has maxed out on her CPM but her knee only goes to about 95 despite the CPM going to 120 she does have a stationary bike at home.  She is seeing physical therapy on a regular basis.    Physical exam of patient's left knee reveals benign-appearing incision.  Fair straight leg raising effort with a slight lag.  Passive range of motion to 90 degrees.    In the supine position the patient has a hard time doing an extension to flexion activity in an open chain position.  However when hanging her leg off the table she is able to do active range of motion with satisfactory tracking of the kneecap without any J sign present.    Passive patella mobility 1+ quadrant lateral mobility with a firm endpoint.    X-rays were taken.  They show satisfactory position of the patella on axial view, and elimination of the trochlear bump on sagittal view    Assessment: Excellent early postoperative results to date    Plan: I discussed with the patient that despite the fact that I do not like to emphasize her age, this is a surgical procedure that is typically done in younger people.  I think she is doing great postop and is sitting all the marks that I would expect a younger person to have.    Nonetheless we need to continue to work on both strength and motion over time.    Plan: continue physical therapy.  Consideration for e-stim during therapy should be considered  Patient has a stationary bike at home and I discussed with her that this would be an excellent home tool to use for range of motion.    Follow-up in 8 weeks time or sooner should there be concerns concerning motion or other    Rima Sarmiento MD  Professor Orthopedic Surgery  [facility]

## 2025-01-24 ENCOUNTER — THERAPY VISIT (OUTPATIENT)
Dept: PHYSICAL THERAPY | Facility: CLINIC | Age: 50
End: 2025-01-24
Payer: COMMERCIAL

## 2025-01-24 DIAGNOSIS — Z98.890 S/P KNEE SURGERY: Primary | ICD-10-CM

## 2025-01-24 PROCEDURE — 97110 THERAPEUTIC EXERCISES: CPT | Mod: GP | Performed by: PHYSICAL THERAPIST

## 2025-01-24 PROCEDURE — 97140 MANUAL THERAPY 1/> REGIONS: CPT | Mod: GP | Performed by: PHYSICAL THERAPIST

## 2025-01-27 ENCOUNTER — THERAPY VISIT (OUTPATIENT)
Dept: PHYSICAL THERAPY | Facility: CLINIC | Age: 50
End: 2025-01-27
Payer: COMMERCIAL

## 2025-01-27 DIAGNOSIS — Z98.890 S/P KNEE SURGERY: Primary | ICD-10-CM

## 2025-01-27 PROCEDURE — 97110 THERAPEUTIC EXERCISES: CPT | Mod: GP | Performed by: PHYSICAL THERAPIST

## 2025-02-03 ENCOUNTER — THERAPY VISIT (OUTPATIENT)
Dept: PHYSICAL THERAPY | Facility: CLINIC | Age: 50
End: 2025-02-03
Payer: COMMERCIAL

## 2025-02-03 DIAGNOSIS — Z98.890 S/P KNEE SURGERY: Primary | ICD-10-CM

## 2025-02-03 PROCEDURE — 97140 MANUAL THERAPY 1/> REGIONS: CPT | Mod: GP | Performed by: PHYSICAL THERAPIST

## 2025-02-03 PROCEDURE — 97110 THERAPEUTIC EXERCISES: CPT | Mod: GP | Performed by: PHYSICAL THERAPIST

## 2025-02-06 ENCOUNTER — DOCUMENTATION ONLY (OUTPATIENT)
Dept: ORTHOPEDICS | Facility: CLINIC | Age: 50
End: 2025-02-06
Payer: COMMERCIAL

## 2025-02-06 DIAGNOSIS — M25.562 PATELLOFEMORAL INSTABILITY OF LEFT KNEE WITH PAIN: Primary | ICD-10-CM

## 2025-02-06 DIAGNOSIS — M25.362 PATELLOFEMORAL INSTABILITY OF LEFT KNEE WITH PAIN: Primary | ICD-10-CM

## 2025-02-06 DIAGNOSIS — M25.561 ACUTE PAIN OF RIGHT KNEE: ICD-10-CM

## 2025-02-10 ENCOUNTER — THERAPY VISIT (OUTPATIENT)
Dept: PHYSICAL THERAPY | Facility: CLINIC | Age: 50
End: 2025-02-10
Payer: COMMERCIAL

## 2025-02-10 DIAGNOSIS — Z98.890 S/P KNEE SURGERY: Primary | ICD-10-CM

## 2025-02-10 PROCEDURE — 97110 THERAPEUTIC EXERCISES: CPT | Mod: GP | Performed by: PHYSICAL THERAPIST

## 2025-02-10 PROCEDURE — 97140 MANUAL THERAPY 1/> REGIONS: CPT | Mod: GP | Performed by: PHYSICAL THERAPIST

## 2025-02-11 ENCOUNTER — DOCUMENTATION ONLY (OUTPATIENT)
Dept: ORTHOPEDICS | Facility: CLINIC | Age: 50
End: 2025-02-11
Payer: COMMERCIAL

## 2025-02-11 NOTE — PROGRESS NOTES
Received Completed forms Yes   Faxed Forms Faxed To: [employer]  Fax Number: [phone]   Sent to [facility] (Date) 2/11/25

## 2025-02-14 ENCOUNTER — THERAPY VISIT (OUTPATIENT)
Dept: PHYSICAL THERAPY | Facility: CLINIC | Age: 50
End: 2025-02-14
Payer: COMMERCIAL

## 2025-02-14 DIAGNOSIS — Z98.890 S/P KNEE SURGERY: Primary | ICD-10-CM

## 2025-02-14 PROCEDURE — 97110 THERAPEUTIC EXERCISES: CPT | Mod: GP | Performed by: PHYSICAL THERAPIST

## 2025-02-14 PROCEDURE — 97140 MANUAL THERAPY 1/> REGIONS: CPT | Mod: GP | Performed by: PHYSICAL THERAPIST

## 2025-02-17 ENCOUNTER — THERAPY VISIT (OUTPATIENT)
Dept: PHYSICAL THERAPY | Facility: CLINIC | Age: 50
End: 2025-02-17
Payer: COMMERCIAL

## 2025-02-17 DIAGNOSIS — Z98.890 S/P KNEE SURGERY: Primary | ICD-10-CM

## 2025-02-17 PROCEDURE — 97110 THERAPEUTIC EXERCISES: CPT | Mod: GP | Performed by: PHYSICAL THERAPIST

## 2025-02-17 PROCEDURE — 97112 NEUROMUSCULAR REEDUCATION: CPT | Mod: GP | Performed by: PHYSICAL THERAPIST

## 2025-02-24 ENCOUNTER — THERAPY VISIT (OUTPATIENT)
Dept: PHYSICAL THERAPY | Facility: CLINIC | Age: 50
End: 2025-02-24
Payer: COMMERCIAL

## 2025-02-24 DIAGNOSIS — Z98.890 S/P KNEE SURGERY: Primary | ICD-10-CM

## 2025-02-24 PROCEDURE — 97110 THERAPEUTIC EXERCISES: CPT | Mod: GP | Performed by: PHYSICAL THERAPIST

## 2025-02-24 PROCEDURE — 97140 MANUAL THERAPY 1/> REGIONS: CPT | Mod: GP | Performed by: PHYSICAL THERAPIST

## 2025-02-28 ENCOUNTER — THERAPY VISIT (OUTPATIENT)
Dept: PHYSICAL THERAPY | Facility: CLINIC | Age: 50
End: 2025-02-28
Payer: COMMERCIAL

## 2025-02-28 DIAGNOSIS — Z98.890 S/P KNEE SURGERY: Primary | ICD-10-CM

## 2025-02-28 PROCEDURE — 97140 MANUAL THERAPY 1/> REGIONS: CPT | Mod: GP | Performed by: PHYSICAL THERAPIST

## 2025-02-28 PROCEDURE — 97110 THERAPEUTIC EXERCISES: CPT | Mod: GP | Performed by: PHYSICAL THERAPIST

## 2025-03-03 ENCOUNTER — THERAPY VISIT (OUTPATIENT)
Dept: PHYSICAL THERAPY | Facility: CLINIC | Age: 50
End: 2025-03-03
Payer: COMMERCIAL

## 2025-03-03 DIAGNOSIS — Z98.890 S/P KNEE SURGERY: Primary | ICD-10-CM

## 2025-03-03 PROCEDURE — 97110 THERAPEUTIC EXERCISES: CPT | Mod: GP | Performed by: PHYSICAL THERAPIST

## 2025-03-03 PROCEDURE — 97140 MANUAL THERAPY 1/> REGIONS: CPT | Mod: GP | Performed by: PHYSICAL THERAPIST

## 2025-03-07 ENCOUNTER — THERAPY VISIT (OUTPATIENT)
Dept: PHYSICAL THERAPY | Facility: CLINIC | Age: 50
End: 2025-03-07
Payer: COMMERCIAL

## 2025-03-07 DIAGNOSIS — Z98.890 S/P KNEE SURGERY: Primary | ICD-10-CM

## 2025-03-07 PROCEDURE — 97140 MANUAL THERAPY 1/> REGIONS: CPT | Mod: GP | Performed by: PHYSICAL THERAPIST

## 2025-03-07 PROCEDURE — 97110 THERAPEUTIC EXERCISES: CPT | Mod: GP | Performed by: PHYSICAL THERAPIST

## 2025-03-10 ENCOUNTER — THERAPY VISIT (OUTPATIENT)
Dept: PHYSICAL THERAPY | Facility: CLINIC | Age: 50
End: 2025-03-10
Payer: COMMERCIAL

## 2025-03-10 DIAGNOSIS — Z98.890 S/P KNEE SURGERY: Primary | ICD-10-CM

## 2025-03-10 PROCEDURE — 97110 THERAPEUTIC EXERCISES: CPT | Mod: GP

## 2025-03-11 ENCOUNTER — OFFICE VISIT (OUTPATIENT)
Dept: ORTHOPEDICS | Facility: CLINIC | Age: 50
End: 2025-03-11
Payer: COMMERCIAL

## 2025-03-11 DIAGNOSIS — Z98.890 S/P KNEE SURGERY: Primary | ICD-10-CM

## 2025-03-11 DIAGNOSIS — M51.16 LUMBAR DISC HERNIATION WITH RADICULOPATHY: ICD-10-CM

## 2025-03-11 PROCEDURE — 99024 POSTOP FOLLOW-UP VISIT: CPT | Performed by: ORTHOPAEDIC SURGERY

## 2025-03-11 RX ORDER — METHOCARBAMOL 500 MG/1
500 TABLET, FILM COATED ORAL
Qty: 30 TABLET | Refills: 0 | Status: SHIPPED | OUTPATIENT
Start: 2025-03-11

## 2025-03-11 NOTE — LETTER
2025     Seen today: Yes    Patient:  Lisset MITCHELL Chi  :   1975  MRN:     [id number]  Physician: PAMELLA SHEIKH    Lisset MITCHELL Chi may return to work on 3/12/2025.  Please allow her to have flexible hours.        The next clinic appointment is scheduled for (date/time) 2025.     Please call the clinic with any questions,      Electronically signed by Pamella Sheikh MD

## 2025-03-11 NOTE — LETTER
2025     Seen today: Yes    Patient:  Lisset MITCHELL Chi  :   1975  MRN:     [id number]  Physician: PAMELLA SHEIKH    Lisset MITCHELL Chi may return to work on 3/11/2025.  Please allow her to have flexible hours.      The next clinic appointment is scheduled for (date/time) 2025.    Please call the clinic with any questions,    Electronically signed by Pamella Sheikh MD

## 2025-03-11 NOTE — LETTER
3/11/2025      Lisset MITCHELL Chi  [address] [id number]      Dear Colleague,    Thank you for referring your patient, Lisset MITCHELL Chi, to the [facility] [place]. Please see a copy of my visit note below.    Patient is a 49-year-old female who is now approximately 10 weeks status post left MPFL reconstruction, trochlear plasty, LRL.    Overall she is happy with her results to date particularly in regards to the patella tracking.  However she wishes that her knee motion was better.    She regularly goes to physical therapy.  Her motion has been somewhat erratic.  It seems to range from the 105 degrees up to 118 degrees.  Yesterday in clinic it was 116 degrees.    She currently uses the brace open.  She uses 0-1 crutch around the house.  She continues to work remotely.    She occasionally has posterior lateral pain which she thinks might be the hamstrings.  She has been on Robaxin in the past and has some at home and would like permission to use this as needed.    She has a trip planned to [place] at the end of April.  She has been fitted with a cane by her therapist.    Physical exam of patient's left knee:  Benign-appearing incisions.  Good straight leg raising effort without a lag.  Range of motion 0-1 05 with a soft endpoint.  Excellent tracking with early range of motion with no J sign.    Passive patella mobility 1 quadrant lateral mobility with a firm endpoint.    Assessment: Excellent tracking and fair motion and strength at this point in time postop    Plan: I believe the patient will be okay to attend a conference in [place] at the end of April.  I would like her to stay on 1 aspirin a day starting the day before the trip and extending until she gets home  I have suggested that she bring the cane along at that time.    She has a stationary bike at home.  We talked about using it more and primarily to address her motion.  We can work on strength concurrently  but at this point in time motion is more important than strength.    I have approved that she can use Robaxin as needed for spasm.  She currently uses occasionally at night for back spasm.  This was originally prescribed by my colleague Dr. Greyson Larsen.    She will follow-up with me in 2 months time or sooner should there be some concerns with getting her motion back.    Rima Sarmiento MD  Professor Orthopedic Surgery  [facility]     Again, thank you for allowing me to participate in the care of your patient.        Sincerely,        Rima Sarmiento MD    Electronically signed

## 2025-03-11 NOTE — NURSING NOTE
Reason For Visit:   Chief Complaint   Patient presents with    RECHECK     DOS: 12/19/24 Left Knee Arthroscopy, Medial Patello-femoral Ligament Reconstruction with Allograft, trochleoplasty, and LRL       Primary MD: Katia Monsivais  Ref. MD: EST    ?  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     Date of injury: 1- 2 years ago  Type of injury: Left knee dislocation and subluxed     Date of surgery: 3/2/2018  Type of surgery: scope and lateral compartment, ant chamber debridement.      9/19/24 right knee arthroscopy, with open exploration of loose body      12/19/24 Left Knee Arthroscopy, Medial Patello-femoral Ligament Reconstruction with Allograft, Arthroscopy, Knee, With Trochleoplasty       Smoker: No  Request smoking cessation information: No    LMP 12/25/2013     Pain Assessment  Patient Currently in Pain: Jeremiah Almonte LPN

## 2025-03-11 NOTE — PROGRESS NOTES
Patient is a 49-year-old female who is now approximately 10 weeks status post left MPFL reconstruction, trochlear plasty, LRL.    Overall she is happy with her results to date particularly in regards to the patella tracking.  However she wishes that her knee motion was better.    She regularly goes to physical therapy.  Her motion has been somewhat erratic.  It seems to range from the 105 degrees up to 118 degrees.  Yesterday in clinic it was 116 degrees.    She currently uses the brace open.  She uses 0-1 crutch around the house.  She continues to work remotely.    She occasionally has posterior lateral pain which she thinks might be the hamstrings.  She has been on Robaxin in the past and has some at home and would like permission to use this as needed.    She has a trip planned to [place] at the end of April.  She has been fitted with a cane by her therapist.    Physical exam of patient's left knee:  Benign-appearing incisions.  Good straight leg raising effort without a lag.  Range of motion 0-1 05 with a soft endpoint.  Excellent tracking with early range of motion with no J sign.    Passive patella mobility 1 quadrant lateral mobility with a firm endpoint.    Assessment: Excellent tracking and fair motion and strength at this point in time postop    Plan: I believe the patient will be okay to attend a conference in [place] at the end of April.  I would like her to stay on 1 aspirin a day starting the day before the trip and extending until she gets home  I have suggested that she bring the cane along at that time.    She has a stationary bike at home.  We talked about using it more and primarily to address her motion.  We can work on strength concurrently but at this point in time motion is more important than strength.    I have approved that she can use Robaxin as needed for spasm.  She currently uses occasionally at night for back spasm.  This was originally prescribed by my colleague Dr. Rubi  Suzie.    She will follow-up with me in 2 months time or sooner should there be some concerns with getting her motion back.    Rima Sarmiento MD  Professor Orthopedic Surgery  [facility]

## 2025-03-17 ENCOUNTER — THERAPY VISIT (OUTPATIENT)
Age: 50
End: 2025-03-17
Payer: COMMERCIAL

## 2025-03-17 DIAGNOSIS — Z98.890 S/P KNEE SURGERY: Primary | ICD-10-CM

## 2025-03-26 ENCOUNTER — OFFICE VISIT (OUTPATIENT)
Dept: FAMILY MEDICINE | Facility: CLINIC | Age: 50
End: 2025-03-26
Attending: FAMILY MEDICINE
Payer: COMMERCIAL

## 2025-03-26 VITALS
OXYGEN SATURATION: 99 % | SYSTOLIC BLOOD PRESSURE: 121 MMHG | TEMPERATURE: 97.8 F | HEIGHT: 62 IN | RESPIRATION RATE: 16 BRPM | WEIGHT: 134 LBS | DIASTOLIC BLOOD PRESSURE: 71 MMHG | BODY MASS INDEX: 24.66 KG/M2 | HEART RATE: 67 BPM

## 2025-03-26 DIAGNOSIS — Z13.1 SCREENING FOR DIABETES MELLITUS: ICD-10-CM

## 2025-03-26 DIAGNOSIS — Z13.220 LIPID SCREENING: ICD-10-CM

## 2025-03-26 DIAGNOSIS — Z00.00 ANNUAL PHYSICAL EXAM: Primary | ICD-10-CM

## 2025-03-26 LAB
ALBUMIN SERPL BCG-MCNC: 4.4 G/DL (ref 3.5–5.2)
ALP SERPL-CCNC: 104 U/L (ref 40–150)
ALT SERPL W P-5'-P-CCNC: 10 U/L (ref 0–50)
ANION GAP SERPL CALCULATED.3IONS-SCNC: 10 MMOL/L (ref 7–15)
AST SERPL W P-5'-P-CCNC: 20 U/L (ref 0–45)
BILIRUB SERPL-MCNC: <0.2 MG/DL
BUN SERPL-MCNC: 15.4 MG/DL (ref 6–20)
CALCIUM SERPL-MCNC: 9.5 MG/DL (ref 8.8–10.4)
CHLORIDE SERPL-SCNC: 106 MMOL/L (ref 98–107)
CHOLEST SERPL-MCNC: 219 MG/DL
CREAT SERPL-MCNC: 0.65 MG/DL (ref 0.51–0.95)
EGFRCR SERPLBLD CKD-EPI 2021: >90 ML/MIN/1.73M2
FASTING STATUS PATIENT QL REPORTED: YES
FASTING STATUS PATIENT QL REPORTED: YES
GLUCOSE SERPL-MCNC: 92 MG/DL (ref 70–99)
HBV SURFACE AB SERPL IA-ACNC: 14.7 M[IU]/ML
HBV SURFACE AB SERPL IA-ACNC: REACTIVE M[IU]/ML
HCO3 SERPL-SCNC: 26 MMOL/L (ref 22–29)
HDLC SERPL-MCNC: 65 MG/DL
LDLC SERPL CALC-MCNC: 129 MG/DL
NONHDLC SERPL-MCNC: 154 MG/DL
POTASSIUM SERPL-SCNC: 4.5 MMOL/L (ref 3.4–5.3)
PROT SERPL-MCNC: 6.7 G/DL (ref 6.4–8.3)
SODIUM SERPL-SCNC: 142 MMOL/L (ref 135–145)
TRIGL SERPL-MCNC: 126 MG/DL

## 2025-03-26 PROCEDURE — 86706 HEP B SURFACE ANTIBODY: CPT | Performed by: FAMILY MEDICINE

## 2025-03-26 PROCEDURE — 36415 COLL VENOUS BLD VENIPUNCTURE: CPT | Performed by: FAMILY MEDICINE

## 2025-03-26 PROCEDURE — 80061 LIPID PANEL: CPT | Performed by: FAMILY MEDICINE

## 2025-03-26 PROCEDURE — 80053 COMPREHEN METABOLIC PANEL: CPT | Performed by: FAMILY MEDICINE

## 2025-03-26 PROCEDURE — 99396 PREV VISIT EST AGE 40-64: CPT | Performed by: FAMILY MEDICINE

## 2025-03-26 SDOH — HEALTH STABILITY: PHYSICAL HEALTH: ON AVERAGE, HOW MANY MINUTES DO YOU ENGAGE IN EXERCISE AT THIS LEVEL?: 20 MIN

## 2025-03-26 SDOH — HEALTH STABILITY: PHYSICAL HEALTH: ON AVERAGE, HOW MANY DAYS PER WEEK DO YOU ENGAGE IN MODERATE TO STRENUOUS EXERCISE (LIKE A BRISK WALK)?: 7 DAYS

## 2025-03-26 ASSESSMENT — ASTHMA QUESTIONNAIRES
QUESTION_5 LAST FOUR WEEKS HOW WOULD YOU RATE YOUR ASTHMA CONTROL: WELL CONTROLLED
QUESTION_3 LAST FOUR WEEKS HOW OFTEN DID YOUR ASTHMA SYMPTOMS (WHEEZING, COUGHING, SHORTNESS OF BREATH, CHEST TIGHTNESS OR PAIN) WAKE YOU UP AT NIGHT OR EARLIER THAN USUAL IN THE MORNING: ONCE OR TWICE
QUESTION_4 LAST FOUR WEEKS HOW OFTEN HAVE YOU USED YOUR RESCUE INHALER OR NEBULIZER MEDICATION (SUCH AS ALBUTEROL): NOT AT ALL
QUESTION_2 LAST FOUR WEEKS HOW OFTEN HAVE YOU HAD SHORTNESS OF BREATH: NOT AT ALL
ACT_TOTALSCORE: 23
QUESTION_1 LAST FOUR WEEKS HOW MUCH OF THE TIME DID YOUR ASTHMA KEEP YOU FROM GETTING AS MUCH DONE AT WORK, SCHOOL OR AT HOME: NONE OF THE TIME

## 2025-03-26 ASSESSMENT — SOCIAL DETERMINANTS OF HEALTH (SDOH): HOW OFTEN DO YOU GET TOGETHER WITH FRIENDS OR RELATIVES?: ONCE A WEEK

## 2025-03-26 NOTE — RESULT ENCOUNTER NOTE
The 10-year ASCVD risk score (Ketan DK, et al., 2019) is: 1%  Patient updated by [facility] message with lab results.       Milla Darling,  Your labs have returned.  1.  Your cholesterol labs are stable from last check, slightly up.  Your estimated risk of cardiovascular disease remains low.  Please keep working on a heart healthy nutrient dense diet, regular physical activity, and maintaining a healthy weight.  2.  You are immune to hepatitis B and do not need to be revaccinated.  3.  Your metabolic panel is normal.  Please reach out by [facility] with any follow-up questions or concerns.  Katia Monsivais, DO

## 2025-03-26 NOTE — PROGRESS NOTES
Preventive Care Visit  [facility]  Katia Monsivais DO, Family Medicine  Mar 26, 2025      Assessment & Plan     1. Annual physical exam (Primary)  - Hepatitis B Surface Antibody; Future    Reviewed health history and health maintenance recommendations.  Pap and colon up-to-date, mammogram scheduled.  Will plan to repeat DEXA scan next year.  Blood pressure normal.  Weight still in the normal range, has gained a little weight though anticipated with recent surgeries, swelling, reduced physical activity.  Continue working on healthy habits heading into the spring and summer.  Thinks she had the hepatitis B vaccine, antigen in 2009.  We will check for antibody for immunity.  She believes she was vaccinated for MMR in her youth.    Recent worsening constipation after surgery with brief episode of bright red blood per rectum correlating with firmer stools.  Report very consistent with hemorrhoid.  Colonoscopy up-to-date.  Work on bowel regimen and monitor.  If bleeding persistent or worsening, let me know and we can discuss sooner reevaluation.    2. Screening for diabetes mellitus  - Comprehensive metabolic panel (BMP + Alb, Alk Phos, ALT, AST, Total. Bili, TP); Future    3. Lipid screening  - Lipid panel reflex to direct LDL Fasting; Future  - Comprehensive metabolic panel (BMP + Alb, Alk Phos, ALT, AST, Total. Bili, TP); Future      Patient has been advised of split billing requirements and indicates understanding: Yes      Counseling  Appropriate preventive services were addressed with this patient via screening, questionnaire, or discussion as appropriate for fall prevention, nutrition, physical activity, Tobacco-use cessation, social engagement, weight loss and cognition.  Checklist reviewing preventive services available has been given to the patient.  Reviewed patient's diet, addressing concerns and/or questions.     The longitudinal plan of care for the diagnosis(es)/condition(s) as  documented were addressed during this visit. Due to the added complexity in care, I will continue to support Lisset in the subsequent management and with ongoing continuity of care.       Subjective   Lisset is a 49 year old, presenting for the following:  Physical (Issue with hemmoroids)        3/26/2025     8:24 AM   Additional Questions   Roomed by Gen MCLEAN MA        Via the Health Maintenance questionnaire, the patient has reported the following services have been completed -Mammogram: [facility] 2024-03-15, this information has not been sent to the abstraction team.    HPI    Annual physical exam (Primary)  - Hep B: neg antigen March 2009 / traveled to [place] in the past, thinks vaccinated.    - mammo: 3/28/25 - scheduled   - pap: 12/26/22 - normal, neg HPV / repeat 5 years   - colon: 7/14/23 - repeat 3 years    - DEXA: Feb 2024 - osteoporosis, plan to trend next year     Traveling to [place], wondering about measles?     Body mass index is 24.51 kg/m .  Wt Readings from Last 4 Encounters:   03/26/25 60.8 kg (134 lb)   12/19/24 57.2 kg (126 lb)   11/26/24 58.1 kg (128 lb)   11/12/24 58.1 kg (128 lb)     Period: Amenorrhea since 2013, premature menopause.   No issues with hot flashes for about a year.    Focusing on her knees, working through [employer] in [place].    Derm - moles unchanged.     Working with allergy for management of her asthma/allergies. Using Singulair, albuterol PRN.      1/19/2024     8:18 AM 10/2/2024     1:55 PM 3/26/2025     8:13 AM   ACT Total Scores   ACT TOTAL SCORE (Goal Greater than or Equal to 20) 25 23 23    In the past 12 months, how many times did you visit the emergency room for your asthma without being admitted to the hospital?  0 0   In the past 12 months, how many times were you hospitalized overnight because of your asthma?  0 0       Patient-reported         Hemorrhoids  After surgery, was taking dulcolax which helped. Was also on pain  medication.  Reduced dulcolax, felt like she was more regular, stools a little harder and more solid.   Noticing some blood in her stool. Bright red blood with BM, streak along stool. Blood with wiping as well.   Tried some tucks pads and hemorrhoid cream, symptoms resolved. Onset a few weeks ago.       Advance Care Planning  Patient does not have a Health Care Directive: Discussed advance care planning with patient; however, patient declined at this time.        3/26/2025   General Health   How would you rate your overall physical health? Good   Feel stress (tense, anxious, or unable to sleep) Not at all         3/26/2025   Nutrition   Three or more servings of calcium each day? Yes   Diet: Breakfast skipped    Other   If other, please elaborate: non dairy   How many servings of fruit and vegetables per day? 4 or more   How many sweetened beverages each day? 0-1       Multiple values from one day are sorted in reverse-chronological order         3/26/2025   Exercise   Days per week of moderate/strenous exercise 7 days   Average minutes spent exercising at this level 20 min         3/26/2025   Social Factors   Frequency of gathering with friends or relatives Once a week   Worry food won't last until get money to buy more No   Food not last or not have enough money for food? No   Do you have housing? (Housing is defined as stable permanent housing and does not include staying ouside in a car, in a tent, in an abandoned building, in an overnight shelter, or couch-surfing.) Yes   Are you worried about losing your housing? No   Lack of transportation? No   Unable to get utilities (heat,electricity)? No         3/26/2025   Fall Risk   Fallen 2 or more times in the past year? No   Trouble with walking or balance? Yes   Reason Gait Speed Test Not Completed Recent surgery          3/26/2025   Dental   Dentist two times every year? Yes       Today's PHQ-2 Score:       3/26/2025     8:11 AM   PHQ-2 ( 1999 Pf[employer])   Q1: Little  interest or pleasure in doing things 1   Q2: Feeling down, depressed or hopeless 1   PHQ-2 Score 2    Q1: Little interest or pleasure in doing things Several days   Q2: Feeling down, depressed or hopeless Several days   PHQ-2 Score 2       Patient-reported           3/26/2025   Substance Use   Alcohol more than 3/day or more than 7/wk No   Do you use any other substances recreationally? No     Social History     Tobacco Use    Smoking status: Never     Passive exposure: Never    Smokeless tobacco: Never   Vaping Use    Vaping status: Never Used   Substance Use Topics    Alcohol use: Yes     Comment: once a month 1-2 drinks    Drug use: No             3/26/2025   Breast Cancer Screening   Family history of breast, colon, or ovarian cancer? No / Unknown         12/29/2023   LAST FHS-7 RESULTS   1st degree relative breast or ovarian cancer No   Any relative bilateral breast cancer No   Any male have breast cancer No   Any ONE woman have BOTH breast AND ovarian cancer No   Any woman with breast cancer before 50yrs No   2 or more relatives with breast AND/OR ovarian cancer No   2 or more relatives with breast AND/OR bowel cancer No        Mammogram Screening - Mammogram every 1-2 years updated in Health Maintenance based on mutual decision making        3/26/2025   STI Screening   New sexual partner(s) since last STI/HIV test? No     History of abnormal Pap smear: No - age 30- 64 PAP with HPV every 5 years recommended        Latest Ref Rng & Units 12/26/2022     1:44 PM 4/28/2017    10:15 AM 4/28/2017    10:11 AM   PAP / HPV   PAP  Negative for Intraepithelial Lesion or Malignancy (NILM)      PAP (Historical)    NIL    HPV 16 DNA Negative Negative  Negative     HPV 18 DNA Negative Negative  Negative     Other HR HPV Negative Negative  Negative       ASCVD Risk   The 10-year ASCVD risk score (Ketan DIA, et al., 2019) is: 0.8%    Values used to calculate the score:      Age: 49 years      Sex: Female      Is  "Non- : No      Diabetic: No      Tobacco smoker: No      Systolic Blood Pressure: 121 mmHg      Is BP treated: No      HDL Cholesterol: 71 mg/dL      Total Cholesterol: 207 mg/dL           Reviewed and updated as needed this visit by Provider   Tobacco  Allergies  Meds  Problems  Med Hx  Surg Hx  Fam Hx  Soc   Hx Sexual Activity            Review of Systems  Constitutional, HEENT, cardiovascular, pulmonary, GI, , musculoskeletal, neuro, skin, endocrine and psych systems are negative, except as otherwise noted.     Objective    Exam  /71   Pulse 67   Temp 97.8  F (36.6  C) (Oral)   Resp 16   Ht 1.575 m (5' 2\")   Wt 60.8 kg (134 lb)   LMP 12/25/2013   SpO2 99%   BMI 24.51 kg/m     Estimated body mass index is 24.51 kg/m  as calculated from the following:    Height as of this encounter: 1.575 m (5' 2\").    Weight as of this encounter: 60.8 kg (134 lb).    Physical Exam    GENERAL: alert and no distress  EYES: Eyes grossly normal to inspection, PERRL and conjunctivae and sclerae normal  HENT: ear canals and TM's normal, nose and mouth without ulcers or lesions  NECK: no adenopathy, no asymmetry, masses, or scars  RESP: lungs clear to auscultation - no rales, rhonchi or wheezes  CV: regular rate and rhythm, normal S1 S2, no S3 or S4, no murmur, click or rub, no peripheral edema  ABDOMEN: soft, nontender, no hepatosplenomegaly, no masses and bowel sounds normal  MS: no gross musculoskeletal defects noted, no edema  SKIN: no suspicious lesions or rashes  NEURO: Normal strength and tone, mentation intact and speech normal  PSYCH: mentation appears normal, affect normal/bright        Signed Electronically by: Katia Monsivais, DO    "

## 2025-03-26 NOTE — PATIENT INSTRUCTIONS
Patient Education   Preventive Care Advice   This is general advice given by our system to help you stay healthy. However, your care team may have specific advice just for you. Please talk to your care team about your preventive care needs.  Nutrition  Eat 5 or more servings of fruits and vegetables each day.  Try wheat bread, brown rice and whole grain pasta (instead of white bread, rice, and pasta).  Get enough calcium and vitamin D. Check the label on foods and aim for 100% of the RDA (recommended daily allowance).  Lifestyle  Exercise at least 150 minutes each week  (30 minutes a day, 5 days a week).  Do muscle strengthening activities 2 days a week. These help control your weight and prevent disease.  No smoking.  Wear sunscreen to prevent skin cancer.  Have a dental exam and cleaning every 6 months.  Yearly exams  See your health care team every year to talk about:  Any changes in your health.  Any medicines your care team has prescribed.  Preventive care, family planning, and ways to prevent chronic diseases.  Shots (vaccines)   HPV shots (up to age 26), if you've never had them before.  Hepatitis B shots (up to age 59), if you've never had them before.  COVID-19 shot: Get this shot when it's due.  Flu shot: Get a flu shot every year.  Tetanus shot: Get a tetanus shot every 10 years.  Pneumococcal, hepatitis A, and RSV shots: Ask your care team if you need these based on your risk.  Shingles shot (for age 50 and up)  General health tests  Diabetes screening:  Starting at age 35, Get screened for diabetes at least every 3 years.  If you are younger than age 35, ask your care team if you should be screened for diabetes.  Cholesterol test: At age 39, start having a cholesterol test every 5 years, or more often if advised.  Bone density scan (DEXA): At age 50, ask your care team if you should have this scan for osteoporosis (brittle bones).  Hepatitis C: Get tested at least once in your life.  STIs (sexually  transmitted infections)  Before age 24: Ask your care team if you should be screened for STIs.  After age 24: Get screened for STIs if you're at risk. You are at risk for STIs (including HIV) if:  You are sexually active with more than one person.  You don't use condoms every time.  You or a partner was diagnosed with a sexually transmitted infection.  If you are at risk for HIV, ask about PrEP medicine to prevent HIV.  Get tested for HIV at least once in your life, whether you are at risk for HIV or not.  Cancer screening tests  Cervical cancer screening: If you have a cervix, begin getting regular cervical cancer screening tests starting at age 21.  Breast cancer scan (mammogram): If you've ever had breasts, begin having regular mammograms starting at age 40. This is a scan to check for breast cancer.  Colon cancer screening: It is important to start screening for colon cancer at age 45.  Have a colonoscopy test every 10 years (or more often if you're at risk) Or, ask your provider about stool tests like a FIT test every year or Cologuard test every 3 years.  To learn more about your testing options, visit:   .  For help making a decision, visit:   https://bit.ly/dg13518.  Prostate cancer screening test: If you have a prostate, ask your care team if a prostate cancer screening test (PSA) at age 55 is right for you.  Lung cancer screening: If you are a current or former smoker ages 50 to 80, ask your care team if ongoing lung cancer screenings are right for you.  For informational purposes only. Not to replace the advice of your health care provider. Copyright   2023 [facility]. All rights reserved. Clinically reviewed by the [facility]. [employer] [id number] - REV 01/24.  Preventing Falls: Care Instructions  Injuries and health problems such as trouble walking or poor eyesight can increase your risk of falling. So can some medicines. But there are things you can do to help  "prevent falls. You can exercise to get stronger. You can also arrange your home to make it safer.    Talk to your doctor about the medicines you take. Ask if any of them increase the risk of falls and whether they can be changed or stopped.   Try to exercise regularly. It can help improve your strength and balance. This can help lower your risk of falling.         Practice fall safety and prevention.   Wear low-heeled shoes that fit well and give your feet good support. Talk to your doctor if you have foot problems that make this hard.  Carry a cellphone or wear a medical alert device that you can use to call for help.  Use stepladders instead of chairs to reach high objects. Don't climb if you're at risk for falls. Ask for help, if needed.  Wear the correct eyeglasses, if you need them.        Make your home safer.   Remove rugs, cords, clutter, and furniture from walkways.  Keep your house well lit. Use night-lights in hallways and bathrooms.  Install and use sturdy handrails on stairways.  Wear nonskid footwear, even inside. Don't walk barefoot or in socks without shoes.        Be safe outside.   Use handrails, curb cuts, and ramps whenever possible.  Keep your hands free by using a shoulder bag or backpack.  Try to walk in well-lit areas. Watch out for uneven ground, changes in pavement, and debris.  Be careful in the winter. Walk on the grass or gravel when sidewalks are slippery. Use de-icer on steps and walkways. Add non-slip devices to shoes.    Put grab bars and nonskid mats in your shower or tub and near the toilet. Try to use a shower chair or bath bench when bathing.   Get into a tub or shower by putting in your weaker leg first. Get out with your strong side first. Have a phone or medical alert device in the bathroom with you.   Where can you learn more?  Go to https://www.[employer].net/patiented  Enter G117 in the search box to learn more about \"Preventing Falls: Care Instructions.\"  Current as of: " July 31, 2024  Content Version: 14.4    2150-3413 [employer].   Care instructions adapted under license by your healthcare professional. If you have questions about a medical condition or this instruction, always ask your healthcare professional. [employer] disclaims any warranty or liability for your use of this information.

## 2025-03-28 ENCOUNTER — ANCILLARY PROCEDURE (OUTPATIENT)
Dept: MAMMOGRAPHY | Facility: CLINIC | Age: 50
End: 2025-03-28
Attending: FAMILY MEDICINE
Payer: COMMERCIAL

## 2025-03-28 ENCOUNTER — THERAPY VISIT (OUTPATIENT)
Age: 50
End: 2025-03-28
Payer: COMMERCIAL

## 2025-03-28 DIAGNOSIS — Z12.31 VISIT FOR SCREENING MAMMOGRAM: ICD-10-CM

## 2025-03-28 DIAGNOSIS — Z98.890 S/P KNEE SURGERY: Primary | ICD-10-CM

## 2025-03-28 PROCEDURE — 97140 MANUAL THERAPY 1/> REGIONS: CPT | Mod: GP | Performed by: PHYSICAL THERAPIST

## 2025-03-28 PROCEDURE — 77063 BREAST TOMOSYNTHESIS BI: CPT | Mod: TC | Performed by: RADIOLOGY

## 2025-03-28 PROCEDURE — 77067 SCR MAMMO BI INCL CAD: CPT | Mod: TC | Performed by: RADIOLOGY

## 2025-03-28 PROCEDURE — 97110 THERAPEUTIC EXERCISES: CPT | Mod: GP | Performed by: PHYSICAL THERAPIST

## 2025-04-04 ENCOUNTER — THERAPY VISIT (OUTPATIENT)
Age: 50
End: 2025-04-04
Payer: COMMERCIAL

## 2025-04-04 DIAGNOSIS — Z98.890 S/P KNEE SURGERY: Primary | ICD-10-CM

## 2025-04-04 PROCEDURE — 97140 MANUAL THERAPY 1/> REGIONS: CPT | Mod: GP | Performed by: PHYSICAL THERAPIST

## 2025-04-04 PROCEDURE — 97110 THERAPEUTIC EXERCISES: CPT | Mod: GP | Performed by: PHYSICAL THERAPIST

## 2025-04-23 ENCOUNTER — THERAPY VISIT (OUTPATIENT)
Age: 50
End: 2025-04-23
Payer: COMMERCIAL

## 2025-04-23 DIAGNOSIS — Z98.890 S/P KNEE SURGERY: Primary | ICD-10-CM

## 2025-04-23 PROCEDURE — 97140 MANUAL THERAPY 1/> REGIONS: CPT | Mod: GP | Performed by: PHYSICAL THERAPIST

## 2025-04-23 PROCEDURE — 97110 THERAPEUTIC EXERCISES: CPT | Mod: GP | Performed by: PHYSICAL THERAPIST

## 2025-04-28 ENCOUNTER — OFFICE VISIT (OUTPATIENT)
Dept: URGENT CARE | Facility: URGENT CARE | Age: 50
End: 2025-04-28
Payer: COMMERCIAL

## 2025-04-28 VITALS
OXYGEN SATURATION: 100 % | WEIGHT: 132.9 LBS | DIASTOLIC BLOOD PRESSURE: 82 MMHG | SYSTOLIC BLOOD PRESSURE: 127 MMHG | BODY MASS INDEX: 24.31 KG/M2 | HEART RATE: 72 BPM | RESPIRATION RATE: 18 BRPM | TEMPERATURE: 98.5 F

## 2025-04-28 DIAGNOSIS — L03.115 CELLULITIS OF RIGHT FOOT: Primary | ICD-10-CM

## 2025-04-28 DIAGNOSIS — B35.1 ONYCHOMYCOSIS: ICD-10-CM

## 2025-04-28 PROCEDURE — 96372 THER/PROPH/DIAG INJ SC/IM: CPT

## 2025-04-28 PROCEDURE — 99214 OFFICE O/P EST MOD 30 MIN: CPT | Mod: 25

## 2025-04-28 RX ORDER — CEFTRIAXONE SODIUM 1 G
1 VIAL (EA) INJECTION ONCE
Status: COMPLETED | OUTPATIENT
Start: 2025-04-28 | End: 2025-04-28

## 2025-04-28 RX ORDER — CEPHALEXIN 500 MG/1
500 CAPSULE ORAL 3 TIMES DAILY
Qty: 21 CAPSULE | Refills: 0 | Status: SHIPPED | OUTPATIENT
Start: 2025-04-28 | End: 2025-05-05

## 2025-04-28 RX ADMIN — Medication 1 G: at 17:03

## 2025-04-28 NOTE — PATIENT INSTRUCTIONS
Diagnosis: Cellulitis _ skin infection     Plan:   Antibiotics prescription today   Ibuprofen or tylenol for pain   Keep area clean and dry  Loose clothing and shoes   Elevate      Monitor for:   Symptoms are not getting better and are getting worse  Drainage   Red streaks: infection in the blood   Symptoms not improving   Severe headache            Cellulitis   Cellulitis is an infection of the skin (rash) and underlying tissue caused by streptococcal, staphylococcal, or other bacteria.   Cellulitis can be caused by different types of bacteria. Bacteria enter the body through a cut or sore.   Poisons made by the bacteria destroy skin cells. The infection spreads over the area within a day or two and can affect tissues below the skin.  Cellulitis most often occurs on the face, arms, or legs, but it can happen anywhere.   Symptoms of cellulitis may include:  Redness, swelling, extreme tenderness or pain   skin that feels hot to the touch, red streaks from the wound or sore, pus-filled sores (abscesses) swollen and tender lymph glands, fever.

## 2025-04-28 NOTE — PROGRESS NOTES
URGENT CARE  Assessment & Plan   Assessment:   Lisset MITCHELL Chi is a 49 year old female who's clinical presentation today is consistent with:   1. Cellulitis of right foot   - cefTRIAXone (ROCEPHIN) in lidocaine 1% for IM administration 1 g  - cephALEXin (KEFLEX) 500 MG capsule   Plan:  I am concerned about the rapid progression of patient's infection and covered her with a Rocephin injection today, additionally will continue to treat her cellulitis with antibiotic outpatient therapy, No culture obtained due to lack of fluctuance, very specific return precautions given that she should see improvement in the next 24 to 48 hours however if she does not have improvement or if she notes worsening, to return immediately for further evaluation and treatment  Additionally educated patient on pain relief using ibuprofen/tylenol and elevation  No alarm signs or symptoms present   Differential Diagnoses for this patient's CC include: Erysipelas, abscess, sepsis, Atopic dermatitis, allergic Dermatitis, contact dermatitis, Insect bite/sting, drug reaction,  Necrotizing fasciitis, thrombophlebitis, DVT, Gout, Lyme,      2. Onychomycosis, left great toe - chronic   - Orthopedic  Referral; Future  Plan:  Patient has been using topical antifungals to her left great toe for a chronic toenail fungal infection for over 2 years, discussed with patient there are other treatment options that she can do and we will refer her to podiatry for further evaluation and treatment of this chronic condition     Clair Hinojosa, JO[facility] [place]    ______________________________________________________________________      Subjective     HPI: Lisset MITCHELL Chi is a 49 year old  female who presents today for evaluation the following concerns:   Patient presents today and reports a rash noted to the top of her right foot, which started yesterday, one day ago,  Patient states that she was aggressively exfoliating between  her toes with a rough cloth, got a blister, popped the blister and now has spreading redness and heat to the site   Patient states they haven't tried anything otc yet   Patient states they have  had a rash like this before.  Patient states they are worried it is infected     (2) patient also wondering if there is anything else she can do for her chronic fungal toenail on her left great toe, states she has had it for over 2 years and has been using topical antifungals without much relief    Review of Systems:  Pertinent review of systems as reflected in HPI, otherwise negative.     Objective    Physical Exam:  Vitals:    04/28/25 1627   BP: 127/82   Pulse: 72   Resp: 18   Temp: 98.5  F (36.9  C)   TempSrc: Oral   SpO2: 100%   Weight: 60.3 kg (132 lb 14.4 oz)      General:   alert and oriented, no acute distress, non ill-appearing   Vital signs reviewed: afebrile and normotensive  SKIN:   Right foot, toes 2-5:   noted macular erythema with indistinct borders, warmth noted (warmer than body temp) , tenderness to palpation , and slight edema noted.  No abscesses seen, no fluctuance noted, no drainage appreciated, no bullae or  vesicles.  Small open area in between toes 3 and 4         ______________________________________________________________________    I explained my diagnostic considerations and recommendations to the patient  All questions were answered.   Please see AVS for any patient instructions & handouts given.

## 2025-04-29 ENCOUNTER — MYC MEDICAL ADVICE (OUTPATIENT)
Dept: FAMILY MEDICINE | Facility: CLINIC | Age: 50
End: 2025-04-29
Payer: COMMERCIAL

## 2025-04-29 NOTE — TELEPHONE ENCOUNTER
Reach out to patient to discuss.  Patient states that overall her symptoms are fairly similar to yesterday when she was first diagnosed with cellulitis and started on oral Keflex with a one-time dose of IM Rocephin.    Her main concern is that some of the redness and swelling has moved up her foot a touch and worsened slightly today.  Patient did end up drawing a line around the spreading area this morning and within the past 5 hours redness has not extended further.    Patient otherwise denies any systemic concerns and continues her oral antibiotics.  Discussed the option of keeping an eye on things today with the expectation that by tomorrow her symptoms should start to improve.  Patient is understanding that if her symptoms worsen she needs to call us right away.    The above situation was discussed with PCP who agreed with the plan we will also put a hold on PCP schedule tentatively tomorrow in case symptoms worsen.   details…

## 2025-04-30 NOTE — TELEPHONE ENCOUNTER
Mixed picture here, glad to hear the foot overall is feeling better, sores improving.  Slight concern with ongoing spreading, but this could still be because we are early in the antibiotic course.  Lets plan for updated line and further surveillance.  If continuing to spread upward tomorrow, double book on my schedule and we can discuss labs, possibly imaging, possibly changing antibiotics  Katia Monsivais, DO

## 2025-05-02 ENCOUNTER — THERAPY VISIT (OUTPATIENT)
Age: 50
End: 2025-05-02
Payer: COMMERCIAL

## 2025-05-02 DIAGNOSIS — M25.561 ACUTE PAIN OF RIGHT KNEE: Primary | ICD-10-CM

## 2025-05-02 PROCEDURE — 97140 MANUAL THERAPY 1/> REGIONS: CPT | Mod: GP | Performed by: PHYSICAL THERAPIST

## 2025-05-02 PROCEDURE — 97110 THERAPEUTIC EXERCISES: CPT | Mod: GP | Performed by: PHYSICAL THERAPIST

## 2025-05-09 ENCOUNTER — OFFICE VISIT (OUTPATIENT)
Dept: PODIATRY | Facility: CLINIC | Age: 50
End: 2025-05-09
Payer: COMMERCIAL

## 2025-05-09 DIAGNOSIS — L98.8 MACERATION OF SKIN: ICD-10-CM

## 2025-05-09 DIAGNOSIS — B35.3 TINEA PEDIS OF RIGHT FOOT: ICD-10-CM

## 2025-05-09 DIAGNOSIS — B35.1 ONYCHOMYCOSIS: Primary | ICD-10-CM

## 2025-05-09 PROCEDURE — 99203 OFFICE O/P NEW LOW 30 MIN: CPT | Performed by: STUDENT IN AN ORGANIZED HEALTH CARE EDUCATION/TRAINING PROGRAM

## 2025-05-09 RX ORDER — TERBINAFINE HYDROCHLORIDE 250 MG/1
250 TABLET ORAL DAILY
Qty: 90 TABLET | Refills: 0 | Status: SHIPPED | OUTPATIENT
Start: 2025-05-09 | End: 2025-08-07

## 2025-05-09 ASSESSMENT — PAIN SCALES - GENERAL: PAINLEVEL_OUTOF10: NO PAIN (0)

## 2025-05-09 NOTE — LETTER
2025      Lisset MITCHELL Chi  [address] [facility] [id number]      Dear Colleague,    Thank you for referring your patient, Lisset MITCHELL Chi, to the [facility]. Please see a copy of my visit note below.    CC: Concern for fungal digits of the left foot and infection of the right foot     HPI: Lisset MITCHELL Chi is a 49 year old female who presents to clinic for chief concern of fungal digits of the left foot and infection of the right foot.  Patient states that she had redness and swelling to the right foot and has been using antibiotic creams and tinctures to the right foot.  She is unsure if this area is still infected but she still has an issue between her 4th and 5th toes with flaking and whiteness.    Additionally the patient would like to discuss the thickened white and yellow nails to the left great toe and left fifth toe.  She states that she was on oral terbinafine previously and that this cleared up significantly but over the last few years it is continue to come back to the state it was previously on.    Past Surgical History:   Procedure Laterality Date     ARTHROSCOPY KNEE Right 3/2/2018    Procedure: ARTHROSCOPY KNEE;  Right Knee Arthroscopy, Lateral Compartmnt Debridemen and  Anterior Chamber;  Surgeon: Rima Sarmiento MD;  Location: [facility]     ARTHROSCOPY KNEE Right 2024    Procedure: right knee arthroscopy, with open exploration of loose body;  Surgeon: Rima Sarmiento MD;  Location: [facility] OR     ARTHROSCOPY KNEE WITH TIBIAL TUBERCLE SHIFT Left 2024    Procedure: Left Knee Arthroscopy, Medial Patello-femoral Ligament Reconstruction with Allograft,;  Surgeon: Rima Sarmiento MD;  Location: [facility] OR     ARTHROSCOPY, KNEE, WITH TROCHLEOPLASTY Left 2024    Procedure: Arthroscopy, Knee, With Trochleoplasty;  Surgeon: Rima Sarmiento MD;  Location: [facility] OR      SECTION           COLONOSCOPY N/A 2022    Procedure: COLONOSCOPY,  WITH HOT POLYPECTOMY, TATTOO, AND CLIPS;  Surgeon: Johnson Petit MD;  Location: [facility] OR     COLONOSCOPY N/A 7/14/2023    Procedure: COLONOSCOPY, WITH POLYPECTOMY AND BIOPSY;  Surgeon: Johnson Petit MD;  Location: [facility] OR     HC DILATION/CURETTAGE DIAG/THER NON OB  5/2003    D & C for retained placenta one week after the delivery     I&D BARTHOLIN GLAND ABSCESS  9/07 and 12/07    mcgrath cath 12/07     KNEE SURGERY  1988    Rt knee Fx, repair-dislocation problem     KNEE SURGERY  1990    Lt knee reconstructive surgery-dislocation problem     LEEP TX, CERVICAL  1995    LEEP for abnormal pap     MARSUP BARTHOLIN GLAND CYST  4/25/08     Past Medical History:   Diagnosis Date     Allergic rhinitis due to other allergen      Asthma      Bilateral low back pain with left-sided sciatica 10/17/2022     IBS (irritable bowel syndrome)      Other internal derangement of knee(717.89) 2005    patella dislocates easily     Premature ovarian failure      Raynaud's disease 3/08     Trochanteric bursitis of both hips 11/15/2017     Medications:  Current Outpatient Medications   Medication Sig Dispense Refill     acetaminophen (TYLENOL) 325 MG tablet Take 2 tablets (650 mg) by mouth every 4 hours as needed for mild pain. 50 tablet 0     albuterol (PROAIR HFA/PROVENTIL HFA/VENTOLIN HFA) 108 (90 Base) MCG/ACT inhaler Inhale 2 puffs into the lungs every 4 hours as needed for shortness of breath, wheezing or cough. 18 g 1     aspirin 81 MG EC tablet Take 1 tablet (81 mg) by mouth 2 times daily. 60 tablet 0     azelastine (ASTELIN) 0.1 % nasal spray Spray 1 spray into both nostrils 2 times daily. 90 mL 1     Calcium Carbonate-Vitamin D (CALCIUM + D PO) Take 600 mg by mouth daily (with lunch)       FEXOFENADINE  MG OR TABS Take 180 mg by mouth every morning       ibuprofen (ADVIL/MOTRIN) 600 MG tablet TAKE 1 TABLET (600 MG) BY MOUTH EVERY 8 HOURS AS NEEDED FOR MODERATE PAIN 60 tablet 1     magnesium 250 MG tablet Take 1 tablet by  mouth daily (with lunch).       methocarbamol (ROBAXIN) 500 MG tablet Take 1 tablet (500 mg) by mouth nightly as needed for muscle spasms. 30 tablet 0     montelukast (SINGULAIR) 10 MG tablet Take 1 tablet (10 mg) by mouth at bedtime. 90 tablet 3     oxyCODONE (ROXICODONE) 5 MG tablet Take 1-2 tablets (5-10 mg) by mouth every 4 hours as needed for moderate to severe pain. 26 tablet 0     senna-docusate (SENOKOT-S/PERICOLACE) 8.6-50 MG tablet Take 1-2 tablets by mouth 2 times daily. 30 tablet 0     terbinafine (LAMISIL) 250 MG tablet Take 1 tablet (250 mg) by mouth daily. 90 tablet 0     Turmeric Curcumin 500 MG CAPS Take 500 mg by mouth daily (with lunch)       VITAMIN D, CHOLECALCIFEROL, PO Take 2,000 Units by mouth daily (with lunch).       Allergies:  Milk (cow)  Social History     Socioeconomic History     Marital status:      Spouse name: Not on file     Number of children: 2     Years of education: Not on file     Highest education level: Not on file   Occupational History     Occupation:      Employer: [employer]   Tobacco Use     Smoking status: Never     Passive exposure: Never     Smokeless tobacco: Never   Vaping Use     Vaping status: Never Used   Substance and Sexual Activity     Alcohol use: Yes     Comment: once a month 1-2 drinks     Drug use: No     Sexual activity: Yes     Partners: Male     Birth control/protection: Surgical     Comment:  had vasectomy   Other Topics Concern      Service No     Blood Transfusions Not Asked     Caffeine Concern No     Occupational Exposure No     Hobby Hazards Not Asked     Sleep Concern No     Stress Concern Yes     Weight Concern Yes     Comment: gains easily     Special Diet No     Back Care Yes     Exercise Yes     Comment: irregularly     Bike Helmet Not Asked     Comment: doesn't bike     Seat Belt No     Self-Exams No     Parent/sibling w/ CABG, MI or angioplasty before 65F 55M? No   Social History  Narrative    Caffeine intake/servings daily - 0-1    Calcium intake/servings daily - 2-3    Exercise 2-3  times weekly - describe aerobics    Sunscreen used - Yes    Seatbelts used - Yes    Guns stored in the home - No    Self Breast Exam - Yes    Pap test up to date -  Yes    Eye exam up to date -  Yes    Dental exam up to date -  Yes    DEXA scan up to date -  Not Applicable    Flex Sig/Colonoscopy up to date -  Not Applicable    Mammography up to date -  Not Applicable    Immunizations reviewed and up to date - Yes    Abuse: Current or Past (Physical, Sexual or Emotional) - None current, past    Do you feel safe in your environment - Yes    Do you cope well with stress - Yes    Do you suffer from insomnia - No    Last updated by: Estefani Moraes MA 5/5/2010             Social Drivers of Health     Financial Resource Strain: Low Risk  (3/26/2025)    Financial Resource Strain      Within the past 12 months, have you or your family members you live with been unable to get utilities (heat, electricity) when it was really needed?: No   Food Insecurity: Low Risk  (3/26/2025)    Food Insecurity      Within the past 12 months, did you worry that your food would run out before you got money to buy more?: No      Within the past 12 months, did the food you bought just not last and you didn t have money to get more?: No   Transportation Needs: Low Risk  (3/26/2025)    Transportation Needs      Within the past 12 months, has lack of transportation kept you from medical appointments, getting your medicines, non-medical meetings or appointments, work, or from getting things that you need?: No   Physical Activity: Insufficiently Active (3/26/2025)    Exercise Vital Sign      Days of Exercise per Week: 7 days      Minutes of Exercise per Session: 20 min   Stress: No Stress Concern Present (3/26/2025)    Citizen of [place] [employer] - Occupational Stress Questionnaire      Feeling of Stress : Not at all   Social  Connections: Unknown (3/26/2025)    Social Connection and Isolation Panel [NHANES]      Frequency of Communication with Friends and Family: Not on file      Frequency of Social Gatherings with Friends and Family: Once a week      Attends Gnosticism Services: Not on file      Active Member of Clubs or Organizations: Not on file      Attends Club or Organization Meetings: Not on file      Marital Status: Not on file   Interpersonal Safety: Low Risk  (3/26/2025)    Interpersonal Safety      Do you feel physically and emotionally safe where you currently live?: Yes      Within the past 12 months, have you been hit, slapped, kicked or otherwise physically hurt by someone?: No      Within the past 12 months, have you been humiliated or emotionally abused in other ways by your partner or ex-partner?: No   Housing Stability: Low Risk  (3/26/2025)    Housing Stability      Do you have housing? : Yes      Are you worried about losing your housing?: No     Family History   Problem Relation Age of Onset     Thyroid Disease Mother      Diabetes Mother         type2     Eye Disorder Mother         cataracts     Gastrointestinal Disease Mother         hep a/has to have her throat stretched (esophageal stricturing)     Respiratory Mother         sleep apnea     Heart Disease Father 60     Tremor Father      Thyroid Disease Maternal Grandmother      Gynecology Maternal Grandmother         on bcp to keep period regular, a small uterus     Heart Disease Maternal Grandmother         tachycardia     Diabetes Maternal Grandfather      Cancer Maternal Grandfather      C.A.D. Paternal Grandfather 30        age 30, then again in his 80's     Allergies Daughter         milk     Asthma Daughter      Anesthesia Reaction No family hx of      Thrombosis No family hx of        Medical records were reviewed and are summarized above.    Review of Systems    PHYSICAL EXAM:  Vital signs:LMP 12/25/2013      Focused lower extremity physical exam:      Derm: Skin is warm, dry, intact.  Interdigital maceration noted to the right fourth interspace with flaking, xerotic skin, and pocking of the skin noted from the fourth interspace and spreading plantarly in a moccasin distribution.  No open wounds, laceration or abrasions noted.  Left hallux and left fifth digit nail are white and yellow with mild dystrophy noted throughout. No ecchymosis or erythema noted.     Vasc: Dorsalis pedis pulses, 2/4 bilateral. Posterior tibial pulses 2/4 bilateral. Cap fill time < 3 seconds to the digits. No edema is present. Hair growth present to the toes.     Neuro: Protective sensation intact via light touch to the feet. Gross sensation intact.     MSK:   - No pain to palpation on clinical examination.  - Muscle strength 5/5 with dorsiflexion, plantarflexion, eversion, inversion of the feet. Compartments soft and compressible.    Assessment:   - Interdigital maceration, right fourth interspace  - Tinea pedis, right foot  - Tinea unguium, left foot    Plan:  - Patient was seen and evaluated in clinic by myself.     - Tinea unguium, left hallux, left fifth digit.  Discussed tinea unguium first tinea pedis.  Discussed complications including nail dystrophy, hyphae, fungus and that these can all appear very similar.  Discussed taking a piece of the nail and sending it to the lab for further evaluation.  Discussed oral versus topical medications such as ciclopirox versus oral terbinafine.  Discussed the efficacy of this.  Discussed other off-label topical treatment such as Vicks VapoRub.  Patient would like to try oral terbinafine at this time.  Recent labs reviewed and liver enzymes are appropriate levels.  Oral terbinafine was ordered.    - Tinea pedis, right foot:  Discussed xerosis versus tinea pedis.  Discussed dermatitis or tinea pedis.  Discussed the flaking and rubor in a moccasin distribution to the feet.  Discussed cream versus lotion treatment options.    - Interdigital  maceration, right fourth interspace:  Discussed the patient's interdigital maceration.  Discussed avoidance of creams or lotions interdigitally.  Discussed the skin breakdown visible.  Discussed keeping the area dry with powder versus Betadine.  Betadine was given to the patient.  Patient will  over-the-counter Betadine.    - Patient follow-up in 6 weeks or sooner should problems arise.    Gopi Castro DPM      Again, thank you for allowing me to participate in the care of your patient.        Sincerely,        Gopi Castro DPM    Electronically signed

## 2025-05-09 NOTE — PROGRESS NOTES
CC: Concern for fungal digits of the left foot and infection of the right foot     HPI: Lisset MITCHELL Chi is a 49 year old female who presents to clinic for chief concern of fungal digits of the left foot and infection of the right foot.  Patient states that she had redness and swelling to the right foot and has been using antibiotic creams and tinctures to the right foot.  She is unsure if this area is still infected but she still has an issue between her 4th and 5th toes with flaking and whiteness.    Additionally the patient would like to discuss the thickened white and yellow nails to the left great toe and left fifth toe.  She states that she was on oral terbinafine previously and that this cleared up significantly but over the last few years it is continue to come back to the state it was previously on.    Past Surgical History:   Procedure Laterality Date    ARTHROSCOPY KNEE Right 3/2/2018    Procedure: ARTHROSCOPY KNEE;  Right Knee Arthroscopy, Lateral Compartmnt Debridemen and  Anterior Chamber;  Surgeon: Rima Sarmiento MD;  Location:  OR    ARTHROSCOPY KNEE Right 2024    Procedure: right knee arthroscopy, with open exploration of loose body;  Surgeon: Rima Sarmiento MD;  Location: [facility] OR    ARTHROSCOPY KNEE WITH TIBIAL TUBERCLE SHIFT Left 2024    Procedure: Left Knee Arthroscopy, Medial Patello-femoral Ligament Reconstruction with Allograft,;  Surgeon: Rima Sarmiento MD;  Location: [facility] OR    ARTHROSCOPY, KNEE, WITH TROCHLEOPLASTY Left 2024    Procedure: Arthroscopy, Knee, With Trochleoplasty;  Surgeon: Rima Sarmietno MD;  Location: [facility] OR     SECTION          COLONOSCOPY N/A 2022    Procedure: COLONOSCOPY, WITH HOT POLYPECTOMY, TATTOO, AND CLIPS;  Surgeon: Johnson Petit MD;  Location: [facility] OR    COLONOSCOPY N/A 2023    Procedure: COLONOSCOPY, WITH POLYPECTOMY AND BIOPSY;  Surgeon: Johnson Petit MD;  Location: [facility] OR      DILATION/CURETTAGE DIAG/THER NON OB  5/2003    D & C for retained placenta one week after the delivery    I&D BARTHOLIN GLAND ABSCESS  9/07 and 12/07    mcgrath cath 12/07    KNEE SURGERY  1988    Rt knee Fx, repair-dislocation problem    KNEE SURGERY  1990    Lt knee reconstructive surgery-dislocation problem    LEEP TX, CERVICAL  1995    LEEP for abnormal pap    MARSUP BARTHOLIN GLAND CYST  4/25/08     Past Medical History:   Diagnosis Date    Allergic rhinitis due to other allergen     Asthma     Bilateral low back pain with left-sided sciatica 10/17/2022    IBS (irritable bowel syndrome)     Other internal derangement of knee(717.89) 2005    patella dislocates easily    Premature ovarian failure     Raynaud's disease 3/08    Trochanteric bursitis of both hips 11/15/2017     Medications:  Current Outpatient Medications   Medication Sig Dispense Refill    acetaminophen (TYLENOL) 325 MG tablet Take 2 tablets (650 mg) by mouth every 4 hours as needed for mild pain. 50 tablet 0    albuterol (PROAIR HFA/PROVENTIL HFA/VENTOLIN HFA) 108 (90 Base) MCG/ACT inhaler Inhale 2 puffs into the lungs every 4 hours as needed for shortness of breath, wheezing or cough. 18 g 1    aspirin 81 MG EC tablet Take 1 tablet (81 mg) by mouth 2 times daily. 60 tablet 0    azelastine (ASTELIN) 0.1 % nasal spray Spray 1 spray into both nostrils 2 times daily. 90 mL 1    Calcium Carbonate-Vitamin D (CALCIUM + D PO) Take 600 mg by mouth daily (with lunch)      FEXOFENADINE  MG OR TABS Take 180 mg by mouth every morning      ibuprofen (ADVIL/MOTRIN) 600 MG tablet TAKE 1 TABLET (600 MG) BY MOUTH EVERY 8 HOURS AS NEEDED FOR MODERATE PAIN 60 tablet 1    magnesium 250 MG tablet Take 1 tablet by mouth daily (with lunch).      methocarbamol (ROBAXIN) 500 MG tablet Take 1 tablet (500 mg) by mouth nightly as needed for muscle spasms. 30 tablet 0    montelukast (SINGULAIR) 10 MG tablet Take 1 tablet (10 mg) by mouth at bedtime. 90 tablet 3     oxyCODONE (ROXICODONE) 5 MG tablet Take 1-2 tablets (5-10 mg) by mouth every 4 hours as needed for moderate to severe pain. 26 tablet 0    senna-docusate (SENOKOT-S/PERICOLACE) 8.6-50 MG tablet Take 1-2 tablets by mouth 2 times daily. 30 tablet 0    terbinafine (LAMISIL) 250 MG tablet Take 1 tablet (250 mg) by mouth daily. 90 tablet 0    Turmeric Curcumin 500 MG CAPS Take 500 mg by mouth daily (with lunch)      VITAMIN D, CHOLECALCIFEROL, PO Take 2,000 Units by mouth daily (with lunch).       Allergies:  Milk (cow)  Social History     Socioeconomic History    Marital status:      Spouse name: Not on file    Number of children: 2    Years of education: Not on file    Highest education level: Not on file   Occupational History    Occupation:      Employer: [employer]   Tobacco Use    Smoking status: Never     Passive exposure: Never    Smokeless tobacco: Never   Vaping Use    Vaping status: Never Used   Substance and Sexual Activity    Alcohol use: Yes     Comment: once a month 1-2 drinks    Drug use: No    Sexual activity: Yes     Partners: Male     Birth control/protection: Surgical     Comment:  had vasectomy   Other Topics Concern     Service No    Blood Transfusions Not Asked    Caffeine Concern No    Occupational Exposure No    Hobby Hazards Not Asked    Sleep Concern No    Stress Concern Yes    Weight Concern Yes     Comment: gains easily    Special Diet No    Back Care Yes    Exercise Yes     Comment: irregularly    Bike Helmet Not Asked     Comment: doesn't bike    Seat Belt No    Self-Exams No    Parent/sibling w/ CABG, MI or angioplasty before 65F 55M? No   Social History Narrative    Caffeine intake/servings daily - 0-1    Calcium intake/servings daily - 2-3    Exercise 2-3  times weekly - describe aerobics    Sunscreen used - Yes    Seatbelts used - Yes    Guns stored in the home - No    Self Breast Exam - Yes    Pap test up to date -  Yes    Eye exam  up to date -  Yes    Dental exam up to date -  Yes    DEXA scan up to date -  Not Applicable    Flex Sig/Colonoscopy up to date -  Not Applicable    Mammography up to date -  Not Applicable    Immunizations reviewed and up to date - Yes    Abuse: Current or Past (Physical, Sexual or Emotional) - None current, past    Do you feel safe in your environment - Yes    Do you cope well with stress - Yes    Do you suffer from insomnia - No    Last updated by: Estefani Moraes MA 5/5/2010             Social Drivers of Health     Financial Resource Strain: Low Risk  (3/26/2025)    Financial Resource Strain     Within the past 12 months, have you or your family members you live with been unable to get utilities (heat, electricity) when it was really needed?: No   Food Insecurity: Low Risk  (3/26/2025)    Food Insecurity     Within the past 12 months, did you worry that your food would run out before you got money to buy more?: No     Within the past 12 months, did the food you bought just not last and you didn t have money to get more?: No   Transportation Needs: Low Risk  (3/26/2025)    Transportation Needs     Within the past 12 months, has lack of transportation kept you from medical appointments, getting your medicines, non-medical meetings or appointments, work, or from getting things that you need?: No   Physical Activity: Insufficiently Active (3/26/2025)    Exercise Vital Sign     Days of Exercise per Week: 7 days     Minutes of Exercise per Session: 20 min   Stress: No Stress Concern Present (3/26/2025)    Citizen of [place] of Occupational Health - Occupational Stress Questionnaire     Feeling of Stress : Not at all   Social Connections: Unknown (3/26/2025)    Social Connection and Isolation Panel [NHANES]     Frequency of Communication with Friends and Family: Not on file     Frequency of Social Gatherings with Friends and Family: Once a week     Attends Sabianist Services: Not on file     Active Member of Clubs or  Organizations: Not on file     Attends Club or Organization Meetings: Not on file     Marital Status: Not on file   Interpersonal Safety: Low Risk  (3/26/2025)    Interpersonal Safety     Do you feel physically and emotionally safe where you currently live?: Yes     Within the past 12 months, have you been hit, slapped, kicked or otherwise physically hurt by someone?: No     Within the past 12 months, have you been humiliated or emotionally abused in other ways by your partner or ex-partner?: No   Housing Stability: Low Risk  (3/26/2025)    Housing Stability     Do you have housing? : Yes     Are you worried about losing your housing?: No     Family History   Problem Relation Age of Onset    Thyroid Disease Mother     Diabetes Mother         type2    Eye Disorder Mother         cataracts    Gastrointestinal Disease Mother         hep a/has to have her throat stretched (esophageal stricturing)    Respiratory Mother         sleep apnea    Heart Disease Father 60    Tremor Father     Thyroid Disease Maternal Grandmother     Gynecology Maternal Grandmother         on bcp to keep period regular, a small uterus    Heart Disease Maternal Grandmother         tachycardia    Diabetes Maternal Grandfather     Cancer Maternal Grandfather     C.A.D. Paternal Grandfather 30        age 30, then again in his 80's    Allergies Daughter         milk    Asthma Daughter     Anesthesia Reaction No family hx of     Thrombosis No family hx of        Medical records were reviewed and are summarized above.    Review of Systems    PHYSICAL EXAM:  Vital signs:LMP 12/25/2013      Focused lower extremity physical exam:     Derm: Skin is warm, dry, intact.  Interdigital maceration noted to the right fourth interspace with flaking, xerotic skin, and pocking of the skin noted from the fourth interspace and spreading plantarly in a moccasin distribution.  No open wounds, laceration or abrasions noted.  Left hallux and left fifth digit nail are  white and yellow with mild dystrophy noted throughout. No ecchymosis or erythema noted.     Vasc: Dorsalis pedis pulses, 2/4 bilateral. Posterior tibial pulses 2/4 bilateral. Cap fill time < 3 seconds to the digits. No edema is present. Hair growth present to the toes.     Neuro: Protective sensation intact via light touch to the feet. Gross sensation intact.     MSK:   - No pain to palpation on clinical examination.  - Muscle strength 5/5 with dorsiflexion, plantarflexion, eversion, inversion of the feet. Compartments soft and compressible.    Assessment:   - Interdigital maceration, right fourth interspace  - Tinea pedis, right foot  - Tinea unguium, left foot    Plan:  - Patient was seen and evaluated in clinic by myself.     - Tinea unguium, left hallux, left fifth digit.  Discussed tinea unguium first tinea pedis.  Discussed complications including nail dystrophy, hyphae, fungus and that these can all appear very similar.  Discussed taking a piece of the nail and sending it to the lab for further evaluation.  Discussed oral versus topical medications such as ciclopirox versus oral terbinafine.  Discussed the efficacy of this.  Discussed other off-label topical treatment such as Vicks VapoRub.  Patient would like to try oral terbinafine at this time.  Recent labs reviewed and liver enzymes are appropriate levels.  Oral terbinafine was ordered.    - Tinea pedis, right foot:  Discussed xerosis versus tinea pedis.  Discussed dermatitis or tinea pedis.  Discussed the flaking and rubor in a moccasin distribution to the feet.  Discussed cream versus lotion treatment options.    - Interdigital maceration, right fourth interspace:  Discussed the patient's interdigital maceration.  Discussed avoidance of creams or lotions interdigitally.  Discussed the skin breakdown visible.  Discussed keeping the area dry with powder versus Betadine.  Betadine was given to the patient.  Patient will  over-the-counter  Betadine.    - Patient follow-up in 6 weeks or sooner should problems arise.    Gopi Castro DPM

## 2025-05-23 ENCOUNTER — OFFICE VISIT (OUTPATIENT)
Dept: PODIATRY | Facility: CLINIC | Age: 50
End: 2025-05-23
Payer: COMMERCIAL

## 2025-05-23 DIAGNOSIS — L98.8 MACERATION OF SKIN: ICD-10-CM

## 2025-05-23 DIAGNOSIS — B35.1 TINEA UNGUIUM: Primary | ICD-10-CM

## 2025-05-23 ASSESSMENT — PAIN SCALES - GENERAL: PAINLEVEL_OUTOF10: MILD PAIN (2)

## 2025-05-23 NOTE — LETTER
5/23/2025      Lisset MITCHELL Chi  [address] [id number]      Dear Colleague,    Thank you for referring your patient, Lisset MITCHELL Chi, to the [facility]. Please see a copy of my visit note below.        FOOT AND ANKLE SURGERY/PODIATRY PROGRESS NOTE    ASSESSMENT:   - Tinea unguium, left foot  - Resolving interdigital maceration, right fourth interspace    PLAN:  - Follow-up for interdigital maceration of the right fourth interspace and tinea pedis of the right foot.  Discussed that clinically she has no remaining signs of interdigital maceration, infection, or tinea pedis to the right or left foot.  Discussed that she may continue to apply Betadine to the interdigital spaces as needed if she sees recurrence of maceration and/or tinea to the webspaces of her feet.  - Discussed the tinea unguium in detail.  Again we discussed continued application of cream such as ciclopirox versus Vicks VapoRub versus oral terbinafine that she is currently on.  Patient will continue to take oral terbinafine at this time and will continue to watch as nails grow out to evaluate for any changes.    - Patient's questions invited and answered. Patient to return to clinic in 6-8 week(s) for re-evaluation. Patient was encouraged to call my office with any further questions or concerns.     20 minutes spent on the day of encounter doing chart review, history and exam, documentation, and further activities as noted.     Segun Castro DPM  [facility] Podiatry/Foot & Ankle Surgery      SUBJECTIVE: Lisset MITCHELL Chi was seen in clinic today for continued evaluation of her tinea unguium to the left foot and tinea pedis of the right foot as well as the interdigital maceration of the right foot.  Patient states she has been putting Betadine interdigitally without any complication.  She thinks that this is all healed up to the right foot and does not see any more sign of maceration or fungal infection.  She is  continuing to take the oral terbinafine without any complications.    Past Medical History:   Diagnosis Date     Allergic rhinitis due to other allergen      Asthma      Bilateral low back pain with left-sided sciatica 10/17/2022     IBS (irritable bowel syndrome)      Other internal derangement of knee(717.89)     patella dislocates easily     Premature ovarian failure      Raynaud's disease 3/08     Trochanteric bursitis of both hips 11/15/2017       Past Surgical History:   Procedure Laterality Date     ARTHROSCOPY KNEE Right 3/2/2018    Procedure: ARTHROSCOPY KNEE;  Right Knee Arthroscopy, Lateral Compartmnt Debridemen and  Anterior Chamber;  Surgeon: Rima Sarmiento MD;  Location: [facility] OR     ARTHROSCOPY KNEE Right 2024    Procedure: right knee arthroscopy, with open exploration of loose body;  Surgeon: Rima Sarmiento MD;  Location: [facility] OR     ARTHROSCOPY KNEE WITH TIBIAL TUBERCLE SHIFT Left 2024    Procedure: Left Knee Arthroscopy, Medial Patello-femoral Ligament Reconstruction with Allograft,;  Surgeon: Rima Sarmiento MD;  Location: [facility] OR     ARTHROSCOPY, KNEE, WITH TROCHLEOPLASTY Left 2024    Procedure: Arthroscopy, Knee, With Trochleoplasty;  Surgeon: Rima Sarmiento MD;  Location: [facility] OR      SECTION           COLONOSCOPY N/A 2022    Procedure: COLONOSCOPY, WITH HOT POLYPECTOMY, TATTOO, AND CLIPS;  Surgeon: Johnson Petit MD;  Location: [facility] OR     COLONOSCOPY N/A 2023    Procedure: COLONOSCOPY, WITH POLYPECTOMY AND BIOPSY;  Surgeon: Johnson Petit MD;  Location: [facility] OR     HC DILATION/CURETTAGE DIAG/THER NON OB  2003    D & C for retained placenta one week after the delivery     I&D BARTHOLIN GLAND ABSCESS   and     mcgrath cath      KNEE SURGERY      Rt knee Fx, repair-dislocation problem     KNEE SURGERY      Lt knee reconstructive surgery-dislocation problem     LEEP TX, CERVICAL      LEEP for abnormal  pap     MARSUP BARTHOLIN GLAND CYST  4/25/08       Allergies   Allergen Reactions     Milk (Cow) GI Disturbance         Current Outpatient Medications:      acetaminophen (TYLENOL) 325 MG tablet, Take 2 tablets (650 mg) by mouth every 4 hours as needed for mild pain., Disp: 50 tablet, Rfl: 0     albuterol (PROAIR HFA/PROVENTIL HFA/VENTOLIN HFA) 108 (90 Base) MCG/ACT inhaler, Inhale 2 puffs into the lungs every 4 hours as needed for shortness of breath, wheezing or cough., Disp: 18 g, Rfl: 1     aspirin 81 MG EC tablet, Take 1 tablet (81 mg) by mouth 2 times daily., Disp: 60 tablet, Rfl: 0     azelastine (ASTELIN) 0.1 % nasal spray, Spray 1 spray into both nostrils 2 times daily., Disp: 90 mL, Rfl: 1     Calcium Carbonate-Vitamin D (CALCIUM + D PO), Take 600 mg by mouth daily (with lunch), Disp: , Rfl:      FEXOFENADINE  MG OR TABS, Take 180 mg by mouth every morning, Disp: , Rfl:      ibuprofen (ADVIL/MOTRIN) 600 MG tablet, TAKE 1 TABLET (600 MG) BY MOUTH EVERY 8 HOURS AS NEEDED FOR MODERATE PAIN, Disp: 60 tablet, Rfl: 1     magnesium 250 MG tablet, Take 1 tablet by mouth daily (with lunch)., Disp: , Rfl:      methocarbamol (ROBAXIN) 500 MG tablet, Take 1 tablet (500 mg) by mouth nightly as needed for muscle spasms., Disp: 30 tablet, Rfl: 0     montelukast (SINGULAIR) 10 MG tablet, Take 1 tablet (10 mg) by mouth at bedtime., Disp: 90 tablet, Rfl: 3     oxyCODONE (ROXICODONE) 5 MG tablet, Take 1-2 tablets (5-10 mg) by mouth every 4 hours as needed for moderate to severe pain., Disp: 26 tablet, Rfl: 0     senna-docusate (SENOKOT-S/PERICOLACE) 8.6-50 MG tablet, Take 1-2 tablets by mouth 2 times daily., Disp: 30 tablet, Rfl: 0     terbinafine (LAMISIL) 250 MG tablet, Take 1 tablet (250 mg) by mouth daily., Disp: 90 tablet, Rfl: 0     Turmeric Curcumin 500 MG CAPS, Take 500 mg by mouth daily (with lunch), Disp: , Rfl:      VITAMIN D, CHOLECALCIFEROL, PO, Take 2,000 Units by mouth daily (with lunch)., Disp: , Rfl:      Family History   Problem Relation Age of Onset     Thyroid Disease Mother      Diabetes Mother         type2     Eye Disorder Mother         cataracts     Gastrointestinal Disease Mother         hep a/has to have her throat stretched (esophageal stricturing)     Respiratory Mother         sleep apnea     Heart Disease Father 60     Tremor Father      Thyroid Disease Maternal Grandmother      Gynecology Maternal Grandmother         on bcp to keep period regular, a small uterus     Heart Disease Maternal Grandmother         tachycardia     Diabetes Maternal Grandfather      Cancer Maternal Grandfather      C.A.D. Paternal Grandfather 30        age 30, then again in his 80's     Allergies Daughter         milk     Asthma Daughter      Anesthesia Reaction No family hx of      Thrombosis No family hx of        Social History     Socioeconomic History     Marital status:      Spouse name: Not on file     Number of children: 2     Years of education: Not on file     Highest education level: Not on file   Occupational History     Occupation:      Employer: [employer]   Tobacco Use     Smoking status: Never     Passive exposure: Never     Smokeless tobacco: Never   Vaping Use     Vaping status: Never Used   Substance and Sexual Activity     Alcohol use: Yes     Comment: once a month 1-2 drinks     Drug use: No     Sexual activity: Yes     Partners: Male     Birth control/protection: Surgical     Comment:  had vasectomy   Other Topics Concern      Service No     Blood Transfusions Not Asked     Caffeine Concern No     Occupational Exposure No     Hobby Hazards Not Asked     Sleep Concern No     Stress Concern Yes     Weight Concern Yes     Comment: gains easily     Special Diet No     Back Care Yes     Exercise Yes     Comment: irregularly     Bike Helmet Not Asked     Comment: doesn't bike     Seat Belt No     Self-Exams No     Parent/sibling w/ CABG, MI or angioplasty  before 65F 55M? No   Social History Narrative    Caffeine intake/servings daily - 0-1    Calcium intake/servings daily - 2-3    Exercise 2-3  times weekly - describe aerobics    Sunscreen used - Yes    Seatbelts used - Yes    Guns stored in the home - No    Self Breast Exam - Yes    Pap test up to date -  Yes    Eye exam up to date -  Yes    Dental exam up to date -  Yes    DEXA scan up to date -  Not Applicable    Flex Sig/Colonoscopy up to date -  Not Applicable    Mammography up to date -  Not Applicable    Immunizations reviewed and up to date - Yes    Abuse: Current or Past (Physical, Sexual or Emotional) - None current, past    Do you feel safe in your environment - Yes    Do you cope well with stress - Yes    Do you suffer from insomnia - No    Last updated by: Estefani Moraes MA 5/5/2010             Social Drivers of Health     Financial Resource Strain: Low Risk  (3/26/2025)    Financial Resource Strain      Within the past 12 months, have you or your family members you live with been unable to get utilities (heat, electricity) when it was really needed?: No   Food Insecurity: Low Risk  (3/26/2025)    Food Insecurity      Within the past 12 months, did you worry that your food would run out before you got money to buy more?: No      Within the past 12 months, did the food you bought just not last and you didn t have money to get more?: No   Transportation Needs: Low Risk  (3/26/2025)    Transportation Needs      Within the past 12 months, has lack of transportation kept you from medical appointments, getting your medicines, non-medical meetings or appointments, work, or from getting things that you need?: No   Physical Activity: Insufficiently Active (3/26/2025)    Exercise Vital Sign      Days of Exercise per Week: 7 days      Minutes of Exercise per Session: 20 min   Stress: No Stress Concern Present (3/26/2025)    [place] of Occupational Health - Occupational Stress Questionnaire      Feeling of  Stress : Not at all   Social Connections: Unknown (3/26/2025)    Social Connection and Isolation Panel [NHANES]      Frequency of Communication with Friends and Family: Not on file      Frequency of Social Gatherings with Friends and Family: Once a week      Attends Spiritism Services: Not on file      Active Member of Clubs or Organizations: Not on file      Attends Club or Organization Meetings: Not on file      Marital Status: Not on file   Interpersonal Safety: Low Risk  (3/26/2025)    Interpersonal Safety      Do you feel physically and emotionally safe where you currently live?: Yes      Within the past 12 months, have you been hit, slapped, kicked or otherwise physically hurt by someone?: No      Within the past 12 months, have you been humiliated or emotionally abused in other ways by your partner or ex-partner?: No   Housing Stability: Low Risk  (3/26/2025)    Housing Stability      Do you have housing? : Yes      Are you worried about losing your housing?: No       10 point Review of Systems is negative except otherwise noted in HPI.    OBJECTIVE:  Derm: Skin is warm, dry, intact.  No remaining interdigital maceration noted to the right fourth interspace and no remaining signs of tinea pedis to the plantar aspect of both feet.  No open wounds, laceration or abrasions noted.  Left hallux and left fifth digit nail are white and yellow with mild dystrophy noted throughout. No ecchymosis or erythema noted.      Vasc: Dorsalis pedis pulses, 2/4 bilateral. Posterior tibial pulses 2/4 bilateral. Cap fill time < 3 seconds to the digits. No edema is present. Hair growth present to the toes.      Neuro: Protective sensation intact via light touch to the feet. Gross sensation intact.      MSK:   - No pain to palpation on clinical examination.  - Muscle strength 5/5 with dorsiflexion, plantarflexion, eversion, inversion of the feet. Compartments soft and compressible.    Again, thank you for allowing me to participate  in the care of your patient.        Sincerely,        Gopi Castro DPM    Electronically signed

## 2025-05-27 NOTE — PROGRESS NOTES
FOOT AND ANKLE SURGERY/PODIATRY PROGRESS NOTE    ASSESSMENT:   - Tinea unguium, left foot  - Resolving interdigital maceration, right fourth interspace    PLAN:  - Follow-up for interdigital maceration of the right fourth interspace and tinea pedis of the right foot.  Discussed that clinically she has no remaining signs of interdigital maceration, infection, or tinea pedis to the right or left foot.  Discussed that she may continue to apply Betadine to the interdigital spaces as needed if she sees recurrence of maceration and/or tinea to the webspaces of her feet.  - Discussed the tinea unguium in detail.  Again we discussed continued application of cream such as ciclopirox versus Vicks VapoRub versus oral terbinafine that she is currently on.  Patient will continue to take oral terbinafine at this time and will continue to watch as nails grow out to evaluate for any changes.    - Patient's questions invited and answered. Patient to return to clinic in 6-8 week(s) for re-evaluation. Patient was encouraged to call my office with any further questions or concerns.     20 minutes spent on the day of encounter doing chart review, history and exam, documentation, and further activities as noted.     Segun Castro DPM  [facility] Podiatry/Foot & Ankle Surgery      SUBJECTIVE: Lisset MITCHELL Chi was seen in clinic today for continued evaluation of her tinea unguium to the left foot and tinea pedis of the right foot as well as the interdigital maceration of the right foot.  Patient states she has been putting Betadine interdigitally without any complication.  She thinks that this is all healed up to the right foot and does not see any more sign of maceration or fungal infection.  She is continuing to take the oral terbinafine without any complications.    Past Medical History:   Diagnosis Date    Allergic rhinitis due to other allergen     Asthma     Bilateral low back pain with left-sided sciatica 10/17/2022    IBS  (irritable bowel syndrome)     Other internal derangement of knee(717.89)     patella dislocates easily    Premature ovarian failure     Raynaud's disease 3/08    Trochanteric bursitis of both hips 11/15/2017       Past Surgical History:   Procedure Laterality Date    ARTHROSCOPY KNEE Right 3/2/2018    Procedure: ARTHROSCOPY KNEE;  Right Knee Arthroscopy, Lateral Compartmnt Debridemen and  Anterior Chamber;  Surgeon: Rima Sarmiento MD;  Location: [facility]    ARTHROSCOPY KNEE Right 2024    Procedure: right knee arthroscopy, with open exploration of loose body;  Surgeon: Rima Sarmiento MD;  Location: [facility] OR    ARTHROSCOPY KNEE WITH TIBIAL TUBERCLE SHIFT Left 2024    Procedure: Left Knee Arthroscopy, Medial Patello-femoral Ligament Reconstruction with Allograft,;  Surgeon: Rima Sarmiento MD;  Location: [facility] OR    ARTHROSCOPY, KNEE, WITH TROCHLEOPLASTY Left 2024    Procedure: Arthroscopy, Knee, With Trochleoplasty;  Surgeon: Rima Sarmiento MD;  Location: [facility] OR     SECTION          COLONOSCOPY N/A 2022    Procedure: COLONOSCOPY, WITH HOT POLYPECTOMY, TATTOO, AND CLIPS;  Surgeon: Johnson Petit MD;  Location: [facility] OR    COLONOSCOPY N/A 2023    Procedure: COLONOSCOPY, WITH POLYPECTOMY AND BIOPSY;  Surgeon: Johnson Petit MD;  Location: [facility] OR    HC DILATION/CURETTAGE DIAG/THER NON OB  2003    D & C for retained placenta one week after the delivery    I&D BARTHOLIN GLAND ABSCESS   and     mcgrath cath     KNEE SURGERY      Rt knee Fx, repair-dislocation problem    KNEE SURGERY      Lt knee reconstructive surgery-dislocation problem    LEEP TX, CERVICAL      LEEP for abnormal pap    MARSUP BARTHOLIN GLAND CYST  08       Allergies   Allergen Reactions    Milk (Cow) GI Disturbance         Current Outpatient Medications:     acetaminophen (TYLENOL) 325 MG tablet, Take 2 tablets (650 mg) by mouth every 4 hours as needed for  mild pain., Disp: 50 tablet, Rfl: 0    albuterol (PROAIR HFA/PROVENTIL HFA/VENTOLIN HFA) 108 (90 Base) MCG/ACT inhaler, Inhale 2 puffs into the lungs every 4 hours as needed for shortness of breath, wheezing or cough., Disp: 18 g, Rfl: 1    aspirin 81 MG EC tablet, Take 1 tablet (81 mg) by mouth 2 times daily., Disp: 60 tablet, Rfl: 0    azelastine (ASTELIN) 0.1 % nasal spray, Spray 1 spray into both nostrils 2 times daily., Disp: 90 mL, Rfl: 1    Calcium Carbonate-Vitamin D (CALCIUM + D PO), Take 600 mg by mouth daily (with lunch), Disp: , Rfl:     FEXOFENADINE  MG OR TABS, Take 180 mg by mouth every morning, Disp: , Rfl:     ibuprofen (ADVIL/MOTRIN) 600 MG tablet, TAKE 1 TABLET (600 MG) BY MOUTH EVERY 8 HOURS AS NEEDED FOR MODERATE PAIN, Disp: 60 tablet, Rfl: 1    magnesium 250 MG tablet, Take 1 tablet by mouth daily (with lunch)., Disp: , Rfl:     methocarbamol (ROBAXIN) 500 MG tablet, Take 1 tablet (500 mg) by mouth nightly as needed for muscle spasms., Disp: 30 tablet, Rfl: 0    montelukast (SINGULAIR) 10 MG tablet, Take 1 tablet (10 mg) by mouth at bedtime., Disp: 90 tablet, Rfl: 3    oxyCODONE (ROXICODONE) 5 MG tablet, Take 1-2 tablets (5-10 mg) by mouth every 4 hours as needed for moderate to severe pain., Disp: 26 tablet, Rfl: 0    senna-docusate (SENOKOT-S/PERICOLACE) 8.6-50 MG tablet, Take 1-2 tablets by mouth 2 times daily., Disp: 30 tablet, Rfl: 0    terbinafine (LAMISIL) 250 MG tablet, Take 1 tablet (250 mg) by mouth daily., Disp: 90 tablet, Rfl: 0    Turmeric Curcumin 500 MG CAPS, Take 500 mg by mouth daily (with lunch), Disp: , Rfl:     VITAMIN D, CHOLECALCIFEROL, PO, Take 2,000 Units by mouth daily (with lunch)., Disp: , Rfl:     Family History   Problem Relation Age of Onset    Thyroid Disease Mother     Diabetes Mother         type2    Eye Disorder Mother         cataracts    Gastrointestinal Disease Mother         hep a/has to have her throat stretched (esophageal stricturing)     Respiratory Mother         sleep apnea    Heart Disease Father 60    Tremor Father     Thyroid Disease Maternal Grandmother     Gynecology Maternal Grandmother         on bcp to keep period regular, a small uterus    Heart Disease Maternal Grandmother         tachycardia    Diabetes Maternal Grandfather     Cancer Maternal Grandfather     JAVIA.[email or web address]. Paternal Grandfather 30        age 30, then again in his 80's    Allergies Daughter         milk    Asthma Daughter     Anesthesia Reaction No family hx of     Thrombosis No family hx of        Social History     Socioeconomic History    Marital status:      Spouse name: Not on file    Number of children: 2    Years of education: Not on file    Highest education level: Not on file   Occupational History    Occupation:      Employer: [employer]   Tobacco Use    Smoking status: Never     Passive exposure: Never    Smokeless tobacco: Never   Vaping Use    Vaping status: Never Used   Substance and Sexual Activity    Alcohol use: Yes     Comment: once a month 1-2 drinks    Drug use: No    Sexual activity: Yes     Partners: Male     Birth control/protection: Surgical     Comment:  had vasectomy   Other Topics Concern     Service No    Blood Transfusions Not Asked    Caffeine Concern No    Occupational Exposure No    Hobby Hazards Not Asked    Sleep Concern No    Stress Concern Yes    Weight Concern Yes     Comment: gains easily    Special Diet No    Back Care Yes    Exercise Yes     Comment: irregularly    Bike Helmet Not Asked     Comment: doesn't bike    Seat Belt No    Self-Exams No    Parent/sibling w/ CABG, MI or angioplasty before 65F 55M? No   Social History Narrative    Caffeine intake/servings daily - 0-1    Calcium intake/servings daily - 2-3    Exercise 2-3  times weekly - describe aerobics    Sunscreen used - Yes    Seatbelts used - Yes    Guns stored in the home - No    Self Breast Exam - Yes    Pap test up to date  -  Yes    Eye exam up to date -  Yes    Dental exam up to date -  Yes    DEXA scan up to date -  Not Applicable    Flex Sig/Colonoscopy up to date -  Not Applicable    Mammography up to date -  Not Applicable    Immunizations reviewed and up to date - Yes    Abuse: Current or Past (Physical, Sexual or Emotional) - None current, past    Do you feel safe in your environment - Yes    Do you cope well with stress - Yes    Do you suffer from insomnia - No    Last updated by: Estefani Moraes MA 5/5/2010             Social Drivers of Health     Financial Resource Strain: Low Risk  (3/26/2025)    Financial Resource Strain     Within the past 12 months, have you or your family members you live with been unable to get utilities (heat, electricity) when it was really needed?: No   Food Insecurity: Low Risk  (3/26/2025)    Food Insecurity     Within the past 12 months, did you worry that your food would run out before you got money to buy more?: No     Within the past 12 months, did the food you bought just not last and you didn t have money to get more?: No   Transportation Needs: Low Risk  (3/26/2025)    Transportation Needs     Within the past 12 months, has lack of transportation kept you from medical appointments, getting your medicines, non-medical meetings or appointments, work, or from getting things that you need?: No   Physical Activity: Insufficiently Active (3/26/2025)    Exercise Vital Sign     Days of Exercise per Week: 7 days     Minutes of Exercise per Session: 20 min   Stress: No Stress Concern Present (3/26/2025)    [place] [employer] - Occupational Stress Questionnaire     Feeling of Stress : Not at all   Social Connections: Unknown (3/26/2025)    Social Connection and Isolation Panel [NHANES]     Frequency of Communication with Friends and Family: Not on file     Frequency of Social Gatherings with Friends and Family: Once a week     Attends Hoahaoism Services: Not on file     Active Member  of Clubs or Organizations: Not on file     Attends Club or Organization Meetings: Not on file     Marital Status: Not on file   Interpersonal Safety: Low Risk  (3/26/2025)    Interpersonal Safety     Do you feel physically and emotionally safe where you currently live?: Yes     Within the past 12 months, have you been hit, slapped, kicked or otherwise physically hurt by someone?: No     Within the past 12 months, have you been humiliated or emotionally abused in other ways by your partner or ex-partner?: No   Housing Stability: Low Risk  (3/26/2025)    Housing Stability     Do you have housing? : Yes     Are you worried about losing your housing?: No       10 point Review of Systems is negative except otherwise noted in HPI.    OBJECTIVE:  Derm: Skin is warm, dry, intact.  No remaining interdigital maceration noted to the right fourth interspace and no remaining signs of tinea pedis to the plantar aspect of both feet.  No open wounds, laceration or abrasions noted.  Left hallux and left fifth digit nail are white and yellow with mild dystrophy noted throughout. No ecchymosis or erythema noted.      Vasc: Dorsalis pedis pulses, 2/4 bilateral. Posterior tibial pulses 2/4 bilateral. Cap fill time < 3 seconds to the digits. No edema is present. Hair growth present to the toes.      Neuro: Protective sensation intact via light touch to the feet. Gross sensation intact.      MSK:   - No pain to palpation on clinical examination.  - Muscle strength 5/5 with dorsiflexion, plantarflexion, eversion, inversion of the feet. Compartments soft and compressible.

## 2025-05-30 ENCOUNTER — THERAPY VISIT (OUTPATIENT)
Age: 50
End: 2025-05-30
Payer: COMMERCIAL

## 2025-05-30 DIAGNOSIS — M25.561 ACUTE PAIN OF RIGHT KNEE: Primary | ICD-10-CM

## 2025-05-30 PROCEDURE — 97110 THERAPEUTIC EXERCISES: CPT | Mod: GP | Performed by: PHYSICAL THERAPIST

## 2025-05-30 PROCEDURE — 97112 NEUROMUSCULAR REEDUCATION: CPT | Mod: GP | Performed by: PHYSICAL THERAPIST

## 2025-06-27 ENCOUNTER — THERAPY VISIT (OUTPATIENT)
Age: 50
End: 2025-06-27
Payer: COMMERCIAL

## 2025-06-27 DIAGNOSIS — Z98.890 S/P KNEE SURGERY: Primary | ICD-10-CM

## 2025-06-27 PROCEDURE — 97112 NEUROMUSCULAR REEDUCATION: CPT | Mod: GP | Performed by: PHYSICAL THERAPIST

## 2025-06-27 PROCEDURE — 97110 THERAPEUTIC EXERCISES: CPT | Mod: GP | Performed by: PHYSICAL THERAPIST

## 2025-06-30 NOTE — PROGRESS NOTES
06/27/25 0500   Appointment Info   Signing clinician's name / credentials Imelda Olson PT   Total/Authorized Visits E[employer]   Visits Used 31   Medical Diagnosis S/P knee surgery   PT Tx Diagnosis L knee pain with strength and mobility defiicits   Other pertinent information Protocol Trochleoplasty: 12 weeks s/p. R knee arthroscopy on 9/19/24 for exploration of loose body - not removed as was in quad tendon. Hypermobile. History of patellar dislocations bilaterally.   Progress Note/Certification   Onset of illness/injury or Date of Surgery 12/19/24   Therapy Frequency 1x/week   Predicted Duration 6 weeks   Progress Note Completed Date 03/17/25   PT Goal 1   Goal Identifier HEP   Goal Description Pt will demonstrate mastery of HEP, completing at least 5x/week, without need for additional cuing in order to increase independence with self cares and self management of the condition.   Goal Progress independent in HEP   Target Date 03/17/25   Date Met 06/13/25   PT Goal 2   Goal Identifier Ambulation   Goal Description Pt will be able to ambulate without brace, without AD, and without pain for >30 min in order to return to PLOF post op   Rationale to maximize safety and independence with performance of ADLs and functional tasks;to maximize safety and independence within the home;to maximize safety and independence within the community   Goal Progress Can ambulate 30 min with 0/10 pain   Target Date 07/11/25   Date Met 06/27/25   Subjective Report   Subjective Report Exercises have been going well and are getting easier. She is noticing popping in her knee but it is not associated with pain. Stairs are still challenging and she is still experiencing pain with them.   Objective Measure 1   Objective Measure L knee AAROM   Details -2-139   Objective Measure 2   Objective Measure Gait   Details WNL, good heel strike and equal step length   Objective Measure 3   Objective Measure MMT   Details hip ext 4+/5, abd 4+/5, flexion  "4+/5 bilateral   Treatment Interventions (PT)   Interventions Therapeutic Procedure/Exercise   Therapeutic Procedure/Exercise   Therapeutic Procedures: strength, endurance, ROM, flexibility minutes (41786) 25   Ther Proc 1 bike x 5 min warmup SH 3   PTRx Ther Proc 2 Prone Plank   PTRx Ther Proc 2 - Details verbal review   PTRx Ther Proc 3 Hip Flexion Straight Leg Raise   PTRx Ther Proc 3 - Details verbal review   PTRx Ther Proc 4 Towel Gather   PTRx Ther Proc 4 - Details x15   PTRx Ther Proc 5 Strap Assisted Straight Leg Stretch   PTRx Ther Proc 5 - Details x30 sec   PTRx Ther Proc 6 Foot Doming   PTRx Ther Proc 6 - Details x15   PTRx Ther Proc 7 Isometric Ankle Dorsiflexion   PTRx Ther Proc 7 - Details x15   PTRx Ther Proc 8 Seated Hamstring Stretch   PTRx Ther Proc 8 - Details x30 sec   PTRx Ther Proc 9 Hip AROM Standing Extension   PTRx Ther Proc 9 - Details x15 GTB   PTRx Ther Proc 10 Donkey Kick #2 Standing   PTRx Ther Proc 10 - Details x15   PTRx Ther Proc 11 Side Stepping With Theraband   PTRx Ther Proc 11 - Details blue band above knees x 15   PTRx Ther Proc 12 Hip Flexion With Tubing   PTRx Ther Proc 12 - Details x15 GTB   Skilled Intervention education, HEP review and progression of prox hip strength   Patient Response/Progress demos HEP independently. Fatiguing and pain free.   Neuromuscular Re-education   Neuromuscular re-ed of mvmt, balance, coord, kinesthetic sense, posture, proprioception minutes (22158) 8   Neuro Re-ed 1 SLS 30\" bilat EC - VR   Neuro Re-ed 1 - Details tandem 30\" bilat EC - VR   Neuro Re-ed 2 toe taps x 15   Neuro Re-ed 2 - Details mini squats with theraband green x 15   Neuro Re-ed 3 step ups 4\" x 15 with cues for QS   PTRx Neuro Re-ed 1 step ups 6\" x 10 - pain   Patient Response/Progress min cues for hip hike, fatigues quickly   Education   Learner/Method Patient;Listening;Reading;Demonstration;Pictures/Video;No Barriers to Learning   Plan   Home program PTRx (Digital)   Updates to " plan of care progressed prox hip strength   Plan for next session Progress per trochleoplasty protocol; progress prox hip strengthening, proprioception. Quadriceps strengthening, ambulation/aerobic exercise.   Total Session Time   Timed Code Treatment Minutes 33   Total Treatment Time (sum of timed and untimed services) 33       PLAN  Continue therapy per current plan of care.    Beginning/End Dates of Progress Note Reporting Period:  03/17/25 to 06/27/2025    Referring Provider:  Rima Sarmiento MD

## 2025-07-08 ENCOUNTER — OFFICE VISIT (OUTPATIENT)
Dept: ORTHOPEDICS | Facility: CLINIC | Age: 50
End: 2025-07-08
Payer: COMMERCIAL

## 2025-07-08 DIAGNOSIS — Z98.890 S/P KNEE SURGERY: Primary | ICD-10-CM

## 2025-07-08 PROCEDURE — 99213 OFFICE O/P EST LOW 20 MIN: CPT | Performed by: ORTHOPAEDIC SURGERY

## 2025-07-08 NOTE — PROGRESS NOTES
Patient is a 49-year-old female who is now 7 months status post a left knee MPFL/trochlear plasty/LRL.  Her initial statement to me is that she is better than she was 4 months ago.  However she continues to struggle with pain and noise in her knee.    Her pain is most evident with steps going down greater than going up.  She believes that she continues to be weak and is noticing her quad is still atrophied.  She does admit however this is improving    She secondly thinks that the noise in her knee is a source of pain.  She is able to demonstrate this noise into my ear and I I believe that it is minimal crepitance and active range of motion.  She has good cartilage surfaces pre trochlear plasty, we did not perform an osteotomy of the cartilage I believe that the cartilage surfaces remain normal.  I have told her that this might persist and that she should try to ignore it is much as possible    She was doing planks in regards to core but not doing any bridging because initially bridging bothered her knee.  I think she should go back to trying it.  She continues to be very aware of her knee and is continues to be fearful that is going to dislocate at any moment but she feels that she is getting over that fear slowly over time.  She is back to gardening with less pain.    Overall she does notice that the patella tracks well which is an obvious objective sign, and she does believe that she is improving.  She reports her pain level today is 2-3 out of 10    Physical exam of patient's left knee reveals benign appearing incision no knee swelling good straight leg raising effort without a lag range of motion 0-1 30 with a soft endpoint.  There is minimal noise or crepitus in early flexion.  Kneecap tracks well through an active arc of motion.    Passive patella mobility 1 quadrant lateral mobility with a firm endpoint.    X-rays were reviewed and continue to show satisfactory position of the kneecap in good sulcus angle on  the axial view    Assessment: Continued weakness otherwise satisfactory status post patella stabilization procedure    Plan: I would like to add BFR to her routine.  She might need to change location of the therapist for this.  I would add bridging to her regiment.    She will follow-up once more at the 4-month kendall.  A functional test will be done prior to seeing me  No new x-rays need to be done    Rima Sarmiento MD  Professor Orthopedic Surgery  [facility]

## 2025-07-08 NOTE — LETTER
7/8/2025      Lisset MITCHELL Chi  [address] [id number]      Dear Colleague,    Thank you for referring your patient, Lisset MITCHELL Chi, to the [facility] [place]. Please see a copy of my visit note below.    Patient is a 49-year-old female who is now 7 months status post a left knee MPFL/trochlear plasty/LRL.  Her initial statement to me is that she is better than she was 4 months ago.  However she continues to struggle with pain and noise in her knee.    Her pain is most evident with steps going down greater than going up.  She believes that she continues to be weak and is noticing her quad is still atrophied.  She does admit however this is improving    She secondly thinks that the noise in her knee is a source of pain.  She is able to demonstrate this noise into my ear and I I believe that it is minimal crepitance and active range of motion.  She has good cartilage surfaces pre trochlear plasty, we did not perform an osteotomy of the cartilage I believe that the cartilage surfaces remain normal.  I have told her that this might persist and that she should try to ignore it is much as possible    She was doing planks in regards to core but not doing any bridging because initially bridging bothered her knee.  I think she should go back to trying it.  She continues to be very aware of her knee and is continues to be fearful that is going to dislocate at any moment but she feels that she is getting over that fear slowly over time.  She is back to gardening with less pain.    Overall she does notice that the patella tracks well which is an obvious objective sign, and she does believe that she is improving.  She reports her pain level today is 2-3 out of 10    Physical exam of patient's left knee reveals benign appearing incision no knee swelling good straight leg raising effort without a lag range of motion 0-1 30 with a soft endpoint.  There is minimal noise or crepitus in early  flexion.  Kneecap tracks well through an active arc of motion.    Passive patella mobility 1 quadrant lateral mobility with a firm endpoint.    X-rays were reviewed and continue to show satisfactory position of the kneecap in good sulcus angle on the axial view    Assessment: Continued weakness otherwise satisfactory status post patella stabilization procedure    Plan: I would like to add BFR to her routine.  She might need to change location of the therapist for this.  I would add bridging to her regiment.    She will follow-up once more at the 4-month kendall.  A functional test will be done prior to seeing me  No new x-rays need to be done    Rima Sarmiento MD  Professor Orthopedic Surgery  [facility]     Again, thank you for allowing me to participate in the care of your patient.        Sincerely,        Rima Sarmiento MD    Electronically signed

## 2025-07-08 NOTE — NURSING NOTE
Reason For Visit:   Chief Complaint   Patient presents with    RECHECK     DOS: 12/19/24 Left Knee Arthroscopy, Medial Patello-femoral Ligament Reconstruction with Allograft, trochleoplasty, and LRL       Primary MD: Katia Monsivais  Ref. MD: EST    ?  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     Date of injury: 1- 2 years ago  Type of injury: Left knee dislocation and subluxed     Date of surgery: 3/2/2018  Type of surgery: scope and lateral compartment, ant chamber debridement.      9/19/24 right knee arthroscopy, with open exploration of loose body      12/19/24 Left Knee Arthroscopy, Medial Patello-femoral Ligament Reconstruction with Allograft, Arthroscopy, Knee, With Trochleoplasty       Smoker: No  Request smoking cessation information: No    LMP 12/25/2013     Pain Assessment  Patient Currently in Pain: Yes (2-3/10)  Primary Pain Location: Knee         Maribel Formato, LPN

## 2025-07-09 ENCOUNTER — THERAPY VISIT (OUTPATIENT)
Age: 50
End: 2025-07-09
Payer: COMMERCIAL

## 2025-07-09 DIAGNOSIS — M25.561 ACUTE PAIN OF RIGHT KNEE: Primary | ICD-10-CM

## 2025-07-09 DIAGNOSIS — Z98.890 S/P KNEE SURGERY: ICD-10-CM

## 2025-07-09 PROCEDURE — 97110 THERAPEUTIC EXERCISES: CPT | Mod: GP | Performed by: PHYSICAL THERAPIST

## 2025-07-09 PROCEDURE — 97112 NEUROMUSCULAR REEDUCATION: CPT | Mod: GP | Performed by: PHYSICAL THERAPIST

## 2025-07-21 ENCOUNTER — THERAPY VISIT (OUTPATIENT)
Age: 50
End: 2025-07-21
Payer: COMMERCIAL

## 2025-07-21 DIAGNOSIS — Z98.890 S/P KNEE SURGERY: Primary | ICD-10-CM

## 2025-07-21 PROCEDURE — 97110 THERAPEUTIC EXERCISES: CPT | Mod: GP | Performed by: PHYSICAL THERAPIST

## 2025-07-21 PROCEDURE — 97140 MANUAL THERAPY 1/> REGIONS: CPT | Mod: GP | Performed by: PHYSICAL THERAPIST

## 2025-08-20 ENCOUNTER — THERAPY VISIT (OUTPATIENT)
Age: 50
End: 2025-08-20
Payer: COMMERCIAL

## 2025-08-20 DIAGNOSIS — M25.561 ACUTE PAIN OF RIGHT KNEE: Primary | ICD-10-CM

## 2025-08-20 DIAGNOSIS — Z98.890 S/P KNEE SURGERY: ICD-10-CM

## 2025-08-20 PROCEDURE — 97110 THERAPEUTIC EXERCISES: CPT | Mod: GP | Performed by: PHYSICAL THERAPIST

## (undated) DEVICE — NDL SCLEROTHERAPY 25GA CARR-LOCK  00711811

## (undated) DEVICE — PACK ARTHROSCOPY CUSTOM ASC

## (undated) DEVICE — SPECIMEN CONTAINER 3OZ W/FORMALIN 59901

## (undated) DEVICE — MINI C-ARM KITE W/PLATE PROTECTOR & FOOT COVER 5999777

## (undated) DEVICE — SOL WATER IRRIG 500ML BOTTLE 2F7113

## (undated) DEVICE — KIT ENDO TURNOVER/PROCEDURE CARRY-ON 101822

## (undated) DEVICE — DRSG STERI STRIP 1/2X4" R1547

## (undated) DEVICE — GOWN IMPERVIOUS 2XL BLUE

## (undated) DEVICE — TUBING SUCTION MEDI-VAC 1/4"X20' N620A

## (undated) DEVICE — LINEN GOWN XLG 5407

## (undated) DEVICE — SNARE CAPIVATOR ROUND COLD SNR BX10 M00561101

## (undated) DEVICE — CLIP HEMOCLIP ENDOSCOPIC INSTINCT 2.8X230CM INSC-7-230-SS

## (undated) DEVICE — SU VICRYL 1 CT-2 27" J335H

## (undated) DEVICE — SOL WATER IRRIG 1000ML BOTTLE 2F7114

## (undated) DEVICE — SUCTION MANIFOLD NEPTUNE 2 SYS 1 PORT 702-025-000

## (undated) DEVICE — DRSG PRIMAPORE 02X3" 7133

## (undated) DEVICE — SUCTION MANIFOLD NEPTUNE 2 SYS 4 PORT 0702-020-000

## (undated) DEVICE — BLADE KNIFE SURG 10 371110

## (undated) DEVICE — TUBING SYSTEM ARTHREX PUMP REDEUCE AR-6411

## (undated) DEVICE — ENDO SNARE POLYPECTOMY OVAL 15MM LOOP SD-240U-15

## (undated) DEVICE — SU VICRYL 2-0 CT-2 27" UND J269H

## (undated) DEVICE — ESU GROUND PAD ADULT W/CORD E7507

## (undated) DEVICE — GLOVE PROTEXIS POWDER FREE SMT 6.5  2D72PT65X

## (undated) DEVICE — ENDO TRAP POLYP E-TRAP 00711099

## (undated) DEVICE — GLOVE GAMMEX NEOPRENE ULTRA SZ 6.5 LF 8513

## (undated) DEVICE — GLOVE BIOGEL PI MICRO SZ 6.5 48565

## (undated) DEVICE — TUBING SUCTION 12"X1/4" N612

## (undated) DEVICE — ENDO FORCEP SPIKED SERRATED SHAFT JUMBO 239CM G56998

## (undated) DEVICE — SU MONOCRYL 3-0 SH 27" UND Y416H

## (undated) DEVICE — ENDO SNARE EXACTO COLD 9MM LOOP 2.4MMX230CM 00711115

## (undated) RX ORDER — PROPOFOL 10 MG/ML
INJECTION, EMULSION INTRAVENOUS
Status: DISPENSED
Start: 2024-09-19

## (undated) RX ORDER — ONDANSETRON 2 MG/ML
INJECTION INTRAMUSCULAR; INTRAVENOUS
Status: DISPENSED
Start: 2024-09-19

## (undated) RX ORDER — DEXAMETHASONE SODIUM PHOSPHATE 4 MG/ML
INJECTION, SOLUTION INTRA-ARTICULAR; INTRALESIONAL; INTRAMUSCULAR; INTRAVENOUS; SOFT TISSUE
Status: DISPENSED
Start: 2024-12-19

## (undated) RX ORDER — LIDOCAINE HYDROCHLORIDE 10 MG/ML
INJECTION, SOLUTION EPIDURAL; INFILTRATION; INTRACAUDAL; PERINEURAL
Status: DISPENSED
Start: 2021-09-13

## (undated) RX ORDER — ONDANSETRON 2 MG/ML
INJECTION INTRAMUSCULAR; INTRAVENOUS
Status: DISPENSED
Start: 2018-03-02

## (undated) RX ORDER — TRANEXAMIC ACID 100 MG/ML
INJECTION, SOLUTION INTRAVENOUS
Status: DISPENSED
Start: 2024-12-19

## (undated) RX ORDER — PROPOFOL 10 MG/ML
INJECTION, EMULSION INTRAVENOUS
Status: DISPENSED
Start: 2018-03-02

## (undated) RX ORDER — FENTANYL CITRATE 50 UG/ML
INJECTION, SOLUTION INTRAMUSCULAR; INTRAVENOUS
Status: DISPENSED
Start: 2024-12-19

## (undated) RX ORDER — CEFAZOLIN SODIUM 2 G/50ML
SOLUTION INTRAVENOUS
Status: DISPENSED
Start: 2024-09-19

## (undated) RX ORDER — ACETAMINOPHEN 325 MG/1
TABLET ORAL
Status: DISPENSED
Start: 2024-12-19

## (undated) RX ORDER — HYDROMORPHONE HYDROCHLORIDE 1 MG/ML
INJECTION, SOLUTION INTRAMUSCULAR; INTRAVENOUS; SUBCUTANEOUS
Status: DISPENSED
Start: 2024-12-19

## (undated) RX ORDER — PROPOFOL 10 MG/ML
INJECTION, EMULSION INTRAVENOUS
Status: DISPENSED
Start: 2024-12-19

## (undated) RX ORDER — GABAPENTIN 300 MG/1
CAPSULE ORAL
Status: DISPENSED
Start: 2018-03-02

## (undated) RX ORDER — FENTANYL CITRATE 50 UG/ML
INJECTION, SOLUTION INTRAMUSCULAR; INTRAVENOUS
Status: DISPENSED
Start: 2018-03-02

## (undated) RX ORDER — FENTANYL CITRATE-0.9 % NACL/PF 10 MCG/ML
PLASTIC BAG, INJECTION (ML) INTRAVENOUS
Status: DISPENSED
Start: 2024-12-19

## (undated) RX ORDER — LIDOCAINE HYDROCHLORIDE 10 MG/ML
INJECTION, SOLUTION EPIDURAL; INFILTRATION; INTRACAUDAL; PERINEURAL
Status: DISPENSED
Start: 2021-06-07

## (undated) RX ORDER — KETOROLAC TROMETHAMINE 30 MG/ML
INJECTION, SOLUTION INTRAMUSCULAR; INTRAVENOUS
Status: DISPENSED
Start: 2018-03-02

## (undated) RX ORDER — MINERAL OIL
OIL (ML) MISCELLANEOUS
Status: DISPENSED
Start: 2018-03-02

## (undated) RX ORDER — ONDANSETRON 4 MG/1
TABLET, ORALLY DISINTEGRATING ORAL
Status: DISPENSED
Start: 2024-12-19

## (undated) RX ORDER — ACETAMINOPHEN 325 MG/1
TABLET ORAL
Status: DISPENSED
Start: 2018-03-02

## (undated) RX ORDER — CEFAZOLIN SODIUM 2 G/50ML
SOLUTION INTRAVENOUS
Status: DISPENSED
Start: 2024-12-19

## (undated) RX ORDER — FENTANYL CITRATE 50 UG/ML
INJECTION, SOLUTION INTRAMUSCULAR; INTRAVENOUS
Status: DISPENSED
Start: 2022-12-02

## (undated) RX ORDER — ONDANSETRON 2 MG/ML
INJECTION INTRAMUSCULAR; INTRAVENOUS
Status: DISPENSED
Start: 2024-12-19

## (undated) RX ORDER — KETOROLAC TROMETHAMINE 30 MG/ML
INJECTION, SOLUTION INTRAMUSCULAR; INTRAVENOUS
Status: DISPENSED
Start: 2024-09-19

## (undated) RX ORDER — KETOROLAC TROMETHAMINE 30 MG/ML
INJECTION, SOLUTION INTRAMUSCULAR; INTRAVENOUS
Status: DISPENSED
Start: 2024-12-19

## (undated) RX ORDER — FENTANYL CITRATE 50 UG/ML
INJECTION, SOLUTION INTRAMUSCULAR; INTRAVENOUS
Status: DISPENSED
Start: 2024-09-19

## (undated) RX ORDER — DEXAMETHASONE SODIUM PHOSPHATE 4 MG/ML
INJECTION, SOLUTION INTRA-ARTICULAR; INTRALESIONAL; INTRAMUSCULAR; INTRAVENOUS; SOFT TISSUE
Status: DISPENSED
Start: 2018-03-02

## (undated) RX ORDER — ALBUTEROL SULFATE 90 UG/1
AEROSOL, METERED RESPIRATORY (INHALATION)
Status: DISPENSED
Start: 2024-09-19

## (undated) RX ORDER — ACETAMINOPHEN 325 MG/1
TABLET ORAL
Status: DISPENSED
Start: 2024-09-19

## (undated) RX ORDER — OXYCODONE HYDROCHLORIDE 5 MG/1
TABLET ORAL
Status: DISPENSED
Start: 2024-09-19

## (undated) RX ORDER — BUPIVACAINE HYDROCHLORIDE 5 MG/ML
INJECTION, SOLUTION EPIDURAL; INTRACAUDAL
Status: DISPENSED
Start: 2021-09-13

## (undated) RX ORDER — DIPHENHYDRAMINE HYDROCHLORIDE 50 MG/ML
INJECTION INTRAMUSCULAR; INTRAVENOUS
Status: DISPENSED
Start: 2022-12-02

## (undated) RX ORDER — BUPIVACAINE HYDROCHLORIDE 2.5 MG/ML
INJECTION, SOLUTION EPIDURAL; INFILTRATION; INTRACAUDAL
Status: DISPENSED
Start: 2018-03-02

## (undated) RX ORDER — LIDOCAINE HYDROCHLORIDE 10 MG/ML
INJECTION, SOLUTION EPIDURAL; INFILTRATION; INTRACAUDAL; PERINEURAL
Status: DISPENSED
Start: 2024-05-24

## (undated) RX ORDER — METHYLPREDNISOLONE ACETATE 80 MG/ML
INJECTION, SUSPENSION INTRA-ARTICULAR; INTRALESIONAL; INTRAMUSCULAR; SOFT TISSUE
Status: DISPENSED
Start: 2021-09-13

## (undated) RX ORDER — DEXAMETHASONE SODIUM PHOSPHATE 4 MG/ML
INJECTION, SOLUTION INTRA-ARTICULAR; INTRALESIONAL; INTRAMUSCULAR; INTRAVENOUS; SOFT TISSUE
Status: DISPENSED
Start: 2024-09-19

## (undated) RX ORDER — BETAMETHASONE SODIUM PHOSPHATE AND BETAMETHASONE ACETATE 3; 3 MG/ML; MG/ML
INJECTION, SUSPENSION INTRA-ARTICULAR; INTRALESIONAL; INTRAMUSCULAR; SOFT TISSUE
Status: DISPENSED
Start: 2021-06-07